# Patient Record
Sex: FEMALE | Race: WHITE | NOT HISPANIC OR LATINO | Employment: OTHER | URBAN - METROPOLITAN AREA
[De-identification: names, ages, dates, MRNs, and addresses within clinical notes are randomized per-mention and may not be internally consistent; named-entity substitution may affect disease eponyms.]

---

## 2017-01-06 DIAGNOSIS — I10 ESSENTIAL HYPERTENSION: ICD-10-CM

## 2017-01-09 RX ORDER — LISINOPRIL 2.5 MG/1
2.5 TABLET ORAL DAILY
Qty: 30 TABLET | Refills: 2 | Status: SHIPPED | OUTPATIENT
Start: 2017-01-09 | End: 2017-05-17 | Stop reason: SDUPTHER

## 2017-05-17 ENCOUNTER — OFFICE VISIT (OUTPATIENT)
Dept: FAMILY MEDICINE | Facility: CLINIC | Age: 72
End: 2017-05-17
Payer: MEDICARE

## 2017-05-17 ENCOUNTER — LAB VISIT (OUTPATIENT)
Dept: LAB | Facility: HOSPITAL | Age: 72
End: 2017-05-17
Attending: FAMILY MEDICINE
Payer: MEDICARE

## 2017-05-17 VITALS
TEMPERATURE: 98 F | HEART RATE: 88 BPM | DIASTOLIC BLOOD PRESSURE: 82 MMHG | SYSTOLIC BLOOD PRESSURE: 134 MMHG | OXYGEN SATURATION: 96 % | HEIGHT: 68 IN

## 2017-05-17 DIAGNOSIS — L03.115 BILATERAL LOWER LEG CELLULITIS: ICD-10-CM

## 2017-05-17 DIAGNOSIS — L03.116 BILATERAL LOWER LEG CELLULITIS: Primary | ICD-10-CM

## 2017-05-17 DIAGNOSIS — L03.115 BILATERAL LOWER LEG CELLULITIS: Primary | ICD-10-CM

## 2017-05-17 DIAGNOSIS — L03.116 BILATERAL LOWER LEG CELLULITIS: ICD-10-CM

## 2017-05-17 DIAGNOSIS — I10 ESSENTIAL HYPERTENSION: ICD-10-CM

## 2017-05-17 LAB
BASOPHILS # BLD AUTO: 0.06 K/UL
BASOPHILS NFR BLD: 0.7 %
CRP SERPL-MCNC: 15.5 MG/L
DIFFERENTIAL METHOD: ABNORMAL
EOSINOPHIL # BLD AUTO: 0.6 K/UL
EOSINOPHIL NFR BLD: 6.4 %
ERYTHROCYTE [DISTWIDTH] IN BLOOD BY AUTOMATED COUNT: 14.1 %
HCT VFR BLD AUTO: 41.2 %
HGB BLD-MCNC: 13.2 G/DL
LYMPHOCYTES # BLD AUTO: 2.2 K/UL
LYMPHOCYTES NFR BLD: 24.9 %
MCH RBC QN AUTO: 26.9 PG
MCHC RBC AUTO-ENTMCNC: 32 %
MCV RBC AUTO: 84 FL
MONOCYTES # BLD AUTO: 0.6 K/UL
MONOCYTES NFR BLD: 6.5 %
NEUTROPHILS # BLD AUTO: 5.3 K/UL
NEUTROPHILS NFR BLD: 61.2 %
PLATELET # BLD AUTO: 296 K/UL
PMV BLD AUTO: 11.1 FL
RBC # BLD AUTO: 4.9 M/UL
WBC # BLD AUTO: 8.73 K/UL

## 2017-05-17 PROCEDURE — 36415 COLL VENOUS BLD VENIPUNCTURE: CPT | Mod: PO

## 2017-05-17 PROCEDURE — 99214 OFFICE O/P EST MOD 30 MIN: CPT | Mod: S$PBB,,, | Performed by: FAMILY MEDICINE

## 2017-05-17 PROCEDURE — 85025 COMPLETE CBC W/AUTO DIFF WBC: CPT

## 2017-05-17 PROCEDURE — 99999 PR PBB SHADOW E&M-EST. PATIENT-LVL III: CPT | Mod: PBBFAC,,, | Performed by: FAMILY MEDICINE

## 2017-05-17 PROCEDURE — 86140 C-REACTIVE PROTEIN: CPT

## 2017-05-17 RX ORDER — LISINOPRIL 2.5 MG/1
2.5 TABLET ORAL DAILY
Qty: 30 TABLET | Refills: 2 | Status: SHIPPED | OUTPATIENT
Start: 2017-05-17 | End: 2017-05-23 | Stop reason: DRUGHIGH

## 2017-05-17 RX ORDER — FLUTICASONE PROPIONATE 50 MCG
2 SPRAY, SUSPENSION (ML) NASAL DAILY
Qty: 16 G | Refills: 0 | Status: SHIPPED | OUTPATIENT
Start: 2017-05-17 | End: 2017-11-06

## 2017-05-17 RX ORDER — SULFAMETHOXAZOLE AND TRIMETHOPRIM 800; 160 MG/1; MG/1
1 TABLET ORAL 2 TIMES DAILY
Qty: 14 TABLET | Refills: 0 | Status: SHIPPED | OUTPATIENT
Start: 2017-05-17 | End: 2017-05-24

## 2017-05-17 NOTE — PROGRESS NOTES
Subjective:       Patient ID: Niru Reyes is a 71 y.o. female.    Chief Complaint: leg infection    HPI Comments: 71 y old female with bilateral leg erythema and edema for 1 w, she also has sores, unsure if they are bug bites. Concerned about cellulitis .No fever , sob , chills, pain      Review of Systems   Constitutional: Negative.    HENT: Negative.    Respiratory: Negative.    Cardiovascular: Positive for leg swelling.       Objective:      Physical Exam   Constitutional: She is oriented to person, place, and time. She appears well-developed and well-nourished. No distress.   HENT:   Head: Normocephalic and atraumatic.   Right Ear: External ear normal.   Left Ear: External ear normal.   Mouth/Throat: No oropharyngeal exudate.   Eyes: Conjunctivae and EOM are normal. Pupils are equal, round, and reactive to light. Right eye exhibits no discharge. Left eye exhibits no discharge. No scleral icterus.   Neck: Normal range of motion. Neck supple. No JVD present. No tracheal deviation present. No thyromegaly present.   Cardiovascular: Normal rate, regular rhythm and normal heart sounds.  Exam reveals no gallop and no friction rub.    No murmur heard.  Pulmonary/Chest: Effort normal and breath sounds normal. No stridor. No respiratory distress. She has no wheezes. She has no rales. She exhibits no tenderness.   Abdominal: Soft. Bowel sounds are normal. She exhibits no distension. There is no tenderness. There is no rebound and no guarding.   Musculoskeletal: Normal range of motion.        Left lower leg: She exhibits tenderness and edema.        Legs:  skin erythema, edema, and warmth on both LE   Lymphadenopathy:     She has no cervical adenopathy.   Neurological: She is alert and oriented to person, place, and time.   Skin: Skin is warm and dry. She is not diaphoretic.   Psychiatric: She has a normal mood and affect. Her behavior is normal. Judgment and thought content normal.       Assessment:      Niru was seen today  for leg infection.    Diagnoses and all orders for this visit:    Bilateral lower leg cellulitis  -     CBC auto differential; Future  -     C-reactive protein; Future    Essential hypertension  -     lisinopril (PRINIVIL,ZESTRIL) 2.5 MG tablet; Take 1 tablet (2.5 mg total) by mouth once daily.    Other orders  -     sulfamethoxazole-trimethoprim 800-160mg (BACTRIM DS) 800-160 mg Tab; Take 1 tablet by mouth 2 (two) times daily.  -     fluticasone (FLONASE) 50 mcg/actuation nasal spray; 2 sprays by Each Nare route once daily.      Plan:   F.u in 2d . WS discussed. Keep legs elevated

## 2017-05-17 NOTE — MR AVS SNAPSHOT
[facility]  [address]  [facility] [address]  Phone: [phone]  Fax: [phone]                  Niru Reyes   2017 10:40 AM   Office Visit    Description:  Female : 1945   Provider:  Jordana Rubin MD   Department:  [facility]           Reason for Visit     leg infection           Diagnoses this Visit        Comments    Bilateral lower leg cellulitis    -  Primary     Essential hypertension                To Do List           Future Appointments        Provider Department Dept Phone    2017 2:20 PM [facility]     2017 9:40 AM Maria Eugenia Grier NP [facility] [phone]      Goals (5 Years of Data)     None       These Medications        Disp Refills Start End    sulfamethoxazole-trimethoprim 800-160mg (BACTRIM DS) 800-160 mg Tab 14 tablet 0 2017    Take 1 tablet by mouth 2 (two) times daily. - Oral    Pharmacy: [facility] [address] [place] [id number] [facility] [address] [place] [address] Ph #: [phone]       lisinopril (PRINIVIL,ZESTRIL) 2.5 MG tablet 30 tablet 2 2017    Take 1 tablet (2.5 mg total) by mouth once daily. - Oral    Pharmacy: [facility] [address] [place] [id number] [place] [address] Ph #: [phone]       fluticasone (FLONASE) 50 mcg/actuation nasal spray 16 g 0 2017     2 sprays by Each Nare route once daily. - Each Nare    Pharmacy: [facility] [address] [place] [id number] [facility] [address] [place] [address] Ph #: [phone]       Notes to Pharmacy: **Patient requests 90 days supply**      [facility]     [facility] Care Line -  Assistance  Unless otherwise directed by your provider, please contact [facility], our nurse care line that is available for  assistance.     Registered nurses in the [facility] provide: appointment scheduling, clinical advisement, health education, and other advisory services.  Call: [phone] (toll free)               Medications           Message regarding Medications     Verify the changes and/or additions to your medication regime listed below are the same as discussed with your clinician today.  If any of these changes or additions are incorrect, please notify your healthcare provider.        START taking these NEW medications        Refills    sulfamethoxazole-trimethoprim 800-160mg (BACTRIM DS) 800-160 mg Tab 0    Sig: Take 1 tablet by mouth 2 (two) times daily.    Class: Normal    Route: Oral      CHANGE how you are taking these medications     Start Taking Instead of    fluticasone (FLONASE) 50 mcg/actuation nasal spray fluticasone (FLONASE) 50 mcg/actuation nasal spray    Dosage:  2 sprays by Each Nare route once daily. Dosage:  USE TWO SPRAYS IN EACH NOSTRIL ONCE DAILY    Reason for Change:  Reorder       STOP taking these medications     ondansetron (ZOFRAN-ODT) 4 MG TbDL Take 2 tablets (8 mg total) by mouth every 6 (six) hours as needed.           Verify that the below list of medications is an accurate representation of the medications you are currently taking.  If none reported, the list may be blank. If incorrect, please contact your healthcare provider. Carry this list with you in case of emergency.           Current Medications     citalopram (CELEXA) 10 MG tablet Take 1 tablet (10 mg total) by mouth once daily.    hydrochlorothiazide (HYDRODIURIL) 25 MG tablet Take 1 tablet (25 mg total) by mouth once daily.    cetirizine (ZYRTEC) 10 MG tablet Take 1 tablet (10 mg total) by mouth every evening.    fluticasone (FLONASE) 50 mcg/actuation nasal spray 2 sprays by Each Nare route once daily.    lisinopril (PRINIVIL,ZESTRIL) 2.5 MG tablet Take 1 tablet (2.5 mg total) by mouth once daily.    sulfamethoxazole-trimethoprim 800-160mg (BACTRIM DS) 800-160 mg Tab Take 1  "tablet by mouth 2 (two) times daily.    tramadol (ULTRAM) 50 mg tablet Take 50 mg by mouth every 6 (six) hours as needed for Pain.           Clinical Reference Information           Your Vitals Were     BP Pulse Temp Height SpO2       134/82 (BP Location: Right arm, Patient Position: Sitting, BP Method: Manual) 88 98.3 °F (36.8 °C) (Oral) 5' 8" (1.727 m) 96%       Blood Pressure          Most Recent Value    BP  134/82      Allergies as of 5/17/2017     No Known Allergies      Immunizations Administered on Date of Encounter - 5/17/2017     None      Orders Placed During Today's Visit     Future Labs/Procedures Expected by Expires    C-reactive protein  5/17/2017 7/16/2018    CBC auto differential  5/17/2017 8/15/2017      [facility] Sign-Up     Activating your [facility] account is as easy as 1-2-3!     1) Visit [email or web address], select Sign Up Now, enter this activation code and your date of birth, then select Next.  [email or web address]  Expires: 7/1/2017 11:32 AM      2) Create a username and password to use when you visit [facility] in the future and select a security question in case you lose your password and select Next.    3) Enter your e-mail address and click Sign Up!    Additional Information  If you have questions, please e-mail [email or web address] or call [phone] to talk to our [facility] staff. Remember, [facility] is NOT to be used for urgent needs. For medical emergencies, dial 911.         Instructions     any of these at the pharmacy .  estefany Butt   For diarrhea prevention          Language Assistance Services     ATTENTION: Language assistance services are available, free of charge. Please call [phone].      ATENCIÓN: Si habla español, tiene a montanez disposición servicios gratuitos de asistencia lingüística. Llame al [phone].     CHÚ Ý: N?u b?n nói Ti?ng Vi?t, có các d?ch v? h? tr? ngôn ng? mi?n phí dành cho b?n. G?i s? [phone].         [facility] " complies with applicable Federal civil rights laws and does not discriminate on the basis of race, color, national origin, age, disability, or sex.

## 2017-05-19 ENCOUNTER — OFFICE VISIT (OUTPATIENT)
Dept: FAMILY MEDICINE | Facility: CLINIC | Age: 72
End: 2017-05-19
Payer: MEDICARE

## 2017-05-19 VITALS
BODY MASS INDEX: 44.41 KG/M2 | SYSTOLIC BLOOD PRESSURE: 160 MMHG | OXYGEN SATURATION: 96 % | DIASTOLIC BLOOD PRESSURE: 81 MMHG | WEIGHT: 293 LBS | HEIGHT: 68 IN | RESPIRATION RATE: 18 BRPM | HEART RATE: 72 BPM

## 2017-05-19 DIAGNOSIS — L03.115 BILATERAL LOWER LEG CELLULITIS: Primary | ICD-10-CM

## 2017-05-19 DIAGNOSIS — L03.116 BILATERAL LOWER LEG CELLULITIS: Primary | ICD-10-CM

## 2017-05-19 DIAGNOSIS — F32.2 SEVERE SINGLE CURRENT EPISODE OF MAJOR DEPRESSIVE DISORDER, WITHOUT PSYCHOTIC FEATURES: ICD-10-CM

## 2017-05-19 DIAGNOSIS — I10 ESSENTIAL HYPERTENSION: ICD-10-CM

## 2017-05-19 PROCEDURE — 99999 PR PBB SHADOW E&M-EST. PATIENT-LVL III: CPT | Mod: PBBFAC,,, | Performed by: NURSE PRACTITIONER

## 2017-05-19 PROCEDURE — 99213 OFFICE O/P EST LOW 20 MIN: CPT | Mod: PBBFAC,PO | Performed by: NURSE PRACTITIONER

## 2017-05-19 PROCEDURE — 99213 OFFICE O/P EST LOW 20 MIN: CPT | Mod: S$PBB,,, | Performed by: NURSE PRACTITIONER

## 2017-05-19 PROCEDURE — 96372 THER/PROPH/DIAG INJ SC/IM: CPT | Mod: PBBFAC,PO

## 2017-05-19 RX ORDER — CEFTRIAXONE 1 G/1
1 INJECTION, POWDER, FOR SOLUTION INTRAMUSCULAR; INTRAVENOUS
Status: COMPLETED | OUTPATIENT
Start: 2017-05-19 | End: 2017-05-19

## 2017-05-19 RX ADMIN — CEFTRIAXONE 1 G: 1 INJECTION, POWDER, FOR SOLUTION INTRAMUSCULAR; INTRAVENOUS at 10:05

## 2017-05-19 NOTE — PROGRESS NOTES
Subjective:       Patient ID: Niru Reyes is a 72 y.o. female.    Chief Complaint: No chief complaint on file.  Pt reports to clinic for follow up evaluation of cellulitis to bilateral lower extremities. She was seen in clinic 2 days ago and started on bactrim.  CBC was negative for elevated WBC, CRP=15.5. Pt denies fevers or chills.  Pt is crying in office.  Reports her sister  2 recently from sepsis related to cellulitis.  Cellulitis resulted in amputation of leg.  Pt is afraid the same will happen to her.  Pt's blood pressure is elevated. Has taken medication. Denies chest pain headache or dizziness. Takes celexa daily to management depression  HPI  Review of Systems   Constitutional: Negative for chills and fever.   Cardiovascular: Negative for chest pain and palpitations.   Skin: Positive for wound.   Neurological: Negative for headaches.   Psychiatric/Behavioral: Positive for dysphoric mood. The patient is nervous/anxious.        Objective:      Physical Exam   Constitutional: She is oriented to person, place, and time. She has a sickly appearance.   HENT:   Head: Normocephalic.   Cardiovascular: Normal rate and normal heart sounds.    Pulmonary/Chest: Effort normal and breath sounds normal.   Neurological: She is alert and oriented to person, place, and time.   Skin: There is erythema.        Posterior lower extremities are erythematous, cool to touch, no drainage, edematous   Psychiatric: Her mood appears anxious. She exhibits a depressed mood.   Vitals reviewed.      Assessment:       1. Bilateral lower leg cellulitis    2. Severe single current episode of major depressive disorder, without psychotic features        Plan:   Bilateral lower leg cellulitis  -     cefTRIAXone injection 1 g; Inject 1 g into the muscle one time.  -continue bactrim. Monitor for fever. Skin marker used to border erythema, pt instructed to seek emergency evaluation if erythema exceeds border over the weekend. Verbalized  understanding  Severe single current episode of major depressive disorder, without psychotic features  -pt is emotionally upset in office today.  Explained to patient in great detail about cellulitis and worsening symptoms.  -will consider increasing dose on celexa during follow up visit.    Essential htn  -pt's blood pressure did decrease in office after allowing time to calm down.  Continue current treatment plan  Will re evaluate bp in 3 day during follow up.  If remains elevated will adjust lisinopril  -diet and exercise  -likely related to emotional upset.       Return in about 3 days (around 5/22/2017).

## 2017-05-19 NOTE — MR AVS SNAPSHOT
[facility]  [address]  [place] [address]  Phone: [phone]  Fax: [phone]                  Niru Reyes   2017 9:40 AM   Office Visit    Description:  Female : 1945   Provider:  Maria Eugenia Grier NP   Department:  [facility]           Diagnoses this Visit        Comments    Bilateral lower leg cellulitis    -  Primary            To Do List           Future Appointments        Provider Department Dept Phone    2017 9:40 AM Mraia Eugenia Grier NP [facility] [phone]      Goals (5 Years of Data)     None      Follow-Up and Disposition     Return in about 3 days (around 2017).      [facility]     [facility] Care [facility] -  Assistance  Unless otherwise directed by your provider, please contact [facility], our nurse care line that is available for  assistance.     Registered nurses in the [facility] provide: appointment scheduling, clinical advisement, health education, and other advisory services.  Call: [phone] (toll free)               Medications           Message regarding Medications     Verify the changes and/or additions to your medication regime listed below are the same as discussed with your clinician today.  If any of these changes or additions are incorrect, please notify your healthcare provider.        These medications were administered today        Dose Freq    cefTRIAXone injection 1 g 1 g Clinic/HOD 1 time    Sig: Inject 1 g into the muscle one time.    Class: Normal    Route: Intramuscular           Verify that the below list of medications is an accurate representation of the medications you are currently taking.  If none reported, the list may be blank. If incorrect, please contact your healthcare provider. Carry this list with you in case of emergency.           Current Medications     citalopram (CELEXA) 10 MG tablet Take 1 tablet (10 mg total) by  "mouth once daily.    hydrochlorothiazide (HYDRODIURIL) 25 MG tablet Take 1 tablet (25 mg total) by mouth once daily.    lisinopril (PRINIVIL,ZESTRIL) 2.5 MG tablet Take 1 tablet (2.5 mg total) by mouth once daily.    sulfamethoxazole-trimethoprim 800-160mg (BACTRIM DS) 800-160 mg Tab Take 1 tablet by mouth 2 (two) times daily.    cetirizine (ZYRTEC) 10 MG tablet Take 1 tablet (10 mg total) by mouth every evening.    fluticasone (FLONASE) 50 mcg/actuation nasal spray 2 sprays by Each Nare route once daily.    tramadol (ULTRAM) 50 mg tablet Take 50 mg by mouth every 6 (six) hours as needed for Pain.           Clinical Reference Information           Your Vitals Were     BP Pulse Resp Height Weight SpO2    160/81 72 18 5' 8" (1.727 m) 163 kg (359 lb 5.6 oz) 96%    BMI                54.64 kg/m2          Blood Pressure          Most Recent Value    BP  (!)  160/81      Allergies as of 5/19/2017     No Known Allergies      Immunizations Administered on Date of Encounter - 5/19/2017     None      Administrations This Visit     cefTRIAXone injection 1 g     Admin Date Action Dose Route Administered By             05/19/2017 Given 1 g Intramuscular Nereyda Garibay LPN                      [facility] Sign-Up     Activating your [facility] account is as easy as 1-2-3!     1) Visit [email or web address], select Sign Up Now, enter this activation code and your date of birth, then select Next.  [email or web address]  Expires: 7/1/2017 11:32 AM      2) Create a username and password to use when you visit [facility] in the future and select a security question in case you lose your password and select Next.    3) Enter your e-mail address and click Sign Up!    Additional Information  If you have questions, please e-mail [email or web address] or call [phone] to talk to our [facility] staff. Remember, [facility] is NOT to be used for urgent needs. For medical emergencies, dial 911.         Language Assistance Services     ATTENTION: Language " assistance services are available, free of charge. Please call [phone].      ATENCIÓN: Si habla joseph, tiene a montanez disposición servicios gratuitos de asistencia lingüística. Llame al [phone].     CHÚ Ý: N?u b?n nói Ti?ng Vi?t, có các d?ch v? h? tr? ngôn ng? mi?n phí dành cho b?n. G?i s? [phone].         [facility] complies with applicable Federal civil rights laws and does not discriminate on the basis of race, color, national origin, age, disability, or sex.

## 2017-05-23 ENCOUNTER — OFFICE VISIT (OUTPATIENT)
Dept: FAMILY MEDICINE | Facility: CLINIC | Age: 72
End: 2017-05-23
Payer: MEDICARE

## 2017-05-23 VITALS
SYSTOLIC BLOOD PRESSURE: 149 MMHG | HEIGHT: 68 IN | RESPIRATION RATE: 18 BRPM | DIASTOLIC BLOOD PRESSURE: 74 MMHG | OXYGEN SATURATION: 95 % | WEIGHT: 293 LBS | HEART RATE: 89 BPM | BODY MASS INDEX: 44.41 KG/M2

## 2017-05-23 DIAGNOSIS — L03.116 CELLULITIS OF BOTH LOWER EXTREMITIES: Primary | ICD-10-CM

## 2017-05-23 DIAGNOSIS — I10 ESSENTIAL HYPERTENSION: ICD-10-CM

## 2017-05-23 DIAGNOSIS — L03.115 CELLULITIS OF BOTH LOWER EXTREMITIES: Primary | ICD-10-CM

## 2017-05-23 PROCEDURE — 99214 OFFICE O/P EST MOD 30 MIN: CPT | Mod: PBBFAC,PO | Performed by: NURSE PRACTITIONER

## 2017-05-23 PROCEDURE — 99999 PR PBB SHADOW E&M-EST. PATIENT-LVL IV: CPT | Mod: PBBFAC,,, | Performed by: NURSE PRACTITIONER

## 2017-05-23 PROCEDURE — 99213 OFFICE O/P EST LOW 20 MIN: CPT | Mod: S$PBB,,, | Performed by: NURSE PRACTITIONER

## 2017-05-23 RX ORDER — LISINOPRIL 5 MG/1
5 TABLET ORAL DAILY
Qty: 90 TABLET | Refills: 0 | Status: SHIPPED | OUTPATIENT
Start: 2017-05-23 | End: 2017-06-08 | Stop reason: DRUGHIGH

## 2017-05-23 NOTE — PROGRESS NOTES
Subjective:       Patient ID: Niru Reyes is a 72 y.o. female.    Chief Complaint: Follow-up  pt reports to clinic for cellulitis follow up.  She is being treated with bactrim.  Denies fever, or chills.  Pt's notes decreased in erythema.  Bp is noted to be elevated. Complaint with medication.  Denies chest pain, headache or dizziness.   HPI  Review of Systems   Constitutional: Negative for fever.   Respiratory: Negative.    Cardiovascular: Negative.  Negative for chest pain.   Gastrointestinal: Negative.    Genitourinary: Negative.    Skin: Positive for color change.       Objective:      Physical Exam   Constitutional: She is oriented to person, place, and time. She appears well-developed.   HENT:   Head: Normocephalic.   Eyes: EOM are normal.   Neck: Neck supple.   Cardiovascular: Normal rate and normal heart sounds.    Pulmonary/Chest: Effort normal and breath sounds normal.   Neurological: She is alert and oriented to person, place, and time.   Skin: Skin is warm and dry. No erythema.        Vitals reviewed.      Assessment:       1. Cellulitis of both lower extremities    2. Essential hypertension        Plan:   Cellulitis of both lower extremities  Complete previous prescribed antibiotics   Essential hypertension  -     lisinopril (PRINIVIL,ZESTRIL) 5 MG tablet; Take 1 tablet (5 mg total) by mouth once daily.  Dispense: 90 tablet; Refill: 0  -lisinopril increased to 5mg/day  -diet and exercise to aid in treatment  -pt will schedule a nurse visit in 2 weeks to re evaluate. If remains elevated will re adjust bp meds.     No Follow-up on file.

## 2017-06-06 ENCOUNTER — TELEPHONE (OUTPATIENT)
Dept: FAMILY MEDICINE | Facility: CLINIC | Age: 72
End: 2017-06-06

## 2017-06-08 ENCOUNTER — OFFICE VISIT (OUTPATIENT)
Dept: FAMILY MEDICINE | Facility: CLINIC | Age: 72
End: 2017-06-08
Payer: MEDICARE

## 2017-06-08 VITALS
SYSTOLIC BLOOD PRESSURE: 168 MMHG | BODY MASS INDEX: 44.41 KG/M2 | OXYGEN SATURATION: 98 % | RESPIRATION RATE: 18 BRPM | HEART RATE: 80 BPM | DIASTOLIC BLOOD PRESSURE: 83 MMHG | TEMPERATURE: 98 F | HEIGHT: 68 IN | WEIGHT: 293 LBS

## 2017-06-08 DIAGNOSIS — F32.1 MODERATE SINGLE CURRENT EPISODE OF MAJOR DEPRESSIVE DISORDER: ICD-10-CM

## 2017-06-08 DIAGNOSIS — I10 ESSENTIAL HYPERTENSION: ICD-10-CM

## 2017-06-08 DIAGNOSIS — I10 ESSENTIAL HYPERTENSION: Primary | ICD-10-CM

## 2017-06-08 PROCEDURE — 99213 OFFICE O/P EST LOW 20 MIN: CPT | Mod: PBBFAC,PO | Performed by: NURSE PRACTITIONER

## 2017-06-08 PROCEDURE — 99213 OFFICE O/P EST LOW 20 MIN: CPT | Mod: S$PBB,,, | Performed by: NURSE PRACTITIONER

## 2017-06-08 PROCEDURE — 99999 PR PBB SHADOW E&M-EST. PATIENT-LVL III: CPT | Mod: PBBFAC,,, | Performed by: NURSE PRACTITIONER

## 2017-06-08 PROCEDURE — 1159F MED LIST DOCD IN RCRD: CPT | Mod: ,,, | Performed by: NURSE PRACTITIONER

## 2017-06-08 RX ORDER — CITALOPRAM 20 MG/1
20 TABLET, FILM COATED ORAL DAILY
Qty: 30 TABLET | Refills: 11 | Status: SHIPPED | OUTPATIENT
Start: 2017-06-08 | End: 2017-06-08 | Stop reason: SDUPTHER

## 2017-06-08 RX ORDER — CITALOPRAM 20 MG/1
20 TABLET, FILM COATED ORAL DAILY
Qty: 90 TABLET | Refills: 3 | Status: SHIPPED | OUTPATIENT
Start: 2017-06-08 | End: 2017-11-13 | Stop reason: SDUPTHER

## 2017-06-08 RX ORDER — LISINOPRIL 10 MG/1
10 TABLET ORAL DAILY
Qty: 30 TABLET | Refills: 0 | Status: SHIPPED | OUTPATIENT
Start: 2017-06-08 | End: 2017-06-08 | Stop reason: SDUPTHER

## 2017-06-08 RX ORDER — LISINOPRIL 10 MG/1
10 TABLET ORAL DAILY
Qty: 90 TABLET | Refills: 3 | Status: SHIPPED | OUTPATIENT
Start: 2017-06-08 | End: 2018-08-06 | Stop reason: SDUPTHER

## 2017-06-08 NOTE — PROGRESS NOTES
"Subjective:       Patient ID: Niru Reyes is a 72 y.o. female.    Chief Complaint: Follow-up  Pt reports to clinic for nurse's visit to re evaluate blood pressure.  Pt denies chest pain headache or dizziness.  Reports she has been emotionally upset.  "every little thing upsets me".  This has been a daily occurrence since sister's death. Tries to monitor diet. No exercise regimen.   Hypertension   This is a chronic problem. The current episode started more than 1 year ago. The problem has been gradually worsening since onset. The problem is uncontrolled. Pertinent negatives include no chest pain, headaches or palpitations. There are no associated agents to hypertension. Risk factors for coronary artery disease include obesity, sedentary lifestyle and family history. Past treatments include diuretics and ACE inhibitors. The current treatment provides mild improvement.     Review of Systems   Constitutional: Negative.    Respiratory: Negative.    Cardiovascular: Negative for chest pain and palpitations.   Musculoskeletal: Positive for arthralgias and myalgias.   Neurological: Negative for headaches.   Psychiatric/Behavioral: Positive for dysphoric mood.       Objective:      Physical Exam   Constitutional: She is oriented to person, place, and time. She appears well-developed.   HENT:   Head: Normocephalic.   Eyes: EOM are normal.   Neck: Neck supple.   Cardiovascular: Normal rate and normal heart sounds.    Abdominal:   obese   Neurological: She is alert and oriented to person, place, and time.   Skin: Skin is warm and dry.   Psychiatric: Her mood appears anxious.   Vitals reviewed.      Assessment:       1. Essential hypertension    2. Moderate single current episode of major depressive disorder        Plan:   Essential hypertension  -     lisinopril 10 MG tablet; Take 1 tablet (10 mg total) by mouth once daily.  Dispense: 30 tablet; Refill: 0  Uncontrolled. Schedule follow up nurse visit in 2 weeks.   -diet and " exercise to aid in management.   Moderate single current episode of major depressive disorder  -     citalopram (CELEXA) 20 MG tablet; Take 1 tablet (20 mg total) by mouth once daily.  Dispense: 30 tablet; Refill: 11      No Follow-up on file.

## 2017-06-22 ENCOUNTER — LAB VISIT (OUTPATIENT)
Dept: LAB | Facility: HOSPITAL | Age: 72
End: 2017-06-22
Attending: NURSE PRACTITIONER
Payer: MEDICARE

## 2017-06-22 ENCOUNTER — OFFICE VISIT (OUTPATIENT)
Dept: FAMILY MEDICINE | Facility: CLINIC | Age: 72
End: 2017-06-22
Payer: MEDICARE

## 2017-06-22 ENCOUNTER — TELEPHONE (OUTPATIENT)
Dept: FAMILY MEDICINE | Facility: CLINIC | Age: 72
End: 2017-06-22

## 2017-06-22 VITALS
BODY MASS INDEX: 44.41 KG/M2 | SYSTOLIC BLOOD PRESSURE: 128 MMHG | HEIGHT: 68 IN | HEART RATE: 80 BPM | TEMPERATURE: 97 F | RESPIRATION RATE: 18 BRPM | OXYGEN SATURATION: 95 % | DIASTOLIC BLOOD PRESSURE: 74 MMHG | WEIGHT: 293 LBS

## 2017-06-22 DIAGNOSIS — L03.115 CELLULITIS OF BOTH LOWER EXTREMITIES: Primary | ICD-10-CM

## 2017-06-22 DIAGNOSIS — M79.661 PAIN OF RIGHT LOWER LEG: ICD-10-CM

## 2017-06-22 DIAGNOSIS — L03.115 CELLULITIS OF BOTH LOWER EXTREMITIES: ICD-10-CM

## 2017-06-22 DIAGNOSIS — L03.116 CELLULITIS OF BOTH LOWER EXTREMITIES: Primary | ICD-10-CM

## 2017-06-22 DIAGNOSIS — L03.116 CELLULITIS OF BOTH LOWER EXTREMITIES: ICD-10-CM

## 2017-06-22 LAB
BASOPHILS # BLD AUTO: 0.07 K/UL
BASOPHILS NFR BLD: 0.8 %
CRP SERPL-MCNC: 19.7 MG/L
DIFFERENTIAL METHOD: ABNORMAL
EOSINOPHIL # BLD AUTO: 0.3 K/UL
EOSINOPHIL NFR BLD: 3.6 %
ERYTHROCYTE [DISTWIDTH] IN BLOOD BY AUTOMATED COUNT: 14.1 %
ERYTHROCYTE [SEDIMENTATION RATE] IN BLOOD BY WESTERGREN METHOD: 22 MM/HR
HCT VFR BLD AUTO: 42.8 %
HGB BLD-MCNC: 13.5 G/DL
LYMPHOCYTES # BLD AUTO: 2 K/UL
LYMPHOCYTES NFR BLD: 22.9 %
MCH RBC QN AUTO: 26 PG
MCHC RBC AUTO-ENTMCNC: 31.5 %
MCV RBC AUTO: 82 FL
MONOCYTES # BLD AUTO: 0.6 K/UL
MONOCYTES NFR BLD: 6.7 %
NEUTROPHILS # BLD AUTO: 5.8 K/UL
NEUTROPHILS NFR BLD: 65.7 %
PLATELET # BLD AUTO: 296 K/UL
PMV BLD AUTO: 11.2 FL
RBC # BLD AUTO: 5.2 M/UL
WBC # BLD AUTO: 8.81 K/UL

## 2017-06-22 PROCEDURE — 85025 COMPLETE CBC W/AUTO DIFF WBC: CPT

## 2017-06-22 PROCEDURE — 99999 PR PBB SHADOW E&M-EST. PATIENT-LVL III: CPT | Mod: PBBFAC,,, | Performed by: NURSE PRACTITIONER

## 2017-06-22 PROCEDURE — 85651 RBC SED RATE NONAUTOMATED: CPT

## 2017-06-22 PROCEDURE — 36415 COLL VENOUS BLD VENIPUNCTURE: CPT | Mod: PO

## 2017-06-22 PROCEDURE — 86140 C-REACTIVE PROTEIN: CPT

## 2017-06-22 PROCEDURE — 1159F MED LIST DOCD IN RCRD: CPT | Mod: ,,, | Performed by: NURSE PRACTITIONER

## 2017-06-22 PROCEDURE — 99214 OFFICE O/P EST MOD 30 MIN: CPT | Mod: S$PBB,,, | Performed by: NURSE PRACTITIONER

## 2017-06-22 RX ORDER — SULFAMETHOXAZOLE AND TRIMETHOPRIM 800; 160 MG/1; MG/1
1 TABLET ORAL 2 TIMES DAILY
Qty: 14 TABLET | Refills: 0 | Status: SHIPPED | OUTPATIENT
Start: 2017-06-22 | End: 2017-07-02

## 2017-06-22 NOTE — TELEPHONE ENCOUNTER
Spoke with pt. NV cancelled. Pt will be seen by Maria Eugenia Grier NP this afternoon for possible cellulitis.

## 2017-06-22 NOTE — TELEPHONE ENCOUNTER
----- Message from Milady Palma sent at 6/22/2017  9:55 AM CDT -----  Contact: [phone]  Patient will not be able to make nurse visit scheduled for 10:00 a.m due to an emergency. Patient would like to schedule another visit for later on this afternoon. Please call back at [phone].    Thanks,  Milady Palma

## 2017-06-22 NOTE — PROGRESS NOTES
Subjective:       Patient ID: Niru Reyes is a 72 y.o. female.    Chief Complaint: Recurrent Skin Infections  Pt reports to clinic for evaluation of discoloration bilateral legs.  Onset of erythema on yesterday.  History of cellulitis.  Negative for fever or chills.  Pt notes having scabs  HPI  Review of Systems   Constitutional: Negative.  Negative for chills and fever.   Respiratory: Negative.    Skin: Positive for color change and wound.       Objective:      Physical Exam   Constitutional: She appears well-developed and well-nourished.   HENT:   Head: Normocephalic.   Eyes: EOM are normal.   Neck: Neck supple.   Cardiovascular: Normal rate and normal heart sounds.    Pulses:       Posterior tibial pulses are 1+ on the right side, and 1+ on the left side.   Pulmonary/Chest: Effort normal and breath sounds normal.   Musculoskeletal: She exhibits edema (BLE).   Skin: There is erythema (bilateral lower extremities, cool to touch, left leg scab).   Vitals reviewed.      Assessment:       1. Cellulitis of both lower extremities    2. Pain of right lower leg         Plan:   Cellulitis of both lower extremities  -     sulfamethoxazole-trimethoprim 800-160mg (BACTRIM DS) 800-160 mg Tab; Take 1 tablet by mouth 2 (two) times daily.  Dispense: 14 tablet; Refill: 0  -     CBC auto differential; Future; Expected date: 06/22/2017  -     C-reactive protein; Future; Expected date: 06/22/2017  -     Sedimentation rate, manual; Future; Expected date: 06/22/2017  -     US Lower Extremity Arteries Bilateral; Future; Expected date: 06/22/2017    Pain of right lower leg   -     US Lower Extremity Arteries Bilateral; Future; Expected date: 06/22/2017  Decreased pedal pulse bilateral lower extremity.     Follow up in 1 week

## 2017-07-05 ENCOUNTER — TELEPHONE (OUTPATIENT)
Dept: RADIOLOGY | Facility: HOSPITAL | Age: 72
End: 2017-07-05

## 2017-07-06 ENCOUNTER — HOSPITAL ENCOUNTER (OUTPATIENT)
Dept: RADIOLOGY | Facility: HOSPITAL | Age: 72
Discharge: HOME | End: 2017-07-06
Attending: NURSE PRACTITIONER
Payer: MEDICARE

## 2017-07-06 DIAGNOSIS — M79.661 PAIN OF RIGHT LOWER LEG: ICD-10-CM

## 2017-07-06 DIAGNOSIS — L03.115 CELLULITIS OF BOTH LOWER EXTREMITIES: ICD-10-CM

## 2017-07-06 DIAGNOSIS — L03.116 CELLULITIS OF BOTH LOWER EXTREMITIES: ICD-10-CM

## 2017-07-06 PROCEDURE — 93925 LOWER EXTREMITY STUDY: CPT | Mod: TC

## 2017-07-06 PROCEDURE — 93925 LOWER EXTREMITY STUDY: CPT | Mod: 26,,, | Performed by: RADIOLOGY

## 2017-08-15 ENCOUNTER — OFFICE VISIT (OUTPATIENT)
Dept: FAMILY MEDICINE | Facility: CLINIC | Age: 72
End: 2017-08-15
Payer: MEDICARE

## 2017-08-15 VITALS
HEART RATE: 80 BPM | SYSTOLIC BLOOD PRESSURE: 124 MMHG | HEIGHT: 68 IN | OXYGEN SATURATION: 98 % | TEMPERATURE: 98 F | DIASTOLIC BLOOD PRESSURE: 77 MMHG | RESPIRATION RATE: 20 BRPM | BODY MASS INDEX: 44.41 KG/M2 | WEIGHT: 293 LBS

## 2017-08-15 DIAGNOSIS — Z12.39 SCREENING FOR MALIGNANT NEOPLASM OF BREAST: ICD-10-CM

## 2017-08-15 DIAGNOSIS — I70.209 ATHEROSCLEROSIS OF ARTERIES OF EXTREMITIES: ICD-10-CM

## 2017-08-15 DIAGNOSIS — Z23 IMMUNIZATION, VIRAL DISEASE: ICD-10-CM

## 2017-08-15 DIAGNOSIS — Z13.820 SCREENING FOR OSTEOPOROSIS: ICD-10-CM

## 2017-08-15 DIAGNOSIS — M85.9 DISORDER OF BONE DENSITY AND STRUCTURE, UNSPECIFIED: ICD-10-CM

## 2017-08-15 DIAGNOSIS — L02.419 ABSCESS OF ANKLE: Primary | ICD-10-CM

## 2017-08-15 PROCEDURE — 99215 OFFICE O/P EST HI 40 MIN: CPT | Mod: PBBFAC,PO | Performed by: NURSE PRACTITIONER

## 2017-08-15 PROCEDURE — 3074F SYST BP LT 130 MM HG: CPT | Mod: ,,, | Performed by: NURSE PRACTITIONER

## 2017-08-15 PROCEDURE — 3008F BODY MASS INDEX DOCD: CPT | Mod: ,,, | Performed by: NURSE PRACTITIONER

## 2017-08-15 PROCEDURE — 1159F MED LIST DOCD IN RCRD: CPT | Mod: ,,, | Performed by: NURSE PRACTITIONER

## 2017-08-15 PROCEDURE — 99214 OFFICE O/P EST MOD 30 MIN: CPT | Mod: S$PBB,,, | Performed by: NURSE PRACTITIONER

## 2017-08-15 PROCEDURE — 3078F DIAST BP <80 MM HG: CPT | Mod: ,,, | Performed by: NURSE PRACTITIONER

## 2017-08-15 PROCEDURE — 99999 PR PBB SHADOW E&M-EST. PATIENT-LVL V: CPT | Mod: PBBFAC,,, | Performed by: NURSE PRACTITIONER

## 2017-08-15 RX ORDER — ASPIRIN 81 MG/1
81 TABLET ORAL DAILY
Status: ON HOLD | COMMUNITY
End: 2023-03-13 | Stop reason: HOSPADM

## 2017-08-15 RX ORDER — BACITRACIN 500 [USP'U]/G
OINTMENT TOPICAL 2 TIMES DAILY
Refills: 0 | COMMUNITY
Start: 2017-08-15 | End: 2017-11-29 | Stop reason: SDUPTHER

## 2017-08-15 RX ORDER — ATORVASTATIN CALCIUM 20 MG/1
20 TABLET, FILM COATED ORAL DAILY
Qty: 90 TABLET | Refills: 3 | Status: SHIPPED | OUTPATIENT
Start: 2017-08-15 | End: 2019-09-19 | Stop reason: SDUPTHER

## 2017-08-15 RX ORDER — HYDROCODONE BITARTRATE AND ACETAMINOPHEN 5; 325 MG/1; MG/1
1 TABLET ORAL EVERY 6 HOURS PRN
Qty: 20 TABLET | Refills: 0 | Status: SHIPPED | OUTPATIENT
Start: 2017-08-15 | End: 2017-08-25

## 2017-08-15 RX ORDER — NAPROXEN SODIUM 220 MG
220 TABLET ORAL 2 TIMES DAILY WITH MEALS
COMMUNITY
End: 2017-11-06

## 2017-08-15 RX ORDER — SULFAMETHOXAZOLE AND TRIMETHOPRIM 800; 160 MG/1; MG/1
1 TABLET ORAL 2 TIMES DAILY
Qty: 14 TABLET | Refills: 0 | Status: SHIPPED | OUTPATIENT
Start: 2017-08-15 | End: 2017-08-25

## 2017-08-15 NOTE — PROGRESS NOTES
Subjective:       Patient ID: Niru Reyes is a 72 y.o. female.    Chief Complaint: Lump (left ankle)  Pt reports to clinic with chief complaint of painful enlarged area on left ankle.  Onset 2 days ago.  Notes erythema.  No drainage.  Pt agrees to mammogram and bone density in the fall.  Would like to hold off on colonoscopy.    Abscess   Chronicity:  NewProgression Since Onset: gradually worsening  Location:  Leg  Characteristics: painful, redness and swelling    Pain Scale:  7/10  Treatments Tried:  Nothing  Relieved by:  Nothing    Review of Systems   Constitutional: Negative.    HENT: Negative.    Respiratory: Negative.    Cardiovascular: Positive for leg swelling. Negative for chest pain and palpitations.   Gastrointestinal: Negative.    Genitourinary: Negative.    Musculoskeletal: Positive for arthralgias, back pain and myalgias.   Skin: Positive for color change and wound.       Objective:      Physical Exam   Constitutional: She is oriented to person, place, and time. She appears well-developed.   obese   HENT:   Head: Normocephalic.   Eyes: EOM are normal.   Neck: Neck supple.   Cardiovascular: Normal rate and normal heart sounds.    Pulmonary/Chest: Effort normal and breath sounds normal.   Musculoskeletal: Normal range of motion.   Neurological: She is alert and oriented to person, place, and time.   Skin: There is erythema.        Psychiatric: She has a normal mood and affect.   Vitals reviewed.      Assessment:       1. Abscess of ankle    2. Atherosclerosis of arteries of extremities    3. Screening for malignant neoplasm of breast    4. Immunization, viral disease    5. Screening for osteoporosis    6. Disorder of bone density and structure, unspecified         Plan:   Abscess of ankle  -     sulfamethoxazole-trimethoprim 800-160mg (BACTRIM DS) 800-160 mg Tab; Take 1 tablet by mouth 2 (two) times daily.  Dispense: 14 tablet; Refill: 0  -     bacitracin 500 unit/gram ointment; Apply topically 2 (two)  times daily.; Refill: 0  -     hydrocodone-acetaminophen 5-325mg (NORCO) 5-325 mg per tablet; Take 1 tablet by mouth every 6 (six) hours as needed for Pain.  Dispense: 20 tablet; Refill: 0  Home care instructions given.  If worsens return to clinic.    Atherosclerosis of arteries of extremities  -     atorvastatin (LIPITOR) 20 MG tablet; Take 1 tablet (20 mg total) by mouth once daily.  Dispense: 90 tablet; Refill: 3  Us of bilateral ext 6/22/17 There is mild atherosclerotic plaque in the lower extremity arteries bilaterally  Screening for malignant neoplasm of breast    Immunization, viral disease  -     zoster vaccine live, PF, (ZOSTAVAX, PF,) 19,400 unit/0.65 mL injection; Inject 19,400 Units into the skin once.  Dispense: 1 vial; Refill: 0    Screening for osteoporosis  -     DXA Bone Density Appendicular Skeleton; Future; Expected date: 08/15/2017    Disorder of bone density and structure, unspecified   -     DXA Bone Density Appendicular Skeleton; Future; Expected date: 08/15/2017      No Follow-up on file.

## 2017-08-16 ENCOUNTER — TELEPHONE (OUTPATIENT)
Dept: FAMILY MEDICINE | Facility: CLINIC | Age: 72
End: 2017-08-16

## 2017-08-16 NOTE — TELEPHONE ENCOUNTER
Called and spoke with patient and informed rx she was given in office she folded up and put in purse. Patient looked in purse and found rx.

## 2017-08-16 NOTE — TELEPHONE ENCOUNTER
----- Message from Ni Burrell sent at 8/16/2017 11:28 AM CDT -----  Contact: Patient  Patient states that her prescription for hydrocodone and bactrim did not go to pharmacy, please send or call patient back at [phone]. Thank you

## 2017-08-18 ENCOUNTER — OFFICE VISIT (OUTPATIENT)
Dept: FAMILY MEDICINE | Facility: CLINIC | Age: 72
End: 2017-08-18
Payer: MEDICARE

## 2017-08-18 VITALS
HEIGHT: 68 IN | DIASTOLIC BLOOD PRESSURE: 78 MMHG | BODY MASS INDEX: 44.41 KG/M2 | HEART RATE: 87 BPM | RESPIRATION RATE: 18 BRPM | WEIGHT: 293 LBS | OXYGEN SATURATION: 97 % | TEMPERATURE: 96 F | SYSTOLIC BLOOD PRESSURE: 135 MMHG

## 2017-08-18 DIAGNOSIS — L02.416 ABSCESS OF LEFT LEG: Primary | ICD-10-CM

## 2017-08-18 PROCEDURE — 99999 PR PBB SHADOW E&M-EST. PATIENT-LVL IV: CPT | Mod: PBBFAC,,, | Performed by: NURSE PRACTITIONER

## 2017-08-18 PROCEDURE — 3075F SYST BP GE 130 - 139MM HG: CPT | Mod: ,,, | Performed by: NURSE PRACTITIONER

## 2017-08-18 PROCEDURE — 1159F MED LIST DOCD IN RCRD: CPT | Mod: ,,, | Performed by: NURSE PRACTITIONER

## 2017-08-18 PROCEDURE — 3078F DIAST BP <80 MM HG: CPT | Mod: ,,, | Performed by: NURSE PRACTITIONER

## 2017-08-18 PROCEDURE — 99214 OFFICE O/P EST MOD 30 MIN: CPT | Mod: PBBFAC,PO | Performed by: NURSE PRACTITIONER

## 2017-08-18 PROCEDURE — 99212 OFFICE O/P EST SF 10 MIN: CPT | Mod: S$PBB,,, | Performed by: NURSE PRACTITIONER

## 2017-08-18 NOTE — PROGRESS NOTES
Subjective:       Patient ID: Niru Reyes is a 72 y.o. female.    Chief Complaint: Follow-up  Pt reports to clinic for abscess to left lower extremity follow up.  Pt placed on bactrim and bacitracin ointment as home care. Pt reports spontaneous rupture on this am, with pus like drainage.  Reports pain has decreased.   HPI  Review of Systems    Objective:      Physical Exam   Constitutional: She is oriented to person, place, and time.   Eyes: EOM are normal.   Neck: Neck supple.   Cardiovascular: Normal rate and normal heart sounds.    Pulmonary/Chest: Effort normal and breath sounds normal.   Neurological: She is alert and oriented to person, place, and time.   Skin: There is erythema.        Vitals reviewed.      Assessment:       1. Abscess of left leg        Plan:   Abscess of left leg  Improved from previous exam.  Continue bactrim and bacitracin, return to clinic if worsens     No Follow-up on file.

## 2017-08-30 ENCOUNTER — OFFICE VISIT (OUTPATIENT)
Dept: FAMILY MEDICINE | Facility: CLINIC | Age: 72
End: 2017-08-30
Payer: MEDICARE

## 2017-08-30 VITALS
OXYGEN SATURATION: 98 % | BODY MASS INDEX: 44.41 KG/M2 | TEMPERATURE: 98 F | HEIGHT: 68 IN | HEART RATE: 90 BPM | SYSTOLIC BLOOD PRESSURE: 142 MMHG | DIASTOLIC BLOOD PRESSURE: 90 MMHG | WEIGHT: 293 LBS

## 2017-08-30 DIAGNOSIS — L98.9 SKIN LESIONS: Primary | ICD-10-CM

## 2017-08-30 PROCEDURE — 99214 OFFICE O/P EST MOD 30 MIN: CPT | Mod: PBBFAC,PO | Performed by: NURSE PRACTITIONER

## 2017-08-30 PROCEDURE — 3080F DIAST BP >= 90 MM HG: CPT | Mod: ,,, | Performed by: NURSE PRACTITIONER

## 2017-08-30 PROCEDURE — 99212 OFFICE O/P EST SF 10 MIN: CPT | Mod: S$PBB,,, | Performed by: NURSE PRACTITIONER

## 2017-08-30 PROCEDURE — 1159F MED LIST DOCD IN RCRD: CPT | Mod: ,,, | Performed by: NURSE PRACTITIONER

## 2017-08-30 PROCEDURE — 99999 PR PBB SHADOW E&M-EST. PATIENT-LVL IV: CPT | Mod: PBBFAC,,, | Performed by: NURSE PRACTITIONER

## 2017-08-30 PROCEDURE — 1126F AMNT PAIN NOTED NONE PRSNT: CPT | Mod: ,,, | Performed by: NURSE PRACTITIONER

## 2017-08-30 PROCEDURE — 3077F SYST BP >= 140 MM HG: CPT | Mod: ,,, | Performed by: NURSE PRACTITIONER

## 2017-08-31 NOTE — PROGRESS NOTES
"Subjective:       Patient ID: Niru Reyes is a 72 y.o. female.    Chief Complaint: Follow-up (left leg abcess)  pt reports to clinic with chief complaint of generalized scab areas on bilateral arms and bilateral lower extremities.  She was recently treated for cellulitis and abscess to lower extremity.  Pt reports open areas, "just pop up and bleed".  Negative for animals in the home or bed bugs.  No rash.  On daily ASA  HPI  Review of Systems   Constitutional: Negative for activity change and appetite change.   Respiratory: Negative.    Cardiovascular: Negative.    Skin: Positive for color change and wound.       Objective:      Physical Exam   Constitutional: She appears well-developed and well-nourished.   Cardiovascular: Normal rate and normal heart sounds.    Pulmonary/Chest: Effort normal and breath sounds normal.   Skin:   Scabbed papule like lesions on bilateral lower extremities, open scabbed areas on bilateral arms   Vitals reviewed.      Assessment:       1. Skin lesions        Plan:   Skin lesions  -uncertain cause of lesions.  Will have patient follow up with Dr. Myers for evaluation.    No Follow-up on file.    "

## 2017-09-18 RX ORDER — HYDROCHLOROTHIAZIDE 25 MG/1
TABLET ORAL
Qty: 90 TABLET | Refills: 0 | Status: SHIPPED | OUTPATIENT
Start: 2017-09-18 | End: 2017-12-21 | Stop reason: SDUPTHER

## 2017-11-03 ENCOUNTER — PATIENT OUTREACH (OUTPATIENT)
Dept: ADMINISTRATIVE | Facility: HOSPITAL | Age: 72
End: 2017-11-03

## 2017-11-03 NOTE — PROGRESS NOTES
I spoke with pt that did book a nurses visit for blood pressure re-check. Pt verbalized understanding of call.

## 2017-11-06 ENCOUNTER — TELEPHONE (OUTPATIENT)
Dept: FAMILY MEDICINE | Facility: CLINIC | Age: 72
End: 2017-11-06

## 2017-11-06 ENCOUNTER — CLINICAL SUPPORT (OUTPATIENT)
Dept: FAMILY MEDICINE | Facility: CLINIC | Age: 72
End: 2017-11-06
Payer: MEDICARE

## 2017-11-06 VITALS — SYSTOLIC BLOOD PRESSURE: 142 MMHG | DIASTOLIC BLOOD PRESSURE: 80 MMHG

## 2017-11-06 DIAGNOSIS — L02.419 ABSCESS OF ANKLE: ICD-10-CM

## 2017-11-06 RX ORDER — MUPIROCIN 20 MG/G
OINTMENT TOPICAL 2 TIMES DAILY
Qty: 22 G | Refills: 0 | Status: SHIPPED | OUTPATIENT
Start: 2017-11-06 | End: 2017-11-29 | Stop reason: SDUPTHER

## 2017-11-06 RX ORDER — BACITRACIN 500 [USP'U]/G
OINTMENT TOPICAL 2 TIMES DAILY
Refills: 0 | Status: CANCELLED | COMMUNITY
Start: 2017-11-06

## 2017-11-06 NOTE — PROGRESS NOTES
Pt came in today to get blood pressure read which was 142/80. Pt is also requesting a refill on bacitracin. Please advise

## 2017-11-08 ENCOUNTER — TELEPHONE (OUTPATIENT)
Dept: FAMILY MEDICINE | Facility: CLINIC | Age: 72
End: 2017-11-08

## 2017-11-13 ENCOUNTER — OFFICE VISIT (OUTPATIENT)
Dept: FAMILY MEDICINE | Facility: CLINIC | Age: 72
End: 2017-11-13
Payer: MEDICARE

## 2017-11-13 VITALS
BODY MASS INDEX: 44.41 KG/M2 | TEMPERATURE: 98 F | DIASTOLIC BLOOD PRESSURE: 78 MMHG | HEART RATE: 97 BPM | SYSTOLIC BLOOD PRESSURE: 132 MMHG | WEIGHT: 293 LBS | OXYGEN SATURATION: 96 % | HEIGHT: 68 IN

## 2017-11-13 DIAGNOSIS — E66.01 MORBID OBESITY WITH BMI OF 50.0-59.9, ADULT: ICD-10-CM

## 2017-11-13 DIAGNOSIS — F42.4 EXCORIATION (SKIN-PICKING) DISORDER: ICD-10-CM

## 2017-11-13 DIAGNOSIS — E78.5 DYSLIPIDEMIA: ICD-10-CM

## 2017-11-13 DIAGNOSIS — R79.9 ABNORMAL FINDING OF BLOOD CHEMISTRY: ICD-10-CM

## 2017-11-13 DIAGNOSIS — I10 HYPERTENSION, UNSPECIFIED TYPE: Primary | ICD-10-CM

## 2017-11-13 DIAGNOSIS — F32.1 MODERATE SINGLE CURRENT EPISODE OF MAJOR DEPRESSIVE DISORDER: ICD-10-CM

## 2017-11-13 PROCEDURE — 99213 OFFICE O/P EST LOW 20 MIN: CPT | Mod: PBBFAC,PO | Performed by: FAMILY MEDICINE

## 2017-11-13 PROCEDURE — 99999 PR PBB SHADOW E&M-EST. PATIENT-LVL III: CPT | Mod: PBBFAC,,, | Performed by: FAMILY MEDICINE

## 2017-11-13 PROCEDURE — 99214 OFFICE O/P EST MOD 30 MIN: CPT | Mod: S$PBB,,, | Performed by: FAMILY MEDICINE

## 2017-11-13 RX ORDER — CITALOPRAM 40 MG/1
40 TABLET, FILM COATED ORAL DAILY
Qty: 90 TABLET | Refills: 3 | Status: SHIPPED | OUTPATIENT
Start: 2017-11-13 | End: 2019-02-18 | Stop reason: SDUPTHER

## 2017-11-13 NOTE — PROGRESS NOTES
Subjective:       Patient ID: Niru Reyes is a 72 y.o. female.    Chief Complaint: Hypertension    72 y old female with MO , HTN , DLP , CRAIG and picking disorder here for f.u . Doing well except for continuing picking  of her arms . She has multiple excoriations which she self medicates with Mupirocin. tries to stay  active . Avoids Soda and candies . Compliant with statin and BP meds.Still mourns her late sister       Review of Systems   Constitutional: Negative.    HENT: Negative.    Respiratory: Negative.    Cardiovascular: Negative.    Gastrointestinal: Negative.    Musculoskeletal: Negative.    Skin: Positive for wound.   Hematological: Negative.    Psychiatric/Behavioral: Negative.        Objective:      Physical Exam   Constitutional: She is oriented to person, place, and time. She appears well-developed and well-nourished. No distress.   HENT:   Head: Normocephalic and atraumatic.   Right Ear: External ear normal.   Left Ear: External ear normal.   Mouth/Throat: No oropharyngeal exudate.   Eyes: Conjunctivae and EOM are normal. Pupils are equal, round, and reactive to light. Right eye exhibits no discharge. Left eye exhibits no discharge. No scleral icterus.   Neck: Normal range of motion. Neck supple. No JVD present. No tracheal deviation present. No thyromegaly present.   Cardiovascular: Normal rate, regular rhythm and normal heart sounds.  Exam reveals no gallop and no friction rub.    No murmur heard.  Pulmonary/Chest: Effort normal and breath sounds normal. No stridor. No respiratory distress. She has no wheezes. She has no rales. She exhibits no tenderness.   Abdominal: Soft. Bowel sounds are normal. She exhibits no distension. There is no tenderness. There is no rebound and no guarding.   Musculoskeletal: Normal range of motion.   Lymphadenopathy:     She has no cervical adenopathy.   Neurological: She is alert and oriented to person, place, and time.   Skin: Skin is warm and dry. Abrasion noted. She  is not diaphoretic.        Psychiatric: She has a normal mood and affect. Her behavior is normal. Judgment and thought content normal.       Assessment:         Niru was seen today for hypertension.    Diagnoses and all orders for this visit:    Hypertension, unspecified type  -     CBC auto differential; Future  -     Comprehensive metabolic panel; Future  -     Hemoglobin A1c; Future  -     Lipid panel; Future    Dyslipidemia  -     CBC auto differential; Future  -     Comprehensive metabolic panel; Future  -     Hemoglobin A1c; Future  -     Lipid panel; Future    Abnormal finding of blood chemistry   -     Hemoglobin A1c; Future    Moderate single current episode of major depressive disorder  -     citalopram (CELEXA) 40 MG tablet; Take 1 tablet (40 mg total) by mouth once daily.    Morbid obesity with BMI of 50.0-59.9, adult    Excoriation (skin-picking) disorder      Plan:     Niru was seen today for hypertension.    Diagnoses and all orders for this visit:    Hypertension, unspecified type  -     CBC auto differential; Future  -     Comprehensive metabolic panel; Future  -     Hemoglobin A1c; Future  -     Lipid panel; Future    Dyslipidemia  -     CBC auto differential; Future  -     Comprehensive metabolic panel; Future  -     Hemoglobin A1c; Future  -     Lipid panel; Future    Abnormal finding of blood chemistry   -     Hemoglobin A1c; Future    Moderate single current episode of major depressive disorder  -     citalopram (CELEXA) 40 MG tablet; Take 1 tablet (40 mg total) by mouth once daily.     controlled. Cont med   The patient is asked to make an attempt to improve diet and exercise patterns to aid in medical management of this problem.  See above '  Increase celexa . Reassess in 2 w   Pending Vladimir , dexa, immunizations t etc

## 2017-11-29 ENCOUNTER — LAB VISIT (OUTPATIENT)
Dept: LAB | Facility: HOSPITAL | Age: 72
End: 2017-11-29
Attending: FAMILY MEDICINE
Payer: MEDICARE

## 2017-11-29 ENCOUNTER — OFFICE VISIT (OUTPATIENT)
Dept: FAMILY MEDICINE | Facility: CLINIC | Age: 72
End: 2017-11-29
Payer: MEDICARE

## 2017-11-29 VITALS
TEMPERATURE: 97 F | HEIGHT: 68 IN | SYSTOLIC BLOOD PRESSURE: 132 MMHG | OXYGEN SATURATION: 94 % | BODY MASS INDEX: 44.41 KG/M2 | HEART RATE: 70 BPM | WEIGHT: 293 LBS | DIASTOLIC BLOOD PRESSURE: 78 MMHG

## 2017-11-29 DIAGNOSIS — I10 HYPERTENSION, UNSPECIFIED TYPE: ICD-10-CM

## 2017-11-29 DIAGNOSIS — Z11.59 ENCOUNTER FOR HEPATITIS C SCREENING TEST FOR LOW RISK PATIENT: ICD-10-CM

## 2017-11-29 DIAGNOSIS — E78.5 DYSLIPIDEMIA: ICD-10-CM

## 2017-11-29 DIAGNOSIS — Z12.11 COLON CANCER SCREENING: ICD-10-CM

## 2017-11-29 DIAGNOSIS — R79.9 ABNORMAL FINDING OF BLOOD CHEMISTRY: ICD-10-CM

## 2017-11-29 DIAGNOSIS — F42.4 PICKING OWN SKIN: ICD-10-CM

## 2017-11-29 DIAGNOSIS — F41.1 GAD (GENERALIZED ANXIETY DISORDER): ICD-10-CM

## 2017-11-29 DIAGNOSIS — Z11.59 ENCOUNTER FOR HEPATITIS C SCREENING TEST FOR LOW RISK PATIENT: Primary | ICD-10-CM

## 2017-11-29 DIAGNOSIS — Z12.39 BREAST CANCER SCREENING: ICD-10-CM

## 2017-11-29 LAB
ALBUMIN SERPL BCP-MCNC: 3.3 G/DL
ALP SERPL-CCNC: 111 U/L
ALT SERPL W/O P-5'-P-CCNC: 7 U/L
ANION GAP SERPL CALC-SCNC: 9 MMOL/L
AST SERPL-CCNC: 14 U/L
BASOPHILS # BLD AUTO: 0.08 K/UL
BASOPHILS NFR BLD: 1.1 %
BILIRUB SERPL-MCNC: 0.9 MG/DL
BUN SERPL-MCNC: 16 MG/DL
CALCIUM SERPL-MCNC: 9.1 MG/DL
CHLORIDE SERPL-SCNC: 102 MMOL/L
CHOLEST SERPL-MCNC: 160 MG/DL
CHOLEST/HDLC SERPL: 4.2 {RATIO}
CO2 SERPL-SCNC: 29 MMOL/L
CREAT SERPL-MCNC: 0.8 MG/DL
DIFFERENTIAL METHOD: ABNORMAL
EOSINOPHIL # BLD AUTO: 0.3 K/UL
EOSINOPHIL NFR BLD: 4 %
ERYTHROCYTE [DISTWIDTH] IN BLOOD BY AUTOMATED COUNT: 14.2 %
EST. GFR  (AFRICAN AMERICAN): >60 ML/MIN/1.73 M^2
EST. GFR  (NON AFRICAN AMERICAN): >60 ML/MIN/1.73 M^2
ESTIMATED AVG GLUCOSE: 103 MG/DL
GLUCOSE SERPL-MCNC: 106 MG/DL
HBA1C MFR BLD HPLC: 5.2 %
HCT VFR BLD AUTO: 40.8 %
HDLC SERPL-MCNC: 38 MG/DL
HDLC SERPL: 23.8 %
HGB BLD-MCNC: 13 G/DL
IMM GRANULOCYTES # BLD AUTO: 0.03 K/UL
IMM GRANULOCYTES NFR BLD AUTO: 0.4 %
LDLC SERPL CALC-MCNC: 100.8 MG/DL
LYMPHOCYTES # BLD AUTO: 1.7 K/UL
LYMPHOCYTES NFR BLD: 23.5 %
MCH RBC QN AUTO: 26.5 PG
MCHC RBC AUTO-ENTMCNC: 31.9 G/DL
MCV RBC AUTO: 83 FL
MONOCYTES # BLD AUTO: 0.5 K/UL
MONOCYTES NFR BLD: 6.5 %
NEUTROPHILS # BLD AUTO: 4.7 K/UL
NEUTROPHILS NFR BLD: 64.5 %
NONHDLC SERPL-MCNC: 122 MG/DL
NRBC BLD-RTO: 0 /100 WBC
PLATELET # BLD AUTO: 289 K/UL
PMV BLD AUTO: 11.2 FL
POTASSIUM SERPL-SCNC: 4 MMOL/L
PROT SERPL-MCNC: 7.6 G/DL
RBC # BLD AUTO: 4.91 M/UL
SODIUM SERPL-SCNC: 140 MMOL/L
TRIGL SERPL-MCNC: 106 MG/DL
WBC # BLD AUTO: 7.28 K/UL

## 2017-11-29 PROCEDURE — 83036 HEMOGLOBIN GLYCOSYLATED A1C: CPT

## 2017-11-29 PROCEDURE — 86803 HEPATITIS C AB TEST: CPT

## 2017-11-29 PROCEDURE — 90732 PPSV23 VACC 2 YRS+ SUBQ/IM: CPT | Mod: PBBFAC,PO

## 2017-11-29 PROCEDURE — 85025 COMPLETE CBC W/AUTO DIFF WBC: CPT

## 2017-11-29 PROCEDURE — 99213 OFFICE O/P EST LOW 20 MIN: CPT | Mod: S$PBB,,, | Performed by: FAMILY MEDICINE

## 2017-11-29 PROCEDURE — G0009 ADMIN PNEUMOCOCCAL VACCINE: HCPCS | Mod: PBBFAC,PO

## 2017-11-29 PROCEDURE — 99215 OFFICE O/P EST HI 40 MIN: CPT | Mod: PBBFAC,PO,25 | Performed by: FAMILY MEDICINE

## 2017-11-29 PROCEDURE — 80053 COMPREHEN METABOLIC PANEL: CPT

## 2017-11-29 PROCEDURE — 99999 PR PBB SHADOW E&M-EST. PATIENT-LVL V: CPT | Mod: PBBFAC,,, | Performed by: FAMILY MEDICINE

## 2017-11-29 PROCEDURE — 80061 LIPID PANEL: CPT

## 2017-11-29 PROCEDURE — G0008 ADMIN INFLUENZA VIRUS VAC: HCPCS | Mod: PBBFAC,PO

## 2017-11-29 PROCEDURE — 36415 COLL VENOUS BLD VENIPUNCTURE: CPT | Mod: PO

## 2017-11-29 RX ORDER — MUPIROCIN 20 MG/G
OINTMENT TOPICAL 2 TIMES DAILY
Qty: 22 G | Refills: 0 | Status: SHIPPED | OUTPATIENT
Start: 2017-11-29 | End: 2018-12-12

## 2017-11-29 NOTE — PROGRESS NOTES
Subjective:       Patient ID: Niru Reyes is a 72 y.o. female.    Chief Complaint: Follow-up (2 week f/u ) and Hypertension    72 y old female with CRAIG and picking disorder  here for f.u after increasing celexa. She feels much better , coping   Better with loss of sister . Picking skin less.       Hypertension       Review of Systems   Constitutional: Negative.    HENT: Negative.    Respiratory: Negative.    Cardiovascular: Negative.    Gastrointestinal: Negative.    Genitourinary: Negative.    Musculoskeletal: Negative.        Objective:      Physical Exam   Constitutional: She is oriented to person, place, and time. No distress.   HENT:   Head: Normocephalic and atraumatic.   Right Ear: External ear normal.   Left Ear: External ear normal.   Mouth/Throat: No oropharyngeal exudate.   Eyes: Conjunctivae and EOM are normal. Pupils are equal, round, and reactive to light. Right eye exhibits no discharge. Left eye exhibits no discharge. No scleral icterus.   Neck: Normal range of motion. Neck supple. No JVD present. No tracheal deviation present. No thyromegaly present.   Cardiovascular: Normal rate, regular rhythm and normal heart sounds.  Exam reveals no gallop and no friction rub.    No murmur heard.  Pulmonary/Chest: Effort normal and breath sounds normal. No stridor. No respiratory distress. She has no wheezes. She has no rales. She exhibits no tenderness.   Abdominal: Soft. Bowel sounds are normal. She exhibits no distension. There is no tenderness. There is no rebound and no guarding.   Musculoskeletal: Normal range of motion.   Lymphadenopathy:     She has no cervical adenopathy.   Neurological: She is alert and oriented to person, place, and time.   Skin: Skin is warm and dry. She is not diaphoretic.   Psychiatric: She has a normal mood and affect. Her behavior is normal. Judgment and thought content normal.       Assessment:     Niru was seen today for follow-up and hypertension.    Diagnoses and all orders  for this visit:    Encounter for hepatitis C screening test for low risk patient  -     Hepatitis C antibody; Future    Colon cancer screening  -     Fecal Immunochemical Test (iFOBT); Future    Breast cancer screening  -     Mammo Digital Screening Bilateral With CAD; Future  -     Mammo Digital Screening Bilateral With CAD    CRAIG (generalized anxiety disorder)    Picking own skin    Hypertension, unspecified type    Other orders  -     mupirocin (BACTROBAN) 2 % ointment; Apply topically 2 (two) times daily.  -     (In Office Administered) Pneumococcal Polysaccharide Vaccine (23 Valent) (SQ/IM)  -     Influenza - High Dose (65+) (PF) (IM)          Plan:     Niru was seen today for follow-up and hypertension.    Diagnoses and all orders for this visit:    Encounter for hepatitis C screening test for low risk patient  -     Hepatitis C antibody; Future    Colon cancer screening  -     Fecal Immunochemical Test (iFOBT); Future    Breast cancer screening  -     Mammo Digital Screening Bilateral With CAD; Future  -     Mammo Digital Screening Bilateral With CAD    Other orders  -     mupirocin (BACTROBAN) 2 % ointment; Apply topically 2 (two) times daily.  -     (In Office Administered) Pneumococcal Polysaccharide Vaccine (23 Valent) (SQ/IM)     Improved. Cont med

## 2017-11-30 LAB — HCV AB SERPL QL IA: NEGATIVE

## 2017-12-21 NOTE — TELEPHONE ENCOUNTER
Last office visit 11/29/2017. Last refill date 12/21/2017. Pt is requesting a refill on hctz 25mg #90 qd.

## 2017-12-22 RX ORDER — HYDROCHLOROTHIAZIDE 25 MG/1
25 TABLET ORAL DAILY
Qty: 90 TABLET | Refills: 0 | Status: SHIPPED | OUTPATIENT
Start: 2017-12-22 | End: 2018-04-02 | Stop reason: SDUPTHER

## 2018-04-02 RX ORDER — HYDROCHLOROTHIAZIDE 25 MG/1
TABLET ORAL
Qty: 90 TABLET | Refills: 0 | Status: SHIPPED | OUTPATIENT
Start: 2018-04-02 | End: 2018-08-03 | Stop reason: SDUPTHER

## 2018-05-17 ENCOUNTER — PATIENT OUTREACH (OUTPATIENT)
Dept: FAMILY MEDICINE | Facility: CLINIC | Age: 73
End: 2018-05-17

## 2018-05-17 NOTE — PROGRESS NOTES
I spoke with pt that did schedule her Mammogram and Dexa Scan. Appt's booked at [facility]. Appt slip mailed. Pt verbalized understanding of needed appt's.

## 2018-08-03 DIAGNOSIS — M89.9 DISORDER OF BONE: ICD-10-CM

## 2018-08-03 DIAGNOSIS — Z12.11 COLON CANCER SCREENING: ICD-10-CM

## 2018-08-03 DIAGNOSIS — Z12.31 ENCOUNTER FOR SCREENING MAMMOGRAM FOR MALIGNANT NEOPLASM OF BREAST: Primary | ICD-10-CM

## 2018-08-03 DIAGNOSIS — M85.80 OSTEOPENIA, UNSPECIFIED LOCATION: ICD-10-CM

## 2018-08-03 RX ORDER — HYDROCHLOROTHIAZIDE 25 MG/1
TABLET ORAL
Qty: 90 TABLET | Refills: 0 | Status: SHIPPED | OUTPATIENT
Start: 2018-08-03 | End: 2018-11-05 | Stop reason: SDUPTHER

## 2018-08-03 NOTE — TELEPHONE ENCOUNTER
Pt is over due for health maintenance.  Needs mammogram, DEXA and fit kit.  Orders placed please call patient and schedule

## 2018-08-06 DIAGNOSIS — I10 ESSENTIAL HYPERTENSION: ICD-10-CM

## 2018-08-06 RX ORDER — LISINOPRIL 10 MG/1
10 TABLET ORAL DAILY
Qty: 90 TABLET | Refills: 3 | Status: SHIPPED | OUTPATIENT
Start: 2018-08-06 | End: 2019-09-19 | Stop reason: SDUPTHER

## 2018-11-05 RX ORDER — HYDROCHLOROTHIAZIDE 25 MG/1
TABLET ORAL
Qty: 12 TABLET | Refills: 0 | Status: SHIPPED | OUTPATIENT
Start: 2018-11-05 | End: 2018-12-12 | Stop reason: SDUPTHER

## 2018-11-05 RX ORDER — HYDROCHLOROTHIAZIDE 25 MG/1
TABLET ORAL
Qty: 90 TABLET | Refills: 0 | OUTPATIENT
Start: 2018-11-05

## 2018-11-05 NOTE — TELEPHONE ENCOUNTER
Spoke with pt, informed her that medication was refilled but needs an appointment. Pt verbalized understanding and states she will call back to schedule.

## 2018-12-12 ENCOUNTER — OFFICE VISIT (OUTPATIENT)
Dept: FAMILY MEDICINE | Facility: CLINIC | Age: 73
End: 2018-12-12
Payer: MEDICARE

## 2018-12-12 VITALS
TEMPERATURE: 99 F | SYSTOLIC BLOOD PRESSURE: 134 MMHG | WEIGHT: 293 LBS | HEIGHT: 68 IN | HEART RATE: 90 BPM | OXYGEN SATURATION: 98 % | BODY MASS INDEX: 44.41 KG/M2 | DIASTOLIC BLOOD PRESSURE: 80 MMHG

## 2018-12-12 DIAGNOSIS — E66.01 MORBID OBESITY WITH BMI OF 50.0-59.9, ADULT: ICD-10-CM

## 2018-12-12 DIAGNOSIS — E78.5 DYSLIPIDEMIA: ICD-10-CM

## 2018-12-12 DIAGNOSIS — R79.9 ABNORMAL FINDING OF BLOOD CHEMISTRY: ICD-10-CM

## 2018-12-12 DIAGNOSIS — Z12.11 COLON CANCER SCREENING: ICD-10-CM

## 2018-12-12 DIAGNOSIS — J01.40 ACUTE PANSINUSITIS, RECURRENCE NOT SPECIFIED: ICD-10-CM

## 2018-12-12 DIAGNOSIS — I10 HYPERTENSION, UNSPECIFIED TYPE: Primary | ICD-10-CM

## 2018-12-12 PROCEDURE — 99213 OFFICE O/P EST LOW 20 MIN: CPT | Mod: PBBFAC,PO | Performed by: FAMILY MEDICINE

## 2018-12-12 PROCEDURE — 99999 PR PBB SHADOW E&M-EST. PATIENT-LVL III: CPT | Mod: PBBFAC,,, | Performed by: FAMILY MEDICINE

## 2018-12-12 PROCEDURE — 99214 OFFICE O/P EST MOD 30 MIN: CPT | Mod: S$PBB,,, | Performed by: FAMILY MEDICINE

## 2018-12-12 RX ORDER — HYDROCHLOROTHIAZIDE 25 MG/1
TABLET ORAL
Qty: 30 TABLET | Refills: 0 | Status: SHIPPED | OUTPATIENT
Start: 2018-12-12 | End: 2019-02-18 | Stop reason: SDUPTHER

## 2018-12-12 RX ORDER — AMOXICILLIN AND CLAVULANATE POTASSIUM 875; 125 MG/1; MG/1
1 TABLET, FILM COATED ORAL 2 TIMES DAILY
Qty: 20 TABLET | Refills: 0 | Status: SHIPPED | OUTPATIENT
Start: 2018-12-12 | End: 2018-12-22

## 2018-12-12 RX ORDER — FLUTICASONE PROPIONATE 50 MCG
SPRAY, SUSPENSION (ML) NASAL
Qty: 48 ML | Refills: 0 | Status: SHIPPED | OUTPATIENT
Start: 2018-12-12 | End: 2019-09-19

## 2018-12-12 RX ORDER — FLUTICASONE PROPIONATE 50 MCG
2 SPRAY, SUSPENSION (ML) NASAL DAILY
Qty: 16 G | Refills: 0 | Status: SHIPPED | OUTPATIENT
Start: 2018-12-12 | End: 2018-12-12 | Stop reason: SDUPTHER

## 2018-12-12 NOTE — PROGRESS NOTES
Subjective:       Patient ID: Niru Reyes is a 73 y.o. female.    Chief Complaint: Annual Exam    73 y old female with MO , dlp , HTN here for f.u , doing well .Compliant with meds. Not exercising . Not on nay diet . Also with nasal  congestion for 1 w . Not taking any meds. No cough . Sob . Cp etc      Review of Systems   Constitutional: Negative.    HENT: Positive for rhinorrhea and sinus pain.    Eyes: Negative.    Respiratory: Negative.    Cardiovascular: Negative.    Gastrointestinal: Negative.    Genitourinary: Negative.    Musculoskeletal: Negative.    Skin: Negative.    Hematological: Negative.        Objective:      Physical Exam   Constitutional: She is oriented to person, place, and time. No distress.   HENT:   Head: Normocephalic and atraumatic.   Right Ear: External ear normal.   Left Ear: External ear normal.   Mouth/Throat: No oropharyngeal exudate.   Eyes: Conjunctivae and EOM are normal. Pupils are equal, round, and reactive to light. Right eye exhibits no discharge. Left eye exhibits no discharge. No scleral icterus.   Neck: Normal range of motion. Neck supple. No JVD present. No tracheal deviation present. No thyromegaly present.   Cardiovascular: Normal rate, regular rhythm and normal heart sounds. Exam reveals no gallop and no friction rub.   No murmur heard.  Pulmonary/Chest: Effort normal and breath sounds normal. No stridor. No respiratory distress. She has no wheezes. She has no rales. She exhibits no tenderness.   Abdominal: Soft. Bowel sounds are normal. She exhibits no distension. There is no tenderness. There is no rebound and no guarding.   Musculoskeletal: Normal range of motion.   Lymphadenopathy:     She has no cervical adenopathy.   Neurological: She is alert and oriented to person, place, and time.   Skin: Skin is warm and dry. She is not diaphoretic.   Psychiatric: She has a normal mood and affect. Her behavior is normal. Judgment and thought content normal.       Assessment:        Niru was seen today for annual exam.    Diagnoses and all orders for this visit:    Hypertension, unspecified type  -     CBC auto differential; Future  -     Comprehensive metabolic panel; Future  -     Hemoglobin A1c; Future  -     Lipid panel; Future    Dyslipidemia  -     CBC auto differential; Future  -     Comprehensive metabolic panel; Future  -     Hemoglobin A1c; Future  -     Lipid panel; Future    Morbid obesity with BMI of 50.0-59.9, adult  -     CBC auto differential; Future  -     Comprehensive metabolic panel; Future  -     Hemoglobin A1c; Future  -     Lipid panel; Future    Colon cancer screening  -     Fecal Immunochemical Test (iFOBT); Future    Abnormal finding of blood chemistry   -     Hemoglobin A1c; Future    Acute pansinusitis, recurrence not specified    Other orders  -     hydroCHLOROthiazide (HYDRODIURIL) 25 MG tablet; TAKE 1 TABLET(25 MG) BY MOUTH EVERY DAY  -     amoxicillin-clavulanate 875-125mg (AUGMENTIN) 875-125 mg per tablet; Take 1 tablet by mouth 2 (two) times daily. for 10 days  -     fluticasone (FLONASE) 50 mcg/actuation nasal spray; 2 sprays (100 mcg total) by Each Nare route once daily.      Plan:     Niru was seen today for annual exam.    Diagnoses and all orders for this visit:    Hypertension, unspecified type  -     CBC auto differential; Future  -     Comprehensive metabolic panel; Future  -     Hemoglobin A1c; Future  -     Lipid panel; Future    Dyslipidemia  -     CBC auto differential; Future  -     Comprehensive metabolic panel; Future  -     Hemoglobin A1c; Future  -     Lipid panel; Future    Morbid obesity with BMI of 50.0-59.9, adult  -     CBC auto differential; Future  -     Comprehensive metabolic panel; Future  -     Hemoglobin A1c; Future  -     Lipid panel; Future    Colon cancer screening  -     Fecal Immunochemical Test (iFOBT); Future    Abnormal finding of blood chemistry   -     Hemoglobin A1c; Future    Other orders  -     hydroCHLOROthiazide  (HYDRODIURIL) 25 MG tablet; TAKE 1 TABLET(25 MG) BY MOUTH EVERY DAY  -     amoxicillin-clavulanate 875-125mg (AUGMENTIN) 875-125 mg per tablet; Take 1 tablet by mouth 2 (two) times daily. for 10 days  -     fluticasone (FLONASE) 50 mcg/actuation nasal spray; 2 sprays (100 mcg total) by Each Nare route once daily.     The patient is asked to make an attempt to improve diet and exercise patterns to aid in medical management of this problem.  Labs   Call or return to clinic prn if these symptoms worsen or fail to improve as anticipated.

## 2018-12-20 ENCOUNTER — PATIENT OUTREACH (OUTPATIENT)
Dept: ADMINISTRATIVE | Facility: HOSPITAL | Age: 73
End: 2018-12-20

## 2018-12-20 NOTE — PROGRESS NOTES
Contacted patient to follow up on overdue fit kit. Patient states she's been sick and is unable to do it at this time.

## 2019-02-18 DIAGNOSIS — F32.1 MODERATE SINGLE CURRENT EPISODE OF MAJOR DEPRESSIVE DISORDER: ICD-10-CM

## 2019-02-18 RX ORDER — CITALOPRAM 40 MG/1
TABLET, FILM COATED ORAL
Qty: 90 TABLET | Refills: 0 | Status: SHIPPED | OUTPATIENT
Start: 2019-02-18 | End: 2019-09-19

## 2019-02-18 RX ORDER — HYDROCHLOROTHIAZIDE 25 MG/1
TABLET ORAL
Qty: 30 TABLET | Refills: 0 | Status: SHIPPED | OUTPATIENT
Start: 2019-02-18 | End: 2019-09-19 | Stop reason: SDUPTHER

## 2019-07-02 ENCOUNTER — PATIENT OUTREACH (OUTPATIENT)
Dept: ADMINISTRATIVE | Facility: HOSPITAL | Age: 74
End: 2019-07-02

## 2019-07-02 NOTE — PROGRESS NOTES
I spoke with pt that stated she got back to late for her appt yesterday and rescheduled. Pt stated she will schedule mammogram/Dexa when she comes in for her appt. Pt verbalized understanding of needed appt's.

## 2019-09-10 ENCOUNTER — PATIENT OUTREACH (OUTPATIENT)
Dept: ADMINISTRATIVE | Facility: HOSPITAL | Age: 74
End: 2019-09-10

## 2019-09-10 NOTE — PROGRESS NOTES
I've attempted without success to contact pt. re scheduling an appt with PCP. No answer, unable to lvm.

## 2019-09-19 ENCOUNTER — LAB VISIT (OUTPATIENT)
Dept: LAB | Facility: HOSPITAL | Age: 74
End: 2019-09-19
Attending: FAMILY MEDICINE
Payer: MEDICARE

## 2019-09-19 ENCOUNTER — OFFICE VISIT (OUTPATIENT)
Dept: FAMILY MEDICINE | Facility: CLINIC | Age: 74
End: 2019-09-19
Attending: FAMILY MEDICINE
Payer: MEDICARE

## 2019-09-19 VITALS
HEART RATE: 84 BPM | DIASTOLIC BLOOD PRESSURE: 94 MMHG | HEIGHT: 68 IN | TEMPERATURE: 97 F | OXYGEN SATURATION: 94 % | BODY MASS INDEX: 54.3 KG/M2 | SYSTOLIC BLOOD PRESSURE: 164 MMHG

## 2019-09-19 DIAGNOSIS — R79.9 ABNORMAL FINDING OF BLOOD CHEMISTRY: ICD-10-CM

## 2019-09-19 DIAGNOSIS — I70.209 ATHEROSCLEROSIS OF ARTERIES OF EXTREMITIES: ICD-10-CM

## 2019-09-19 DIAGNOSIS — L03.90 CELLULITIS, UNSPECIFIED CELLULITIS SITE: ICD-10-CM

## 2019-09-19 DIAGNOSIS — E66.01 MORBID OBESITY WITH BMI OF 50.0-59.9, ADULT: ICD-10-CM

## 2019-09-19 DIAGNOSIS — I10 ESSENTIAL HYPERTENSION: ICD-10-CM

## 2019-09-19 DIAGNOSIS — E78.5 DYSLIPIDEMIA: ICD-10-CM

## 2019-09-19 DIAGNOSIS — Z12.11 COLON CANCER SCREENING: Primary | ICD-10-CM

## 2019-09-19 DIAGNOSIS — F32.1 MODERATE SINGLE CURRENT EPISODE OF MAJOR DEPRESSIVE DISORDER: ICD-10-CM

## 2019-09-19 LAB
ALBUMIN SERPL BCP-MCNC: 3.3 G/DL (ref 3.5–5.2)
ALP SERPL-CCNC: 106 U/L (ref 55–135)
ALT SERPL W/O P-5'-P-CCNC: 8 U/L (ref 10–44)
ANION GAP SERPL CALC-SCNC: 12 MMOL/L (ref 8–16)
AST SERPL-CCNC: 16 U/L (ref 10–40)
BASOPHILS # BLD AUTO: 0.08 K/UL (ref 0–0.2)
BASOPHILS NFR BLD: 1 % (ref 0–1.9)
BILIRUB SERPL-MCNC: 0.9 MG/DL (ref 0.1–1)
BUN SERPL-MCNC: 12 MG/DL (ref 8–23)
CALCIUM SERPL-MCNC: 8.6 MG/DL (ref 8.7–10.5)
CHLORIDE SERPL-SCNC: 106 MMOL/L (ref 95–110)
CHOLEST SERPL-MCNC: 135 MG/DL (ref 120–199)
CHOLEST/HDLC SERPL: 3.6 {RATIO} (ref 2–5)
CO2 SERPL-SCNC: 22 MMOL/L (ref 23–29)
CREAT SERPL-MCNC: 0.8 MG/DL (ref 0.5–1.4)
DIFFERENTIAL METHOD: ABNORMAL
EOSINOPHIL # BLD AUTO: 0.3 K/UL (ref 0–0.5)
EOSINOPHIL NFR BLD: 4.4 % (ref 0–8)
ERYTHROCYTE [DISTWIDTH] IN BLOOD BY AUTOMATED COUNT: 14.6 % (ref 11.5–14.5)
EST. GFR  (AFRICAN AMERICAN): >60 ML/MIN/1.73 M^2
EST. GFR  (NON AFRICAN AMERICAN): >60 ML/MIN/1.73 M^2
ESTIMATED AVG GLUCOSE: 108 MG/DL (ref 68–131)
GLUCOSE SERPL-MCNC: 95 MG/DL (ref 70–110)
HBA1C MFR BLD HPLC: 5.4 % (ref 4–5.6)
HCT VFR BLD AUTO: 39.6 % (ref 37–48.5)
HDLC SERPL-MCNC: 38 MG/DL (ref 40–75)
HDLC SERPL: 28.1 % (ref 20–50)
HGB BLD-MCNC: 11.8 G/DL (ref 12–16)
IMM GRANULOCYTES # BLD AUTO: 0.02 K/UL (ref 0–0.04)
IMM GRANULOCYTES NFR BLD AUTO: 0.3 % (ref 0–0.5)
LDLC SERPL CALC-MCNC: 81 MG/DL (ref 63–159)
LYMPHOCYTES # BLD AUTO: 1.4 K/UL (ref 1–4.8)
LYMPHOCYTES NFR BLD: 17.4 % (ref 18–48)
MCH RBC QN AUTO: 25.7 PG (ref 27–31)
MCHC RBC AUTO-ENTMCNC: 29.8 G/DL (ref 32–36)
MCV RBC AUTO: 86 FL (ref 82–98)
MONOCYTES # BLD AUTO: 0.6 K/UL (ref 0.3–1)
MONOCYTES NFR BLD: 7.6 % (ref 4–15)
NEUTROPHILS # BLD AUTO: 5.4 K/UL (ref 1.8–7.7)
NEUTROPHILS NFR BLD: 69.3 % (ref 38–73)
NONHDLC SERPL-MCNC: 97 MG/DL
NRBC BLD-RTO: 0 /100 WBC
PLATELET # BLD AUTO: 224 K/UL (ref 150–350)
PMV BLD AUTO: 11.7 FL (ref 9.2–12.9)
POTASSIUM SERPL-SCNC: 4.6 MMOL/L (ref 3.5–5.1)
PROT SERPL-MCNC: 7 G/DL (ref 6–8.4)
RBC # BLD AUTO: 4.6 M/UL (ref 4–5.4)
SODIUM SERPL-SCNC: 140 MMOL/L (ref 136–145)
TRIGL SERPL-MCNC: 80 MG/DL (ref 30–150)
WBC # BLD AUTO: 7.77 K/UL (ref 3.9–12.7)

## 2019-09-19 PROCEDURE — 99999 PR PBB SHADOW E&M-EST. PATIENT-LVL III: CPT | Mod: PBBFAC,,, | Performed by: FAMILY MEDICINE

## 2019-09-19 PROCEDURE — 83036 HEMOGLOBIN GLYCOSYLATED A1C: CPT

## 2019-09-19 PROCEDURE — 36415 COLL VENOUS BLD VENIPUNCTURE: CPT | Mod: PO

## 2019-09-19 PROCEDURE — 99214 PR OFFICE/OUTPT VISIT, EST, LEVL IV, 30-39 MIN: ICD-10-PCS | Mod: 25,S$PBB,, | Performed by: FAMILY MEDICINE

## 2019-09-19 PROCEDURE — 90662 IIV NO PRSV INCREASED AG IM: CPT | Mod: PBBFAC,PO

## 2019-09-19 PROCEDURE — 99999 PR PBB SHADOW E&M-EST. PATIENT-LVL III: ICD-10-PCS | Mod: PBBFAC,,, | Performed by: FAMILY MEDICINE

## 2019-09-19 PROCEDURE — 99214 OFFICE O/P EST MOD 30 MIN: CPT | Mod: 25,S$PBB,, | Performed by: FAMILY MEDICINE

## 2019-09-19 PROCEDURE — 80061 LIPID PANEL: CPT

## 2019-09-19 PROCEDURE — 80053 COMPREHEN METABOLIC PANEL: CPT

## 2019-09-19 PROCEDURE — 99213 OFFICE O/P EST LOW 20 MIN: CPT | Mod: PBBFAC,PO | Performed by: FAMILY MEDICINE

## 2019-09-19 PROCEDURE — 85025 COMPLETE CBC W/AUTO DIFF WBC: CPT

## 2019-09-19 RX ORDER — CITALOPRAM 40 MG/1
TABLET, FILM COATED ORAL
Qty: 90 TABLET | Refills: 0 | Status: SHIPPED | OUTPATIENT
Start: 2019-09-19 | End: 2020-09-14 | Stop reason: SDUPTHER

## 2019-09-19 RX ORDER — ATORVASTATIN CALCIUM 20 MG/1
20 TABLET, FILM COATED ORAL DAILY
Qty: 90 TABLET | Refills: 3 | Status: SHIPPED | OUTPATIENT
Start: 2019-09-19 | End: 2023-03-07

## 2019-09-19 RX ORDER — MUPIROCIN 20 MG/G
OINTMENT TOPICAL 2 TIMES DAILY
Qty: 15 G | Refills: 0 | Status: SHIPPED | OUTPATIENT
Start: 2019-09-19 | End: 2020-09-14

## 2019-09-19 RX ORDER — DOXYCYCLINE 100 MG/1
100 CAPSULE ORAL 2 TIMES DAILY
Qty: 20 CAPSULE | Refills: 0 | Status: SHIPPED | OUTPATIENT
Start: 2019-09-19 | End: 2019-09-29

## 2019-09-19 RX ORDER — HYDROCHLOROTHIAZIDE 25 MG/1
TABLET ORAL
Qty: 30 TABLET | Refills: 0 | Status: SHIPPED | OUTPATIENT
Start: 2019-09-19 | End: 2019-11-14 | Stop reason: SDUPTHER

## 2019-09-19 RX ORDER — LISINOPRIL 10 MG/1
10 TABLET ORAL DAILY
Qty: 90 TABLET | Refills: 3 | Status: SHIPPED | OUTPATIENT
Start: 2019-09-19 | End: 2020-11-02 | Stop reason: SDUPTHER

## 2019-09-19 RX ORDER — NAPROXEN SODIUM 220 MG
440 TABLET ORAL DAILY
COMMUNITY
End: 2023-03-07

## 2019-09-19 RX ORDER — DICLOFENAC SODIUM 10 MG/G
2 GEL TOPICAL 3 TIMES DAILY
Qty: 100 G | Refills: 0 | Status: SHIPPED | OUTPATIENT
Start: 2019-09-19 | End: 2020-09-14

## 2019-09-19 NOTE — PROGRESS NOTES
"Subjective:       Patient ID: Niur Reyes is a 74 y.o. female.    Chief Complaint: Rash (to lower legs, x 2 weeks)    74 y old female with MO , dlp , HTN  And depression here for f.u ,  Not doing well . Not Compliant with meds over the past 6 m . "Im falling apart" she states  . Not exercising . Not on any  diet . Also with lesions on both LE for 1 w, also significant erythema and edema . Applying neosporin     Review of Systems   Constitutional: Negative.    HENT: Negative.    Eyes: Negative.    Respiratory: Negative.    Cardiovascular: Positive for leg swelling.   Gastrointestinal: Negative.    Genitourinary: Negative.    Musculoskeletal: Negative.    Skin: Negative.    Hematological: Negative.        Objective:      Physical Exam   Constitutional: She is oriented to person, place, and time. She appears well-developed. No distress.   HENT:   Head: Normocephalic and atraumatic.   Right Ear: External ear normal.   Left Ear: External ear normal.   Mouth/Throat: No oropharyngeal exudate.   Eyes: Pupils are equal, round, and reactive to light. Conjunctivae and EOM are normal. Right eye exhibits no discharge. Left eye exhibits no discharge. No scleral icterus.   Neck: Normal range of motion. Neck supple. No JVD present. No tracheal deviation present. No thyromegaly present.   Cardiovascular: Normal rate, regular rhythm and normal heart sounds. Exam reveals no gallop and no friction rub.   No murmur heard.  Pulmonary/Chest: Effort normal and breath sounds normal. No stridor. No respiratory distress. She has no wheezes. She has no rales. She exhibits no tenderness.   Abdominal: Soft. Bowel sounds are normal. She exhibits no distension. There is no tenderness. There is no rebound and no guarding.   Musculoskeletal: Normal range of motion.   Lymphadenopathy:     She has no cervical adenopathy.   Neurological: She is alert and oriented to person, place, and time.   Skin: Skin is warm and dry. Lesion and rash noted. She is not " diaphoretic. There is erythema.        Psychiatric: She has a normal mood and affect. Her behavior is normal. Judgment and thought content normal.       Assessment:       Niru was seen today for rash.    Diagnoses and all orders for this visit:    Colon cancer screening  -     Fecal Immunochemical Test (iFOBT); Future    Atherosclerosis of arteries of extremities  -     CBC auto differential; Future  -     Comprehensive metabolic panel; Future  -     Hemoglobin A1c; Future  -     Lipid panel; Future  -     atorvastatin (LIPITOR) 20 MG tablet; Take 1 tablet (20 mg total) by mouth once daily.    Moderate single current episode of major depressive disorder  -     citalopram (CELEXA) 40 MG tablet; TAKE 1 TABLET(40 MG) BY MOUTH EVERY DAY    Essential hypertension  -     CBC auto differential; Future  -     Comprehensive metabolic panel; Future  -     Hemoglobin A1c; Future  -     Lipid panel; Future  -     lisinopril 10 MG tablet; Take 1 tablet (10 mg total) by mouth once daily.    Abnormal finding of blood chemistry   -     Hemoglobin A1c; Future    Cellulitis, unspecified cellulitis site    Morbid obesity with BMI of 50.0-59.9, adult    Dyslipidemia    Other orders  -     hydroCHLOROthiazide (HYDRODIURIL) 25 MG tablet; TAKE 1 TABLET(25 MG) BY MOUTH EVERY DAY  -     doxycycline (VIBRAMYCIN) 100 MG Cap; Take 1 capsule (100 mg total) by mouth 2 (two) times daily. for 10 days  -     mupirocin (BACTROBAN) 2 % ointment; Apply topically 2 (two) times daily.  -     diclofenac sodium (VOLTAREN) 1 % Gel; Apply 2 g topically 3 (three) times daily.  -     Influenza - High Dose (65+) (PF) (IM)      Plan:     Niru was seen today for rash.    Diagnoses and all orders for this visit:    Colon cancer screening  -     Fecal Immunochemical Test (iFOBT); Future    Atherosclerosis of arteries of extremities  -     CBC auto differential; Future  -     Comprehensive metabolic panel; Future  -     Hemoglobin A1c; Future  -     Lipid panel;  Future  -     atorvastatin (LIPITOR) 20 MG tablet; Take 1 tablet (20 mg total) by mouth once daily.    Moderate single current episode of major depressive disorder  -     citalopram (CELEXA) 40 MG tablet; TAKE 1 TABLET(40 MG) BY MOUTH EVERY DAY    Essential hypertension  -     CBC auto differential; Future  -     Comprehensive metabolic panel; Future  -     Hemoglobin A1c; Future  -     Lipid panel; Future  -     lisinopril 10 MG tablet; Take 1 tablet (10 mg total) by mouth once daily.    Abnormal finding of blood chemistry   -     Hemoglobin A1c; Future    Other orders  -     hydroCHLOROthiazide (HYDRODIURIL) 25 MG tablet; TAKE 1 TABLET(25 MG) BY MOUTH EVERY DAY  -     doxycycline (VIBRAMYCIN) 100 MG Cap; Take 1 capsule (100 mg total) by mouth 2 (two) times daily. for 10 days  -     mupirocin (BACTROBAN) 2 % ointment; Apply topically 2 (two) times daily.  -     diclofenac sodium (VOLTAREN) 1 % Gel; Apply 2 g topically 3 (three) times daily.     Uncontrolled BP . Resume med . F.u in 1 w   DOXy . WC and WS discussed. F/u im 1 w   Diet and ex   Resume statin

## 2019-09-23 ENCOUNTER — TELEPHONE (OUTPATIENT)
Dept: FAMILY MEDICINE | Facility: CLINIC | Age: 74
End: 2019-09-23

## 2019-09-23 DIAGNOSIS — D64.9 ANEMIA, UNSPECIFIED TYPE: Primary | ICD-10-CM

## 2019-09-23 NOTE — TELEPHONE ENCOUNTER
----- Message from Sheryl Hidalgo MA sent at 9/23/2019 10:37 AM CDT -----  Spoke with pt, scheduled hematology appointment on 10/1/19 at 10 am. Please place referral.

## 2019-09-25 ENCOUNTER — PATIENT OUTREACH (OUTPATIENT)
Dept: ADMINISTRATIVE | Facility: HOSPITAL | Age: 74
End: 2019-09-25

## 2019-09-25 NOTE — PROGRESS NOTES
Health Maintenance reviewed.   Immunizations: Abstracted.  Care Everywhere abstracted: No new results found.  Health Maintenance Due   Topic    Mammogram     DEXA SCAN     Colonoscopy    MAMMO:DUE DEXA:DUE

## 2019-09-26 ENCOUNTER — PATIENT OUTREACH (OUTPATIENT)
Dept: ADMINISTRATIVE | Facility: HOSPITAL | Age: 74
End: 2019-09-26

## 2019-09-26 NOTE — PROGRESS NOTES
Contacted patient to follow up on overdue fit kit. Patient states she hasn't been able to complete it yet since she's constipated

## 2019-09-27 ENCOUNTER — OFFICE VISIT (OUTPATIENT)
Dept: FAMILY MEDICINE | Facility: CLINIC | Age: 74
End: 2019-09-27
Payer: MEDICARE

## 2019-09-27 ENCOUNTER — LAB VISIT (OUTPATIENT)
Dept: LAB | Facility: HOSPITAL | Age: 74
End: 2019-09-27
Attending: NURSE PRACTITIONER
Payer: MEDICARE

## 2019-09-27 VITALS
BODY MASS INDEX: 54.28 KG/M2 | OXYGEN SATURATION: 97 % | DIASTOLIC BLOOD PRESSURE: 57 MMHG | WEIGHT: 293 LBS | SYSTOLIC BLOOD PRESSURE: 137 MMHG | HEART RATE: 67 BPM

## 2019-09-27 DIAGNOSIS — K59.00 CONSTIPATION, UNSPECIFIED CONSTIPATION TYPE: ICD-10-CM

## 2019-09-27 DIAGNOSIS — H00.012 HORDEOLUM EXTERNUM OF RIGHT LOWER EYELID: ICD-10-CM

## 2019-09-27 DIAGNOSIS — E66.01 MORBID OBESITY WITH BMI OF 50.0-59.9, ADULT: ICD-10-CM

## 2019-09-27 DIAGNOSIS — L03.116 LEFT LEG CELLULITIS: ICD-10-CM

## 2019-09-27 DIAGNOSIS — L03.116 LEFT LEG CELLULITIS: Primary | ICD-10-CM

## 2019-09-27 LAB
CRP SERPL-MCNC: 12.7 MG/L (ref 0–8.2)
ERYTHROCYTE [SEDIMENTATION RATE] IN BLOOD BY WESTERGREN METHOD: 24 MM/HR (ref 0–36)

## 2019-09-27 PROCEDURE — 86140 C-REACTIVE PROTEIN: CPT

## 2019-09-27 PROCEDURE — 36415 COLL VENOUS BLD VENIPUNCTURE: CPT | Mod: PO

## 2019-09-27 PROCEDURE — 99213 OFFICE O/P EST LOW 20 MIN: CPT | Mod: PBBFAC,PO | Performed by: NURSE PRACTITIONER

## 2019-09-27 PROCEDURE — 99999 PR PBB SHADOW E&M-EST. PATIENT-LVL III: ICD-10-PCS | Mod: PBBFAC,,, | Performed by: NURSE PRACTITIONER

## 2019-09-27 PROCEDURE — 86038 ANTINUCLEAR ANTIBODIES: CPT

## 2019-09-27 PROCEDURE — 99214 OFFICE O/P EST MOD 30 MIN: CPT | Mod: S$PBB,,, | Performed by: NURSE PRACTITIONER

## 2019-09-27 PROCEDURE — 85652 RBC SED RATE AUTOMATED: CPT

## 2019-09-27 PROCEDURE — 99999 PR PBB SHADOW E&M-EST. PATIENT-LVL III: CPT | Mod: PBBFAC,,, | Performed by: NURSE PRACTITIONER

## 2019-09-27 PROCEDURE — 99214 PR OFFICE/OUTPT VISIT, EST, LEVL IV, 30-39 MIN: ICD-10-PCS | Mod: S$PBB,,, | Performed by: NURSE PRACTITIONER

## 2019-09-27 RX ORDER — ERYTHROMYCIN 5 MG/G
OINTMENT OPHTHALMIC NIGHTLY
Qty: 1 G | Refills: 0 | Status: SHIPPED | OUTPATIENT
Start: 2019-09-27 | End: 2020-09-14

## 2019-09-27 RX ORDER — BISACODYL 10 MG
10 SUPPOSITORY, RECTAL RECTAL DAILY PRN
Qty: 12 SUPPOSITORY | Refills: 3 | Status: SHIPPED | OUTPATIENT
Start: 2019-09-27 | End: 2020-09-14

## 2019-09-27 RX ORDER — POLYETHYLENE GLYCOL 3350 17 G/17G
17 POWDER, FOR SOLUTION ORAL 2 TIMES DAILY
Qty: 510 G | Refills: 0 | Status: SHIPPED | OUTPATIENT
Start: 2019-09-27 | End: 2020-09-14

## 2019-09-27 NOTE — PROGRESS NOTES
Subjective:       Patient ID: Niru Reyes is a 74 y.o. female.    Chief Complaint: Follow-up  pt reports to clinic for follow up evaluation of cellulitis to BLE.  Treated with doxycycline.  Pt reports minimal improvement.  No new eruptions, no worsening erythema.  No fever.  Applying bacitracin to area.  Denies any animals in home, no bed bugs.  Complains of constipation.  No bm in 4 days.  Has not taken anything for relief.  Also complains of pustule on right eye lid.  Denies vision changes.   HPI  Review of Systems   Constitutional: Negative.    HENT: Negative.    Eyes:        Stye   Respiratory: Negative.    Cardiovascular: Negative.    Gastrointestinal: Positive for constipation.   Musculoskeletal: Negative.    Skin: Positive for color change and wound.       Objective:      Physical Exam   Constitutional: She is oriented to person, place, and time. She appears well-developed and well-nourished.   HENT:   Head: Normocephalic.   Eyes: EOM are normal. Right eye exhibits hordeolum.       Neck: Neck supple.   Cardiovascular: Normal rate and normal heart sounds.   Pulmonary/Chest: Effort normal and breath sounds normal.   Abdominal: Soft. Bowel sounds are normal.   obese   Neurological: She is alert and oriented to person, place, and time.   Skin: There is erythema.        Scabbed pustules, purpura    Vitals reviewed.      Assessment:       1. Left leg cellulitis    2. Hordeolum externum of right lower eyelid    3. Constipation, unspecified constipation type    4. Morbid obesity with BMI of 50.0-59.9, adult        Plan:   Left leg cellulitis  -     Sedimentation rate; Future; Expected date: 09/27/2019  -     C-reactive protein; Future; Expected date: 09/27/2019  -     LUX Screen w/Reflex; Future; Expected date: 09/27/2019    Hordeolum externum of right lower eyelid  -     erythromycin (ROMYCIN) ophthalmic ointment; Place into the right eye every evening.  Dispense: 1 g; Refill: 0    Constipation, unspecified  constipation type  -     polyethylene glycol (GLYCOLAX) 17 gram/dose powder; Take 17 g by mouth 2 (two) times daily.  Dispense: 510 g; Refill: 0  -     bisacodyl (DULCOLAX) 10 mg Supp; Place 1 suppository (10 mg total) rectally daily as needed.  Dispense: 12 suppository; Refill: 3    Morbid obesity with BMI of 50.0-59.9, adult      No follow-ups on file.

## 2019-09-30 LAB — ANA SER QL IF: NORMAL

## 2019-10-01 ENCOUNTER — INITIAL CONSULT (OUTPATIENT)
Dept: HEMATOLOGY/ONCOLOGY | Facility: CLINIC | Age: 74
End: 2019-10-01
Payer: MEDICARE

## 2019-10-01 ENCOUNTER — LAB VISIT (OUTPATIENT)
Dept: LAB | Facility: HOSPITAL | Age: 74
End: 2019-10-01
Attending: INTERNAL MEDICINE
Payer: MEDICARE

## 2019-10-01 VITALS
SYSTOLIC BLOOD PRESSURE: 139 MMHG | RESPIRATION RATE: 16 BRPM | HEIGHT: 68 IN | BODY MASS INDEX: 54.28 KG/M2 | HEART RATE: 64 BPM | TEMPERATURE: 98 F | DIASTOLIC BLOOD PRESSURE: 67 MMHG | OXYGEN SATURATION: 94 %

## 2019-10-01 DIAGNOSIS — D64.9 ANEMIA, UNSPECIFIED TYPE: ICD-10-CM

## 2019-10-01 DIAGNOSIS — D64.9 ANEMIA, UNSPECIFIED TYPE: Primary | ICD-10-CM

## 2019-10-01 LAB
ALBUMIN SERPL BCP-MCNC: 3.3 G/DL (ref 3.5–5.2)
ALP SERPL-CCNC: 104 U/L (ref 55–135)
ALT SERPL W/O P-5'-P-CCNC: 8 U/L (ref 10–44)
ANION GAP SERPL CALC-SCNC: 10 MMOL/L (ref 8–16)
AST SERPL-CCNC: 11 U/L (ref 10–40)
BASOPHILS # BLD AUTO: 0.06 K/UL (ref 0–0.2)
BASOPHILS NFR BLD: 0.7 % (ref 0–1.9)
BILIRUB SERPL-MCNC: 0.9 MG/DL (ref 0.1–1)
BUN SERPL-MCNC: 17 MG/DL (ref 8–23)
CALCIUM SERPL-MCNC: 9.1 MG/DL (ref 8.7–10.5)
CHLORIDE SERPL-SCNC: 105 MMOL/L (ref 95–110)
CO2 SERPL-SCNC: 28 MMOL/L (ref 23–29)
CREAT SERPL-MCNC: 0.8 MG/DL (ref 0.5–1.4)
DIFFERENTIAL METHOD: ABNORMAL
EOSINOPHIL # BLD AUTO: 0.3 K/UL (ref 0–0.5)
EOSINOPHIL NFR BLD: 3 % (ref 0–8)
ERYTHROCYTE [DISTWIDTH] IN BLOOD BY AUTOMATED COUNT: 15 % (ref 11.5–14.5)
EST. GFR  (AFRICAN AMERICAN): >60 ML/MIN/1.73 M^2
EST. GFR  (NON AFRICAN AMERICAN): >60 ML/MIN/1.73 M^2
GLUCOSE SERPL-MCNC: 104 MG/DL (ref 70–110)
HCT VFR BLD AUTO: 39.7 % (ref 37–48.5)
HGB BLD-MCNC: 12.6 G/DL (ref 12–16)
LYMPHOCYTES # BLD AUTO: 1.7 K/UL (ref 1–4.8)
LYMPHOCYTES NFR BLD: 19 % (ref 18–48)
MCH RBC QN AUTO: 26.5 PG (ref 27–31)
MCHC RBC AUTO-ENTMCNC: 31.7 G/DL (ref 32–36)
MCV RBC AUTO: 83 FL (ref 82–98)
MONOCYTES # BLD AUTO: 0.6 K/UL (ref 0.3–1)
MONOCYTES NFR BLD: 6.6 % (ref 4–15)
NEUTROPHILS # BLD AUTO: 6.3 K/UL (ref 1.8–7.7)
NEUTROPHILS NFR BLD: 70.8 % (ref 38–73)
PLATELET # BLD AUTO: 209 K/UL (ref 150–350)
PMV BLD AUTO: 10.4 FL (ref 9.2–12.9)
POTASSIUM SERPL-SCNC: 4 MMOL/L (ref 3.5–5.1)
PROT SERPL-MCNC: 6.7 G/DL (ref 6–8.4)
RBC # BLD AUTO: 4.76 M/UL (ref 4–5.4)
SODIUM SERPL-SCNC: 143 MMOL/L (ref 136–145)
WBC # BLD AUTO: 8.93 K/UL (ref 3.9–12.7)

## 2019-10-01 PROCEDURE — 85025 COMPLETE CBC W/AUTO DIFF WBC: CPT

## 2019-10-01 PROCEDURE — 99999 PR PBB SHADOW E&M-EST. PATIENT-LVL IV: ICD-10-PCS | Mod: PBBFAC,,, | Performed by: INTERNAL MEDICINE

## 2019-10-01 PROCEDURE — 36415 COLL VENOUS BLD VENIPUNCTURE: CPT

## 2019-10-01 PROCEDURE — 99205 OFFICE O/P NEW HI 60 MIN: CPT | Mod: S$PBB,,, | Performed by: INTERNAL MEDICINE

## 2019-10-01 PROCEDURE — 99999 PR PBB SHADOW E&M-EST. PATIENT-LVL IV: CPT | Mod: PBBFAC,,, | Performed by: INTERNAL MEDICINE

## 2019-10-01 PROCEDURE — 80053 COMPREHEN METABOLIC PANEL: CPT

## 2019-10-01 PROCEDURE — 82728 ASSAY OF FERRITIN: CPT

## 2019-10-01 PROCEDURE — 99205 PR OFFICE/OUTPT VISIT, NEW, LEVL V, 60-74 MIN: ICD-10-PCS | Mod: S$PBB,,, | Performed by: INTERNAL MEDICINE

## 2019-10-01 PROCEDURE — 99214 OFFICE O/P EST MOD 30 MIN: CPT | Mod: PBBFAC | Performed by: INTERNAL MEDICINE

## 2019-10-01 PROCEDURE — 83540 ASSAY OF IRON: CPT

## 2019-10-01 NOTE — PROGRESS NOTES
Subjective:   Date of Visit: 10/1/19   ?   ?    REFERRING PROVIDER: Jordana Shirley MD  [address] [place] [address]   ?   CHIEF COMPLAINT:  Anemia???????   ?   HPI:  Ms. Reyes was seen at [facility] today.  She is a 74-year-old female with history of hypertension who was referred to us by Dr. Shirley who is her primary care physician due to anemia.  Patient denies any bleeding episodes such as epistaxis, hemoptysis, hematochezia, melena.  She denies any shortness of breath, palpitations, chest pain, weight loss, fever, chills, diarrhea, abdominal pain or change in urine frequency.  Patient has not had any colonoscopy due to fear of complications.  She denies any family history for malignancy.    Review of Systems   Constitutional: Positive for fatigue. Negative for activity change, appetite change, chills, fever and unexpected weight change.   HENT: Negative for hearing loss, mouth sores, nosebleeds, sore throat, tinnitus, trouble swallowing and voice change.    Eyes: Negative for visual disturbance.   Respiratory: Negative for cough, chest tightness and shortness of breath.    Cardiovascular: Negative for chest pain, palpitations and leg swelling.   Gastrointestinal: Negative for abdominal pain, anal bleeding, blood in stool, constipation, diarrhea, nausea and vomiting.   Genitourinary: Negative for dysuria, frequency, hematuria, pelvic pain, vaginal bleeding and vaginal pain.   Musculoskeletal: Negative for arthralgias, back pain, joint swelling and neck pain.   Skin: Negative for color change, pallor, rash and wound.   Allergic/Immunologic: Negative for immunocompromised state.   Neurological: Negative for dizziness, tremors, syncope, speech difficulty, weakness, light-headedness and headaches.   Hematological: Negative for adenopathy. Does not bruise/bleed easily.   Psychiatric/Behavioral: Negative for agitation, confusion, decreased concentration, hallucinations and  sleep disturbance. The patient is not nervous/anxious.        ?   PAST MEDICAL HISTORY:   Past Medical History:   Diagnosis Date    Hypertension     ?     PAST SURGICAL HISTORY:   Past Surgical History:   Procedure Laterality Date     SECTION      HYSTERECTOMY        ?   ALLERGIES:   Allergies as of 10/01/2019    (No Known Allergies)      ?   MEDICATIONS:?   Outpatient Medications Marked as Taking for the 10/1/19 encounter (Initial consult) with Teofilo Mares MD   Medication Sig Dispense Refill    aspirin (ECOTRIN) 81 MG EC tablet Take 81 mg by mouth once daily.      atorvastatin (LIPITOR) 20 MG tablet Take 1 tablet (20 mg total) by mouth once daily. 90 tablet 3    bisacodyl (DULCOLAX) 10 mg Supp Place 1 suppository (10 mg total) rectally daily as needed. 12 suppository 3    citalopram (CELEXA) 40 MG tablet TAKE 1 TABLET(40 MG) BY MOUTH EVERY DAY 90 tablet 0    diclofenac sodium (VOLTAREN) 1 % Gel Apply 2 g topically 3 (three) times daily. 100 g 0    erythromycin (ROMYCIN) ophthalmic ointment Place into the right eye every evening. 1 g 0    hydroCHLOROthiazide (HYDRODIURIL) 25 MG tablet TAKE 1 TABLET(25 MG) BY MOUTH EVERY DAY 30 tablet 0    lisinopril 10 MG tablet Take 1 tablet (10 mg total) by mouth once daily. 90 tablet 3    mupirocin (BACTROBAN) 2 % ointment Apply topically 2 (two) times daily. 15 g 0    naproxen sodium (ANAPROX) 220 MG tablet Take 440 mg by mouth once daily.      polyethylene glycol (GLYCOLAX) 17 gram/dose powder Take 17 g by mouth 2 (two) times daily. 510 g 0      ?   SOCIAL HISTORY:?   Social History     Tobacco Use    Smoking status: Never Smoker   Substance Use Topics    Alcohol use: No        ?   FAMILY HISTORY:   family history includes Birth defects in her daughter; Heart disease in her mother and sister; Muscular dystrophy in her father.   ?     Objective:      Physical Exam   Constitutional: She is oriented to person, place, and time. She appears  well-developed and well-nourished. She is cooperative.  Non-toxic appearance. She does not appear ill. No distress.   Morbid obesity   HENT:   Head: Normocephalic and atraumatic.   Mouth/Throat: No oropharyngeal exudate.   Eyes: Pupils are equal, round, and reactive to light. Conjunctivae are normal. Right eye exhibits no discharge. Left eye exhibits no discharge. No scleral icterus.   Neck: Normal range of motion. Neck supple. No thyromegaly present.   Cardiovascular: Normal rate and regular rhythm.   No murmur heard.  Pulmonary/Chest: Effort normal and breath sounds normal. No respiratory distress. She exhibits no tenderness.   Abdominal: Soft. Bowel sounds are normal. She exhibits no distension and no mass. There is no tenderness. There is no rebound and no guarding.   Musculoskeletal: Normal range of motion. She exhibits no edema or tenderness.   Lymphadenopathy:     She has no cervical adenopathy.        Right cervical: No superficial cervical adenopathy present.       Left cervical: No superficial cervical adenopathy present.        Right axillary: No pectoral adenopathy present.        Left axillary: No pectoral adenopathy present.       Right: No inguinal and no supraclavicular adenopathy present.        Left: No inguinal and no supraclavicular adenopathy present.   Neurological: She is alert and oriented to person, place, and time. No cranial nerve deficit or sensory deficit.   Skin: Skin is warm and dry. Capillary refill takes 2 to 3 seconds. No rash noted. No erythema. No pallor.   Psychiatric: She has a normal mood and affect. Her behavior is normal. Judgment normal.       ?   Vitals:    10/01/19 1028   BP: 139/67   Pulse: 64   Resp: 16   Temp: 98.2 °F (36.8 °C)      ?     ECOG SCORE    1 - Restricted in strenuous activity-ambulatory and able to carry out work of a light nature             ?   Laboratory:  ?   No visits with results within 1 Day(s) from this visit.   Latest known visit with results is:    Lab Visit on 09/27/2019   Component Date Value Ref Range Status    Sed Rate 09/27/2019 24  0 - 36 mm/Hr Final    CRP 09/27/2019 12.7* 0.0 - 8.2 mg/L Final    LUX Screen 09/27/2019 Negative <1:160  Negative <1:160 Final      ?   Tumor markers   ?   ?   Imaging: US Lower Extremity Arteries Bilateral  Narrative: Technique:  Bilateral lower extremity arterial duplex ultrasound examination performed. Multiple gray scale and color doppler images were obtained in addition to waveform analysis.      Comparison: None.    Findings:    There is mild atherosclerotic plaque in the lower extremity arteries bilaterally.  Arterial waveforms are triphasic or biphasic.    The peak systolic velocities on the right are as follows, in centimeters/second:    Common femoral artery: 106  Superficial femoral artery, proximal: 97  Superficial femoral artery, mid portion: 108  Superficial femoral artery, distal: 88  Popliteal artery: 59  Anterior tibial artery: 66  Posterior tibial artery: 71  Peroneal artery: 90  Dorsalis pedis artery: 67    The peak systolic velocities on the left are as follows, in centimeters/second:    Common femoral artery: 124  Superficial femoral artery, proximal: 116  Superficial femoral artery, mid portion: 108  Superficial femoral artery, distal: 92  Popliteal artery: 75  Anterior tibial artery: 1:30  Posterior tibial artery: 110  Peroneal artery: 80  Dorsalis pedis artery: 67  Impression: No hemodynamically significant stenosis demonstrated in the right or left lower extremity arterial system.    Electronically signed by: ROLA VARGHESE MD  Date:     07/06/17  Time:    15:59      ?      Pathology:  Pathology Results  (Last 10 years)    None           ?   Assessment/Plan:       1. Anemia, unspecified type      I had a detailed conversation with Ms. Reyes and her friend who was present at the time of interview some of the common etiologies for anemia.  We discussed iron deficiency anemia as well as anemia of  chronic inflammation.  At this time I will go ahead and order for iron studies including TIBC, serum iron, ferritin.  Based on this report will discuss with the patient requires IV iron supplementation.  Apart from the anemia patient has no other significant cytopenia.  I also discussed importance of endoscopy.  Patient has not had any colonoscopy due to fear of complications from the procedure.  I assured her that it is safe for her to undergo colonoscopy.  We will discuss further in 1 week.  Patient knows to contact the clinic with any questions or concerns.  I will be calling her with the results of the iron studies.  ?   ?   Follow-Up: Follow up in about 1 week (around 10/8/2019).    BARBIE ASH Md., Ph.D  Hematology & Oncology Department  Phone #: [phone]

## 2019-10-01 NOTE — LETTER
October 1, 2019      Jordana Shirley MD  [address] [facility] [address]            [facility]  [address] [place] [id number]  Phone: [phone]  Fax: [phone]          Patient: Niru Reyes   MR Number: [id number]   YOB: 1945   Date of Visit: 10/1/2019       Dear Dr. Jordana Shirley:    Thank you for referring Niru Reyes to me for evaluation. Attached you will find relevant portions of my assessment and plan of care.    If you have questions, please do not hesitate to call me. I look forward to following Niru Reyes along with you.    Sincerely,    Teofilo Mares MD    Enclosure  CC:  No Recipients    If you would like to receive this communication electronically, please contact [email or web address] or [phone] to request more information on [facility] Link access.    For providers and/or their staff who would like to refer a patient to [facility], please contact us through our one-stop-shop provider referral line, [facility], at [phone].    If you feel you have received this communication in error or would no longer like to receive these types of communications, please e-mail [email or web address]

## 2019-10-02 LAB
FERRITIN SERPL-MCNC: 46 NG/ML (ref 20–300)
IRON SERPL-MCNC: 54 UG/DL (ref 30–160)
SATURATED IRON: 15 % (ref 20–50)
TOTAL IRON BINDING CAPACITY: 371 UG/DL (ref 250–450)
TRANSFERRIN SERPL-MCNC: 251 MG/DL (ref 200–375)

## 2019-10-07 ENCOUNTER — TELEPHONE (OUTPATIENT)
Dept: HEMATOLOGY/ONCOLOGY | Facility: CLINIC | Age: 74
End: 2019-10-07

## 2019-10-07 ENCOUNTER — APPOINTMENT (OUTPATIENT)
Dept: LAB | Facility: HOSPITAL | Age: 74
End: 2019-10-07
Attending: FAMILY MEDICINE
Payer: MEDICARE

## 2019-10-07 NOTE — TELEPHONE ENCOUNTER
----- Message from Lisa Pritchard sent at 10/7/2019  2:34 PM CDT -----  Contact: self [phone]  Type:  Sooner Apoointment Request    Caller is requesting a sooner appointment.  Caller declined first available appointment listed below.  Caller will not accept being placed on the waitlist and is requesting a message be sent to doctor.  Name of Caller:Niru Reyes  When is the first available appointment? 10/14/19 @ 2 pm  Symptoms:1 wk fu  Would the patient rather a call back or a response via [facility]? Call back   Best Call Back Number:[phone]  Additional Information: States that she would like to be worked in for an appt the week of 10/14/19 in the morning around 10 am. States that she can only have morning appt because that it when she will have transportation. Also is calling for lab results.

## 2019-10-08 ENCOUNTER — TELEPHONE (OUTPATIENT)
Dept: HEMATOLOGY/ONCOLOGY | Facility: CLINIC | Age: 74
End: 2019-10-08

## 2019-10-08 ENCOUNTER — TELEPHONE (OUTPATIENT)
Dept: FAMILY MEDICINE | Facility: CLINIC | Age: 74
End: 2019-10-08

## 2019-10-08 DIAGNOSIS — K92.1 BLOOD IN STOOL: Primary | ICD-10-CM

## 2019-10-08 NOTE — TELEPHONE ENCOUNTER
----- Message from Teofilo Mares MD sent at 10/8/2019  4:10 PM CDT -----  Please let her know that her labs look good.  The iron studies are normal      ----- Message -----  From: Radha Burch LPN  Sent: 10/8/2019   4:05 PM CDT  To: Teofilo Mares MD    Were you able to review patients labs that were done on 10/1?  Patient inquired about results.. I will call her to update. And to reschedule her appt.till after she has colonoscopy.    Thank you

## 2019-10-08 NOTE — TELEPHONE ENCOUNTER
Spoke to patient about fit kit test results and pt mentioned that she tried to contact Hematology and has not heard from them. Advised her that the nurse tried to contact her yesterday but was unable to leave message.

## 2019-10-08 NOTE — TELEPHONE ENCOUNTER
Called and gave report of labs per md, and patient stated she will call bk to reschedule her f/u appointment after her colonoscopy

## 2019-10-08 NOTE — TELEPHONE ENCOUNTER
Spoke w/ patient, she inquired about lab results. Awaiting response from once he reviews. Informed pt I would call her back once  Reviews.

## 2019-10-08 NOTE — TELEPHONE ENCOUNTER
Called and spoke to patient, patient called in ref to check the stats of her labs, as well a rescheduling her appt.  infomed patient that I would have md to review her labs, and call her back. She also stated that she is having a colonoscopy done, and wants to know if dr wants to see her before or after the colonoscopy.. I informed pt I would call her back or md will call her back.

## 2019-10-09 ENCOUNTER — TELEPHONE (OUTPATIENT)
Dept: ENDOSCOPY | Facility: HOSPITAL | Age: 74
End: 2019-10-09

## 2019-10-09 NOTE — TELEPHONE ENCOUNTER
Endoscopy Scheduling Questionnaire:f2      1. Have you been admitted overnight to the hospital in the past 3 months? no  2. Have you had a cardiac stent placed in the past 12 months? no  3. Have you had a stroke or heart attack in the past 6 months? no  4. Have you had any chest pain in the past 3 months? no      If so, have you been evaluated by your PCP or Cardiologist? no  5. Do you take prescription weight loss medication? no  6. Have you been diagnosed with Diverticulitis within the past 3 months? no  7. Are you on dialysis? no  8. Are you diabetic? no Do you have an insulin pump? no  9. Do you have any other health issues that you feel might limit your ability to safely have the procedure and/or sedation? no  10. Is the patient over 79 yo? no        If so, has the patient been seen by their PCP or GI in the last 6 months? N/A       -I have reviewed the last colonoscopy for recommendations regarding surveillance and bowel prep  is NA  -I have reviewed the patient's medications and allergies. She is not on high risk medication, A clearance request NA  -I have verified the pharmacy information. The prep being used is Miralax. Sent Miralax instructions in mail  .    Date Endoscopy Scheduled: Date: 11-

## 2019-11-05 ENCOUNTER — PES CALL (OUTPATIENT)
Dept: ADMINISTRATIVE | Facility: CLINIC | Age: 74
End: 2019-11-05

## 2019-11-14 RX ORDER — HYDROCHLOROTHIAZIDE 25 MG/1
TABLET ORAL
Qty: 30 TABLET | Refills: 0 | Status: SHIPPED | OUTPATIENT
Start: 2019-11-14 | End: 2020-02-25

## 2019-11-21 ENCOUNTER — TELEPHONE (OUTPATIENT)
Dept: ENDOSCOPY | Facility: HOSPITAL | Age: 74
End: 2019-11-21

## 2019-11-21 NOTE — TELEPHONE ENCOUNTER
----- Message from Prince Jerry sent at 11/21/2019 10:49 AM CST -----  Contact: pt   .Type:  Patient Returning Call    Who Called: pt   Who Left Message for Patient: Dinora  Does the patient know what this is regarding? Procedure   Would the patient rather a call back or a response via [facility]? Callback   Best Call Back Number .[phone]    Additional Information:

## 2019-11-25 ENCOUNTER — ANESTHESIA (OUTPATIENT)
Dept: ENDOSCOPY | Facility: HOSPITAL | Age: 74
End: 2019-11-25
Payer: MEDICARE

## 2019-11-25 ENCOUNTER — HOSPITAL ENCOUNTER (OUTPATIENT)
Facility: HOSPITAL | Age: 74
Discharge: HOME | End: 2019-11-25
Attending: INTERNAL MEDICINE | Admitting: INTERNAL MEDICINE
Payer: MEDICARE

## 2019-11-25 ENCOUNTER — ANESTHESIA EVENT (OUTPATIENT)
Dept: ENDOSCOPY | Facility: HOSPITAL | Age: 74
End: 2019-11-25
Payer: MEDICARE

## 2019-11-25 VITALS
BODY MASS INDEX: 44.41 KG/M2 | HEIGHT: 68 IN | OXYGEN SATURATION: 96 % | SYSTOLIC BLOOD PRESSURE: 162 MMHG | RESPIRATION RATE: 16 BRPM | TEMPERATURE: 98 F | WEIGHT: 293 LBS | HEART RATE: 66 BPM | DIASTOLIC BLOOD PRESSURE: 106 MMHG

## 2019-11-25 DIAGNOSIS — Z12.11 ENCOUNTER FOR SCREENING COLONOSCOPY: Primary | ICD-10-CM

## 2019-11-25 DIAGNOSIS — R19.5 OCCULT BLOOD POSITIVE STOOL: ICD-10-CM

## 2019-11-25 PROCEDURE — 63600175 PHARM REV CODE 636 W HCPCS: Performed by: INTERNAL MEDICINE

## 2019-11-25 PROCEDURE — 37000009 HC ANESTHESIA EA ADD 15 MINS: Performed by: INTERNAL MEDICINE

## 2019-11-25 PROCEDURE — G0121 COLON CA SCRN NOT HI RSK IND: HCPCS | Performed by: INTERNAL MEDICINE

## 2019-11-25 PROCEDURE — 45378 DIAGNOSTIC COLONOSCOPY: CPT | Performed by: INTERNAL MEDICINE

## 2019-11-25 PROCEDURE — 37000008 HC ANESTHESIA 1ST 15 MINUTES: Performed by: INTERNAL MEDICINE

## 2019-11-25 PROCEDURE — G0121 COLON CA SCRN NOT HI RSK IND: ICD-10-PCS | Mod: 53,,, | Performed by: INTERNAL MEDICINE

## 2019-11-25 PROCEDURE — G0121 COLON CA SCRN NOT HI RSK IND: HCPCS | Mod: 53,,, | Performed by: INTERNAL MEDICINE

## 2019-11-25 PROCEDURE — 63600175 PHARM REV CODE 636 W HCPCS: Performed by: ANESTHESIOLOGY

## 2019-11-25 RX ORDER — SODIUM, POTASSIUM,MAG SULFATES 17.5-3.13G
1 SOLUTION, RECONSTITUTED, ORAL ORAL DAILY
Qty: 1 KIT | Refills: 0 | Status: SHIPPED | OUTPATIENT
Start: 2019-11-25 | End: 2019-11-27

## 2019-11-25 RX ORDER — SODIUM CHLORIDE 0.9 % (FLUSH) 0.9 %
10 SYRINGE (ML) INJECTION
Status: DISCONTINUED | OUTPATIENT
Start: 2019-11-25 | End: 2019-11-25 | Stop reason: HOSPADM

## 2019-11-25 RX ORDER — PROPOFOL 10 MG/ML
INJECTION, EMULSION INTRAVENOUS CONTINUOUS PRN
Status: DISCONTINUED | OUTPATIENT
Start: 2019-11-25 | End: 2019-11-25

## 2019-11-25 RX ORDER — LIDOCAINE HCL/PF 100 MG/5ML
SYRINGE (ML) INTRAVENOUS
Status: DISCONTINUED | OUTPATIENT
Start: 2019-11-25 | End: 2019-11-25

## 2019-11-25 RX ORDER — SODIUM CHLORIDE, SODIUM LACTATE, POTASSIUM CHLORIDE, CALCIUM CHLORIDE 600; 310; 30; 20 MG/100ML; MG/100ML; MG/100ML; MG/100ML
INJECTION, SOLUTION INTRAVENOUS CONTINUOUS
Status: DISCONTINUED | OUTPATIENT
Start: 2019-11-25 | End: 2019-11-25 | Stop reason: HOSPADM

## 2019-11-25 RX ADMIN — SODIUM CHLORIDE, SODIUM LACTATE, POTASSIUM CHLORIDE, AND CALCIUM CHLORIDE: 600; 310; 30; 20 INJECTION, SOLUTION INTRAVENOUS at 01:11

## 2019-11-25 RX ADMIN — PROPOFOL 50 MCG/KG/MIN: 10 INJECTION, EMULSION INTRAVENOUS at 01:11

## 2019-11-25 RX ADMIN — LIDOCAINE HYDROCHLORIDE 50 MG: 20 INJECTION, SOLUTION INTRAVENOUS at 01:11

## 2019-11-25 NOTE — TRANSFER OF CARE
"Anesthesia Transfer of Care Note    Patient: Niru Reyes    Procedure(s) Performed: Procedure(s) (LRB):  COLONOSCOPY (N/A)    Patient location: PACU    Anesthesia Type: MAC    Transport from OR: Transported from OR on room air with adequate spontaneous ventilation    Post pain: adequate analgesia    Post assessment: no apparent anesthetic complications    Post vital signs: stable    Level of consciousness: awake    Nausea/Vomiting: no nausea/vomiting    Complications: none    Transfer of care protocol was followed      Last vitals:   Visit Vitals  BP (!) 152/92   Pulse 76   Temp 36.4 °C (97.5 °F)   Resp 18   Ht 5' 8" (1.727 m)   Wt (!) 171.7 kg (378 lb 8.5 oz)   SpO2 (!) 92%   Breastfeeding? No   BMI 57.56 kg/m²     "

## 2019-11-25 NOTE — DISCHARGE SUMMARY
Endoscopy Discharge Summary      Admit Date: 11/25/2019    Discharge Date and Time:  11/25/2019 2:18 PM    Attending Physician: Janeth Leroy MD     Discharge Physician: Janeth Leroy MD     Principal Admitting Diagnoses: Positive FIT (fecal immunochemical test)         Discharge Diagnosis: Positive FIT, poor prep, diverticulosis    Discharged Condition: Good    Indication for Admission: Positive FIT (fecal immunochemical test)     Hospital Course: Patient was admitted for an inpatient procedure and tolerated the procedure well with no complications.    Significant Diagnostic Studies: Incomplete Colonoscopy    Pathology (if any):  Specimen (12h ago, onward)    None          Estimated Blood Loss: 0 ml.    Discussed with: patient.    Disposition: Home.    Follow Up/Patient Instructions:   Current Discharge Medication List      START taking these medications    Details   sodium,potassium,mag sulfates (SUPREP BOWEL PREP KIT) 17.5-3.13-1.6 gram SolR Take 177 mLs by mouth once daily. for 2 days  Qty: 1 kit, Refills: 0         CONTINUE these medications which have NOT CHANGED    Details   aspirin (ECOTRIN) 81 MG EC tablet Take 81 mg by mouth once daily.      atorvastatin (LIPITOR) 20 MG tablet Take 1 tablet (20 mg total) by mouth once daily.  Qty: 90 tablet, Refills: 3    Associated Diagnoses: Atherosclerosis of arteries of extremities      bisacodyl (DULCOLAX) 10 mg Supp Place 1 suppository (10 mg total) rectally daily as needed.  Qty: 12 suppository, Refills: 3    Associated Diagnoses: Constipation, unspecified constipation type      citalopram (CELEXA) 40 MG tablet TAKE 1 TABLET(40 MG) BY MOUTH EVERY DAY  Qty: 90 tablet, Refills: 0    Associated Diagnoses: Moderate single current episode of major depressive disorder      hydroCHLOROthiazide (HYDRODIURIL) 25 MG tablet TAKE 1 TABLET(25 MG) BY MOUTH EVERY DAY  Qty: 30 tablet, Refills: 0      lisinopril 10 MG tablet Take 1 tablet (10 mg total) by mouth once  daily.  Qty: 90 tablet, Refills: 3    Associated Diagnoses: Essential hypertension      naproxen sodium (ANAPROX) 220 MG tablet Take 440 mg by mouth once daily.      diclofenac sodium (VOLTAREN) 1 % Gel Apply 2 g topically 3 (three) times daily.  Qty: 100 g, Refills: 0      erythromycin (ROMYCIN) ophthalmic ointment Place into the right eye every evening.  Qty: 1 g, Refills: 0    Associated Diagnoses: Hordeolum externum of right lower eyelid      mupirocin (BACTROBAN) 2 % ointment Apply topically 2 (two) times daily.  Qty: 15 g, Refills: 0      polyethylene glycol (GLYCOLAX) 17 gram/dose powder Take 17 g by mouth 2 (two) times daily.  Qty: 510 g, Refills: 0    Associated Diagnoses: Constipation, unspecified constipation type             No discharge procedures on file.        Janeth Leroy MD  Gastroenterology and Hepatology

## 2019-11-25 NOTE — [employer] PATIENT INSTRUCTIONS
Discharge Summary/Instructions after an Endoscopic Procedure  Patient Name: Niru Reyes  Patient MRN: [id number]  Patient YOB: 1945 Monday, November 25, 2019 Janeth Leroy MD  RESTRICTIONS:  During your procedure today, you received medications for sedation.  These   medications may affect your judgment, balance and coordination.  Therefore,   for 24 hours, you have the following restrictions:   - DO NOT drive a car, operate machinery, make legal/financial decisions,   sign important papers or drink alcohol.    ACTIVITY:  Today: no heavy lifting, straining or running due to procedural   sedation/anesthesia.  The following day: return to full activity including work.  DIET:  Eat and drink normally unless instructed otherwise.     TREATMENT FOR COMMON SIDE EFFECTS:  - Mild abdominal pain, nausea, belching, bloating or excessive gas:  rest,   eat lightly and use a heating pad.  - Sore Throat: treat with throat lozenges and/or gargle with warm salt   water.  - Because air was used during the procedure, expelling large amounts of air   from your rectum or belching is normal.  - If a bowel prep was taken, you may not have a bowel movement for 1-3 days.    This is normal.  SYMPTOMS TO WATCH FOR AND REPORT TO YOUR PHYSICIAN:  1. Abdominal pain or bloating, other than gas cramps.  2. Chest pain.  3. Back pain.  4. Signs of infection such as: chills or fever occurring within 24 hours   after the procedure.  5. Rectal bleeding, which would show as bright red, maroon, or black stools.   (A tablespoon of blood from the rectum is not serious, especially if   hemorrhoids are present.)  6. Vomiting.  7. Weakness or dizziness.  GO DIRECTLY TO THE NEAREST EMERGENCY ROOM IF YOU HAVE ANY OF THE FOLLOWING:      Difficulty breathing              Chills and/or fever over 101 F   Persistent vomiting and/or vomiting blood   Severe abdominal pain   Severe chest pain   Black, tarry stools   Bleeding- more than one  tablespoon   Any other symptom or condition that you feel may need urgent attention  Your doctor recommends these additional instructions:  If any biopsies were taken, your doctors clinic will contact you in 1 to 2   weeks with any results.  - Discharge patient to home (via wheelchair).   - High fiber diet.   - Continue present medications.   - Repeat colonoscopy at next available appointment (within 3 months) because   the bowel preparation was suboptimal.   - Patient has a contact number available for emergencies.  The signs and   symptoms of potential delayed complications were discussed with the   patient.  Return to normal activities tomorrow.  Written discharge   instructions were provided to the patient.  For questions, problems or results please call your physician Janeth Leroy MD at Work:  [phone]  If you have any questions about the above instructions, call the GI   department at [phone] or call the endoscopy unit at [phone]   from 7am until 3 pm.  [facility], EMERGENCY ROOM PHONE NUMBER:   [phone]  IF A COMPLICATION OR EMERGENCY SITUATION ARISES AND YOU ARE UNABLE TO REACH   YOUR PHYSICIAN - GO DIRECTLY TO THE EMERGENCY ROOM.  I have read or have had read to me these discharge instructions for my   procedure and have received a written copy.  I understand these   instructions and will follow-up with my physician if I have any questions.     __________________________________       _____________________________________  Nurse Signature                                          Patient/Designated   Responsible Party Signature  MD Janeth Nelson MD  11/25/2019 2:17:25 PM  [employer]

## 2019-11-25 NOTE — DISCHARGE INSTRUCTIONS

## 2019-11-25 NOTE — ANESTHESIA RELEASE NOTE
"Anesthesia Release from PACU Note    Patient: Niru Reyes    Procedure(s) Performed: Procedure(s) (LRB):  COLONOSCOPY (N/A)    Anesthesia type: MAC    Post pain: Adequate analgesia    Post assessment: no apparent anesthetic complications, tolerated procedure well and no evidence of recall    Last Vitals:   Visit Vitals  BP (!) 152/92   Pulse 76   Temp 36.4 °C (97.5 °F)   Resp 18   Ht 5' 8" (1.727 m)   Wt (!) 171.7 kg (378 lb 8.5 oz)   SpO2 (!) 92%   Breastfeeding? No   BMI 57.56 kg/m²       Post vital signs: stable    Level of consciousness: awake, alert  and oriented    Nausea/Vomiting: no nausea/no vomiting    Complications: none    Airway Patency: patent    Respiratory: unassisted    Cardiovascular: stable and blood pressure at baseline    Hydration: euvolemic  "

## 2019-11-25 NOTE — ANESTHESIA POSTPROCEDURE EVALUATION
Anesthesia Post Evaluation    Patient: Niru Reyes    Procedure(s) Performed: Procedure(s) (LRB):  COLONOSCOPY (N/A)    Final Anesthesia Type: MAC    Patient location during evaluation: GI PACU  Patient participation: Yes- Able to Participate  Level of consciousness: awake and alert  Post-procedure vital signs: reviewed and stable  Pain management: adequate  Airway patency: patent  CRISTOBAL mitigation strategies: Preoperative initiation of continuous positive airway pressure (CPAP) or non-invasive positive pressure ventilation (NIPPV)  PONV status at discharge: No PONV  Anesthetic complications: no      Cardiovascular status: blood pressure returned to baseline  Respiratory status: unassisted  Hydration status: euvolemic  Follow-up not needed.          Vitals Value Taken Time   /92 11/25/2019  1:37 PM   Temp 36.4 °C (97.5 °F) 11/25/2019  1:37 PM   Pulse 76 11/25/2019  1:37 PM   Resp 18 11/25/2019  1:37 PM   SpO2 92 % 11/25/2019  1:37 PM         No case tracking events are documented in the log.      Pain/Dennis Score: No data recorded

## 2019-11-25 NOTE — H&P
Short Stay Endoscopy History and Physical    PCP - Jordana Shirley MD    Procedure - Colonoscopy  ASA - 2  Mallampati - per anesthesia  History of Anesthesia problems - no  Family history Anesthesia problems -  no     HPI:  This is a 74 y.o. female here for evaluation of :   Active Hospital Problems    Diagnosis  POA    *Positive FIT (fecal immunochemical test) [R19.5]  Yes      Resolved Hospital Problems   No resolved problems to display.         Health Maintenance       Date Due Completion Date    Mammogram 1987 ---    DEXA SCAN 1987 ---    Shingles Vaccine (1 of 2) 1997 ---    Colonoscopy 1997 ---    TETANUS VACCINE 2020 7/3/2010    Aspirin/Antiplatelet Therapy 10/01/2020 10/1/2019    Lipid Panel 2024          Screening - yes  History of polyps - no  Diarrhea - no  Anemia - no  Blood in stools - Patient denies gross blood in stool. +FIT   Abdominal pain - no  Family History of Colon Cancer- no  Other - no    ROS:  CONSTITUTIONAL: Denies weight change,  fatigue, fevers, chills, night sweats.  CARDIOVASCULAR: Denies chest pain, shortness of breath, orthopnea and edema.  RESPIRATORY: Denies cough, hemoptysis, dyspnea, and wheezing.  GI: See HPI.    Medical History:   Past Medical History:   Diagnosis Date    Hypertension        Surgical History:   Past Surgical History:   Procedure Laterality Date     SECTION      HYSTERECTOMY         Family History:   Family History   Problem Relation Age of Onset    Heart disease Mother         valvulopathy    Muscular dystrophy Father     Heart disease Sister         CAD     Birth defects Daughter        Social History:   Social History     Tobacco Use    Smoking status: Never Smoker   Substance Use Topics    Alcohol use: No    Drug use: No       Allergies:   Review of patient's allergies indicates:  No Known Allergies    Medications:   No current facility-administered medications on file prior to  encounter.      Current Outpatient Medications on File Prior to Encounter   Medication Sig Dispense Refill    aspirin (ECOTRIN) 81 MG EC tablet Take 81 mg by mouth once daily.      atorvastatin (LIPITOR) 20 MG tablet Take 1 tablet (20 mg total) by mouth once daily. 90 tablet 3    bisacodyl (DULCOLAX) 10 mg Supp Place 1 suppository (10 mg total) rectally daily as needed. 12 suppository 3    citalopram (CELEXA) 40 MG tablet TAKE 1 TABLET(40 MG) BY MOUTH EVERY DAY 90 tablet 0    lisinopril 10 MG tablet Take 1 tablet (10 mg total) by mouth once daily. 90 tablet 3    naproxen sodium (ANAPROX) 220 MG tablet Take 440 mg by mouth once daily.      diclofenac sodium (VOLTAREN) 1 % Gel Apply 2 g topically 3 (three) times daily. 100 g 0    erythromycin (ROMYCIN) ophthalmic ointment Place into the right eye every evening. 1 g 0    mupirocin (BACTROBAN) 2 % ointment Apply topically 2 (two) times daily. 15 g 0    polyethylene glycol (GLYCOLAX) 17 gram/dose powder Take 17 g by mouth 2 (two) times daily. 510 g 0       Physical Exam:  Vital Signs:   Vitals:    11/25/19 1418   BP: (!) 142/71   Pulse: 69   Resp: 20   Temp: 98.2 °F (36.8 °C)     General Appearance: Well appearing in no acute distress  ENT: OP clear  Chest: CTA B  CV: RRR, no m/r/g  Abd: s/nt/nd/nabs  Ext: no edema    Labs:Reviewed    IMP:  Active Hospital Problems    Diagnosis  POA    *Positive FIT (fecal immunochemical test) [R19.5]  Yes      Resolved Hospital Problems   No resolved problems to display.         Plan:   I have explained the risks and benefits of colonoscopy to the patient including but not limited to bleeding, perforation, infection, and death. The patient wishes to proceed.

## 2019-11-25 NOTE — ANESTHESIA PREPROCEDURE EVALUATION
11/25/2019  Niru Reyes is a 74 y.o., female.    Anesthesia Evaluation    I have reviewed the Patient Summary Reports.    I have reviewed the Nursing Notes.   I have reviewed the Medications.     Review of Systems  Anesthesia Hx:  No problems with previous Anesthesia  Denies Family Hx of Anesthesia complications.   Denies Personal Hx of Anesthesia complications.   Social:  Non-Smoker    Hematology/Oncology:  Hematology Normal   Oncology Normal     EENT/Dental:EENT/Dental Normal   Cardiovascular:   Hypertension, poorly controlled    Pulmonary:  Pulmonary Normal    Renal/:  Renal/ Normal     Hepatic/GI:  Hepatic/GI Normal    Musculoskeletal:   Arthritis     Neurological:  Neurology Normal    Endocrine:  Endocrine Normal    Dermatological:  Skin Normal    Psych:   Psychiatric History depression          Physical Exam  General:  Morbid Obesity    Airway/Jaw/Neck:  Airway Findings: Mouth Opening: Normal Tongue: Large  General Airway Assessment: Adult, Possible difficult mask airway  Mallampati: II  Improves to I with phonation.  TM Distance: Normal, at least 6 cm      Dental:  Dental Findings: Upper Dentures, Lower Dentures   Chest/Lungs:  Chest/Lungs Clear    Heart/Vascular:  Heart Findings: Normal Heart murmur: negative Vascular Findings: Normal    Abdomen:  Abdomen Findings: Normal    Musculoskeletal:  Musculoskeletal Findings: Normal   Skin:  Skin Findings: Normal    Mental Status:  Mental Status Findings:  Alert and Oriented, Cooperative         Anesthesia Plan  Type of Anesthesia, risks & benefits discussed:  Anesthesia Type:  MAC  Patient's Preference:   Intra-op Monitoring Plan: standard ASA monitors  Intra-op Monitoring Plan Comments:   Post Op Pain Control Plan: multimodal analgesia  Post Op Pain Control Plan Comments:   Induction:   IV  Beta Blocker:  Patient is not currently on a Beta-Blocker (No  further documentation required).       Informed Consent: Patient understands risks and agrees with Anesthesia plan.  Questions answered. Anesthesia consent signed with patient.  ASA Score: 3     Day of Surgery Review of History & Physical: I have interviewed and examined the patient. I have reviewed the patient's H&P dated:  There are no significant changes.  H&P update referred to the surgeon.         Ready For Surgery From Anesthesia Perspective.

## 2020-01-08 ENCOUNTER — TELEPHONE (OUTPATIENT)
Dept: FAMILY MEDICINE | Facility: CLINIC | Age: 75
End: 2020-01-08

## 2020-01-08 NOTE — TELEPHONE ENCOUNTER
----- Message from Yuridia Menjivar sent at 1/8/2020  1:50 PM CST -----  Contact: pt   She's calling in regards to medication being called in       ,pls call pt back at [phone] (home)

## 2020-01-08 NOTE — TELEPHONE ENCOUNTER
Spoke with pt, informed her that she will need an appointment. She states she will have to call back to schedule.

## 2020-01-08 NOTE — TELEPHONE ENCOUNTER
Patient states that she has sores on her arms and has been given an antibiotic and Bactroban in the past and requesting refills sent in. Please advise?

## 2020-02-03 ENCOUNTER — PATIENT OUTREACH (OUTPATIENT)
Dept: ADMINISTRATIVE | Facility: HOSPITAL | Age: 75
End: 2020-02-03

## 2020-02-03 NOTE — PROGRESS NOTES
Spoke with pt Re Mammogram. Pt. states she wasn't ready to diaz'd and will call back to [facility].

## 2020-02-04 ENCOUNTER — TELEPHONE (OUTPATIENT)
Dept: ENDOSCOPY | Facility: HOSPITAL | Age: 75
End: 2020-02-04

## 2020-02-04 RX ORDER — SODIUM, POTASSIUM,MAG SULFATES 17.5-3.13G
1 SOLUTION, RECONSTITUTED, ORAL ORAL DAILY
Qty: 1 KIT | Refills: 0 | Status: SHIPPED | OUTPATIENT
Start: 2020-02-04 | End: 2020-02-06

## 2020-02-04 NOTE — TELEPHONE ENCOUNTER
"1. Have you been admitted overnight to the hospital in the past 3 months? no   If yes, schedule an appointment with PCP before scheduling endoscopic procedure.     2. Have you had a stent placed in the last 12 months? no   If yes, for a screening visit, cancel and message the ordering provider.  The patient will need a new order when the time is appropriate.     3. Have you had a stroke or heart attack in the past 6 months? no   If yes, cancel and refer patient to ordering provider for clearance, also message ordering provider to inform.     4. Have you had any chest pain in the past 3 months? no   If yes, Have you been evaluated by your PCP and/or cardiologist and it was determined to not be heart related? not applicable   If No, Pt needs to be seen by PCP or Cardiologist .  Pt can be scheduled once clearance obtained by either of those providers.     5. Do you take prescription weight loss medications?  no   If yes, must stop for 2 weeks prior to procedure.     6. Have you been diagnosed with diverticulitis within the past 3 months? no   If yes, must have been seen by GI within the last 3 months, if not schedule with GI MAR[facility].    If pt has been seen by GI, schedule procedure 8-12 weeks post antibiotic treatment.     7. Are you on Dialysis? no  If yes, schedule procedure for the day AFTER dialysis.  Appt time should be 9am or later, patient arrival time is 2 hours prior.  Nulytely or miralax prep for all patients with kidney disease.     8. Are you diabetic?  no   If yes, schedule morning appt. Advise pt to hold all diabetic meds day of procedure.     9. If pt is older than 80 years of age and HAS NOT been seen by GI or PCP within the last 6 months, needs appt with GI MAR[facility].   If pt has been seen by the GI provider or PCP within the past 6  months AND meets criteria, schedule procedure AND send message to the endoscopist.     10. Is patient on a "high risk" medication (blood thinner/antiplatelet agent)?  no   If yes, " has cardiac clearance been obtained within the last 60 days? N/A   If no, a new clearance needs to be obtained.       I have reviewed the last colonoscopy for recommendations regarding next procedure bowel prep.  yes  I have reviewed medications and allergies.  yes  I have verified the pharmacy information and appropriate prep sent if needed. yes  Prep instructions have been mailed or sent to portal per patient request. yes    If answers yes to any of the following, schedule at [facility] ONLY. If No, OK for either location.     Is BMI over 45?   Any complaints of chest pain, new onset or at rest?  Does pt have an AICD?  Is there a diagnosis of heart failure?  Is patient on dialysis?  Does patient have an insulin pump?  If procedure for esophageal banding?

## 2020-02-04 NOTE — TELEPHONE ENCOUNTER
Per chart note pt will have 2 days of clear liquids/mag citrate added along with Suprep. Advised pt to drink plenty of fluids for a proper clean out. dw   Detail Level: Detailed Plan: If not improved we will biopsy at next visit

## 2020-02-25 RX ORDER — HYDROCHLOROTHIAZIDE 25 MG/1
TABLET ORAL
Qty: 30 TABLET | Refills: 0 | Status: SHIPPED | OUTPATIENT
Start: 2020-02-25 | End: 2020-04-15

## 2020-02-27 ENCOUNTER — PATIENT OUTREACH (OUTPATIENT)
Dept: ADMINISTRATIVE | Facility: HOSPITAL | Age: 75
End: 2020-02-27

## 2020-02-27 NOTE — PROGRESS NOTES
Attempting to contact pt to schedule over due HM & F/U. Unable to reach patient at this time. Left voicemail.   Health Maintenance Updated.   Immunizations: Abstracted.  Care Everywhere abstracted:No new results found.    Health Maintenance Due   Topic    Mammogram     DEXA SCAN        Pt has been declining to schedule Mammogram and Dexa with [facility] Dept stating that she will schedule with pcp at her office visit.  MAMMO:DUE DEXA:DUE

## 2020-03-17 ENCOUNTER — TELEPHONE (OUTPATIENT)
Dept: ENDOSCOPY | Facility: HOSPITAL | Age: 75
End: 2020-03-17

## 2020-03-17 NOTE — TELEPHONE ENCOUNTER
Spoke with patient regarding cancellation of procedure that is scheduled for 3- d/t covid-19.  Patient verbalized understanding of reschedule process.

## 2020-04-15 RX ORDER — HYDROCHLOROTHIAZIDE 25 MG/1
TABLET ORAL
Qty: 30 TABLET | Refills: 0 | Status: SHIPPED | OUTPATIENT
Start: 2020-04-15 | End: 2020-05-16

## 2020-05-13 ENCOUNTER — TELEPHONE (OUTPATIENT)
Dept: ENDOSCOPY | Facility: HOSPITAL | Age: 75
End: 2020-05-13

## 2020-05-16 RX ORDER — HYDROCHLOROTHIAZIDE 25 MG/1
TABLET ORAL
Qty: 30 TABLET | Refills: 0 | Status: SHIPPED | OUTPATIENT
Start: 2020-05-16 | End: 2020-07-23 | Stop reason: SDUPTHER

## 2020-05-23 ENCOUNTER — TELEPHONE (OUTPATIENT)
Dept: GASTROENTEROLOGY | Facility: CLINIC | Age: 75
End: 2020-05-23

## 2020-05-23 NOTE — TELEPHONE ENCOUNTER
Spoke with patient who declined scheduling at this time. Patient states that she relies on her daughter for appointments and she is currently out of town. Patient states that she understands appointment is needed and will call back to schedule after speaking with daughter.     Patient had no concerns at this time.

## 2020-07-23 RX ORDER — HYDROCHLOROTHIAZIDE 25 MG/1
25 TABLET ORAL DAILY
Qty: 30 TABLET | Refills: 0 | Status: SHIPPED | OUTPATIENT
Start: 2020-07-23 | End: 2020-09-14 | Stop reason: SDUPTHER

## 2020-08-05 ENCOUNTER — PATIENT OUTREACH (OUTPATIENT)
Dept: ADMINISTRATIVE | Facility: HOSPITAL | Age: 75
End: 2020-08-05

## 2020-08-05 NOTE — PROGRESS NOTES
MSSP Report-Patients not seen in a year.    Attempted to contact pt to schedule annual exam and consider mammo, dexa scan. No answer or voice mail    Care Everywhere-no records found.  Lab Steven no records found.

## 2020-09-08 RX ORDER — HYDROCHLOROTHIAZIDE 25 MG/1
25 TABLET ORAL DAILY
Qty: 30 TABLET | Refills: 0 | OUTPATIENT
Start: 2020-09-08

## 2020-09-09 ENCOUNTER — TELEPHONE (OUTPATIENT)
Dept: FAMILY MEDICINE | Facility: CLINIC | Age: 75
End: 2020-09-09

## 2020-09-09 NOTE — TELEPHONE ENCOUNTER
----- Message from Jordana Shirley MD sent at 9/9/2020  2:42 PM CDT -----  Regarding: RE: Wheel chair  Contact: pt  Yes, she can see Maria Eugenia   ----- Message -----  From: Mayra Alcantara LPN  Sent: 9/9/2020   2:26 PM CDT  To: Jordana Shirley MD  Subject: FW: Wheel chair                                  Does pt need an appt for this? Please advise. LOV 1/8/2020  ----- Message -----  From: Erendira Peacock  Sent: 9/9/2020   2:04 PM CDT  To: Scottie Silveira Staff  Subject: Wheel chair                                      Stated she calling for ICD-10 code  showing that she needs a wheel chair to be faxed [phone], she can be reached at [phone]

## 2020-09-10 ENCOUNTER — TELEPHONE (OUTPATIENT)
Dept: FAMILY MEDICINE | Facility: CLINIC | Age: 75
End: 2020-09-10

## 2020-09-10 NOTE — TELEPHONE ENCOUNTER
----- Message from Carmen Young sent at 9/10/2020  1:18 PM CDT -----  Type:  Patient Returning Call    Who Called:Mary daughter of patient   Who Left Message for Patient:na  Does the patient know what this is regarding?:wheelchair   Would the patient rather a call back or a response via [facility]? Callback   Best Call Back Number:[phone]  Additional Information:       Type:  RX Refill Request    Who Called: pt   Refill or New Rx:refill  RX Name and Strength:hydroCHLOROthiazide (HYDRODIURIL) 25 MG tablet  How is the patient currently taking it? (ex. 1XDay):1XDaily   Is this a 30 day or 90 day RX 90  Preferred Pharmacy with phone number:  Local or Mail Order:local   Ordering Provider:  Would the patient rather a call back or a response via [facility]? Callback   Best Call Back Number:[phone]   Additional Information:         [id number] [place], [place] - [address] [place] [address] & [facility]  [address]  [facility] [address]  Phone: [phone] Fax: [phone]

## 2020-09-10 NOTE — TELEPHONE ENCOUNTER
Spoke with pt, scheduled her an appointment with Dr. Myers on 9/14/20 at 1:20 pm. Pt verbalized understanding.

## 2020-09-10 NOTE — TELEPHONE ENCOUNTER
----- Message from Nadia Lan sent at 9/10/2020  1:18 PM CDT -----  Regarding: WC Order  Good afternoon!This patient is requesting a WC. If this order can be placed, please fax to [phone].     Family member also asked about blood pressure medication.    Thanks,  [facility]

## 2020-09-10 NOTE — TELEPHONE ENCOUNTER
Spoke with pt, informed her that she will need an appointment with Dr. Myers. She scheduled on Monday the 14th at 1:20 pm.

## 2020-09-10 NOTE — TELEPHONE ENCOUNTER
----- Message from Mayra Alcantara LPN sent at 9/10/2020  4:30 PM CDT -----  Regarding: FW: WC Order    ----- Message -----  From: Nadia Lan  Sent: 9/10/2020   1:18 PM CDT  To: Jordana Shirley MD, #  Subject: WC Order                                         Good afternoon!This patient is requesting a WC. If this order can be placed, please fax to [phone].     Family member also asked about blood pressure medication.    Thanks,  [facility]

## 2020-09-11 NOTE — TELEPHONE ENCOUNTER
I spoke with pt yesterday and she did not mention BP medication. She does have an appointment scheduled for Monday.

## 2020-09-14 ENCOUNTER — TELEPHONE (OUTPATIENT)
Dept: ORTHOPEDICS | Facility: CLINIC | Age: 75
End: 2020-09-14

## 2020-09-14 ENCOUNTER — OFFICE VISIT (OUTPATIENT)
Dept: FAMILY MEDICINE | Facility: CLINIC | Age: 75
End: 2020-09-14
Attending: FAMILY MEDICINE
Payer: MEDICARE

## 2020-09-14 ENCOUNTER — TELEPHONE (OUTPATIENT)
Dept: FAMILY MEDICINE | Facility: CLINIC | Age: 75
End: 2020-09-14

## 2020-09-14 VITALS
HEIGHT: 68 IN | HEART RATE: 95 BPM | DIASTOLIC BLOOD PRESSURE: 78 MMHG | OXYGEN SATURATION: 96 % | SYSTOLIC BLOOD PRESSURE: 196 MMHG | BODY MASS INDEX: 57.56 KG/M2 | TEMPERATURE: 97 F

## 2020-09-14 DIAGNOSIS — F32.1 MODERATE SINGLE CURRENT EPISODE OF MAJOR DEPRESSIVE DISORDER: ICD-10-CM

## 2020-09-14 DIAGNOSIS — Z12.11 COLON CANCER SCREENING: Primary | ICD-10-CM

## 2020-09-14 DIAGNOSIS — Z12.39 BREAST CANCER SCREENING: ICD-10-CM

## 2020-09-14 DIAGNOSIS — M17.30 POST-TRAUMATIC OSTEOARTHRITIS OF KNEE, UNSPECIFIED LATERALITY: ICD-10-CM

## 2020-09-14 DIAGNOSIS — E66.01 MORBID OBESITY WITH BMI OF 60.0-69.9, ADULT: ICD-10-CM

## 2020-09-14 DIAGNOSIS — Z12.31 ENCOUNTER FOR SCREENING MAMMOGRAM FOR MALIGNANT NEOPLASM OF BREAST: ICD-10-CM

## 2020-09-14 DIAGNOSIS — I10 HYPERTENSION, UNSPECIFIED TYPE: ICD-10-CM

## 2020-09-14 PROCEDURE — 99214 OFFICE O/P EST MOD 30 MIN: CPT | Mod: PBBFAC,PO | Performed by: FAMILY MEDICINE

## 2020-09-14 PROCEDURE — 99999 PR PBB SHADOW E&M-EST. PATIENT-LVL IV: CPT | Mod: PBBFAC,,, | Performed by: FAMILY MEDICINE

## 2020-09-14 PROCEDURE — 99999 PR PBB SHADOW E&M-EST. PATIENT-LVL IV: ICD-10-PCS | Mod: PBBFAC,,, | Performed by: FAMILY MEDICINE

## 2020-09-14 PROCEDURE — 99214 PR OFFICE/OUTPT VISIT, EST, LEVL IV, 30-39 MIN: ICD-10-PCS | Mod: S$PBB,,, | Performed by: FAMILY MEDICINE

## 2020-09-14 PROCEDURE — 99214 OFFICE O/P EST MOD 30 MIN: CPT | Mod: S$PBB,,, | Performed by: FAMILY MEDICINE

## 2020-09-14 RX ORDER — CITALOPRAM 40 MG/1
TABLET, FILM COATED ORAL
Qty: 90 TABLET | Refills: 0 | Status: SHIPPED | OUTPATIENT
Start: 2020-09-14 | End: 2020-12-10

## 2020-09-14 RX ORDER — HYDROCHLOROTHIAZIDE 25 MG/1
25 TABLET ORAL DAILY
Qty: 30 TABLET | Refills: 0 | Status: SHIPPED | OUTPATIENT
Start: 2020-09-14 | End: 2020-10-19

## 2020-09-14 NOTE — PROGRESS NOTES
Subjective:       Patient ID: Niru Reyes is a 75 y.o. female.    Chief Complaint: Follow-up    75 y old female with MO , HTN , Knees OA here for f.u . She is having extreme difficulty with ambulation due to bilateral knee pain . No injuries. Acknowledges poor eating habits . She has been using a Cane which helps her with tranfers . It is a taxing effort to go to the grocery store. Agrees to see ortho but insists in having a prescription for a wheelchair  .She has been out of HCTZ for  1 w    Review of Systems   Constitutional: Negative.    HENT: Negative.    Eyes: Negative.    Respiratory: Negative.    Cardiovascular: Negative.    Gastrointestinal: Negative.    Genitourinary: Negative.    Musculoskeletal: Positive for arthralgias.   Skin: Negative.    Hematological: Negative.        Objective:      Physical Exam  Vitals signs and nursing note reviewed.   Constitutional:       General: She is not in acute distress.     Appearance: She is well-developed. She is obese. She is not diaphoretic.   HENT:      Head: Normocephalic and atraumatic.      Right Ear: External ear normal.      Left Ear: External ear normal.      Nose: Nose normal.   Eyes:      General: No scleral icterus.        Left eye: No discharge.      Conjunctiva/sclera: Conjunctivae normal.      Pupils: Pupils are equal, round, and reactive to light.   Neck:      Musculoskeletal: Normal range of motion and neck supple.      Thyroid: No thyromegaly.      Vascular: No JVD.      Trachea: No tracheal deviation.   Cardiovascular:      Rate and Rhythm: Normal rate and regular rhythm.      Heart sounds: Normal heart sounds. No murmur. No friction rub. No gallop.    Pulmonary:      Effort: Pulmonary effort is normal. No respiratory distress.      Breath sounds: Normal breath sounds. No wheezing or rales.   Chest:      Chest wall: No tenderness.   Abdominal:      General: Bowel sounds are normal. There is no distension.      Palpations: Abdomen is soft. There is  no mass.      Tenderness: There is no abdominal tenderness. There is no guarding.   Lymphadenopathy:      Cervical: No cervical adenopathy.         Assessment:       1. Colon cancer screening    2. Moderate single current episode of major depressive disorder    3. Breast cancer screening    4. Post-traumatic osteoarthritis of knee, unspecified laterality    5. Encounter for screening mammogram for malignant neoplasm of breast     6. Hypertension, unspecified type    7. Morbid obesity with BMI of 60.0-69.9, adult        Plan:     Niru was seen today for follow-up.    Diagnoses and all orders for this visit:    Colon cancer screening  -     Case request GI: COLONOSCOPY    Moderate single current episode of major depressive disorder  -     citalopram (CELEXA) 40 MG tablet; TAKE 1 TABLET(40 MG) BY MOUTH EVERY DAY    Breast cancer screening  -     Mammo Digital Screening Bilat; Future    Post-traumatic osteoarthritis of knee, unspecified laterality  -     Ambulatory referral/consult to Orthopedics; Future  -     WHEELCHAIR FOR HOME USE    Encounter for screening mammogram for malignant neoplasm of breast   -     Mammo Digital Screening Bilat; Future    Hypertension, unspecified type    Morbid obesity with BMI of 60.0-69.9, adult    Other orders  -     hydroCHLOROthiazide (HYDRODIURIL) 25 MG tablet; Take 1 tablet (25 mg total) by mouth once daily.     celexa  Ortho , pt, weight loss  Uncontrolled. Resume HCTZ . F.u in 2 w

## 2020-09-15 ENCOUNTER — TELEPHONE (OUTPATIENT)
Dept: FAMILY MEDICINE | Facility: CLINIC | Age: 75
End: 2020-09-15

## 2020-09-15 NOTE — TELEPHONE ENCOUNTER
----- Message from Son Cadena sent at 9/15/2020  9:20 AM CDT -----  Contact: pt daughter - ronal  Is calling to say THANK YOU , they were able to find a wheelchair for the pt and has been ordered and will be delivered on tomorrow and appreciates the Dr's help and can be reached at [phone]//[email or web address]

## 2020-09-15 NOTE — TELEPHONE ENCOUNTER
Spoke with pt's daughter, Mary, she was calling to thank Dr. Myers for her help yesterday. She states that pt is getting wheelchair delivered tomorrow.

## 2020-10-19 RX ORDER — HYDROCHLOROTHIAZIDE 25 MG/1
TABLET ORAL
Qty: 7 TABLET | Refills: 0 | Status: SHIPPED | OUTPATIENT
Start: 2020-10-19 | End: 2020-12-01 | Stop reason: SDUPTHER

## 2020-11-02 DIAGNOSIS — I10 ESSENTIAL HYPERTENSION: ICD-10-CM

## 2020-11-02 RX ORDER — LISINOPRIL 10 MG/1
10 TABLET ORAL DAILY
Qty: 5 TABLET | Refills: 0 | Status: SHIPPED | OUTPATIENT
Start: 2020-11-02 | End: 2020-12-01 | Stop reason: SDUPTHER

## 2020-11-05 ENCOUNTER — CLINICAL SUPPORT (OUTPATIENT)
Dept: FAMILY MEDICINE | Facility: CLINIC | Age: 75
End: 2020-11-05
Payer: MEDICARE

## 2020-11-05 ENCOUNTER — TELEPHONE (OUTPATIENT)
Dept: FAMILY MEDICINE | Facility: CLINIC | Age: 75
End: 2020-11-05

## 2020-11-05 ENCOUNTER — TELEPHONE (OUTPATIENT)
Dept: ADMINISTRATIVE | Facility: HOSPITAL | Age: 75
End: 2020-11-05

## 2020-11-05 VITALS — DIASTOLIC BLOOD PRESSURE: 82 MMHG | SYSTOLIC BLOOD PRESSURE: 132 MMHG

## 2020-11-23 ENCOUNTER — TELEPHONE (OUTPATIENT)
Dept: ADMINISTRATIVE | Facility: HOSPITAL | Age: 75
End: 2020-11-23

## 2020-11-23 NOTE — TELEPHONE ENCOUNTER
Contacted patient to schedule overdue mammogram. Patient states that she is not feeling well at the moment and she declines to schedule at this time.

## 2020-12-01 ENCOUNTER — OFFICE VISIT (OUTPATIENT)
Dept: FAMILY MEDICINE | Facility: CLINIC | Age: 75
End: 2020-12-01
Attending: FAMILY MEDICINE
Payer: MEDICARE

## 2020-12-01 ENCOUNTER — LAB VISIT (OUTPATIENT)
Dept: LAB | Facility: HOSPITAL | Age: 75
End: 2020-12-01
Attending: NURSE PRACTITIONER
Payer: MEDICARE

## 2020-12-01 VITALS
DIASTOLIC BLOOD PRESSURE: 112 MMHG | OXYGEN SATURATION: 96 % | BODY MASS INDEX: 62.92 KG/M2 | SYSTOLIC BLOOD PRESSURE: 174 MMHG | WEIGHT: 293 LBS | TEMPERATURE: 97 F | HEART RATE: 84 BPM

## 2020-12-01 DIAGNOSIS — E66.01 CLASS 3 SEVERE OBESITY IN ADULT, UNSPECIFIED BMI, UNSPECIFIED OBESITY TYPE, UNSPECIFIED WHETHER SERIOUS COMORBIDITY PRESENT: ICD-10-CM

## 2020-12-01 DIAGNOSIS — R09.89 DECREASED DORSALIS PEDIS PULSE: ICD-10-CM

## 2020-12-01 DIAGNOSIS — R79.9 ABNORMAL FINDING OF BLOOD CHEMISTRY, UNSPECIFIED: ICD-10-CM

## 2020-12-01 DIAGNOSIS — I10 ESSENTIAL HYPERTENSION: Primary | ICD-10-CM

## 2020-12-01 DIAGNOSIS — M17.0 PRIMARY OSTEOARTHRITIS OF BOTH KNEES: ICD-10-CM

## 2020-12-01 DIAGNOSIS — I89.0 LYMPHEDEMA OF BOTH LOWER EXTREMITIES: ICD-10-CM

## 2020-12-01 DIAGNOSIS — R60.0 PERIPHERAL EDEMA: ICD-10-CM

## 2020-12-01 DIAGNOSIS — D50.9 IRON DEFICIENCY ANEMIA, UNSPECIFIED IRON DEFICIENCY ANEMIA TYPE: ICD-10-CM

## 2020-12-01 DIAGNOSIS — R19.5 POSITIVE FIT (FECAL IMMUNOCHEMICAL TEST): ICD-10-CM

## 2020-12-01 DIAGNOSIS — M25.50 ARTHRALGIA, UNSPECIFIED JOINT: ICD-10-CM

## 2020-12-01 DIAGNOSIS — F32.1 CURRENT MODERATE EPISODE OF MAJOR DEPRESSIVE DISORDER WITHOUT PRIOR EPISODE: ICD-10-CM

## 2020-12-01 DIAGNOSIS — I70.209 ATHEROSCLEROSIS OF ARTERIES OF EXTREMITIES: ICD-10-CM

## 2020-12-01 LAB
ALBUMIN SERPL BCP-MCNC: 3.3 G/DL (ref 3.5–5.2)
ALP SERPL-CCNC: 99 U/L (ref 55–135)
ALT SERPL W/O P-5'-P-CCNC: 12 U/L (ref 10–44)
ANION GAP SERPL CALC-SCNC: 8 MMOL/L (ref 8–16)
AST SERPL-CCNC: 16 U/L (ref 10–40)
BASOPHILS # BLD AUTO: 0.1 K/UL (ref 0–0.2)
BASOPHILS NFR BLD: 1.2 % (ref 0–1.9)
BILIRUB SERPL-MCNC: 0.6 MG/DL (ref 0.1–1)
BNP SERPL-MCNC: 53 PG/ML (ref 0–99)
BUN SERPL-MCNC: 15 MG/DL (ref 8–23)
CALCIUM SERPL-MCNC: 8.3 MG/DL (ref 8.7–10.5)
CHLORIDE SERPL-SCNC: 107 MMOL/L (ref 95–110)
CHOLEST SERPL-MCNC: 150 MG/DL (ref 120–199)
CHOLEST/HDLC SERPL: 3.7 {RATIO} (ref 2–5)
CO2 SERPL-SCNC: 26 MMOL/L (ref 23–29)
CREAT SERPL-MCNC: 0.8 MG/DL (ref 0.5–1.4)
CRP SERPL-MCNC: 19.9 MG/L (ref 0–8.2)
DIFFERENTIAL METHOD: ABNORMAL
EOSINOPHIL # BLD AUTO: 0.5 K/UL (ref 0–0.5)
EOSINOPHIL NFR BLD: 5.7 % (ref 0–8)
ERYTHROCYTE [DISTWIDTH] IN BLOOD BY AUTOMATED COUNT: 14.4 % (ref 11.5–14.5)
ERYTHROCYTE [SEDIMENTATION RATE] IN BLOOD BY WESTERGREN METHOD: 43 MM/HR (ref 0–36)
EST. GFR  (AFRICAN AMERICAN): >60 ML/MIN/1.73 M^2
EST. GFR  (NON AFRICAN AMERICAN): >60 ML/MIN/1.73 M^2
ESTIMATED AVG GLUCOSE: 108 MG/DL (ref 68–131)
FERRITIN SERPL-MCNC: 68 NG/ML (ref 20–300)
GLUCOSE SERPL-MCNC: 106 MG/DL (ref 70–110)
HBA1C MFR BLD HPLC: 5.4 % (ref 4–5.6)
HCT VFR BLD AUTO: 43.4 % (ref 37–48.5)
HDLC SERPL-MCNC: 41 MG/DL (ref 40–75)
HDLC SERPL: 27.3 % (ref 20–50)
HGB BLD-MCNC: 13.3 G/DL (ref 12–16)
IMM GRANULOCYTES # BLD AUTO: 0.04 K/UL (ref 0–0.04)
IMM GRANULOCYTES NFR BLD AUTO: 0.5 % (ref 0–0.5)
IRON SERPL-MCNC: 41 UG/DL (ref 30–160)
LDLC SERPL CALC-MCNC: 91 MG/DL (ref 63–159)
LYMPHOCYTES # BLD AUTO: 1.7 K/UL (ref 1–4.8)
LYMPHOCYTES NFR BLD: 20.4 % (ref 18–48)
MCH RBC QN AUTO: 26.8 PG (ref 27–31)
MCHC RBC AUTO-ENTMCNC: 30.6 G/DL (ref 32–36)
MCV RBC AUTO: 87 FL (ref 82–98)
MONOCYTES # BLD AUTO: 0.6 K/UL (ref 0.3–1)
MONOCYTES NFR BLD: 6.9 % (ref 4–15)
NEUTROPHILS # BLD AUTO: 5.4 K/UL (ref 1.8–7.7)
NEUTROPHILS NFR BLD: 65.3 % (ref 38–73)
NONHDLC SERPL-MCNC: 109 MG/DL
NRBC BLD-RTO: 0 /100 WBC
PLATELET # BLD AUTO: 265 K/UL (ref 150–350)
PMV BLD AUTO: 10.8 FL (ref 9.2–12.9)
POTASSIUM SERPL-SCNC: 4.2 MMOL/L (ref 3.5–5.1)
PROT SERPL-MCNC: 7.2 G/DL (ref 6–8.4)
RBC # BLD AUTO: 4.97 M/UL (ref 4–5.4)
SATURATED IRON: 11 % (ref 20–50)
SODIUM SERPL-SCNC: 141 MMOL/L (ref 136–145)
TOTAL IRON BINDING CAPACITY: 364 UG/DL (ref 250–450)
TRANSFERRIN SERPL-MCNC: 246 MG/DL (ref 200–375)
TRIGL SERPL-MCNC: 90 MG/DL (ref 30–150)
URATE SERPL-MCNC: 6.5 MG/DL (ref 2.4–5.7)
WBC # BLD AUTO: 8.22 K/UL (ref 3.9–12.7)

## 2020-12-01 PROCEDURE — 36415 COLL VENOUS BLD VENIPUNCTURE: CPT | Mod: PO

## 2020-12-01 PROCEDURE — 80061 LIPID PANEL: CPT

## 2020-12-01 PROCEDURE — 86038 ANTINUCLEAR ANTIBODIES: CPT

## 2020-12-01 PROCEDURE — 99999 PR PBB SHADOW E&M-EST. PATIENT-LVL III: CPT | Mod: PBBFAC,,, | Performed by: NURSE PRACTITIONER

## 2020-12-01 PROCEDURE — 85652 RBC SED RATE AUTOMATED: CPT

## 2020-12-01 PROCEDURE — 83036 HEMOGLOBIN GLYCOSYLATED A1C: CPT

## 2020-12-01 PROCEDURE — 83540 ASSAY OF IRON: CPT

## 2020-12-01 PROCEDURE — 84550 ASSAY OF BLOOD/URIC ACID: CPT

## 2020-12-01 PROCEDURE — 85025 COMPLETE CBC W/AUTO DIFF WBC: CPT

## 2020-12-01 PROCEDURE — 86431 RHEUMATOID FACTOR QUANT: CPT

## 2020-12-01 PROCEDURE — 86140 C-REACTIVE PROTEIN: CPT

## 2020-12-01 PROCEDURE — 99214 OFFICE O/P EST MOD 30 MIN: CPT | Mod: S$PBB,,, | Performed by: NURSE PRACTITIONER

## 2020-12-01 PROCEDURE — 80053 COMPREHEN METABOLIC PANEL: CPT

## 2020-12-01 PROCEDURE — 99213 OFFICE O/P EST LOW 20 MIN: CPT | Mod: PBBFAC,PO | Performed by: NURSE PRACTITIONER

## 2020-12-01 PROCEDURE — 82728 ASSAY OF FERRITIN: CPT

## 2020-12-01 PROCEDURE — 83880 ASSAY OF NATRIURETIC PEPTIDE: CPT

## 2020-12-01 PROCEDURE — 99999 PR PBB SHADOW E&M-EST. PATIENT-LVL III: ICD-10-PCS | Mod: PBBFAC,,, | Performed by: NURSE PRACTITIONER

## 2020-12-01 PROCEDURE — 99214 PR OFFICE/OUTPT VISIT, EST, LEVL IV, 30-39 MIN: ICD-10-PCS | Mod: S$PBB,,, | Performed by: NURSE PRACTITIONER

## 2020-12-01 RX ORDER — HYDROCHLOROTHIAZIDE 25 MG/1
25 TABLET ORAL DAILY
Qty: 90 TABLET | Refills: 0 | Status: SHIPPED | OUTPATIENT
Start: 2020-12-01 | End: 2021-04-16 | Stop reason: SDUPTHER

## 2020-12-01 RX ORDER — LISINOPRIL 10 MG/1
10 TABLET ORAL DAILY
Qty: 90 TABLET | Refills: 0 | Status: SHIPPED | OUTPATIENT
Start: 2020-12-01 | End: 2021-04-16 | Stop reason: SDUPTHER

## 2020-12-02 ENCOUNTER — TELEPHONE (OUTPATIENT)
Dept: FAMILY MEDICINE | Facility: CLINIC | Age: 75
End: 2020-12-02

## 2020-12-02 LAB
ANA SER QL IF: NORMAL
RHEUMATOID FACT SERPL-ACNC: <10 IU/ML (ref 0–15)

## 2020-12-02 NOTE — TELEPHONE ENCOUNTER
Spoke to pt and told pt that it wasn't us calling her that she had a us scheduled for today and that it could have been them calling to reschedule her appt.

## 2020-12-02 NOTE — TELEPHONE ENCOUNTER
----- Message from Nahed Wynn sent at 12/2/2020  1:02 PM CST -----  .Type:  Patient Returning Call    Who Called:self  Who Left Message for Patient:call  Does the patient know what this is regarding?:  Would the patient rather a call back or a response via [facility]? call  Best Call Back Number:.[phone] (home)   Additional Information:

## 2020-12-04 ENCOUNTER — TELEPHONE (OUTPATIENT)
Dept: FAMILY MEDICINE | Facility: CLINIC | Age: 75
End: 2020-12-04

## 2020-12-04 RX ORDER — COLCHICINE 0.6 MG/1
TABLET ORAL
Qty: 3 TABLET | Refills: 0 | Status: SHIPPED | OUTPATIENT
Start: 2020-12-04 | End: 2023-03-07

## 2020-12-04 RX ORDER — METHYLPREDNISOLONE 4 MG/1
TABLET ORAL
Qty: 1 PACKAGE | Refills: 0 | Status: SHIPPED | OUTPATIENT
Start: 2020-12-04 | End: 2023-03-07

## 2020-12-04 NOTE — TELEPHONE ENCOUNTER
----- Message from Cristina Gore sent at 12/4/2020  3:06 PM CST -----  Contact: [phone]  Type:  Test Results    Who Called:Patient  Name of Test (Lab/Mammo/Etc): LABS  Date of Test: 11-01-20  Ordering Provider: Maria Eugenia Grier NP  Where the test was performed: 11-01-20  Would the patient rather a call back or a response via [facility]? Call Back  Best Call Back Number: [phone]  Additional Information:              Myriam/ELLE

## 2020-12-05 ENCOUNTER — NURSE TRIAGE (OUTPATIENT)
Dept: ADMINISTRATIVE | Facility: CLINIC | Age: 75
End: 2020-12-05

## 2020-12-05 NOTE — TELEPHONE ENCOUNTER
Reason for Disposition   [1] Caller requesting NON-URGENT health information AND [2] PCP's office is the best resource    Protocols used: INFORMATION ONLY CALL-A-AH    [employer] called for more information for prior authorization. No additional information was provided. Explained that this will need to come from ordering provider's office, and that triage RN unable to assist with request.  She states understanding.  Message to Jordana Shirley MD, pcp.  Please contact caller directly with any additional care advice.

## 2020-12-07 ENCOUNTER — TELEPHONE (OUTPATIENT)
Dept: FAMILY MEDICINE | Facility: CLINIC | Age: 75
End: 2020-12-07

## 2020-12-16 NOTE — PROGRESS NOTES
Subjective:       Patient ID: Niru Reyes is a 75 y.o. female.    Chief Complaint: Medication Refill  pt reports to clinic for chronic care visit.  PMH: HTN lymphedema, obesity, depression.  Positive FIT kit. Pt has cancelled colonoscopy multiple times.  Does not monitor diet. No exercise regimen.  Reports mobility is difficult and uses a wheelchair.  Additionally pt continues to complaint of multiple joint pain worse in knees.  Notes swelling. Does not watch sodium intake.    Hypertension  This is a chronic problem. The current episode started more than 1 year ago. The problem is uncontrolled. Associated symptoms include peripheral edema. Pertinent negatives include no blurred vision, chest pain or palpitations. Risk factors for coronary artery disease include obesity, dyslipidemia, sedentary lifestyle and post-menopausal state. Past treatments include calcium channel blockers and ACE inhibitors.     Review of Systems   Constitutional: Negative.    HENT: Negative.    Eyes: Negative for blurred vision.   Respiratory: Negative.    Cardiovascular: Negative for chest pain and palpitations.   Gastrointestinal: Negative.    Genitourinary: Negative.    Musculoskeletal: Positive for arthralgias and myalgias.   Skin: Positive for color change.       Objective:      Physical Exam  Vitals signs reviewed.   Constitutional:       Appearance: She is obese.   HENT:      Head: Normocephalic.      Right Ear: Tympanic membrane normal.      Left Ear: Tympanic membrane normal.      Nose: Nose normal.      Mouth/Throat:      Mouth: Mucous membranes are dry.   Eyes:      Extraocular Movements: Extraocular movements intact.   Neck:      Musculoskeletal: Normal range of motion.   Cardiovascular:      Rate and Rhythm: Normal rate.      Heart sounds: Normal heart sounds.   Pulmonary:      Effort: Pulmonary effort is normal.      Breath sounds: Examination of the right-lower field reveals decreased breath sounds. Examination of the  left-lower field reveals decreased breath sounds. Decreased breath sounds present.   Abdominal:      Tenderness: There is no abdominal tenderness.   Musculoskeletal:         General: Swelling present.      Comments: lymphedema   Neurological:      General: No focal deficit present.      Mental Status: She is oriented to person, place, and time.   Psychiatric:         Behavior: Behavior normal.         Assessment:       1. Essential hypertension    2. Positive FIT (fecal immunochemical test)    3. Current moderate episode of major depressive disorder without prior episode    4. Primary osteoarthritis of both knees    5. Class 3 severe obesity in adult, unspecified BMI, unspecified obesity type, unspecified whether serious comorbidity present    6. Iron deficiency anemia, unspecified iron deficiency anemia type    7. Abnormal finding of blood chemistry, unspecified     8. Peripheral edema    9. Arthralgia, unspecified joint    10. Atherosclerosis of arteries of extremities    11. Lymphedema of both lower extremities    12. Decreased dorsalis pedis pulse        Plan:   Essential hypertension  -     lisinopriL 10 MG tablet; Take 1 tablet (10 mg total) by mouth once daily.  Dispense: 90 tablet; Refill: 0  -uncontrolled. Nurse visit schedule to repeat BP  Positive FIT (fecal immunochemical test)  -     Case Request Endoscopy: COLONOSCOPY  -discussed the importance of rescheduling appt  Current moderate episode of major depressive disorder without prior episode  Stable continue treatment plan   Primary osteoarthritis of both knees    Class 3 severe obesity in adult, unspecified BMI, unspecified obesity type, unspecified whether serious comorbidity present  -     CBC Auto Differential; Future; Expected date: 12/01/2020  -     Comprehensive Metabolic Panel; Future; Expected date: 12/01/2020  -     Lipid Panel; Future; Expected date: 12/01/2020  -     Hemoglobin A1C; Future; Expected date: 12/01/2020  -worsening diet and  exercise   Iron deficiency anemia, unspecified iron deficiency anemia type  -     Iron and TIBC; Future; Expected date: 12/01/2020  -     Ferritin; Future; Expected date: 12/01/2020  Stable continue treatment plan   Abnormal finding of blood chemistry, unspecified   -     Hemoglobin A1C; Future; Expected date: 12/01/2020    Peripheral edema  -     BNP; Future; Expected date: 12/01/2020  -     URINALYSIS    Arthralgia, unspecified joint  -     Rheumatoid Factor; Future; Expected date: 12/01/2020  -     Sedimentation rate; Future; Expected date: 12/01/2020  -     C-reactive protein; Future; Expected date: 12/01/2020  -     LUX Screen w/Reflex; Future; Expected date: 12/01/2020  -     Uric Acid; Future; Expected date: 12/01/2020    Atherosclerosis of arteries of extremities  Continue statin plan   Lymphedema of both lower extremities  Refuses referral to lymphedema clinic  Decreased dorsalis pedis pulse  -     US Lower Extremity Arteries Bilateral; Future; Expected date: 12/01/2020    Other orders  -     hydroCHLOROthiazide (HYDRODIURIL) 25 MG tablet; Take 1 tablet (25 mg total) by mouth once daily.  Dispense: 90 tablet; Refill: 0      Follow up pending lab results

## 2021-02-17 ENCOUNTER — PATIENT OUTREACH (OUTPATIENT)
Dept: ADMINISTRATIVE | Facility: HOSPITAL | Age: 76
End: 2021-02-17

## 2021-04-12 ENCOUNTER — TELEPHONE (OUTPATIENT)
Dept: ENDOSCOPY | Facility: HOSPITAL | Age: 76
End: 2021-04-12

## 2021-04-16 DIAGNOSIS — I10 ESSENTIAL HYPERTENSION: ICD-10-CM

## 2021-04-16 RX ORDER — HYDROCHLOROTHIAZIDE 25 MG/1
25 TABLET ORAL DAILY
Qty: 90 TABLET | Refills: 0 | Status: SHIPPED | OUTPATIENT
Start: 2021-04-16 | End: 2021-08-09

## 2021-04-16 RX ORDER — LISINOPRIL 10 MG/1
10 TABLET ORAL DAILY
Qty: 90 TABLET | Refills: 0 | Status: SHIPPED | OUTPATIENT
Start: 2021-04-16 | End: 2023-03-07

## 2021-04-21 ENCOUNTER — PES CALL (OUTPATIENT)
Dept: ADMINISTRATIVE | Facility: CLINIC | Age: 76
End: 2021-04-21

## 2021-07-01 ENCOUNTER — PES CALL (OUTPATIENT)
Dept: ADMINISTRATIVE | Facility: CLINIC | Age: 76
End: 2021-07-01

## 2021-07-20 ENCOUNTER — PATIENT OUTREACH (OUTPATIENT)
Dept: ADMINISTRATIVE | Facility: HOSPITAL | Age: 76
End: 2021-07-20

## 2022-02-03 DIAGNOSIS — I10 HTN (HYPERTENSION): ICD-10-CM

## 2022-03-09 ENCOUNTER — PATIENT OUTREACH (OUTPATIENT)
Dept: ADMINISTRATIVE | Facility: HOSPITAL | Age: 77
End: 2022-03-09
Payer: MEDICARE

## 2022-03-09 NOTE — PROGRESS NOTES
Working Mammogram report; chart searched; No mammogram records  in pt chart.  I Called pt to schedule appointment for overdue mammogram exam. Will call when she is ready.

## 2022-05-12 ENCOUNTER — PATIENT OUTREACH (OUTPATIENT)
Dept: ADMINISTRATIVE | Facility: HOSPITAL | Age: 77
End: 2022-05-12
Payer: MEDICARE

## 2022-05-12 NOTE — PROGRESS NOTES
Not Seen in 12 month Report: Patient notified of overdue annual exam with PCP, patient has scheduled appointment on 5/23/22 for check up.

## 2022-08-24 DIAGNOSIS — Z78.0 MENOPAUSE: ICD-10-CM

## 2022-11-08 ENCOUNTER — PATIENT OUTREACH (OUTPATIENT)
Dept: ADMINISTRATIVE | Facility: HOSPITAL | Age: 77
End: 2022-11-08
Payer: MEDICARE

## 2022-11-08 NOTE — PROGRESS NOTES
HTN REPORT: Tried calling patient to obtain a at home blood pressure reading. No answer. Left voicemail asking for a return call

## 2023-03-07 ENCOUNTER — HOSPITAL ENCOUNTER (INPATIENT)
Facility: HOSPITAL | Age: 78
LOS: 7 days | Discharge: NURSING, REHAB OR LONG-TERM CARE | DRG: 556 | End: 2023-03-15
Attending: EMERGENCY MEDICINE | Admitting: INTERNAL MEDICINE
Payer: MEDICARE

## 2023-03-07 DIAGNOSIS — R52 PAIN: ICD-10-CM

## 2023-03-07 DIAGNOSIS — M79.81: Primary | ICD-10-CM

## 2023-03-07 DIAGNOSIS — K66.1 INTRAPERITONEAL BLEEDING: ICD-10-CM

## 2023-03-07 DIAGNOSIS — R60.0 BILATERAL LOWER EXTREMITY EDEMA: ICD-10-CM

## 2023-03-07 PROBLEM — D62 ACUTE BLOOD LOSS ANEMIA: Status: ACTIVE | Noted: 2023-03-07

## 2023-03-07 LAB
ABO + RH BLD: NORMAL
ALBUMIN SERPL BCP-MCNC: 3.4 G/DL (ref 3.5–5.2)
ALP SERPL-CCNC: 93 U/L (ref 55–135)
ALT SERPL W/O P-5'-P-CCNC: 9 U/L (ref 10–44)
ANION GAP SERPL CALC-SCNC: 12 MMOL/L (ref 8–16)
AST SERPL-CCNC: 13 U/L (ref 10–40)
BASOPHILS # BLD AUTO: 0.06 K/UL (ref 0–0.2)
BASOPHILS NFR BLD: 0.6 % (ref 0–1.9)
BILIRUB SERPL-MCNC: 0.8 MG/DL (ref 0.1–1)
BLD GP AB SCN CELLS X3 SERPL QL: NORMAL
BUN SERPL-MCNC: 15 MG/DL (ref 8–23)
CALCIUM SERPL-MCNC: 9 MG/DL (ref 8.7–10.5)
CHLORIDE SERPL-SCNC: 104 MMOL/L (ref 95–110)
CO2 SERPL-SCNC: 26 MMOL/L (ref 23–29)
CREAT SERPL-MCNC: 0.8 MG/DL (ref 0.5–1.4)
DIFFERENTIAL METHOD: ABNORMAL
EOSINOPHIL # BLD AUTO: 0 K/UL (ref 0–0.5)
EOSINOPHIL NFR BLD: 0.3 % (ref 0–8)
ERYTHROCYTE [DISTWIDTH] IN BLOOD BY AUTOMATED COUNT: 15.5 % (ref 11.5–14.5)
EST. GFR  (NO RACE VARIABLE): >60 ML/MIN/1.73 M^2
GLUCOSE SERPL-MCNC: 147 MG/DL (ref 70–110)
HCT VFR BLD AUTO: 30.9 % (ref 37–48.5)
HCT VFR BLD AUTO: 35.6 % (ref 37–48.5)
HGB BLD-MCNC: 11.1 G/DL (ref 12–16)
HGB BLD-MCNC: 9.8 G/DL (ref 12–16)
IMM GRANULOCYTES # BLD AUTO: 0.04 K/UL (ref 0–0.04)
IMM GRANULOCYTES NFR BLD AUTO: 0.4 % (ref 0–0.5)
LYMPHOCYTES # BLD AUTO: 0.7 K/UL (ref 1–4.8)
LYMPHOCYTES NFR BLD: 6.6 % (ref 18–48)
MCH RBC QN AUTO: 26.4 PG (ref 27–31)
MCHC RBC AUTO-ENTMCNC: 31.2 G/DL (ref 32–36)
MCV RBC AUTO: 85 FL (ref 82–98)
MONOCYTES # BLD AUTO: 0.4 K/UL (ref 0.3–1)
MONOCYTES NFR BLD: 3.7 % (ref 4–15)
NEUTROPHILS # BLD AUTO: 9.4 K/UL (ref 1.8–7.7)
NEUTROPHILS NFR BLD: 88.4 % (ref 38–73)
NRBC BLD-RTO: 0 /100 WBC
PLATELET # BLD AUTO: 226 K/UL (ref 150–450)
PMV BLD AUTO: 10.5 FL (ref 9.2–12.9)
POTASSIUM SERPL-SCNC: 3.8 MMOL/L (ref 3.5–5.1)
PROT SERPL-MCNC: 6.9 G/DL (ref 6–8.4)
RBC # BLD AUTO: 4.21 M/UL (ref 4–5.4)
SODIUM SERPL-SCNC: 142 MMOL/L (ref 136–145)
WBC # BLD AUTO: 10.59 K/UL (ref 3.9–12.7)

## 2023-03-07 PROCEDURE — 99285 EMERGENCY DEPT VISIT HI MDM: CPT | Mod: ,,, | Performed by: PHYSICIAN ASSISTANT

## 2023-03-07 PROCEDURE — 80053 COMPREHEN METABOLIC PANEL: CPT | Performed by: EMERGENCY MEDICINE

## 2023-03-07 PROCEDURE — G0378 HOSPITAL OBSERVATION PER HR: HCPCS

## 2023-03-07 PROCEDURE — 86920 COMPATIBILITY TEST SPIN: CPT | Performed by: NURSE PRACTITIONER

## 2023-03-07 PROCEDURE — 25000003 PHARM REV CODE 250: Performed by: STUDENT IN AN ORGANIZED HEALTH CARE EDUCATION/TRAINING PROGRAM

## 2023-03-07 PROCEDURE — 99285 PR EMERGENCY DEPT VISIT,LEVEL V: ICD-10-PCS | Mod: ,,, | Performed by: PHYSICIAN ASSISTANT

## 2023-03-07 PROCEDURE — 36415 COLL VENOUS BLD VENIPUNCTURE: CPT | Performed by: NURSE PRACTITIONER

## 2023-03-07 PROCEDURE — 96374 THER/PROPH/DIAG INJ IV PUSH: CPT

## 2023-03-07 PROCEDURE — 25000003 PHARM REV CODE 250: Performed by: NURSE PRACTITIONER

## 2023-03-07 PROCEDURE — 25000003 PHARM REV CODE 250: Performed by: EMERGENCY MEDICINE

## 2023-03-07 PROCEDURE — 63600175 PHARM REV CODE 636 W HCPCS: Performed by: EMERGENCY MEDICINE

## 2023-03-07 PROCEDURE — 99285 EMERGENCY DEPT VISIT HI MDM: CPT | Mod: 25

## 2023-03-07 PROCEDURE — 86900 BLOOD TYPING SEROLOGIC ABO: CPT | Performed by: EMERGENCY MEDICINE

## 2023-03-07 PROCEDURE — 85018 HEMOGLOBIN: CPT | Performed by: NURSE PRACTITIONER

## 2023-03-07 PROCEDURE — 85025 COMPLETE CBC W/AUTO DIFF WBC: CPT | Performed by: EMERGENCY MEDICINE

## 2023-03-07 PROCEDURE — 85014 HEMATOCRIT: CPT | Performed by: NURSE PRACTITIONER

## 2023-03-07 RX ORDER — HYDROMORPHONE HYDROCHLORIDE 2 MG/ML
1 INJECTION, SOLUTION INTRAMUSCULAR; INTRAVENOUS; SUBCUTANEOUS
Status: COMPLETED | OUTPATIENT
Start: 2023-03-07 | End: 2023-03-07

## 2023-03-07 RX ORDER — OXYCODONE AND ACETAMINOPHEN 10; 325 MG/1; MG/1
1 TABLET ORAL
Status: COMPLETED | OUTPATIENT
Start: 2023-03-07 | End: 2023-03-07

## 2023-03-07 RX ORDER — ONDANSETRON HYDROCHLORIDE 4 MG/5ML
4 SOLUTION ORAL ONCE
Status: COMPLETED | OUTPATIENT
Start: 2023-03-07 | End: 2023-03-07

## 2023-03-07 RX ORDER — HYDRALAZINE HYDROCHLORIDE 20 MG/ML
10 INJECTION INTRAMUSCULAR; INTRAVENOUS EVERY 4 HOURS PRN
Status: DISCONTINUED | OUTPATIENT
Start: 2023-03-07 | End: 2023-03-15 | Stop reason: HOSPADM

## 2023-03-07 RX ORDER — LISINOPRIL 20 MG/1
20 TABLET ORAL DAILY
Status: DISCONTINUED | OUTPATIENT
Start: 2023-03-07 | End: 2023-03-11

## 2023-03-07 RX ADMIN — OXYCODONE AND ACETAMINOPHEN 1 TABLET: 10; 325 TABLET ORAL at 09:03

## 2023-03-07 RX ADMIN — HYDROMORPHONE HYDROCHLORIDE 1 MG: 2 INJECTION, SOLUTION INTRAMUSCULAR; INTRAVENOUS; SUBCUTANEOUS at 11:03

## 2023-03-07 RX ADMIN — ONDANSETRON 4 MG: 4 SOLUTION ORAL at 08:03

## 2023-03-07 RX ADMIN — LISINOPRIL 20 MG: 20 TABLET ORAL at 08:03

## 2023-03-07 NOTE — ASSESSMENT & PLAN NOTE
Noted on CT hip.   Monitor H/H. 11.3>9.8 g/dL. If downward trend continues may need CTA to rule out active bleed.  No surgical intervention at this time.  Pain manageable with current regimen  Orthopedic surgery input appreciated

## 2023-03-07 NOTE — SUBJECTIVE & OBJECTIVE
Past Medical History:   Diagnosis Date    Hypertension        Past Surgical History:   Procedure Laterality Date     SECTION      COLONOSCOPY N/A 2019    Procedure: COLONOSCOPY;  Surgeon: Janeth Leroy MD;  Location: [facility];  Service: Endoscopy;  Laterality: N/A;    HYSTERECTOMY         Review of patient's allergies indicates:  No Known Allergies    No current facility-administered medications on file prior to encounter.     Current Outpatient Medications on File Prior to Encounter   Medication Sig    aspirin (ECOTRIN) 81 MG EC tablet Take 81 mg by mouth once daily.    hydroCHLOROthiazide (HYDRODIURIL) 25 MG tablet TAKE 1 TABLET(25 MG) BY MOUTH EVERY DAY    [DISCONTINUED] atorvastatin (LIPITOR) 20 MG tablet Take 1 tablet (20 mg total) by mouth once daily.    [DISCONTINUED] citalopram (CELEXA) 40 MG tablet TAKE 1 TABLET(40 MG) BY MOUTH EVERY DAY    [DISCONTINUED] colchicine (COLCRYS) 0.6 mg tablet Take 2 tablets by mouth, wait 1 hour and take 1 tablet    [DISCONTINUED] lisinopriL 10 MG tablet Take 1 tablet (10 mg total) by mouth once daily.    [DISCONTINUED] methylPREDNISolone (MEDROL DOSEPACK) 4 mg tablet use as directed    [DISCONTINUED] naproxen sodium (ANAPROX) 220 MG tablet Take 440 mg by mouth once daily.     Family History       Problem Relation (Age of Onset)    Birth defects Daughter    Heart disease Mother, Sister    Muscular dystrophy Father          Tobacco Use    Smoking status: Never    Smokeless tobacco: Not on file   Substance and Sexual Activity    Alcohol use: No    Drug use: No    Sexual activity: Never     Review of Systems   Constitutional:  Negative for activity change, diaphoresis, fatigue and unexpected weight change.   HENT:  Negative for congestion, ear pain and sore throat.    Eyes: Negative.    Respiratory:  Negative for shortness of breath and wheezing.    Cardiovascular:  Negative for chest pain and palpitations.   Gastrointestinal:  Negative for abdominal pain,  constipation, diarrhea and vomiting.   Endocrine: Negative.    Genitourinary:  Negative for flank pain, hematuria and urgency.   Musculoskeletal:  Positive for arthralgias, gait problem and joint swelling. Negative for neck pain.   Skin:  Positive for wound. Negative for pallor.   Neurological:  Negative for seizures, syncope and light-headedness.   Hematological: Negative.    Psychiatric/Behavioral: Negative.       Objective:     Vital Signs (Most Recent):  Temp: 97.7 °F (36.5 °C) (03/07/23 0809)  Pulse: 73 (03/07/23 1500)  Resp: 20 (03/07/23 1500)  BP: (!) 150/68 (03/07/23 1500)  SpO2: 95 % (03/07/23 1500)   Vital Signs (24h Range):  Temp:  [97.7 °F (36.5 °C)-98.2 °F (36.8 °C)] 97.7 °F (36.5 °C)  Pulse:  [73-80] 73  Resp:  [18-20] 20  SpO2:  [93 %-97 %] 95 %  BP: (150-211)/() 150/68     Weight: (!) 183.6 kg (404 lb 12.8 oz)  Body mass index is 61.55 kg/m².    Physical Exam  Constitutional:       Appearance: She is well-developed. She is obese.   HENT:      Head: Normocephalic and atraumatic.   Cardiovascular:      Rate and Rhythm: Normal rate and regular rhythm.      Heart sounds: Normal heart sounds. No murmur heard.  Pulmonary:      Effort: Pulmonary effort is normal. No respiratory distress.      Breath sounds: Normal breath sounds.   Abdominal:      General: Bowel sounds are normal. There is no distension.      Palpations: Abdomen is soft.      Tenderness: There is no abdominal tenderness.   Musculoskeletal:         General: Normal range of motion.      Cervical back: Normal range of motion and neck supple.      Right lower leg: Edema present.      Left lower leg: Edema present.      Comments: Left hip tenderness with deformity     Skin:     General: Skin is warm.      Comments: Skin changes consistent with lymphedema.    Neurological:      Mental Status: She is alert and oriented to person, place, and time.        Significant Labs: All pertinent labs within the past 24 hours have been reviewed.  CBC:    Recent Labs   Lab 03/07/23  0859 03/07/23  1447   WBC 10.59  --    HGB 11.1* 9.8*   HCT 35.6* 30.9*     --      CMP:   Recent Labs   Lab 03/07/23  0859      K 3.8      CO2 26   *   BUN 15   CREATININE 0.8   CALCIUM 9.0   PROT 6.9   ALBUMIN 3.4*   BILITOT 0.8   ALKPHOS 93   AST 13   ALT 9*   ANIONGAP 12       Significant Imaging:   Imaging Results              CT Pelvis Without Contrast (Final result)  Result time 03/07/23 10:19:33      Final result by Catrachito Steele MD (03/07/23 10:19:33)                   Impression:      Large area of hemorrhage in the left pelvis overlying the left iliacus muscle as well as diffuse enlargement of the left iliacus and iliopsoas muscles extending all the way down towards the femur.  Certainly primary muscular trauma with avulsion is a consideration though there is no fracture visualized.  The left pelvic hematoma is only partially visualized.    All CT scans at this facility are performed  using dose modulation techniques as appropriate to performed exam including the following:  automated exposure control; adjustment of mA and/or kV according to the patients size (this includes techniques or standardized protocols for targeted exams where dose is matched to indication/reason for exam: i.e. extremities or head);  iterative reconstruction technique.      Electronically signed by: Catrachito Steele MD  Date:    03/07/2023  Time:    10:19               Narrative:    EXAMINATION:  CT PELVIS WITHOUT CONTRAST    CLINICAL HISTORY:  Pelvic fracture;scan thru hips;    TECHNIQUE:  Axial CT through the pelvis with multiplanar reformations.    COMPARISON:  Earlier plain radiographs    FINDINGS:  There is diffuse enlargement of the left iliacus and left iliopsoas muscle extending all the way down to the femur.  Heterogeneous areas of hyperdensity/hemorrhage within the muscle more notably in the iliacus portion.  There is a large area of hematoma overlying the left  iliacus muscle with a maximum axial diameter of 5.9 x 6.3 cm.  Additional surrounding smaller degrees of strandy hemorrhage.  The most superior extent of the hemorrhage is not included within the scan.    There is no fracture evident.    No advanced arthritic change.  Mild arthritic changes left hip.    Atherosclerosis.  Colonic diverticulosis.                                       X-Ray Hip 2 or 3 views Left (with Pelvis when performed) (Final result)  Result time 03/07/23 08:07:05      Final result by Timothy Gonzáles MD (03/07/23 08:07:05)                   Impression:      Limited exam.  No definite fracture.  Consider CT if there remains concern for fracture.      Electronically signed by: Timothy Gonzáles  Date:    03/07/2023  Time:    08:07               Narrative:    EXAMINATION:  XR HIP WITH PELVIS WHEN PERFORMED, 2 OR 3 VIEWS LEFT    CLINICAL HISTORY:  Pain, unspecified    TECHNIQUE:  AP view of the pelvis and frog leg lateral view of the left hip were performed.    COMPARISON:  Pelvic radiograph December 4, 2014.    FINDINGS:  Poor osseous detail of uncertain etiology, possibly overlying clothing artifact.  Osteopenia.  No dislocation.  No definite fracture.

## 2023-03-07 NOTE — ED PROVIDER NOTES
"SCRIBE #1 NOTE: I, Zhensang Nevarez, am scribing for, and in the presence of, Mp Vickers Jr., MD. I have scribed the entire note.      History      Chief Complaint   Patient presents with    Hip Pain     Felt a pop in her left hip while moving in bed.        Review of patient's allergies indicates:  No Known Allergies     HPI   HPI    3/7/2023, 7:20 AM   History obtained from the patient      History of Present Illness: Niru Reyes is a 77 y.o. female patient with a PMHx of HTN who presents to the Emergency Department for L hip pain, onset this morning at 1 AM. Pt states that she was laying in bed and felt a "pop" in her L hip. Pt walks with a walker at baseline, but states that she has been unable to walk since her pain started. Symptoms are constant and moderate in severity. Pain is worse with movement. Associated sxs include nausea. Patient denies any trauma/injury, fever, chills, vomiting, SOB, CP, weakness, numbness, dizziness, headache, and all other sxs at this time. Pt was given 4 mg Zofran and 200 mcg Fentanyl en route to the ED. No further complaints or concerns at this time.     Arrival mode: AASI    PCP: Jordana Shirley MD       Past Medical History:  Past Medical History:   Diagnosis Date    Hypertension        Past Surgical History:  Past Surgical History:   Procedure Laterality Date     SECTION      COLONOSCOPY N/A 2019    Procedure: COLONOSCOPY;  Surgeon: Jaenth Leroy MD;  Location: [facility];  Service: Endoscopy;  Laterality: N/A;    HYSTERECTOMY           Family History:  Family History   Problem Relation Age of Onset    Heart disease Mother         valvulopathy    Muscular dystrophy Father     Heart disease Sister         CAD     Birth defects Daughter        Social History:  Social History     Tobacco Use    Smoking status: Never    Smokeless tobacco: Not on file   Substance and Sexual Activity    Alcohol use: No    Drug use: No    Sexual activity: Never       ROS "   Review of Systems   Constitutional:  Negative for chills and fever.   HENT:  Negative for sore throat.    Respiratory:  Negative for shortness of breath.    Cardiovascular:  Negative for chest pain.   Gastrointestinal:  Negative for diarrhea, nausea and vomiting.   Genitourinary:  Negative for dysuria.   Musculoskeletal:  Positive for arthralgias (L hip). Negative for back pain.   Skin:  Negative for rash.   Neurological:  Negative for dizziness, weakness, light-headedness, numbness and headaches.   Hematological:  Does not bruise/bleed easily.   All other systems reviewed and are negative.    Physical Exam      Initial Vitals [03/07/23 0728]   BP Pulse Resp Temp SpO2   (!) 210/100 77 18 98.2 °F (36.8 °C) 96 %      MAP       --          Physical Exam  Nursing Notes and Vital Signs Reviewed.  Constitutional: Patient is in no acute distress. Well-developed and well-nourished.  Head: Atraumatic. Normocephalic.  Eyes: PERRL. EOM intact. Conjunctivae are not pale. No scleral icterus.  ENT: Mucous membranes are moist. Oropharynx is clear and symmetric.    Neck: Supple. Full ROM.   Cardiovascular: Regular rate. Regular rhythm. No murmurs, rubs, or gallops. Distal pulses are 2+ and symmetric.  Pulmonary/Chest: No respiratory distress. Clear to auscultation bilaterally. No wheezing or rales.  Abdominal: Soft and non-distended.  There is no tenderness.  No rebound, guarding, or rigidity.   Musculoskeletal: Moves all extremities. No obvious deformities. No edema. Tenderness over the L hip.  Skin: Warm and dry.  Neurological:  Alert, awake, and appropriate.  Normal speech.  No acute focal neurological deficits are appreciated.  Psychiatric: Normal affect. Good eye contact. Appropriate in content.    ED Course    Procedures  ED Vital Signs:  Vitals:    03/07/23 0728 03/07/23 0809 03/07/23 0835 03/07/23 0900   BP: (!) 210/100  (!) 211/90 (!) 182/75   Pulse: 77  76 80   Resp: 18  20 20   Temp: 98.2 °F (36.8 °C) 97.7 °F (36.5 °C)  "    TempSrc: Oral Oral     SpO2: 96%  97% (!) 94%   Weight:       Height:        03/07/23 0937 03/07/23 1107 03/07/23 1115 03/07/23 1118   BP:   (!) 191/90    Pulse:   76    Resp: 20  19 20   Temp:       TempSrc:       SpO2:   (!) 93%    Weight:  (!) 183.6 kg (404 lb 12.8 oz)     Height:  5' 8" (1.727 m)         Abnormal Lab Results:  Labs Reviewed   CBC W/ AUTO DIFFERENTIAL - Abnormal; Notable for the following components:       Result Value    Hemoglobin 11.1 (*)     Hematocrit 35.6 (*)     MCH 26.4 (*)     MCHC 31.2 (*)     RDW 15.5 (*)     Gran # (ANC) 9.4 (*)     Lymph # 0.7 (*)     Gran % 88.4 (*)     Lymph % 6.6 (*)     Mono % 3.7 (*)     All other components within normal limits   COMPREHENSIVE METABOLIC PANEL - Abnormal; Notable for the following components:    Glucose 147 (*)     Albumin 3.4 (*)     ALT 9 (*)     All other components within normal limits   HEMOGLOBIN   HEMATOCRIT   TYPE & SCREEN        All Lab Results:  Results for orders placed or performed during the hospital encounter of 03/07/23   CBC auto differential   Result Value Ref Range    WBC 10.59 3.90 - 12.70 K/uL    RBC 4.21 4.00 - 5.40 M/uL    Hemoglobin 11.1 (L) 12.0 - 16.0 g/dL    Hematocrit 35.6 (L) 37.0 - 48.5 %    MCV 85 82 - 98 fL    MCH 26.4 (L) 27.0 - 31.0 pg    MCHC 31.2 (L) 32.0 - 36.0 g/dL    RDW 15.5 (H) 11.5 - 14.5 %    Platelets 226 150 - 450 K/uL    MPV 10.5 9.2 - 12.9 fL    Immature Granulocytes 0.4 0.0 - 0.5 %    Gran # (ANC) 9.4 (H) 1.8 - 7.7 K/uL    Immature Grans (Abs) 0.04 0.00 - 0.04 K/uL    Lymph # 0.7 (L) 1.0 - 4.8 K/uL    Mono # 0.4 0.3 - 1.0 K/uL    Eos # 0.0 0.0 - 0.5 K/uL    Baso # 0.06 0.00 - 0.20 K/uL    nRBC 0 0 /100 WBC    Gran % 88.4 (H) 38.0 - 73.0 %    Lymph % 6.6 (L) 18.0 - 48.0 %    Mono % 3.7 (L) 4.0 - 15.0 %    Eosinophil % 0.3 0.0 - 8.0 %    Basophil % 0.6 0.0 - 1.9 %    Differential Method Automated    Comprehensive metabolic panel   Result Value Ref Range    Sodium 142 136 - 145 mmol/L    Potassium " 3.8 3.5 - 5.1 mmol/L    Chloride 104 95 - 110 mmol/L    CO2 26 23 - 29 mmol/L    Glucose 147 (H) 70 - 110 mg/dL    BUN 15 8 - 23 mg/dL    Creatinine 0.8 0.5 - 1.4 mg/dL    Calcium 9.0 8.7 - 10.5 mg/dL    Total Protein 6.9 6.0 - 8.4 g/dL    Albumin 3.4 (L) 3.5 - 5.2 g/dL    Total Bilirubin 0.8 0.1 - 1.0 mg/dL    Alkaline Phosphatase 93 55 - 135 U/L    AST 13 10 - 40 U/L    ALT 9 (L) 10 - 44 U/L    Anion Gap 12 8 - 16 mmol/L    eGFR >60 >60 mL/min/1.73 m^2   Type & Screen   Result Value Ref Range    Group & Rh O NEG     Indirect Carlitos NEG      Imaging Results:  Imaging Results              CT Pelvis Without Contrast (Final result)  Result time 03/07/23 10:19:33      Final result by Catrachito Steele MD (03/07/23 10:19:33)                   Impression:      Large area of hemorrhage in the left pelvis overlying the left iliacus muscle as well as diffuse enlargement of the left iliacus and iliopsoas muscles extending all the way down towards the femur.  Certainly primary muscular trauma with avulsion is a consideration though there is no fracture visualized.  The left pelvic hematoma is only partially visualized.    All CT scans at this facility are performed  using dose modulation techniques as appropriate to performed exam including the following:  automated exposure control; adjustment of mA and/or kV according to the patients size (this includes techniques or standardized protocols for targeted exams where dose is matched to indication/reason for exam: i.e. extremities or head);  iterative reconstruction technique.      Electronically signed by: Catrachito Steele MD  Date:    03/07/2023  Time:    10:19               Narrative:    EXAMINATION:  CT PELVIS WITHOUT CONTRAST    CLINICAL HISTORY:  Pelvic fracture;scan thru hips;    TECHNIQUE:  Axial CT through the pelvis with multiplanar reformations.    COMPARISON:  Earlier plain radiographs    FINDINGS:  There is diffuse enlargement of the left iliacus and left iliopsoas  muscle extending all the way down to the femur.  Heterogeneous areas of hyperdensity/hemorrhage within the muscle more notably in the iliacus portion.  There is a large area of hematoma overlying the left iliacus muscle with a maximum axial diameter of 5.9 x 6.3 cm.  Additional surrounding smaller degrees of strandy hemorrhage.  The most superior extent of the hemorrhage is not included within the scan.    There is no fracture evident.    No advanced arthritic change.  Mild arthritic changes left hip.    Atherosclerosis.  Colonic diverticulosis.                                       X-Ray Hip 2 or 3 views Left (with Pelvis when performed) (Final result)  Result time 03/07/23 08:07:05      Final result by Timothy Gonzáles MD (03/07/23 08:07:05)                   Impression:      Limited exam.  No definite fracture.  Consider CT if there remains concern for fracture.      Electronically signed by: Timothy Gonzáles  Date:    03/07/2023  Time:    08:07               Narrative:    EXAMINATION:  XR HIP WITH PELVIS WHEN PERFORMED, 2 OR 3 VIEWS LEFT    CLINICAL HISTORY:  Pain, unspecified    TECHNIQUE:  AP view of the pelvis and frog leg lateral view of the left hip were performed.    COMPARISON:  Pelvic radiograph December 4, 2014.    FINDINGS:  Poor osseous detail of uncertain etiology, possibly overlying clothing artifact.  Osteopenia.  No dislocation.  No definite fracture.                                              The Emergency Provider reviewed the vital signs and test results, which are outlined above.    ED Discussion     11:05 AM: Discussed case with Dr. Gore ([facility]). Dr. Gore agrees with current care and management of pt and accepts admission.   Admitting Service: Hospital Medicine  Admitting Physician: Dr. Gore  Admit to: Obs    11:06 AM: Re-evaluated pt. I have discussed test results, shared treatment plan, and the need for admission with patient and family at bedside. Pt and family express  understanding at this time and agree with all information. All questions answered. Pt and family have no further questions or concerns at this time. Pt is ready for admit.         ED Medication(s):  Medications   oxyCODONE-acetaminophen  mg per tablet 1 tablet (1 tablet Oral Given 3/7/23 0952)   HYDROmorphone (PF) injection 1 mg (1 mg Intravenous Given 3/7/23 1118)         New Prescriptions    No medications on file       Medical Decision Making    Medical Decision Making:   Clinical Tests:   Lab Tests: Ordered and Reviewed  Radiological Study: Ordered and Reviewed         Scribe Attestation:   Scribe #1: I performed the above scribed service and the documentation accurately describes the services I performed. I attest to the accuracy of the note.    Attending:   Physician Attestation Statement for Scribe #1: I, Mp Vickers Jr., MD, personally performed the services described in this documentation, as scribed by Zhen Nevarez, in my presence, and it is both accurate and complete.          Clinical Impression       ICD-10-CM ICD-9-CM   1. Intraperitoneal bleeding  K66.1 568.81   2. Pain  R52 780.96   3. Nontraumatic hematoma of muscle  M79.81 729.92       Disposition:   Disposition: Placed in Observation  Condition: Fair       Mp Vickers Jr., MD  03/07/23 5571

## 2023-03-07 NOTE — PHARMACY MED REC
"Admission Medication History     The home medication history was taken by Genaro Green.    You may go to "Admission" then "Reconcile Home Medications" tabs to review and/or act upon these items.     The home medication list has been updated by the Pharmacy department.   Please read ALL comments highlighted in yellow.   Please address this information as you see fit.    Feel free to contact us if you have any questions or require assistance.      The medications listed below were removed from the home medication list. Please reorder if appropriate:  Patient reports no longer taking the following medication(s):  LIPITOR 20MG  CELEXA 40MG  LISINOPRIL 10MG    Medications listed below were obtained from: Patient/family and Analytic software- [employer]  (Not in a hospital admission)      Genaro Green  [id number]    Current Outpatient Medications on File Prior to Encounter   Medication Sig Dispense Refill Last Dose    aspirin (ECOTRIN) 81 MG EC tablet Take 81 mg by mouth once daily.   3/7/2023    hydroCHLOROthiazide (HYDRODIURIL) 25 MG tablet TAKE 1 TABLET(25 MG) BY MOUTH EVERY DAY 14 tablet 0 3/7/2023                           .        "

## 2023-03-07 NOTE — PROGRESS NOTES
"[facility] - Emergency Dept.  Hospital Medicine  Progress Note    Patient Name: Niru Reyes  MRN: [id number]  Patient Class: OP- Observation   Admission Date: 3/7/2023  Length of Stay: 0 days  Attending Physician: Nilson Gore MD  Primary Care Provider: Jordana Shirley MD    Subjective:     Principal Problem:<principal problem not specified>        HPI:  Niru Reyes is a 77 year old female with history of obesity, lymphedema, and hypertension who presents to ED of acute left hip pain that began this morning while lying in bed. Patient reports he felt something "pop" when she rolled over. She denies injury or trauma prior to lying in bed. Prior to this event, ambulated with walker.    In ED, X ray of hip unremarkable. CT Pelvis shows large are of hemorrhage in the left pelvis overlying the left iliacus muscle extending to femur. Orthopedic surgery has been consulted. Patient will be placed in observation under the care of Hospital Medicine      Overview/Hospital Course:  No notes on file    Past Medical History:   Diagnosis Date    Hypertension        Past Surgical History:   Procedure Laterality Date     SECTION      COLONOSCOPY N/A 2019    Procedure: COLONOSCOPY;  Surgeon: Janeth Leroy MD;  Location: [facility];  Service: Endoscopy;  Laterality: N/A;    HYSTERECTOMY         Review of patient's allergies indicates:  No Known Allergies    No current facility-administered medications on file prior to encounter.     Current Outpatient Medications on File Prior to Encounter   Medication Sig    aspirin (ECOTRIN) 81 MG EC tablet Take 81 mg by mouth once daily.    hydroCHLOROthiazide (HYDRODIURIL) 25 MG tablet TAKE 1 TABLET(25 MG) BY MOUTH EVERY DAY    [DISCONTINUED] atorvastatin (LIPITOR) 20 MG tablet Take 1 tablet (20 mg total) by mouth once daily.    [DISCONTINUED] citalopram (CELEXA) 40 MG tablet TAKE 1 TABLET(40 MG) BY MOUTH EVERY DAY    [DISCONTINUED] colchicine (COLCRYS) 0.6 mg " tablet Take 2 tablets by mouth, wait 1 hour and take 1 tablet    [DISCONTINUED] lisinopriL 10 MG tablet Take 1 tablet (10 mg total) by mouth once daily.    [DISCONTINUED] methylPREDNISolone (MEDROL DOSEPACK) 4 mg tablet use as directed    [DISCONTINUED] naproxen sodium (ANAPROX) 220 MG tablet Take 440 mg by mouth once daily.     Family History       Problem Relation (Age of Onset)    Birth defects Daughter    Heart disease Mother, Sister    Muscular dystrophy Father          Tobacco Use    Smoking status: Never    Smokeless tobacco: Not on file   Substance and Sexual Activity    Alcohol use: No    Drug use: No    Sexual activity: Never     Review of Systems   Constitutional:  Negative for activity change, diaphoresis, fatigue and unexpected weight change.   HENT:  Negative for congestion, ear pain and sore throat.    Eyes: Negative.    Respiratory:  Negative for shortness of breath and wheezing.    Cardiovascular:  Negative for chest pain and palpitations.   Gastrointestinal:  Negative for abdominal pain, constipation, diarrhea and vomiting.   Endocrine: Negative.    Genitourinary:  Negative for flank pain, hematuria and urgency.   Musculoskeletal:  Positive for arthralgias, gait problem and joint swelling. Negative for neck pain.   Skin:  Positive for wound. Negative for pallor.   Neurological:  Negative for seizures, syncope and light-headedness.   Hematological: Negative.    Psychiatric/Behavioral: Negative.       Objective:     Vital Signs (Most Recent):  Temp: 97.7 °F (36.5 °C) (03/07/23 0809)  Pulse: 73 (03/07/23 1500)  Resp: 20 (03/07/23 1500)  BP: (!) 150/68 (03/07/23 1500)  SpO2: 95 % (03/07/23 1500)   Vital Signs (24h Range):  Temp:  [97.7 °F (36.5 °C)-98.2 °F (36.8 °C)] 97.7 °F (36.5 °C)  Pulse:  [73-80] 73  Resp:  [18-20] 20  SpO2:  [93 %-97 %] 95 %  BP: (150-211)/() 150/68     Weight: (!) 183.6 kg (404 lb 12.8 oz)  Body mass index is 61.55 kg/m².    Physical Exam  Constitutional:        Appearance: She is well-developed. She is obese.   HENT:      Head: Normocephalic and atraumatic.   Cardiovascular:      Rate and Rhythm: Normal rate and regular rhythm.      Heart sounds: Normal heart sounds. No murmur heard.  Pulmonary:      Effort: Pulmonary effort is normal. No respiratory distress.      Breath sounds: Normal breath sounds.   Abdominal:      General: Bowel sounds are normal. There is no distension.      Palpations: Abdomen is soft.      Tenderness: There is no abdominal tenderness.   Musculoskeletal:         General: Normal range of motion.      Cervical back: Normal range of motion and neck supple.      Right lower leg: Edema present.      Left lower leg: Edema present.      Comments: Left hip tenderness with deformity     Skin:     General: Skin is warm.      Comments: Skin changes consistent with lymphedema.    Neurological:      Mental Status: She is alert and oriented to person, place, and time.        Significant Labs: All pertinent labs within the past 24 hours have been reviewed.  CBC:   Recent Labs   Lab 03/07/23  0859 03/07/23  1447   WBC 10.59  --    HGB 11.1* 9.8*   HCT 35.6* 30.9*     --      CMP:   Recent Labs   Lab 03/07/23  0859      K 3.8      CO2 26   *   BUN 15   CREATININE 0.8   CALCIUM 9.0   PROT 6.9   ALBUMIN 3.4*   BILITOT 0.8   ALKPHOS 93   AST 13   ALT 9*   ANIONGAP 12       Significant Imaging:   Imaging Results              CT Pelvis Without Contrast (Final result)  Result time 03/07/23 10:19:33      Final result by Catrachito Steele MD (03/07/23 10:19:33)                   Impression:      Large area of hemorrhage in the left pelvis overlying the left iliacus muscle as well as diffuse enlargement of the left iliacus and iliopsoas muscles extending all the way down towards the femur.  Certainly primary muscular trauma with avulsion is a consideration though there is no fracture visualized.  The left pelvic hematoma is only partially  visualized.    All CT scans at this facility are performed  using dose modulation techniques as appropriate to performed exam including the following:  automated exposure control; adjustment of mA and/or kV according to the patients size (this includes techniques or standardized protocols for targeted exams where dose is matched to indication/reason for exam: i.e. extremities or head);  iterative reconstruction technique.      Electronically signed by: Catrachito Steele MD  Date:    03/07/2023  Time:    10:19               Narrative:    EXAMINATION:  CT PELVIS WITHOUT CONTRAST    CLINICAL HISTORY:  Pelvic fracture;scan thru hips;    TECHNIQUE:  Axial CT through the pelvis with multiplanar reformations.    COMPARISON:  Earlier plain radiographs    FINDINGS:  There is diffuse enlargement of the left iliacus and left iliopsoas muscle extending all the way down to the femur.  Heterogeneous areas of hyperdensity/hemorrhage within the muscle more notably in the iliacus portion.  There is a large area of hematoma overlying the left iliacus muscle with a maximum axial diameter of 5.9 x 6.3 cm.  Additional surrounding smaller degrees of strandy hemorrhage.  The most superior extent of the hemorrhage is not included within the scan.    There is no fracture evident.    No advanced arthritic change.  Mild arthritic changes left hip.    Atherosclerosis.  Colonic diverticulosis.                                       X-Ray Hip 2 or 3 views Left (with Pelvis when performed) (Final result)  Result time 03/07/23 08:07:05      Final result by Timothy Gonzáles MD (03/07/23 08:07:05)                   Impression:      Limited exam.  No definite fracture.  Consider CT if there remains concern for fracture.      Electronically signed by: Timothy Gonzáles  Date:    03/07/2023  Time:    08:07               Narrative:    EXAMINATION:  XR HIP WITH PELVIS WHEN PERFORMED, 2 OR 3 VIEWS LEFT    CLINICAL HISTORY:  Pain, unspecified    TECHNIQUE:  AP view  of the pelvis and frog leg lateral view of the left hip were performed.    COMPARISON:  Pelvic radiograph December 4, 2014.    FINDINGS:  Poor osseous detail of uncertain etiology, possibly overlying clothing artifact.  Osteopenia.  No dislocation.  No definite fracture.                                          Assessment/Plan:      Acute blood loss anemia  See #1.   Hemoglobin 110.>9.8. closely monitor  Orthostatics in AM      Nontraumatic hematoma of muscle  Noted on CT hip.   Monitor H/H. 11.3>9.8 g/dL. If downward trend continues may need CTA to rule out active bleed.  No surgical intervention at this time.  Pain manageable with current regimen  Orthopedic surgery input appreciated    HTN (hypertension)  Was prescribed HCTZ in the past, but without refills.   Hold diuretic for now.  Hydralazine prn        VTE Risk Mitigation (From admission, onward)    None          Discharge Planning   JERONIMO:      Code Status: Prior   Is the patient medically ready for discharge?:     Reason for patient still in hospital (select all that apply): Treatment             Ila Engel NP  Department of Hospital Medicine   [facility] - Emergency Dept.

## 2023-03-07 NOTE — CONSULTS
[facility] Surg  Orthopedics  Consult Note    Patient Name: Niru Reyes  MRN: [id number]  Admission Date: 3/7/2023  Hospital Length of Stay: 0 days  Attending Provider: Nilson Gore MD  Primary Care Provider: Jordana Shirley MD    Patient information was obtained from patient, relative(s), past medical records, and ER records.     Consults  Subjective:     Principal Problem:<principal problem not specified>    Chief Complaint:   Chief Complaint   Patient presents with    Hip Pain     Felt a pop in her left hip while moving in bed.         HPI: Niru Reyes is a 77-year-old female with past medical history significant for hypertension, major depression, osteoarthritis of the knees, anemia, and atherosclerosis of arteries of extremities who is in the emergency department for evaluation and treatment left hip pain.  Patient reports that she was lying in bed when she rolled over and felt immediate pain in the posterior left hip.  She is unsure of whether there was a pop or not.  She denies numbness or tingling in the extremity.  She has no past history of similar pain.    Past Medical History:   Diagnosis Date    Hypertension        Past Surgical History:   Procedure Laterality Date     SECTION      COLONOSCOPY N/A 2019    Procedure: COLONOSCOPY;  Surgeon: Janeth Leryo MD;  Location: [facility];  Service: Endoscopy;  Laterality: N/A;    HYSTERECTOMY         Review of patient's allergies indicates:  No Known Allergies    No current facility-administered medications for this encounter.     Current Outpatient Medications   Medication Sig    aspirin (ECOTRIN) 81 MG EC tablet Take 81 mg by mouth once daily.    hydroCHLOROthiazide (HYDRODIURIL) 25 MG tablet TAKE 1 TABLET(25 MG) BY MOUTH EVERY DAY     Family History       Problem Relation (Age of Onset)    Birth defects Daughter    Heart disease Mother, Sister    Muscular dystrophy Father          Tobacco Use    Smoking status: Never     "Smokeless tobacco: Not on file   Substance and Sexual Activity    Alcohol use: No    Drug use: No    Sexual activity: Never     Review of Systems   Constitutional: Negative for chills, decreased appetite, fever and weight loss.   HENT:  Negative for congestion, hoarse voice and sore throat.    Eyes:  Negative for blurred vision, double vision, vision loss in left eye and vision loss in right eye.   Cardiovascular:  Negative for chest pain, palpitations and syncope.   Respiratory:  Negative for cough, shortness of breath and wheezing.    Endocrine: Negative for cold intolerance and heat intolerance.   Hematologic/Lymphatic: Negative for bleeding problem. Does not bruise/bleed easily.   Skin:  Negative for dry skin, flushing, itching and suspicious lesions.   Musculoskeletal:  Positive for joint pain and joint swelling. Negative for falls.   Gastrointestinal:  Negative for abdominal pain, diarrhea, nausea and vomiting.   Genitourinary:  Negative for dysuria, frequency and urgency.   Neurological:  Negative for dizziness, headaches, numbness and paresthesias.   Psychiatric/Behavioral:  Negative for altered mental status and memory loss.    Objective:     Vital Signs (Most Recent):  Temp: 97.7 °F (36.5 °C) (03/07/23 0809)  Pulse: 76 (03/07/23 1115)  Resp: 20 (03/07/23 1118)  BP: (!) 191/90 (03/07/23 1115)  SpO2: (!) 93 % (03/07/23 1115)   Vital Signs (24h Range):  Temp:  [97.7 °F (36.5 °C)-98.2 °F (36.8 °C)] 97.7 °F (36.5 °C)  Pulse:  [76-80] 76  Resp:  [18-20] 20  SpO2:  [93 %-97 %] 93 %  BP: (182-211)/() 191/90     Weight: (!) 183.6 kg (404 lb 12.8 oz)  Height: 5' 8" (172.7 cm)  Body mass index is 61.55 kg/m².    No intake or output data in the 24 hours ending 03/07/23 1354    Ortho/SPM Exam  Left hip:   Skin is intact   Mild edema  Moderate TTP laterally and posteriorly   Pain increases with internal/external rotation  ROM of the hip not tested secondary to pain  Calf and compartments are soft and compressible "   Motor exam normal   Sensation and pulses intact     GEN: Well developed, well nourished female. AAOX3. No acute distress.   Head: Normocephalic, atraumatic.   Eyes: VITA  Neck: Trachea is midline, no adenopathy  Resp: Breathing unlabored.  Neuro: Motor function normal, Cranial nerves intact  Psych: Mood and affect appropriate.      Significant Labs:   Recent Lab Results         03/07/23  1118   03/07/23  0859        Albumin   3.4       Alkaline Phosphatase   93       ALT   9       Anion Gap   12       AST   13       Baso #   0.06       Basophil %   0.6       BILIRUBIN TOTAL   0.8  Comment: For infants and newborns, interpretation of results should be based  on gestational age, weight and in agreement with clinical  observations.    Premature Infant recommended reference ranges:  Up to 24 hours.............<8.0 mg/dL  Up to 48 hours............<12.0 mg/dL  3-5 days..................<15.0 mg/dL  6-29 days.................<15.0 mg/dL         BUN   15       Calcium   9.0       Chloride   104       CO2   26       Creatinine   0.8       Differential Method   Automated       eGFR   >60       Eos #   0.0       Eosinophil %   0.3       Glucose   147       Gran # (ANC)   9.4       Gran %   88.4       Group & Rh O NEG         Hematocrit   35.6       Hemoglobin   11.1       Immature Grans (Abs)   0.04  Comment: Mild elevation in immature granulocytes is non specific and   can be seen in a variety of conditions including stress response,   acute inflammation, trauma and pregnancy. Correlation with other   laboratory and clinical findings is essential.         Immature Granulocytes   0.4       INDIRECT MARY NEG         Lymph #   0.7       Lymph %   6.6       MCH   26.4       MCHC   31.2       MCV   85       Mono #   0.4       Mono %   3.7       MPV   10.5       nRBC   0       Platelets   226       Potassium   3.8       PROTEIN TOTAL   6.9       RBC   4.21       RDW   15.5       Sodium   142       WBC   10.59             All  pertinent labs within the past 24 hours have been reviewed.    Significant Imaging: CT: I have reviewed all pertinent results/findings and my personal findings are:  CT of the pelvis without contrast was obtained which shows large area of hemorrhage in the left pelvis overlying the left iliacus muscle with diffuse enlargement of the left iliacus and iliopsoas muscles extending all the way down towards the femur.  No acute fracture dislocation noted.    Assessment/Plan:     Assessment:   77-year-old female with past medical history significant for hypertension, major depression, osteoarthritis of the knees, anemia, and atherosclerosis of arteries of extremities who is in the emergency department for evaluation and treatment left hip hematoma    Plan:   Reviewed the results of the imaging with the patient and her daughter.  Recommended hospital admission for monitoring of labs, especially H&H.  We discussed pain control.  We would also like the patient to have PT and OT for gait training and ADLs.  We will continue to follow the patient.  There is no surgical intervention indicated at this time, but if blood counts stable we may consider CT angiogram to evaluate for active bleed    Jorge Cm PA-C  Orthopedics  [facility]

## 2023-03-07 NOTE — HPI
"Niru Reyes is a 77 year old female with history of obesity, lymphedema, and hypertension who presents to ED of acute left hip pain that began this morning while lying in bed. Patient reports he felt something "pop" when she rolled over. She denies injury or trauma prior to lying in bed. Prior to this event, ambulated with walker.    In ED, X ray of hip unremarkable. CT Pelvis shows large are of hemorrhage in the left pelvis overlying the left iliacus muscle extending to femur. Orthopedic surgery has been consulted. Patient will be placed in observation under the care of Hospital Medicine  "

## 2023-03-08 LAB
ALBUMIN SERPL BCP-MCNC: 2.7 G/DL (ref 3.5–5.2)
ALP SERPL-CCNC: 74 U/L (ref 55–135)
ALT SERPL W/O P-5'-P-CCNC: 6 U/L (ref 10–44)
ANION GAP SERPL CALC-SCNC: 6 MMOL/L (ref 8–16)
AST SERPL-CCNC: 21 U/L (ref 10–40)
BASOPHILS # BLD AUTO: 0.06 K/UL (ref 0–0.2)
BASOPHILS NFR BLD: 0.6 % (ref 0–1.9)
BILIRUB SERPL-MCNC: 0.8 MG/DL (ref 0.1–1)
BUN SERPL-MCNC: 19 MG/DL (ref 8–23)
CALCIUM SERPL-MCNC: 8 MG/DL (ref 8.7–10.5)
CHLORIDE SERPL-SCNC: 105 MMOL/L (ref 95–110)
CO2 SERPL-SCNC: 28 MMOL/L (ref 23–29)
CREAT SERPL-MCNC: 0.8 MG/DL (ref 0.5–1.4)
DIFFERENTIAL METHOD: ABNORMAL
EOSINOPHIL # BLD AUTO: 0.3 K/UL (ref 0–0.5)
EOSINOPHIL NFR BLD: 2.9 % (ref 0–8)
ERYTHROCYTE [DISTWIDTH] IN BLOOD BY AUTOMATED COUNT: 15.9 % (ref 11.5–14.5)
EST. GFR  (NO RACE VARIABLE): >60 ML/MIN/1.73 M^2
GLUCOSE SERPL-MCNC: 95 MG/DL (ref 70–110)
HCT VFR BLD AUTO: 26 % (ref 37–48.5)
HCT VFR BLD AUTO: 27.5 % (ref 37–48.5)
HGB BLD-MCNC: 8 G/DL (ref 12–16)
HGB BLD-MCNC: 8.3 G/DL (ref 12–16)
IMM GRANULOCYTES # BLD AUTO: 0.04 K/UL (ref 0–0.04)
IMM GRANULOCYTES NFR BLD AUTO: 0.4 % (ref 0–0.5)
LYMPHOCYTES # BLD AUTO: 1.7 K/UL (ref 1–4.8)
LYMPHOCYTES NFR BLD: 16.9 % (ref 18–48)
MCH RBC QN AUTO: 26.4 PG (ref 27–31)
MCHC RBC AUTO-ENTMCNC: 30.8 G/DL (ref 32–36)
MCV RBC AUTO: 86 FL (ref 82–98)
MONOCYTES # BLD AUTO: 0.9 K/UL (ref 0.3–1)
MONOCYTES NFR BLD: 8.7 % (ref 4–15)
NEUTROPHILS # BLD AUTO: 7.1 K/UL (ref 1.8–7.7)
NEUTROPHILS NFR BLD: 70.5 % (ref 38–73)
NRBC BLD-RTO: 0 /100 WBC
PLATELET # BLD AUTO: 204 K/UL (ref 150–450)
PMV BLD AUTO: 11.2 FL (ref 9.2–12.9)
POTASSIUM SERPL-SCNC: 4.9 MMOL/L (ref 3.5–5.1)
PROT SERPL-MCNC: 5.7 G/DL (ref 6–8.4)
RBC # BLD AUTO: 3.03 M/UL (ref 4–5.4)
SODIUM SERPL-SCNC: 139 MMOL/L (ref 136–145)
WBC # BLD AUTO: 10.11 K/UL (ref 3.9–12.7)

## 2023-03-08 PROCEDURE — 99233 SBSQ HOSP IP/OBS HIGH 50: CPT | Mod: ,,, | Performed by: PHYSICIAN ASSISTANT

## 2023-03-08 PROCEDURE — 11000001 HC ACUTE MED/SURG PRIVATE ROOM

## 2023-03-08 PROCEDURE — 25500020 PHARM REV CODE 255: Performed by: STUDENT IN AN ORGANIZED HEALTH CARE EDUCATION/TRAINING PROGRAM

## 2023-03-08 PROCEDURE — 85018 HEMOGLOBIN: CPT | Performed by: STUDENT IN AN ORGANIZED HEALTH CARE EDUCATION/TRAINING PROGRAM

## 2023-03-08 PROCEDURE — 63600175 PHARM REV CODE 636 W HCPCS: Performed by: NURSE PRACTITIONER

## 2023-03-08 PROCEDURE — 99900035 HC TECH TIME PER 15 MIN (STAT)

## 2023-03-08 PROCEDURE — 36415 COLL VENOUS BLD VENIPUNCTURE: CPT | Performed by: STUDENT IN AN ORGANIZED HEALTH CARE EDUCATION/TRAINING PROGRAM

## 2023-03-08 PROCEDURE — 25000003 PHARM REV CODE 250: Performed by: NURSE PRACTITIONER

## 2023-03-08 PROCEDURE — 27000221 HC OXYGEN, UP TO 24 HOURS

## 2023-03-08 PROCEDURE — 80053 COMPREHEN METABOLIC PANEL: CPT | Performed by: STUDENT IN AN ORGANIZED HEALTH CARE EDUCATION/TRAINING PROGRAM

## 2023-03-08 PROCEDURE — 25000003 PHARM REV CODE 250: Performed by: STUDENT IN AN ORGANIZED HEALTH CARE EDUCATION/TRAINING PROGRAM

## 2023-03-08 PROCEDURE — 99233 PR SUBSEQUENT HOSPITAL CARE,LEVL III: ICD-10-PCS | Mod: ,,, | Performed by: PHYSICIAN ASSISTANT

## 2023-03-08 PROCEDURE — 85014 HEMATOCRIT: CPT | Performed by: STUDENT IN AN ORGANIZED HEALTH CARE EDUCATION/TRAINING PROGRAM

## 2023-03-08 PROCEDURE — 85025 COMPLETE CBC W/AUTO DIFF WBC: CPT | Performed by: STUDENT IN AN ORGANIZED HEALTH CARE EDUCATION/TRAINING PROGRAM

## 2023-03-08 RX ORDER — HYDROCODONE BITARTRATE AND ACETAMINOPHEN 5; 325 MG/1; MG/1
1 TABLET ORAL EVERY 6 HOURS PRN
Status: DISCONTINUED | OUTPATIENT
Start: 2023-03-08 | End: 2023-03-10

## 2023-03-08 RX ORDER — DEXTROSE MONOHYDRATE 50 MG/ML
INJECTION, SOLUTION INTRAVENOUS
Status: DISCONTINUED | OUTPATIENT
Start: 2023-03-08 | End: 2023-03-15 | Stop reason: HOSPADM

## 2023-03-08 RX ADMIN — CEFTRIAXONE 2 G: 2 INJECTION, POWDER, FOR SOLUTION INTRAMUSCULAR; INTRAVENOUS at 03:03

## 2023-03-08 RX ADMIN — LISINOPRIL 20 MG: 20 TABLET ORAL at 09:03

## 2023-03-08 RX ADMIN — DEXTROSE: 5 SOLUTION INTRAVENOUS at 03:03

## 2023-03-08 RX ADMIN — HYDROCODONE BITARTRATE AND ACETAMINOPHEN 1 TABLET: 5; 325 TABLET ORAL at 03:03

## 2023-03-08 RX ADMIN — HYDRALAZINE HYDROCHLORIDE 10 MG: 20 INJECTION, SOLUTION INTRAMUSCULAR; INTRAVENOUS at 01:03

## 2023-03-08 RX ADMIN — IOHEXOL 100 ML: 350 INJECTION, SOLUTION INTRAVENOUS at 10:03

## 2023-03-08 RX ADMIN — HYDROCODONE BITARTRATE AND ACETAMINOPHEN 1 TABLET: 5; 325 TABLET ORAL at 09:03

## 2023-03-08 NOTE — ASSESSMENT & PLAN NOTE
Noted on CT hip.   Monitor H/H. 11.3>9.8 g/dL. If downward trend continues may need CTA to rule out active bleed.  CBC (3/8) Hgb decreased to 8.1  CTA ordered: negative for detrimental change  Repeat CBC in AM  No surgical intervention at this time.  Pain manageable with current regimen  Orthopedic surgery input appreciated

## 2023-03-08 NOTE — PLAN OF CARE
[facility]  Initial Discharge Assessment       Primary Care Provider: Jordana Shirley MD    Admission Diagnosis: Pain [R52]  Nontraumatic hematoma of muscle [M79.81]  Intraperitoneal bleeding [K66.1]    Admission Date: 3/7/2023  Expected Discharge Date:     Discharge Barriers Identified: None    Payor: MEDICARE / Plan: MEDICARE PART A & B / Product Type: Government /     Extended Emergency Contact Information  Primary Emergency Contact: Mary Medeiros   [facility]  Work Phone: [phone]  Mobile Phone: [phone]  Relation: Daughter    Discharge Plan A: Home Health         [id number] [place]  [address] AT [facility] [address] [place] [address]  [address]  [facility] [address]  Phone: [phone] Fax: [phone]    [id number] - [place] - [address] [facility] [address]  [facility] [place] [address]  Phone: [phone] Fax: [phone]    [facility] - [place], [place] - [address]  [address]  [facility] [address]  Phone: [phone] Fax: [phone]      Initial Assessment (most recent)       Adult Discharge Assessment - 03/08/23 1202          Discharge Assessment    Assessment Type Discharge Planning Assessment     Confirmed/corrected address, phone number and insurance Yes     Confirmed Demographics Correct on Facesheet     Source of Information health record;family     Communicated JERONIMO with patient/caregiver Date not available/Unable to determine     Reason For Admission hematoma, HTN     People in Home alone     Facility Arrived From: home     Do you expect to return to your current living situation? Yes     Do you have help at home or someone to help you manage your care at home? Yes     Who are your caregiver(s) and their phone number(s)? independent, daughter assist     Prior to hospitilization cognitive status: Alert/Oriented      Current cognitive status: Alert/Oriented     Walking or Climbing Stairs ambulation difficulty, requires equipment     Mobility Management walker, ramp to enter home     Dressing/Bathing bathing difficulty, assistance 1 person     Home Layout Able to live on 1st floor     Equipment Currently Used at Home walker, rolling;other (see comments)   ramp to enter home    Readmission within 30 days? No     Patient currently being followed by outpatient case management? No     Do you currently have service(s) that help you manage your care at home? No     Do you take prescription medications? Yes     Do you have prescription coverage? Yes     Do you have any problems affording any of your prescribed medications? No     Is the patient taking medications as prescribed? yes     Who is going to help you get home at discharge? daughterMary     How do you get to doctors appointments? family or friend will provide     Are you on dialysis? No     Discharge Plan A Home Health     DME Needed Upon Discharge  none     Discharge Plan discussed with: Adult children     Discharge Barriers Identified None                   Anticipated DC Dispo: home with [facility], pending progress  Prior Level of Function: lives alone, daughter comes daily to assist with bathing, groceries and other needs  PCP: Dr Shirley  Comments:  CM met with daughter at bedside to discuss d/c planning and introduce role. Patient down for MRI. Per daughter patient lives alone and has ramp to enter home. Patient has recently had several falls at home and needs assist with bathing. Family request home health upon d/c. CM also provided senior resource book and private pay caregiver resources.   CM following.

## 2023-03-08 NOTE — PLAN OF CARE
Discussed poc with pt, pt verbalized understanding  Purposeful rounding every 2 hours  VS wnl  Cardiac monitoring in use, pt is NSR, tele monitor # [id number]   Fall precautions in place, remains injury free  Patient on 2L of oxygen  Accurate I&Os  Bed locked at lowest position  Call light within reach  Chart check complete  Will cont with POC

## 2023-03-08 NOTE — SUBJECTIVE & OBJECTIVE
Interval History: Possible early cellulitis, initiated IV Rocephin. Repeat CBC in AM, significant decrease this AM, CTA stable.     Review of Systems   Constitutional:  Negative for activity change, diaphoresis, fatigue and unexpected weight change.   HENT:  Negative for congestion, ear pain and sore throat.    Eyes: Negative.    Respiratory:  Negative for shortness of breath and wheezing.    Cardiovascular:  Negative for chest pain and palpitations.   Gastrointestinal:  Negative for abdominal pain, constipation, diarrhea and vomiting.   Endocrine: Negative.    Genitourinary:  Negative for flank pain, hematuria and urgency.   Musculoskeletal:  Positive for arthralgias, gait problem and joint swelling. Negative for neck pain.   Skin:  Positive for color change and wound. Negative for pallor.   Neurological:  Negative for seizures, syncope and light-headedness.   Hematological: Negative.    Psychiatric/Behavioral: Negative.     Objective:     Vital Signs (Most Recent):  Temp: 98.2 °F (36.8 °C) (03/08/23 1542)  Pulse: 82 (03/08/23 1542)  Resp: 16 (03/08/23 1542)  BP: (!) 120/56 (03/08/23 1542)  SpO2: 95 % (03/08/23 1542)   Vital Signs (24h Range):  Temp:  [97.4 °F (36.3 °C)-99 °F (37.2 °C)] 98.2 °F (36.8 °C)  Pulse:  [63-89] 82  Resp:  [12-20] 16  SpO2:  [93 %-100 %] 95 %  BP: (112-180)/(55-76) 120/56     Weight: (!) 187 kg (412 lb 4.2 oz)  Body mass index is 62.68 kg/m².    Intake/Output Summary (Last 24 hours) at 3/8/2023 6596  Last data filed at 3/8/2023 0635  Gross per 24 hour   Intake 300 ml   Output 300 ml   Net 0 ml      Physical Exam  Constitutional:       Appearance: She is well-developed. She is obese.   HENT:      Head: Normocephalic and atraumatic.   Cardiovascular:      Rate and Rhythm: Normal rate and regular rhythm.      Heart sounds: Normal heart sounds. No murmur heard.  Pulmonary:      Effort: Pulmonary effort is normal. No respiratory distress.      Breath sounds: Normal breath sounds.   Abdominal:       General: Bowel sounds are normal. There is no distension.      Palpations: Abdomen is soft.      Tenderness: There is no abdominal tenderness.   Musculoskeletal:         General: Normal range of motion.      Cervical back: Normal range of motion and neck supple.      Right lower leg: Edema present.      Left lower leg: Edema present.      Comments: Left hip tenderness with deformity     Skin:     General: Skin is warm.      Findings: Erythema (patchy erythema noted to BLE, see pictures from wound care) present.      Comments: Skin changes consistent with lymphedema.    Neurological:      Mental Status: She is alert and oriented to person, place, and time.       Significant Labs: All pertinent labs within the past 24 hours have been reviewed.  CBC:   Recent Labs   Lab 03/07/23  0859 03/07/23  1447 03/08/23  0759 03/08/23  1329   WBC 10.59  --  10.11  --    HGB 11.1* 9.8* 8.0* 8.3*   HCT 35.6* 30.9* 26.0* 27.5*     --  204  --      CMP:   Recent Labs   Lab 03/07/23  0859 03/08/23  0759    139   K 3.8 4.9    105   CO2 26 28   * 95   BUN 15 19   CREATININE 0.8 0.8   CALCIUM 9.0 8.0*   PROT 6.9 5.7*   ALBUMIN 3.4* 2.7*   BILITOT 0.8 0.8   ALKPHOS 93 74   AST 13 21   ALT 9* 6*   ANIONGAP 12 6*       Significant Imaging: I have reviewed all pertinent imaging results/findings within the past 24 hours.

## 2023-03-08 NOTE — HOSPITAL COURSE
77 year old female, under the care of hospital medicine for left psoas muscle hematoma. Decrease in Hgb this AM, 9.8-->8.1, recommended to order CTA of pelvis, no detrimental changes. Erythema noted to bilateral lower extremities, concern for early cellulitis, initiated rocephin. Vitals stable. Hgb: decreased this AM to 7.6 from 8.3, ordered 1U PRBC. Patient continues to not be able to get up with PT due to pain, extensive discussion with patient on living situation and needs after discharge. Patient in agreement that she can not take care of herself at this time. Recommend SNF, PT/OT to eval.   3/10: SNF placement needed. Will cont on cephalexin for BL LE cellulitis. Obtain echo and LE US for edema.   3/11: pulm HTN noted on echo. Lasix 20mg IV given once. Cont cephalexin for cellulitis. US negative for DVT. Will need outpatient f/u with PCP and pulmonology for sleep study.   3/12: antihypertensives adjusted. Otherwise medically stable for dc to SNF.  3/13-3/14: pending DC to SNF  3/15: pt DC to SNF in stable condition. Outpatient f/u with ortho. Continue to hold asa

## 2023-03-08 NOTE — PROGRESS NOTES
"[facility] S[facility]  Orthopedics  Progress Note    Patient Name: Niru Reyes  MRN: [id number]  Admission Date: 3/7/2023  Hospital Length of Stay: 0 days  Attending Provider: Faith Johnson MD  Primary Care Provider: Jordana Shirley MD    Subjective:     Principal Problem:Nontraumatic hematoma of muscle    Principal Orthopedic Problem:  Left hip hematoma    Interval History: Niru Reyes is a 77-year-old female with past medical history significant for hypertension, major depression, osteoarthritis of the knees, anemia, and atherosclerosis of the arteries of extremities who is admitted for a left hip hematoma.  Patient is resting comfortably in bed at this time.  She reports that her pain his satisfactorily controlled.    Review of patient's allergies indicates:  No Known Allergies    Current Facility-Administered Medications   Medication    hydrALAZINE injection 10 mg    HYDROcodone-acetaminophen 5-325 mg per tablet 1 tablet    lisinopriL tablet 20 mg     Objective:     Vital Signs (Most Recent):  Temp: 98.9 °F (37.2 °C) (03/08/23 0727)  Pulse: 66 (03/08/23 0727)  Resp: 14 (03/08/23 0727)  BP: 135/61 (03/08/23 0727)  SpO2: 98 % (03/08/23 0727) Vital Signs (24h Range):  Temp:  [97.4 °F (36.3 °C)-98.9 °F (37.2 °C)] 98.9 °F (37.2 °C)  Pulse:  [63-77] 66  Resp:  [12-20] 14  SpO2:  [93 %-100 %] 98 %  BP: (112-210)/(55-90) 135/61     Weight: (!) 187 kg (412 lb 4.2 oz)  Height: 5' 8" (172.7 cm)  Body mass index is 62.68 kg/m².      Intake/Output Summary (Last 24 hours) at 3/8/2023 0901  Last data filed at 3/8/2023 0635  Gross per 24 hour   Intake 300 ml   Output 300 ml   Net 0 ml       Ortho/SPM Exam  Left hip:  Skin is intact   Moderate edema  Moderate TTP laterally and posteriorly   Pain increases with internal/external rotation  ROM of the hip is not tested secondary to pain   Calf and compartments are soft and compressible  Motor exam normal   Sensation and pulses intact     GEN: Well developed, well nourished " female. AAOX3. No acute distress.   Head: Normocephalic, atraumatic.   Eyes: VITA  Neck: Trachea is midline, no adenopathy  Resp: Breathing unlabored.  Neuro: Motor function normal, Cranial nerves intact  Psych: Mood and affect appropriate.      Significant Labs:   Recent Lab Results         03/08/23  0759   03/07/23  1447   03/07/23  1118        Albumin 2.7           Alkaline Phosphatase 74           ALT 6           Anion Gap 6           AST 21           Baso # 0.06           Basophil % 0.6           BILIRUBIN TOTAL 0.8  Comment: For infants and newborns, interpretation of results should be based  on gestational age, weight and in agreement with clinical  observations.    Premature Infant recommended reference ranges:  Up to 24 hours.............<8.0 mg/dL  Up to 48 hours............<12.0 mg/dL  3-5 days..................<15.0 mg/dL  6-29 days.................<15.0 mg/dL             BUN 19           Calcium 8.0           Chloride 105           CO2 28           Creatinine 0.8           Differential Method Automated           eGFR >60           Eos # 0.3           Eosinophil % 2.9           Glucose 95           Gran # (ANC) 7.1           Gran % 70.5           Group & Rh     O NEG       Hematocrit 26.0   30.9         Hemoglobin 8.0   9.8         Immature Grans (Abs) 0.04  Comment: Mild elevation in immature granulocytes is non specific and   can be seen in a variety of conditions including stress response,   acute inflammation, trauma and pregnancy. Correlation with other   laboratory and clinical findings is essential.             Immature Granulocytes 0.4           INDIRECT MARY     NEG       Lymph # 1.7           Lymph % 16.9           MCH 26.4           MCHC 30.8           MCV 86           Mono # 0.9           Mono % 8.7           MPV 11.2           nRBC 0           Platelets 204           Potassium 4.9           PROTEIN TOTAL 5.7           RBC 3.03           RDW 15.9           Sodium 139           WBC 10.11                  All pertinent labs within the past 24 hours have been reviewed.    Significant Imaging: I have reviewed and interpreted all pertinent imaging results/findings.    Assessment/Plan:     Active Diagnoses:    Diagnosis Date Noted POA    PRINCIPAL PROBLEM:  Nontraumatic hematoma of muscle [M79.81] 03/07/2023 Unknown    Acute blood loss anemia [D62] 03/07/2023 Yes    HTN (hypertension) [I10] 12/04/2014 Yes      Problems Resolved During this Admission:     Assessment:  77-year-old female with past medical history significant for hypertension, major depression, osteoarthritis of the knees, anemia, and atherosclerosis of arteries of the extremities who is admitted for left hip hematoma, concern for active bleed     Plan:  H&H continues to trend down steadily, so we will get a CTA today to evaluate for an active bleed   Patient may need transfusion if H&H continues to drop, hospital medicine is managing this  Recommend PT/OT for mobility  No orthopedic surgical intervention is indicated at this time, but patient may need a procedure if there is an active bleed   We will continue to follow this patient    Jorge Cm PA-C  Orthopedics  [facility]

## 2023-03-08 NOTE — PROGRESS NOTES
"[facility] Medicine  Progress Note    Patient Name: Niru Reyes  MRN: [id number]  Patient Class: IP- Inpatient   Admission Date: 3/7/2023  Length of Stay: 0 days  Attending Physician: Faith Johnson MD  Primary Care Provider: Jordana Shirley MD        Subjective:     Principal Problem:Nontraumatic hematoma of muscle        HPI:  Niru Reyes is a 77 year old female with history of obesity, lymphedema, and hypertension who presents to ED of acute left hip pain that began this morning while lying in bed. Patient reports he felt something "pop" when she rolled over. She denies injury or trauma prior to lying in bed. Prior to this event, ambulated with walker.    In ED, X ray of hip unremarkable. CT Pelvis shows large are of hemorrhage in the left pelvis overlying the left iliacus muscle extending to femur. Orthopedic surgery has been consulted. Patient will be placed in observation under the care of Hospital Medicine      Overview/Hospital Course:  77 year old female, under the care of hospital medicine for left psoas muscle hematoma. Decrease in Hgb this AM, 9.8-->8.1, recommended to order CTA of pelvis, no detrimental changes. Repeat H/H 4 hours after initial draw: increased to 8.3. Will repeat labs in AM. Erythema noted to bilateral lower extremities, concern for early cellulitis, initiated rocephin. Vitals stable.       Interval History: Possible early cellulitis, initiated IV Rocephin. Repeat CBC in AM, significant decrease this AM, CTA stable.     Review of Systems   Constitutional:  Negative for activity change, diaphoresis, fatigue and unexpected weight change.   HENT:  Negative for congestion, ear pain and sore throat.    Eyes: Negative.    Respiratory:  Negative for shortness of breath and wheezing.    Cardiovascular:  Negative for chest pain and palpitations.   Gastrointestinal:  Negative for abdominal pain, constipation, diarrhea and vomiting.   Endocrine: Negative.  "   Genitourinary:  Negative for flank pain, hematuria and urgency.   Musculoskeletal:  Positive for arthralgias, gait problem and joint swelling. Negative for neck pain.   Skin:  Positive for color change and wound. Negative for pallor.   Neurological:  Negative for seizures, syncope and light-headedness.   Hematological: Negative.    Psychiatric/Behavioral: Negative.     Objective:     Vital Signs (Most Recent):  Temp: 98.2 °F (36.8 °C) (03/08/23 1542)  Pulse: 82 (03/08/23 1542)  Resp: 16 (03/08/23 1542)  BP: (!) 120/56 (03/08/23 1542)  SpO2: 95 % (03/08/23 1542)   Vital Signs (24h Range):  Temp:  [97.4 °F (36.3 °C)-99 °F (37.2 °C)] 98.2 °F (36.8 °C)  Pulse:  [63-89] 82  Resp:  [12-20] 16  SpO2:  [93 %-100 %] 95 %  BP: (112-180)/(55-76) 120/56     Weight: (!) 187 kg (412 lb 4.2 oz)  Body mass index is 62.68 kg/m².    Intake/Output Summary (Last 24 hours) at 3/8/2023 1746  Last data filed at 3/8/2023 0635  Gross per 24 hour   Intake 300 ml   Output 300 ml   Net 0 ml      Physical Exam  Constitutional:       Appearance: She is well-developed. She is obese.   HENT:      Head: Normocephalic and atraumatic.   Cardiovascular:      Rate and Rhythm: Normal rate and regular rhythm.      Heart sounds: Normal heart sounds. No murmur heard.  Pulmonary:      Effort: Pulmonary effort is normal. No respiratory distress.      Breath sounds: Normal breath sounds.   Abdominal:      General: Bowel sounds are normal. There is no distension.      Palpations: Abdomen is soft.      Tenderness: There is no abdominal tenderness.   Musculoskeletal:         General: Normal range of motion.      Cervical back: Normal range of motion and neck supple.      Right lower leg: Edema present.      Left lower leg: Edema present.      Comments: Left hip tenderness with deformity     Skin:     General: Skin is warm.      Findings: Erythema (patchy erythema noted to BLE, see pictures from wound care) present.      Comments: Skin changes consistent with  lymphedema.    Neurological:      Mental Status: She is alert and oriented to person, place, and time.       Significant Labs: All pertinent labs within the past 24 hours have been reviewed.  CBC:   Recent Labs   Lab 03/07/23  0859 03/07/23  1447 03/08/23  0759 03/08/23  1329   WBC 10.59  --  10.11  --    HGB 11.1* 9.8* 8.0* 8.3*   HCT 35.6* 30.9* 26.0* 27.5*     --  204  --      CMP:   Recent Labs   Lab 03/07/23  0859 03/08/23  0759    139   K 3.8 4.9    105   CO2 26 28   * 95   BUN 15 19   CREATININE 0.8 0.8   CALCIUM 9.0 8.0*   PROT 6.9 5.7*   ALBUMIN 3.4* 2.7*   BILITOT 0.8 0.8   ALKPHOS 93 74   AST 13 21   ALT 9* 6*   ANIONGAP 12 6*       Significant Imaging: I have reviewed all pertinent imaging results/findings within the past 24 hours.      Assessment/Plan:      * Nontraumatic hematoma of muscle  Noted on CT hip.   Monitor H/H. 11.3>9.8 g/dL. If downward trend continues may need CTA to rule out active bleed.  CBC (3/8) Hgb decreased to 8.1  CTA ordered: negative for detrimental change  Repeat CBC in AM  No surgical intervention at this time.  Pain manageable with current regimen  Orthopedic surgery input appreciated    Acute blood loss anemia  Acute on Chronic, Secondary to psoas muscle hematoma  Hemoglobin 110.>9.8. closely monitor  Orthostatics in AM- unable to complete due to pain  Repeat CBC: 8.1  --Daily CBC  --Transfuse with 1 unit PRBC for Hgb <7.0 or <8.0 if symptomatic       HTN (hypertension)  Was prescribed HCTZ in the past, but without refills.   Hold diuretic for now.  Hydralazine prn        VTE Risk Mitigation (From admission, onward)    None          Discharge Planning   JERONIMO:      Code Status: Prior   Is the patient medically ready for discharge?:     Reason for patient still in hospital (select all that apply): Patient trending condition  Discharge Plan A: Home Health                  Antoinette Ahumada NP  Department of Hospital Medicine   [facility] Med [facility]

## 2023-03-08 NOTE — ASSESSMENT & PLAN NOTE
Acute on Chronic, Secondary to psoas muscle hematoma  Hemoglobin 110.>9.8. closely monitor  Orthostatics in AM- unable to complete due to pain  Repeat CBC: 8.1  --Daily CBC  --Transfuse with 1 unit PRBC for Hgb <7.0 or <8.0 if symptomatic

## 2023-03-08 NOTE — PLAN OF CARE
Discussed poc with pt, pt verbalized understanding  Purposeful rounding every 2hours  VS wnl  Cardiac monitoring in use, pt is NSR, tele monitor # [id number]  Patient went for CT scan of pelvis  Fall precautions in place, remains injury free  Pain under control with PRN meds  Accurate I&Os  Abx given as prescribed  Bed locked at lowest position  Call light within reach    Chart check complete  Will cont with POC

## 2023-03-08 NOTE — PLAN OF CARE
Call button within reach. PRN blood pressure medication ordered by on-call provider.   Problem: Adult Inpatient Plan of Care  Goal: Plan of Care Review  Outcome: Ongoing, Progressing  Goal: Patient-Specific Goal (Individualized)  Outcome: Ongoing, Progressing  Goal: Absence of Hospital-Acquired Illness or Injury  Outcome: Ongoing, Progressing  Goal: Optimal Comfort and Wellbeing  Outcome: Ongoing, Progressing  Goal: Readiness for Transition of Care  Outcome: Ongoing, Progressing     Problem: Bariatric Environmental Safety  Goal: Safety Maintained with Care  Outcome: Ongoing, Progressing     Problem: Skin Injury Risk Increased  Goal: Skin Health and Integrity  Outcome: Ongoing, Progressing     Problem: Skin or Soft Tissue Infection  Goal: Absence of Infection Signs and Symptoms  Outcome: Ongoing, Progressing

## 2023-03-08 NOTE — H&P
"[facility] Medicine  History & Physical    Patient Name: Niru Reyes  MRN: [id number]  Patient Class: OP- Observation  Admission Date: 3/7/2023  Attending Physician: Faith Johnson MD   Primary Care Provider: Jordana Shirley MD      Patient information was obtained from patient and ER records.     Subjective:     Principal Problem:Nontraumatic hematoma of muscle    Chief Complaint:   Chief Complaint   Patient presents with    Hip Pain     Felt a pop in her left hip while moving in bed.         HPI: Niru Reyes is a 77 year old female with history of obesity, lymphedema, and hypertension who presents to ED of acute left hip pain that began this morning while lying in bed. Patient reports he felt something "pop" when she rolled over. She denies injury or trauma prior to lying in bed. Prior to this event, ambulated with walker.    In ED, X ray of hip unremarkable. CT Pelvis shows large are of hemorrhage in the left pelvis overlying the left iliacus muscle extending to femur. Orthopedic surgery has been consulted. Patient will be placed in observation under the care of Hospital Medicine      Past Medical History:   Diagnosis Date    Hypertension        Past Surgical History:   Procedure Laterality Date     SECTION      COLONOSCOPY N/A 2019    Procedure: COLONOSCOPY;  Surgeon: Janeth Leroy MD;  [facility];  Service: Endoscopy;  Laterality: N/A;    HYSTERECTOMY         Review of patient's allergies indicates:  No Known Allergies    No current facility-administered medications on file prior to encounter.     Current Outpatient Medications on File Prior to Encounter   Medication Sig    aspirin (ECOTRIN) 81 MG EC tablet Take 81 mg by mouth once daily.    hydroCHLOROthiazide (HYDRODIURIL) 25 MG tablet TAKE 1 TABLET(25 MG) BY MOUTH EVERY DAY    [DISCONTINUED] atorvastatin (LIPITOR) 20 MG tablet Take 1 tablet (20 mg total) by mouth once daily.    [DISCONTINUED] " citalopram (CELEXA) 40 MG tablet TAKE 1 TABLET(40 MG) BY MOUTH EVERY DAY    [DISCONTINUED] colchicine (COLCRYS) 0.6 mg tablet Take 2 tablets by mouth, wait 1 hour and take 1 tablet    [DISCONTINUED] lisinopriL 10 MG tablet Take 1 tablet (10 mg total) by mouth once daily.    [DISCONTINUED] methylPREDNISolone (MEDROL DOSEPACK) 4 mg tablet use as directed    [DISCONTINUED] naproxen sodium (ANAPROX) 220 MG tablet Take 440 mg by mouth once daily.     Family History       Problem Relation (Age of Onset)    Birth defects Daughter    Heart disease Mother, Sister    Muscular dystrophy Father          Tobacco Use    Smoking status: Never    Smokeless tobacco: Not on file   Substance and Sexual Activity    Alcohol use: No    Drug use: No    Sexual activity: Never     Review of Systems   Constitutional:  Negative for activity change, diaphoresis, fatigue and unexpected weight change.   HENT:  Negative for congestion, ear pain and sore throat.    Eyes: Negative.    Respiratory:  Negative for shortness of breath and wheezing.    Cardiovascular:  Negative for chest pain and palpitations.   Gastrointestinal:  Negative for abdominal pain, constipation, diarrhea and vomiting.   Endocrine: Negative.    Genitourinary:  Negative for flank pain, hematuria and urgency.   Musculoskeletal:  Positive for arthralgias, gait problem and joint swelling. Negative for neck pain.   Skin:  Positive for wound. Negative for pallor.   Neurological:  Negative for seizures, syncope and light-headedness.   Hematological: Negative.    Psychiatric/Behavioral: Negative.       Objective:     Vital Signs (Most Recent):  Temp: 97.7 °F (36.5 °C) (03/07/23 0809)  Pulse: 73 (03/07/23 1500)  Resp: 20 (03/07/23 1500)  BP: (!) 150/68 (03/07/23 1500)  SpO2: 95 % (03/07/23 1500)   Vital Signs (24h Range):  Temp:  [97.7 °F (36.5 °C)-98.2 °F (36.8 °C)] 97.7 °F (36.5 °C)  Pulse:  [73-80] 73  Resp:  [18-20] 20  SpO2:  [93 %-97 %] 95 %  BP: (150-211)/()  150/68     Weight: (!) 183.6 kg (404 lb 12.8 oz)  Body mass index is 61.55 kg/m².    Physical Exam  Constitutional:       Appearance: She is well-developed. She is obese.   HENT:      Head: Normocephalic and atraumatic.   Cardiovascular:      Rate and Rhythm: Normal rate and regular rhythm.      Heart sounds: Normal heart sounds. No murmur heard.  Pulmonary:      Effort: Pulmonary effort is normal. No respiratory distress.      Breath sounds: Normal breath sounds.   Abdominal:      General: Bowel sounds are normal. There is no distension.      Palpations: Abdomen is soft.      Tenderness: There is no abdominal tenderness.   Musculoskeletal:         General: Normal range of motion.      Cervical back: Normal range of motion and neck supple.      Right lower leg: Edema present.      Left lower leg: Edema present.      Comments: Left hip tenderness with deformity     Skin:     General: Skin is warm.      Comments: Skin changes consistent with lymphedema.    Neurological:      Mental Status: She is alert and oriented to person, place, and time.        Significant Labs: All pertinent labs within the past 24 hours have been reviewed.  CBC:   Recent Labs   Lab 03/07/23  0859 03/07/23  1447   WBC 10.59  --    HGB 11.1* 9.8*   HCT 35.6* 30.9*     --      CMP:   Recent Labs   Lab 03/07/23  0859      K 3.8      CO2 26   *   BUN 15   CREATININE 0.8   CALCIUM 9.0   PROT 6.9   ALBUMIN 3.4*   BILITOT 0.8   ALKPHOS 93   AST 13   ALT 9*   ANIONGAP 12       Significant Imaging:   Imaging Results              CT Pelvis Without Contrast (Final result)  Result time 03/07/23 10:19:33      Final result by Catrachito Steele MD (03/07/23 10:19:33)                   Impression:      Large area of hemorrhage in the left pelvis overlying the left iliacus muscle as well as diffuse enlargement of the left iliacus and iliopsoas muscles extending all the way down towards the femur.  Certainly primary muscular trauma with  avulsion is a consideration though there is no fracture visualized.  The left pelvic hematoma is only partially visualized.    All CT scans at this facility are performed  using dose modulation techniques as appropriate to performed exam including the following:  automated exposure control; adjustment of mA and/or kV according to the patients size (this includes techniques or standardized protocols for targeted exams where dose is matched to indication/reason for exam: i.e. extremities or head);  iterative reconstruction technique.      Electronically signed by: Catrachito Steele MD  Date:    03/07/2023  Time:    10:19               Narrative:    EXAMINATION:  CT PELVIS WITHOUT CONTRAST    CLINICAL HISTORY:  Pelvic fracture;scan thru hips;    TECHNIQUE:  Axial CT through the pelvis with multiplanar reformations.    COMPARISON:  Earlier plain radiographs    FINDINGS:  There is diffuse enlargement of the left iliacus and left iliopsoas muscle extending all the way down to the femur.  Heterogeneous areas of hyperdensity/hemorrhage within the muscle more notably in the iliacus portion.  There is a large area of hematoma overlying the left iliacus muscle with a maximum axial diameter of 5.9 x 6.3 cm.  Additional surrounding smaller degrees of strandy hemorrhage.  The most superior extent of the hemorrhage is not included within the scan.    There is no fracture evident.    No advanced arthritic change.  Mild arthritic changes left hip.    Atherosclerosis.  Colonic diverticulosis.                                       X-Ray Hip 2 or 3 views Left (with Pelvis when performed) (Final result)  Result time 03/07/23 08:07:05      Final result by Timothy Gonzáles MD (03/07/23 08:07:05)                   Impression:      Limited exam.  No definite fracture.  Consider CT if there remains concern for fracture.      Electronically signed by: Timothy Gonzáles  Date:    03/07/2023  Time:    08:07               Narrative:    EXAMINATION:  XR  HIP WITH PELVIS WHEN PERFORMED, 2 OR 3 VIEWS LEFT    CLINICAL HISTORY:  Pain, unspecified    TECHNIQUE:  AP view of the pelvis and frog leg lateral view of the left hip were performed.    COMPARISON:  Pelvic radiograph December 4, 2014.    FINDINGS:  Poor osseous detail of uncertain etiology, possibly overlying clothing artifact.  Osteopenia.  No dislocation.  No definite fracture.                                        Assessment/Plan:     * Nontraumatic hematoma of muscle  Noted on CT hip.   Monitor H/H. 11.3>9.8 g/dL. If downward trend continues may need CTA to rule out active bleed.  No surgical intervention at this time.  Pain manageable with current regimen  Orthopedic surgery input appreciated    Acute blood loss anemia  See #1.   Hemoglobin 110.>9.8. closely monitor  Orthostatics in AM      HTN (hypertension)  Was prescribed HCTZ in the past, but without refills.   Hold diuretic for now.  Hydralazine prn        VTE Risk Mitigation (From admission, onward)    None             Ila Engel NP  Department of Hospital Medicine   [facility]

## 2023-03-09 PROBLEM — L03.90 CELLULITIS: Status: ACTIVE | Noted: 2023-03-09

## 2023-03-09 LAB
ALBUMIN SERPL BCP-MCNC: 2.6 G/DL (ref 3.5–5.2)
ALP SERPL-CCNC: 73 U/L (ref 55–135)
ALT SERPL W/O P-5'-P-CCNC: 7 U/L (ref 10–44)
ANION GAP SERPL CALC-SCNC: 7 MMOL/L (ref 8–16)
AST SERPL-CCNC: 14 U/L (ref 10–40)
BASOPHILS # BLD AUTO: 0.09 K/UL (ref 0–0.2)
BASOPHILS NFR BLD: 0.9 % (ref 0–1.9)
BILIRUB SERPL-MCNC: 0.5 MG/DL (ref 0.1–1)
BLD PROD TYP BPU: NORMAL
BLOOD UNIT EXPIRATION DATE: NORMAL
BLOOD UNIT TYPE CODE: 9500
BLOOD UNIT TYPE: NORMAL
BUN SERPL-MCNC: 18 MG/DL (ref 8–23)
CALCIUM SERPL-MCNC: 7.7 MG/DL (ref 8.7–10.5)
CHLORIDE SERPL-SCNC: 106 MMOL/L (ref 95–110)
CO2 SERPL-SCNC: 27 MMOL/L (ref 23–29)
CODING SYSTEM: NORMAL
CREAT SERPL-MCNC: 0.8 MG/DL (ref 0.5–1.4)
CROSSMATCH INTERPRETATION: NORMAL
DIFFERENTIAL METHOD: ABNORMAL
DISPENSE STATUS: NORMAL
EOSINOPHIL # BLD AUTO: 0.4 K/UL (ref 0–0.5)
EOSINOPHIL NFR BLD: 3.9 % (ref 0–8)
ERYTHROCYTE [DISTWIDTH] IN BLOOD BY AUTOMATED COUNT: 15.9 % (ref 11.5–14.5)
EST. GFR  (NO RACE VARIABLE): >60 ML/MIN/1.73 M^2
GLUCOSE SERPL-MCNC: 103 MG/DL (ref 70–110)
HCT VFR BLD AUTO: 24.6 % (ref 37–48.5)
HCT VFR BLD AUTO: 27.1 % (ref 37–48.5)
HGB BLD-MCNC: 7.6 G/DL (ref 12–16)
HGB BLD-MCNC: 8.4 G/DL (ref 12–16)
IMM GRANULOCYTES # BLD AUTO: 0.04 K/UL (ref 0–0.04)
IMM GRANULOCYTES NFR BLD AUTO: 0.4 % (ref 0–0.5)
LYMPHOCYTES # BLD AUTO: 1.7 K/UL (ref 1–4.8)
LYMPHOCYTES NFR BLD: 16.6 % (ref 18–48)
MCH RBC QN AUTO: 27 PG (ref 27–31)
MCHC RBC AUTO-ENTMCNC: 30.9 G/DL (ref 32–36)
MCV RBC AUTO: 87 FL (ref 82–98)
MONOCYTES # BLD AUTO: 0.8 K/UL (ref 0.3–1)
MONOCYTES NFR BLD: 8 % (ref 4–15)
NEUTROPHILS # BLD AUTO: 7 K/UL (ref 1.8–7.7)
NEUTROPHILS NFR BLD: 70.2 % (ref 38–73)
NRBC BLD-RTO: 0 /100 WBC
NUM UNITS TRANS PACKED RBC: NORMAL
PLATELET # BLD AUTO: 214 K/UL (ref 150–450)
PMV BLD AUTO: 10.5 FL (ref 9.2–12.9)
POTASSIUM SERPL-SCNC: 4.4 MMOL/L (ref 3.5–5.1)
PROT SERPL-MCNC: 5.4 G/DL (ref 6–8.4)
RBC # BLD AUTO: 2.82 M/UL (ref 4–5.4)
SODIUM SERPL-SCNC: 140 MMOL/L (ref 136–145)
WBC # BLD AUTO: 9.94 K/UL (ref 3.9–12.7)

## 2023-03-09 PROCEDURE — 85018 HEMOGLOBIN: CPT | Performed by: NURSE PRACTITIONER

## 2023-03-09 PROCEDURE — 25000003 PHARM REV CODE 250: Performed by: STUDENT IN AN ORGANIZED HEALTH CARE EDUCATION/TRAINING PROGRAM

## 2023-03-09 PROCEDURE — 36415 COLL VENOUS BLD VENIPUNCTURE: CPT | Performed by: NURSE PRACTITIONER

## 2023-03-09 PROCEDURE — 94761 N-INVAS EAR/PLS OXIMETRY MLT: CPT

## 2023-03-09 PROCEDURE — 11000001 HC ACUTE MED/SURG PRIVATE ROOM

## 2023-03-09 PROCEDURE — 80053 COMPREHEN METABOLIC PANEL: CPT | Performed by: NURSE PRACTITIONER

## 2023-03-09 PROCEDURE — 85014 HEMATOCRIT: CPT | Performed by: NURSE PRACTITIONER

## 2023-03-09 PROCEDURE — 99232 SBSQ HOSP IP/OBS MODERATE 35: CPT | Mod: ,,, | Performed by: PHYSICIAN ASSISTANT

## 2023-03-09 PROCEDURE — P9016 RBC LEUKOCYTES REDUCED: HCPCS | Performed by: NURSE PRACTITIONER

## 2023-03-09 PROCEDURE — 85025 COMPLETE CBC W/AUTO DIFF WBC: CPT | Performed by: NURSE PRACTITIONER

## 2023-03-09 PROCEDURE — 25000003 PHARM REV CODE 250: Performed by: NURSE PRACTITIONER

## 2023-03-09 PROCEDURE — 99232 PR SUBSEQUENT HOSPITAL CARE,LEVL II: ICD-10-PCS | Mod: ,,, | Performed by: PHYSICIAN ASSISTANT

## 2023-03-09 PROCEDURE — 36430 TRANSFUSION BLD/BLD COMPNT: CPT

## 2023-03-09 PROCEDURE — 63600175 PHARM REV CODE 636 W HCPCS: Performed by: NURSE PRACTITIONER

## 2023-03-09 RX ORDER — MAG HYDROX/ALUMINUM HYD/SIMETH 200-200-20
30 SUSPENSION, ORAL (FINAL DOSE FORM) ORAL EVERY 4 HOURS PRN
Status: DISCONTINUED | OUTPATIENT
Start: 2023-03-09 | End: 2023-03-15 | Stop reason: HOSPADM

## 2023-03-09 RX ORDER — HYDROCODONE BITARTRATE AND ACETAMINOPHEN 500; 5 MG/1; MG/1
TABLET ORAL
Status: DISCONTINUED | OUTPATIENT
Start: 2023-03-09 | End: 2023-03-15 | Stop reason: HOSPADM

## 2023-03-09 RX ADMIN — HYDROCODONE BITARTRATE AND ACETAMINOPHEN 1 TABLET: 5; 325 TABLET ORAL at 12:03

## 2023-03-09 RX ADMIN — LISINOPRIL 20 MG: 20 TABLET ORAL at 09:03

## 2023-03-09 RX ADMIN — HYDROCODONE BITARTRATE AND ACETAMINOPHEN 1 TABLET: 5; 325 TABLET ORAL at 11:03

## 2023-03-09 RX ADMIN — HYDROCODONE BITARTRATE AND ACETAMINOPHEN 1 TABLET: 5; 325 TABLET ORAL at 06:03

## 2023-03-09 RX ADMIN — ALUMINUM HYDROXIDE, MAGNESIUM HYDROXIDE, AND DIMETHICONE 30 ML: 200; 20; 200 SUSPENSION ORAL at 03:03

## 2023-03-09 RX ADMIN — CEFTRIAXONE 2 G: 2 INJECTION, POWDER, FOR SOLUTION INTRAMUSCULAR; INTRAVENOUS at 03:03

## 2023-03-09 NOTE — SUBJECTIVE & OBJECTIVE
Interval History: Cellulitis improving, PT/OT to eval, will need SNF placement. Lives alone.     Review of Systems   Constitutional:  Negative for activity change, diaphoresis, fatigue and unexpected weight change.   HENT:  Negative for congestion, ear pain and sore throat.    Eyes: Negative.    Respiratory:  Negative for shortness of breath and wheezing.    Cardiovascular:  Negative for chest pain and palpitations.   Gastrointestinal:  Negative for abdominal pain, constipation, diarrhea and vomiting.   Endocrine: Negative.    Genitourinary:  Negative for flank pain, hematuria and urgency.   Musculoskeletal:  Positive for arthralgias, gait problem and joint swelling. Negative for neck pain.   Skin:  Positive for color change and wound. Negative for pallor.   Neurological:  Negative for seizures, syncope and light-headedness.   Hematological: Negative.    Psychiatric/Behavioral: Negative.     Objective:     Vital Signs (Most Recent):  Temp: 98.1 °F (36.7 °C) (03/09/23 1501)  Pulse: 86 (03/09/23 1501)  Resp: 18 (03/09/23 1501)  BP: 137/63 (03/09/23 1501)  SpO2: (!) 90 % (03/09/23 1501)   Vital Signs (24h Range):  Temp:  [98 °F (36.7 °C)-98.7 °F (37.1 °C)] 98.1 °F (36.7 °C)  Pulse:  [71-89] 86  Resp:  [18-20] 18  SpO2:  [90 %-97 %] 90 %  BP: (102-144)/(45-64) 137/63     Weight: (!) 187 kg (412 lb 4.2 oz)  Body mass index is 62.68 kg/m².    Intake/Output Summary (Last 24 hours) at 3/9/2023 1543  Last data filed at 3/9/2023 1530  Gross per 24 hour   Intake 602.14 ml   Output 650 ml   Net -47.86 ml      Physical Exam  Constitutional:       Appearance: She is well-developed. She is obese.   HENT:      Head: Normocephalic and atraumatic.   Cardiovascular:      Rate and Rhythm: Normal rate and regular rhythm.      Heart sounds: Normal heart sounds. No murmur heard.  Pulmonary:      Effort: Pulmonary effort is normal. No respiratory distress.      Breath sounds: Normal breath sounds.   Abdominal:      General: Bowel sounds  are normal. There is no distension.      Palpations: Abdomen is soft.      Tenderness: There is no abdominal tenderness.   Musculoskeletal:         General: Normal range of motion.      Cervical back: Normal range of motion and neck supple.      Right lower leg: Edema present.      Left lower leg: Edema present.      Comments: Left hip tenderness with deformity     Skin:     General: Skin is warm.      Findings: Erythema (patchy erythema noted to BLE, see pictures from wound care) present.      Comments: Skin changes consistent with lymphedema.    Neurological:      Mental Status: She is alert and oriented to person, place, and time.       Significant Labs: All pertinent labs within the past 24 hours have been reviewed.  CBC:   Recent Labs   Lab 03/08/23  0759 03/08/23  1329 03/09/23  0525   WBC 10.11  --  9.94   HGB 8.0* 8.3* 7.6*   HCT 26.0* 27.5* 24.6*     --  214     CMP:   Recent Labs   Lab 03/08/23  0759 03/09/23  0525    140   K 4.9 4.4    106   CO2 28 27   GLU 95 103   BUN 19 18   CREATININE 0.8 0.8   CALCIUM 8.0* 7.7*   PROT 5.7* 5.4*   ALBUMIN 2.7* 2.6*   BILITOT 0.8 0.5   ALKPHOS 74 73   AST 21 14   ALT 6* 7*   ANIONGAP 6* 7*       Significant Imaging: I have reviewed all pertinent imaging results/findings within the past 24 hours.

## 2023-03-09 NOTE — PROGRESS NOTES
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  Patient is resting comfortably in bed.  Patient reports that her pain continues to improve.  She is not been able to get up and ambulate as of yet.    Review of patient's allergies indicates:  No Known Allergies    Current Facility-Administered Medications   Medication    0.9%  NaCl infusion (for blood administration)    cefTRIAXone (ROCEPHIN) 2 g in dextrose 5 % in water (D5W) 5 % 50 mL IVPB (MB+)    dextrose 5 % (D5W) infusion    hydrALAZINE injection 10 mg    HYDROcodone-acetaminophen 5-325 mg per tablet 1 tablet    lisinopriL tablet 20 mg     Objective:     Vital Signs (Most Recent):  Temp: 98.5 °F (36.9 °C) (03/09/23 0747)  Pulse: 78 (03/09/23 0747)  Resp: 18 (03/09/23 0747)  BP: (!) 140/61 (03/09/23 0747)  SpO2: (!) 92 % (03/09/23 0747)   Vital Signs (24h Range):  Temp:  [98.2 °F (36.8 °C)-99 °F (37.2 °C)] 98.5 °F (36.9 °C)  Pulse:  [75-89] 78  Resp:  [16-20] 18  SpO2:  [92 %-97 %] 92 %  BP: (105-170)/(45-74) 140/61     Weight: (!) 187 kg (412 lb 4.2 oz)  Height: 5' 8" (172.7 cm)  Body mass index is 62.68 kg/m².      Intake/Output Summary (Last 24 hours) at 3/9/2023 0995  Last data filed at 3/9/2023 0309  Gross per 24 hour   Intake 482.14 ml   Output 650 ml   Net -167.86 ml       Ortho/SPM Exam  Left hip:  Skin is intact   Moderate edema  Moderate TTP laterally and posteriorly   Pain increases " with internal/external rotation  ROM of the hip is not tested secondary to pain   Calf and compartments are soft and compressible  Motor exam normal   Sensation and pulses intact     GEN: Well developed, well nourished female. AAOX3. No acute distress.   Head: Normocephalic, atraumatic.   Eyes: VITA  Neck: Trachea is midline, no adenopathy  Resp: Breathing unlabored.  Neuro: Motor function normal, Cranial nerves intact  Psych: Mood and affect appropriate.    Significant Labs:   Recent Lab Results         03/09/23  0525   03/08/23  1329        Albumin 2.6         Alkaline Phosphatase 73         ALT 7         Anion Gap 7         AST 14         Baso # 0.09         Basophil % 0.9         BILIRUBIN TOTAL 0.5  Comment: For infants and newborns, interpretation of results should be based  on gestational age, weight and in agreement with clinical  observations.    Premature Infant recommended reference ranges:  Up to 24 hours.............<8.0 mg/dL  Up to 48 hours............<12.0 mg/dL  3-5 days..................<15.0 mg/dL  6-29 days.................<15.0 mg/dL           BUN 18         Calcium 7.7         Chloride 106         CO2 27         Creatinine 0.8         Differential Method Automated         eGFR >60         Eos # 0.4         Eosinophil % 3.9         Glucose 103         Gran # (ANC) 7.0         Gran % 70.2         Hematocrit 24.6   27.5       Hemoglobin 7.6   8.3       Immature Grans (Abs) 0.04  Comment: Mild elevation in immature granulocytes is non specific and   can be seen in a variety of conditions including stress response,   acute inflammation, trauma and pregnancy. Correlation with other   laboratory and clinical findings is essential.           Immature Granulocytes 0.4         Lymph # 1.7         Lymph % 16.6         MCH 27.0         MCHC 30.9         MCV 87         Mono # 0.8         Mono % 8.0         MPV 10.5         nRBC 0         Platelets 214         Potassium 4.4         PROTEIN TOTAL 5.4          RBC 2.82         RDW 15.9         Sodium 140         WBC 9.94               All pertinent labs within the past 24 hours have been reviewed.    Significant Imaging: I have reviewed and interpreted all pertinent imaging results/findings.    Assessment/Plan:     Active Diagnoses:    Diagnosis Date Noted POA    PRINCIPAL PROBLEM:  Nontraumatic hematoma of muscle [M79.81] 03/07/2023 Yes    Acute blood loss anemia [D62] 03/07/2023 Yes    HTN (hypertension) [I10] 12/04/2014 Yes      Problems Resolved During this Admission:     Assessment:  77-year-old female with past medical history significant for hypertension, major depression, osteoarthritis of the knees, anemia, and atherosclerosis of arteries of the extremities who is admitted for left hip hematoma, concern for active bleed     Plan:  CTA performed yesterday showed no evidence of active bleed   H&H continues to trend downward, but may be leveling off, we will continue to monitor this   Appreciate management per primary team   No orthopedic intervention is indicated at this time: Boot we will continue to follow this patient and we will see her as an outpatient once she has been discharged    Jorge Cm PA-C  Orthopedics  [facility]

## 2023-03-09 NOTE — ASSESSMENT & PLAN NOTE
Noted on CT hip.   Monitor H/H. 11.3>9.8 g/dL. If downward trend continues may need CTA to rule out active bleed.  --Monitor CBC, decreased to 7.6 this AM, transfuse with 1U  CTA ordered: negative for detrimental change  Repeat CBC in AM  No surgical intervention at this time.  Pain manageable with current regimen  Orthopedic surgery input appreciated

## 2023-03-09 NOTE — PROGRESS NOTES
"[facility] Medicine  Progress Note    Patient Name: Niru Reyes  MRN: [id number]  Patient Class: IP- Inpatient   Admission Date: 3/7/2023  Length of Stay: 1 days  Attending Physician: Faith Johnson MD  Primary Care Provider: Jordana Shirley MD        Subjective:     Principal Problem:Nontraumatic hematoma of muscle        HPI:  Niru Reyes is a 77 year old female with history of obesity, lymphedema, and hypertension who presents to ED of acute left hip pain that began this morning while lying in bed. Patient reports he felt something "pop" when she rolled over. She denies injury or trauma prior to lying in bed. Prior to this event, ambulated with walker.    In ED, X ray of hip unremarkable. CT Pelvis shows large are of hemorrhage in the left pelvis overlying the left iliacus muscle extending to femur. Orthopedic surgery has been consulted. Patient will be placed in observation under the care of Hospital Medicine      Overview/Hospital Course:  77 year old female, under the care of hospital medicine for left psoas muscle hematoma. Decrease in Hgb this AM, 9.8-->8.1, recommended to order CTA of pelvis, no detrimental changes. Erythema noted to bilateral lower extremities, concern for early cellulitis, initiated rocephin. Vitals stable. Hgb: decreased this AM to 7.6 from 8.3, ordered 1U PRBC. Patient continues to not be able to get up with PT due to pain, extensive discussion with patient on living situation and needs after discharge. Patient in agreement that she can not take care of herself at this time. Recommend SNF, PT/OT to eval.       Interval History: Cellulitis improving, PT/OT to eval, will need SNF placement. Lives alone.     Review of Systems   Constitutional:  Negative for activity change, diaphoresis, fatigue and unexpected weight change.   HENT:  Negative for congestion, ear pain and sore throat.    Eyes: Negative.    Respiratory:  Negative for shortness of breath and " wheezing.    Cardiovascular:  Negative for chest pain and palpitations.   Gastrointestinal:  Negative for abdominal pain, constipation, diarrhea and vomiting.   Endocrine: Negative.    Genitourinary:  Negative for flank pain, hematuria and urgency.   Musculoskeletal:  Positive for arthralgias, gait problem and joint swelling. Negative for neck pain.   Skin:  Positive for color change and wound. Negative for pallor.   Neurological:  Negative for seizures, syncope and light-headedness.   Hematological: Negative.    Psychiatric/Behavioral: Negative.     Objective:     Vital Signs (Most Recent):  Temp: 98.1 °F (36.7 °C) (03/09/23 1501)  Pulse: 86 (03/09/23 1501)  Resp: 18 (03/09/23 1501)  BP: 137/63 (03/09/23 1501)  SpO2: (!) 90 % (03/09/23 1501)   Vital Signs (24h Range):  Temp:  [98 °F (36.7 °C)-98.7 °F (37.1 °C)] 98.1 °F (36.7 °C)  Pulse:  [71-89] 86  Resp:  [18-20] 18  SpO2:  [90 %-97 %] 90 %  BP: (102-144)/(45-64) 137/63     Weight: (!) 187 kg (412 lb 4.2 oz)  Body mass index is 62.68 kg/m².    Intake/Output Summary (Last 24 hours) at 3/9/2023 1543  Last data filed at 3/9/2023 1530  Gross per 24 hour   Intake 602.14 ml   Output 650 ml   Net -47.86 ml      Physical Exam  Constitutional:       Appearance: She is well-developed. She is obese.   HENT:      Head: Normocephalic and atraumatic.   Cardiovascular:      Rate and Rhythm: Normal rate and regular rhythm.      Heart sounds: Normal heart sounds. No murmur heard.  Pulmonary:      Effort: Pulmonary effort is normal. No respiratory distress.      Breath sounds: Normal breath sounds.   Abdominal:      General: Bowel sounds are normal. There is no distension.      Palpations: Abdomen is soft.      Tenderness: There is no abdominal tenderness.   Musculoskeletal:         General: Normal range of motion.      Cervical back: Normal range of motion and neck supple.      Right lower leg: Edema present.      Left lower leg: Edema present.      Comments: Left hip tenderness  with deformity     Skin:     General: Skin is warm.      Findings: Erythema (patchy erythema noted to BLE, see pictures from wound care) present.      Comments: Skin changes consistent with lymphedema.    Neurological:      Mental Status: She is alert and oriented to person, place, and time.       Significant Labs: All pertinent labs within the past 24 hours have been reviewed.  CBC:   Recent Labs   Lab 03/08/23  0759 03/08/23  1329 03/09/23  0525   WBC 10.11  --  9.94   HGB 8.0* 8.3* 7.6*   HCT 26.0* 27.5* 24.6*     --  214     CMP:   Recent Labs   Lab 03/08/23  0759 03/09/23  0525    140   K 4.9 4.4    106   CO2 28 27   GLU 95 103   BUN 19 18   CREATININE 0.8 0.8   CALCIUM 8.0* 7.7*   PROT 5.7* 5.4*   ALBUMIN 2.7* 2.6*   BILITOT 0.8 0.5   ALKPHOS 74 73   AST 21 14   ALT 6* 7*   ANIONGAP 6* 7*       Significant Imaging: I have reviewed all pertinent imaging results/findings within the past 24 hours.      Assessment/Plan:      * Nontraumatic hematoma of muscle  Noted on CT hip.   Monitor H/H. 11.3>9.8 g/dL. If downward trend continues may need CTA to rule out active bleed.  --Monitor CBC, decreased to 7.6 this AM, transfuse with 1U  CTA ordered: negative for detrimental change  Repeat CBC in AM  No surgical intervention at this time.  Pain manageable with current regimen  Orthopedic surgery input appreciated    Cellulitis  Secondary to possible lymphedema vs venous stasis    --Rocephin IV      Acute blood loss anemia  Acute on Chronic, Secondary to psoas muscle hematoma  Hemoglobin 110.>9.8. closely monitor  Orthostatics in AM- unable to complete due to pain    --Daily CBC  --Transfuse with 1 unit PRBC for Hgb <7.0 or <8.0 if symptomatic   --Hgb: 7.6, transfuse with 1U PRBC      HTN (hypertension)  Was prescribed HCTZ in the past, but without refills.   Hold diuretic for now.  Hydralazine prn        VTE Risk Mitigation (From admission, onward)    None          Discharge Planning   JERONIMO: 3/9/2023      Code Status: Prior   Is the patient medically ready for discharge?:     Reason for patient still in hospital (select all that apply): Patient trending condition  Discharge Plan A: Home Health                  Antoinette Ahumada NP  Department of Hospital Medicine   '[facility]

## 2023-03-09 NOTE — PLAN OF CARE
Pt remains free from falls/injuries this shift. Safety precautions maintained. All orders active and reviewed. Unable to obtain orthostatic vital signs at this time due to pt being unable to get out of bed. Pt refuses q2 repositioning. Pain managed per prn orders. Chart check completed.   Problem: Adult Inpatient Plan of Care  Goal: Plan of Care Review  Outcome: Ongoing, Progressing  Goal: Patient-Specific Goal (Individualized)  Outcome: Ongoing, Progressing  Goal: Absence of Hospital-Acquired Illness or Injury  Outcome: Ongoing, Progressing  Goal: Optimal Comfort and Wellbeing  Outcome: Ongoing, Progressing  Goal: Readiness for Transition of Care  Outcome: Ongoing, Progressing     Problem: Bariatric Environmental Safety  Goal: Safety Maintained with Care  Outcome: Ongoing, Progressing     Problem: Skin Injury Risk Increased  Goal: Skin Health and Integrity  Outcome: Ongoing, Progressing     Problem: Skin or Soft Tissue Infection  Goal: Absence of Infection Signs and Symptoms  Outcome: Ongoing, Progressing     Problem: Pain Acute  Goal: Acceptable Pain Control and Functional Ability  Outcome: Ongoing, Progressing

## 2023-03-09 NOTE — PLAN OF CARE
Discussed poc with pt, pt verbalized understanding    Purposeful rounding every 2hours    VS wnl  Cardiac monitoring in use, pt is NSR, tele monitor # [id number]  Fall precautions in place, remains injury free  Pain and nausea under control with PRN meds  Received 1 unit of PRBCs without any problems    IVFs  Accurate I&Os; Purewick in use  Abx given as prescribed  Bed locked at lowest position  Call light within reach    Chart check complete  Will cont with POC

## 2023-03-09 NOTE — CHAPLAIN
Initial visit with patient.  Visited with patient to assess for spiritual and emotional needs.  Pt was doing good but did ask for prayer.  I prayed for her before leaving and spiritual care remains available as needed.    Chaplain Catrachito Poole M.Div., [facility]

## 2023-03-09 NOTE — ASSESSMENT & PLAN NOTE
Acute on Chronic, Secondary to psoas muscle hematoma  Hemoglobin 110.>9.8. closely monitor  Orthostatics in AM- unable to complete due to pain    --Daily CBC  --Transfuse with 1 unit PRBC for Hgb <7.0 or <8.0 if symptomatic   --Hgb: 7.6, transfuse with 1U PRBC

## 2023-03-10 PROBLEM — R60.0 BILATERAL LOWER EXTREMITY EDEMA: Status: ACTIVE | Noted: 2023-03-10

## 2023-03-10 LAB
ALBUMIN SERPL BCP-MCNC: 2.7 G/DL (ref 3.5–5.2)
ALP SERPL-CCNC: 75 U/L (ref 55–135)
ALT SERPL W/O P-5'-P-CCNC: 8 U/L (ref 10–44)
ANION GAP SERPL CALC-SCNC: 9 MMOL/L (ref 8–16)
AORTIC ROOT ANNULUS: 3.12 CM
ASCENDING AORTA: 3.56 CM
AST SERPL-CCNC: 18 U/L (ref 10–40)
AV INDEX (PROSTH): 0.59
AV MEAN GRADIENT: 9 MMHG
AV PEAK GRADIENT: 16 MMHG
AV VELOCITY RATIO: 0.56
BASOPHILS # BLD AUTO: 0.08 K/UL (ref 0–0.2)
BASOPHILS NFR BLD: 0.9 % (ref 0–1.9)
BILIRUB SERPL-MCNC: 0.6 MG/DL (ref 0.1–1)
BSA FOR ECHO PROCEDURE: 2.96 M2
BUN SERPL-MCNC: 16 MG/DL (ref 8–23)
CALCIUM SERPL-MCNC: 8 MG/DL (ref 8.7–10.5)
CHLORIDE SERPL-SCNC: 105 MMOL/L (ref 95–110)
CO2 SERPL-SCNC: 26 MMOL/L (ref 23–29)
CREAT SERPL-MCNC: 0.7 MG/DL (ref 0.5–1.4)
CV ECHO LV RWT: 0.65 CM
DIFFERENTIAL METHOD: ABNORMAL
DOP CALC AO PEAK VEL: 2.03 M/S
DOP CALC AO VTI: 43.2 CM
DOP CALC LVOT PEAK VEL: 1.14 M/S
DOP CALC RVOT PEAK VEL: 1.16 M/S
DOP CALC RVOT VTI: 32.1 CM
DOP CALCLVOT PEAK VEL VTI: 25.6 CM
E WAVE DECELERATION TIME: 255.94 MSEC
E/A RATIO: 1.03
ECHO LV POSTERIOR WALL: 1.38 CM (ref 0.6–1.1)
EJECTION FRACTION: 60 %
EOSINOPHIL # BLD AUTO: 0.4 K/UL (ref 0–0.5)
EOSINOPHIL NFR BLD: 4.6 % (ref 0–8)
ERYTHROCYTE [DISTWIDTH] IN BLOOD BY AUTOMATED COUNT: 16.8 % (ref 11.5–14.5)
EST. GFR  (NO RACE VARIABLE): >60 ML/MIN/1.73 M^2
FRACTIONAL SHORTENING: 32 % (ref 28–44)
GLUCOSE SERPL-MCNC: 108 MG/DL (ref 70–110)
HCT VFR BLD AUTO: 26.3 % (ref 37–48.5)
HGB BLD-MCNC: 8.3 G/DL (ref 12–16)
IMM GRANULOCYTES # BLD AUTO: 0.06 K/UL (ref 0–0.04)
IMM GRANULOCYTES NFR BLD AUTO: 0.6 % (ref 0–0.5)
INTERVENTRICULAR SEPTUM: 1.53 CM (ref 0.6–1.1)
IVC DIAMETER: 2.7 CM
IVRT: 59.94 MSEC
LA MAJOR: 6.16 CM
LA MINOR: 5.69 CM
LA WIDTH: 3.5 CM
LEFT ATRIUM SIZE: 3.57 CM
LEFT ATRIUM VOLUME INDEX: 22.8 ML/M2
LEFT ATRIUM VOLUME: 62.83 CM3
LEFT INTERNAL DIMENSION IN SYSTOLE: 2.91 CM (ref 2.1–4)
LEFT VENTRICLE DIASTOLIC VOLUME INDEX: 29.21 ML/M2
LEFT VENTRICLE DIASTOLIC VOLUME: 80.62 ML
LEFT VENTRICLE MASS INDEX: 88 G/M2
LEFT VENTRICLE SYSTOLIC VOLUME INDEX: 11.7 ML/M2
LEFT VENTRICLE SYSTOLIC VOLUME: 32.4 ML
LEFT VENTRICULAR INTERNAL DIMENSION IN DIASTOLE: 4.25 CM (ref 3.5–6)
LEFT VENTRICULAR MASS: 242.13 G
LV LATERAL E/E' RATIO: 10.5 M/S
LVOT MG: 2.95 MMHG
LVOT MV: 0.81 CM/S
LYMPHOCYTES # BLD AUTO: 1.7 K/UL (ref 1–4.8)
LYMPHOCYTES NFR BLD: 18.7 % (ref 18–48)
MCH RBC QN AUTO: 26.5 PG (ref 27–31)
MCHC RBC AUTO-ENTMCNC: 31.6 G/DL (ref 32–36)
MCV RBC AUTO: 84 FL (ref 82–98)
MONOCYTES # BLD AUTO: 0.8 K/UL (ref 0.3–1)
MONOCYTES NFR BLD: 8.2 % (ref 4–15)
MV PEAK A VEL: 1.22 M/S
MV PEAK E VEL: 1.26 M/S
MV STENOSIS PRESSURE HALF TIME: 74.22 MS
MV VALVE AREA P 1/2 METHOD: 2.96 CM2
NEUTROPHILS # BLD AUTO: 6.2 K/UL (ref 1.8–7.7)
NEUTROPHILS NFR BLD: 67 % (ref 38–73)
NRBC BLD-RTO: 0 /100 WBC
PISA TR MAX VEL: 3 M/S
PLATELET # BLD AUTO: 195 K/UL (ref 150–450)
PMV BLD AUTO: 10.7 FL (ref 9.2–12.9)
POTASSIUM SERPL-SCNC: 4.4 MMOL/L (ref 3.5–5.1)
PROT SERPL-MCNC: 5.7 G/DL (ref 6–8.4)
PV MEAN GRADIENT: 4.59 MMHG
RA MAJOR: 4.78 CM
RA PRESSURE: 15 MMHG
RBC # BLD AUTO: 3.13 M/UL (ref 4–5.4)
SODIUM SERPL-SCNC: 140 MMOL/L (ref 136–145)
STJ: 3.45 CM
TDI LATERAL: 0.12 M/S
TR MAX PG: 36 MMHG
TRICUSPID ANNULAR PLANE SYSTOLIC EXCURSION: 2.7 CM
TV REST PULMONARY ARTERY PRESSURE: 51 MMHG
WBC # BLD AUTO: 9.29 K/UL (ref 3.9–12.7)

## 2023-03-10 PROCEDURE — 94761 N-INVAS EAR/PLS OXIMETRY MLT: CPT

## 2023-03-10 PROCEDURE — 97530 THERAPEUTIC ACTIVITIES: CPT

## 2023-03-10 PROCEDURE — 25000003 PHARM REV CODE 250: Performed by: FAMILY MEDICINE

## 2023-03-10 PROCEDURE — 85025 COMPLETE CBC W/AUTO DIFF WBC: CPT | Performed by: NURSE PRACTITIONER

## 2023-03-10 PROCEDURE — 80053 COMPREHEN METABOLIC PANEL: CPT | Performed by: NURSE PRACTITIONER

## 2023-03-10 PROCEDURE — 25000003 PHARM REV CODE 250: Performed by: NURSE PRACTITIONER

## 2023-03-10 PROCEDURE — 99232 PR SUBSEQUENT HOSPITAL CARE,LEVL II: ICD-10-PCS | Mod: ,,, | Performed by: PHYSICIAN ASSISTANT

## 2023-03-10 PROCEDURE — 36415 COLL VENOUS BLD VENIPUNCTURE: CPT | Performed by: NURSE PRACTITIONER

## 2023-03-10 PROCEDURE — 99232 SBSQ HOSP IP/OBS MODERATE 35: CPT | Mod: ,,, | Performed by: PHYSICIAN ASSISTANT

## 2023-03-10 PROCEDURE — 97162 PT EVAL MOD COMPLEX 30 MIN: CPT

## 2023-03-10 PROCEDURE — 63600175 PHARM REV CODE 636 W HCPCS: Performed by: NURSE PRACTITIONER

## 2023-03-10 PROCEDURE — 25000003 PHARM REV CODE 250: Performed by: STUDENT IN AN ORGANIZED HEALTH CARE EDUCATION/TRAINING PROGRAM

## 2023-03-10 PROCEDURE — 97166 OT EVAL MOD COMPLEX 45 MIN: CPT

## 2023-03-10 PROCEDURE — 11000001 HC ACUTE MED/SURG PRIVATE ROOM

## 2023-03-10 RX ORDER — HYDROCODONE BITARTRATE AND ACETAMINOPHEN 7.5; 325 MG/1; MG/1
1 TABLET ORAL EVERY 6 HOURS PRN
Status: DISCONTINUED | OUTPATIENT
Start: 2023-03-10 | End: 2023-03-15 | Stop reason: HOSPADM

## 2023-03-10 RX ORDER — CEPHALEXIN 500 MG/1
500 CAPSULE ORAL
Status: DISCONTINUED | OUTPATIENT
Start: 2023-03-10 | End: 2023-03-15 | Stop reason: HOSPADM

## 2023-03-10 RX ADMIN — HYDROCODONE BITARTRATE AND ACETAMINOPHEN 1 TABLET: 7.5; 325 TABLET ORAL at 03:03

## 2023-03-10 RX ADMIN — CEPHALEXIN 500 MG: 500 CAPSULE ORAL at 12:03

## 2023-03-10 RX ADMIN — CEPHALEXIN 500 MG: 500 CAPSULE ORAL at 08:03

## 2023-03-10 RX ADMIN — HYDROCODONE BITARTRATE AND ACETAMINOPHEN 1 TABLET: 7.5; 325 TABLET ORAL at 09:03

## 2023-03-10 RX ADMIN — HYDRALAZINE HYDROCHLORIDE 10 MG: 20 INJECTION, SOLUTION INTRAMUSCULAR; INTRAVENOUS at 04:03

## 2023-03-10 RX ADMIN — HYDROCODONE BITARTRATE AND ACETAMINOPHEN 1 TABLET: 5; 325 TABLET ORAL at 01:03

## 2023-03-10 RX ADMIN — LISINOPRIL 20 MG: 20 TABLET ORAL at 09:03

## 2023-03-10 RX ADMIN — HYDROCODONE BITARTRATE AND ACETAMINOPHEN 1 TABLET: 5; 325 TABLET ORAL at 09:03

## 2023-03-10 NOTE — PLAN OF CARE
OT eval completed. Rolling to R side with total A x 2. Unable to complete sup>sit with multiple attempts with total A x 2. Returned to supine with total A x 2. Supine scoot with max A x 2. Recommending SNF at d/c.

## 2023-03-10 NOTE — NURSING
Pt is resting in bed. No signs of distress noted. No adverse overnight events. Pt remains free of falls. Q12 chart check complete.

## 2023-03-10 NOTE — PLAN OF CARE
Problem: Adult Inpatient Plan of Care  Goal: Plan of Care Review  Outcome: Ongoing, Progressing  Goal: Patient-Specific Goal (Individualized)  Outcome: Ongoing, Progressing  Goal: Absence of Hospital-Acquired Illness or Injury  Outcome: Ongoing, Progressing  Goal: Optimal Comfort and Wellbeing  Outcome: Ongoing, Progressing  Goal: Readiness for Transition of Care  Outcome: Ongoing, Progressing     Problem: Bariatric Environmental Safety  Goal: Safety Maintained with Care  Outcome: Ongoing, Progressing     Problem: Skin Injury Risk Increased  Goal: Skin Health and Integrity  Outcome: Ongoing, Progressing     Problem: Skin or Soft Tissue Infection  Goal: Absence of Infection Signs and Symptoms  Outcome: Ongoing, Progressing     Problem: Pain Acute  Goal: Acceptable Pain Control and Functional Ability  Outcome: Ongoing, Progressing

## 2023-03-10 NOTE — PLAN OF CARE
PT EVAL complete. Required Total A for sup<>sit, MAX A of 2 for supine scoot. Pt limited by sever pain with movement. Recommending SNF upon d/c.

## 2023-03-10 NOTE — PROGRESS NOTES
Pharmacist Renal Dose Adjustment Note    Niru Reyes is a 77 y.o. female being treated with the medication cephalexin.     Patient Data:    Vital Signs (Most Recent):  Temp: 98.1 °F (36.7 °C) (03/10/23 1154)  Pulse: 72 (03/10/23 1154)  Resp: 17 (03/10/23 1154)  BP: 130/61 (03/10/23 1154)  SpO2: 95 % (03/10/23 1154) Vital Signs (72h Range):  Temp:  [97.4 °F (36.3 °C)-99 °F (37.2 °C)]   Pulse:  [63-90]   Resp:  [12-20]   BP: (102-180)/(45-79)   SpO2:  [90 %-100 %]      Recent Labs   Lab 03/08/23  0759 03/09/23  0525 03/10/23  0645   CREATININE 0.8 0.8 0.7     Serum creatinine: 0.7 mg/dL 03/10/23 0645  Estimated creatinine clearance: 122.4 mL/min    Cephalexin 500 mg oral every 6 hours will be changed to cephalexin 500 mg oral every 8 hours per renal dose protocol for mild-moderate infection in CrCl > 50 ml/min.     Thank you,  Pharmacist's Name: Scout Calix

## 2023-03-10 NOTE — PROGRESS NOTES
"[facility]  Progress Note    Patient Name: Niru Reyes  MRN: [id number]  Patient Class: IP- Inpatient   Admission Date: 3/7/2023  Length of Stay: 2 days  Attending Physician: Damian Little MD  Primary Care Provider: Jordana Shirley MD        Subjective:     Principal Problem:Nontraumatic hematoma of muscle        HPI:  Niru Reyes is a 77 year old female with history of obesity, lymphedema, and hypertension who presents to ED of acute left hip pain that began this morning while lying in bed. Patient reports he felt something "pop" when she rolled over. She denies injury or trauma prior to lying in bed. Prior to this event, ambulated with walker.    In ED, X ray of hip unremarkable. CT Pelvis shows large are of hemorrhage in the left pelvis overlying the left iliacus muscle extending to femur. Orthopedic surgery has been consulted. Patient will be placed in observation under the care of Hospital Medicine      Overview/Hospital Course:  77 year old female, under the care of hospital medicine for left psoas muscle hematoma. Decrease in Hgb this AM, 9.8-->8.1, recommended to order CTA of pelvis, no detrimental changes. Erythema noted to bilateral lower extremities, concern for early cellulitis, initiated rocephin. Vitals stable. Hgb: decreased this AM to 7.6 from 8.3, ordered 1U PRBC. Patient continues to not be able to get up with PT due to pain, extensive discussion with patient on living situation and needs after discharge. Patient in agreement that she can not take care of herself at this time. Recommend SNF, PT/OT to eval.   3/10: SNF placement needed. Will cont on cephalexin for BL LE cellulitis. Obtain echo and LE US for edema.         Interval History: No acute issues overnight. Pain controlled. Awaiting snf.     Review of Systems   Constitutional:  Negative for activity change, diaphoresis, fatigue and unexpected weight change.   HENT:  Negative for congestion, ear pain and sore " throat.    Eyes: Negative.    Respiratory:  Negative for shortness of breath and wheezing.    Cardiovascular:  Negative for chest pain and palpitations.   Gastrointestinal:  Negative for abdominal pain, constipation, diarrhea and vomiting.   Endocrine: Negative.    Genitourinary:  Negative for flank pain, hematuria and urgency.   Musculoskeletal:  Positive for arthralgias, gait problem and joint swelling. Negative for neck pain.   Skin:  Positive for color change and wound. Negative for pallor.   Neurological:  Negative for seizures, syncope and light-headedness.   Hematological: Negative.    Psychiatric/Behavioral: Negative.     Objective:     Vital Signs (Most Recent):  Temp: 98.1 °F (36.7 °C) (03/10/23 1154)  Pulse: 72 (03/10/23 1154)  Resp: 17 (03/10/23 1154)  BP: 130/61 (03/10/23 1154)  SpO2: 95 % (03/10/23 1154) Vital Signs (24h Range):  Temp:  [97.7 °F (36.5 °C)-98.6 °F (37 °C)] 98.1 °F (36.7 °C)  Pulse:  [72-90] 72  Resp:  [17-19] 17  SpO2:  [90 %-97 %] 95 %  BP: (119-171)/(53-72) 130/61     Weight: (!) 183.3 kg (404 lb)  Body mass index is 61.43 kg/m².    Intake/Output Summary (Last 24 hours) at 3/10/2023 1202  Last data filed at 3/10/2023 0905  Gross per 24 hour   Intake 710 ml   Output 900 ml   Net -190 ml      Physical Exam  Constitutional:       Appearance: She is well-developed. She is obese.   HENT:      Head: Normocephalic and atraumatic.   Cardiovascular:      Rate and Rhythm: Normal rate and regular rhythm.      Heart sounds: Normal heart sounds. No murmur heard.  Pulmonary:      Effort: Pulmonary effort is normal. No respiratory distress.      Breath sounds: Normal breath sounds.   Abdominal:      General: Bowel sounds are normal. There is no distension.      Palpations: Abdomen is soft.      Tenderness: There is no abdominal tenderness.   Musculoskeletal:         General: Normal range of motion.      Cervical back: Normal range of motion and neck supple.      Right lower leg: Edema present.       Left lower leg: Edema present.      Comments: Left hip tenderness with deformity     Skin:     General: Skin is warm.      Findings: Erythema (patchy erythema noted to BLE, see pictures from wound care) present.      Comments: Skin changes consistent with lymphedema.    Neurological:      Mental Status: She is alert and oriented to person, place, and time.       Significant Labs: All pertinent labs within the past 24 hours have been reviewed.    Significant Imaging: I have reviewed all pertinent imaging results/findings within the past 24 hours.          Assessment/Plan:      * Nontraumatic hematoma of muscle  Noted on CT hip.   Monitor H/H. 11.3>9.8 g/dL. If downward trend continues may need CTA to rule out active bleed.  --Monitor CBC, decreased to 7.6 this AM, transfuse with 1U  CTA ordered: negative for detrimental change  Repeat CBC in AM  No surgical intervention at this time.  Pain manageable with current regimen  Orthopedic surgery input appreciated    3/10:  H/h stable  Pt/ot recommending snf  Placement pending     Bilateral lower extremity edema  BLE edema  Possibly due to lymphadema  Will obtain echo and lower ext u/s      Cellulitis  Will continue on cephalexin       Acute blood loss anemia  Stable  Cont to monitor       HTN (hypertension)  Was prescribed HCTZ in the past, but without refills.   Hold diuretic for now.  Hydralazine prn        VTE Risk Mitigation (From admission, onward)    None          Discharge Planning   JERONIMO: 3/9/2023     Code Status: Prior   Is the patient medically ready for discharge?:     Reason for patient still in hospital (select all that apply): Pending disposition  Discharge Plan A: Home Health                  Damian Little MD  Department of Hospital Medicine   [facility]

## 2023-03-10 NOTE — PROGRESS NOTES
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  Patient is resting comfortably in bed.  Patient reports that her pain continues to improve.    Review of patient's allergies indicates:  No Known Allergies    Current Facility-Administered Medications   Medication    0.9%  NaCl infusion (for blood administration)    aluminum-magnesium hydroxide-simethicone 200-200-20 mg/5 mL suspension 30 mL    cefTRIAXone (ROCEPHIN) 2 g in dextrose 5 % in water (D5W) 5 % 50 mL IVPB (MB+)    dextrose 5 % (D5W) infusion    hydrALAZINE injection 10 mg    HYDROcodone-acetaminophen 5-325 mg per tablet 1 tablet    lisinopriL tablet 20 mg     Objective:     Vital Signs (Most Recent):  Temp: 97.9 °F (36.6 °C) (03/10/23 0718)  Pulse: 78 (03/10/23 0718)  Resp: 18 (03/10/23 0801)  BP: (!) 143/61 (03/10/23 0718)  SpO2: (!) 94 % (03/10/23 0801)   Vital Signs (24h Range):  Temp:  [97.7 °F (36.5 °C)-98.6 °F (37 °C)] 97.9 °F (36.6 °C)  Pulse:  [71-89] 78  Resp:  [17-19] 18  SpO2:  [90 %-97 %] 94 %  BP: (102-171)/(53-72) 143/61     Weight: 16.3 kg (35 lb 15 oz)  Height: 5' 8" (172.7 cm)  Body mass index is 5.46 kg/m².      Intake/Output Summary (Last 24 hours) at 3/10/2023 0835  Last data filed at 3/9/2023 1614  Gross per 24 hour   Intake 590 ml   Output 1100 ml   Net -510 ml       Ortho/SPM Exam  Left hip:  Skin is intact   Moderate edema  Moderate TTP laterally and posteriorly "   Pain increases with internal/external rotation  ROM of the hip is not tested secondary to pain   Calf and compartments are soft and compressible  Motor exam normal   Sensation and pulses intact     GEN: Well developed, well nourished female. AAOX3. No acute distress.   Head: Normocephalic, atraumatic.   Eyes: VITA  Neck: Trachea is midline, no adenopathy  Resp: Breathing unlabored.  Neuro: Motor function normal, Cranial nerves intact  Psych: Mood and affect appropriate.    Significant Labs:   Recent Lab Results         03/10/23  0645   03/09/23  1602        Albumin 2.7         Alkaline Phosphatase 75         ALT 8         Anion Gap 9         AST 18         Baso # 0.08         Basophil % 0.9         BILIRUBIN TOTAL 0.6  Comment: For infants and newborns, interpretation of results should be based  on gestational age, weight and in agreement with clinical  observations.    Premature Infant recommended reference ranges:  Up to 24 hours.............<8.0 mg/dL  Up to 48 hours............<12.0 mg/dL  3-5 days..................<15.0 mg/dL  6-29 days.................<15.0 mg/dL           BUN 16         Calcium 8.0         Chloride 105         CO2 26         Creatinine 0.7         Differential Method Automated         eGFR >60         Eos # 0.4         Eosinophil % 4.6         Glucose 108         Gran # (ANC) 6.2         Gran % 67.0         Hematocrit 26.3   27.1       Hemoglobin 8.3   8.4       Immature Grans (Abs) 0.06  Comment: Mild elevation in immature granulocytes is non specific and   can be seen in a variety of conditions including stress response,   acute inflammation, trauma and pregnancy. Correlation with other   laboratory and clinical findings is essential.           Immature Granulocytes 0.6         Lymph # 1.7         Lymph % 18.7         MCH 26.5         MCHC 31.6         MCV 84         Mono # 0.8         Mono % 8.2         MPV 10.7         nRBC 0         Platelets 195         Potassium 4.4         PROTEIN  TOTAL 5.7         RBC 3.13         RDW 16.8         Sodium 140         WBC 9.29               All pertinent labs within the past 24 hours have been reviewed.    Significant Imaging: I have reviewed and interpreted all pertinent imaging results/findings.    Assessment/Plan:     Active Diagnoses:    Diagnosis Date Noted POA    PRINCIPAL PROBLEM:  Nontraumatic hematoma of muscle [M79.81] 03/07/2023 Yes    Cellulitis [L03.90] 03/09/2023 Yes    Acute blood loss anemia [D62] 03/07/2023 Yes    HTN (hypertension) [I10] 12/04/2014 Yes      Problems Resolved During this Admission:     Assessment:  77-year-old female with past medical history significant for hypertension, major depression, osteoarthritis of the knees, anemia, and atherosclerosis of arteries of the extremities who is admitted for left hip hematoma    Plan:  CTA performed 2 days ago she evidence of active bleed  H&H has stabilized  Appreciate management per primary team   No orthopedic intervention is indicated at this time   Patient will need SNF/rehab placement  We will continue to follow the patient and we will see her as an outpatient once discharged    Jorge Cm PA-C  Orthopedics  [facility]

## 2023-03-10 NOTE — SUBJECTIVE & OBJECTIVE
Interval History: No acute issues overnight. Pain controlled. Awaiting snf.     Review of Systems   Constitutional:  Negative for activity change, diaphoresis, fatigue and unexpected weight change.   HENT:  Negative for congestion, ear pain and sore throat.    Eyes: Negative.    Respiratory:  Negative for shortness of breath and wheezing.    Cardiovascular:  Negative for chest pain and palpitations.   Gastrointestinal:  Negative for abdominal pain, constipation, diarrhea and vomiting.   Endocrine: Negative.    Genitourinary:  Negative for flank pain, hematuria and urgency.   Musculoskeletal:  Positive for arthralgias, gait problem and joint swelling. Negative for neck pain.   Skin:  Positive for color change and wound. Negative for pallor.   Neurological:  Negative for seizures, syncope and light-headedness.   Hematological: Negative.    Psychiatric/Behavioral: Negative.     Objective:     Vital Signs (Most Recent):  Temp: 98.1 °F (36.7 °C) (03/10/23 1154)  Pulse: 72 (03/10/23 1154)  Resp: 17 (03/10/23 1154)  BP: 130/61 (03/10/23 1154)  SpO2: 95 % (03/10/23 1154) Vital Signs (24h Range):  Temp:  [97.7 °F (36.5 °C)-98.6 °F (37 °C)] 98.1 °F (36.7 °C)  Pulse:  [72-90] 72  Resp:  [17-19] 17  SpO2:  [90 %-97 %] 95 %  BP: (119-171)/(53-72) 130/61     Weight: (!) 183.3 kg (404 lb)  Body mass index is 61.43 kg/m².    Intake/Output Summary (Last 24 hours) at 3/10/2023 1202  Last data filed at 3/10/2023 0905  Gross per 24 hour   Intake 710 ml   Output 900 ml   Net -190 ml      Physical Exam  Constitutional:       Appearance: She is well-developed. She is obese.   HENT:      Head: Normocephalic and atraumatic.   Cardiovascular:      Rate and Rhythm: Normal rate and regular rhythm.      Heart sounds: Normal heart sounds. No murmur heard.  Pulmonary:      Effort: Pulmonary effort is normal. No respiratory distress.      Breath sounds: Normal breath sounds.   Abdominal:      General: Bowel sounds are normal. There is no  distension.      Palpations: Abdomen is soft.      Tenderness: There is no abdominal tenderness.   Musculoskeletal:         General: Normal range of motion.      Cervical back: Normal range of motion and neck supple.      Right lower leg: Edema present.      Left lower leg: Edema present.      Comments: Left hip tenderness with deformity     Skin:     General: Skin is warm.      Findings: Erythema (patchy erythema noted to BLE, see pictures from wound care) present.      Comments: Skin changes consistent with lymphedema.    Neurological:      Mental Status: She is alert and oriented to person, place, and time.       Significant Labs: All pertinent labs within the past 24 hours have been reviewed.    Significant Imaging: I have reviewed all pertinent imaging results/findings within the past 24 hours.

## 2023-03-10 NOTE — PLAN OF CARE
Pt remains free from falls/injuries this shift. Safety precautions maintained. No s/s of acute distress noted. VSS. Pain controlled with pain medication and heat pads. Tele monitoring per orders. Low air loss pump on bed. Pt educated on turning q 2hr and changing position. Continuing with plan of care. Chart check complete and all orders reviewed.

## 2023-03-10 NOTE — PT/OT/SLP EVAL
Physical Therapy Evaluation    Patient Name:  Niru Reyes   MRN:  [id number]    Recommendations:     Discharge Recommendations: nursing facility, skilled   Discharge Equipment Recommendations:  (TBD at next level of care)   Barriers to discharge: Decreased caregiver support    Assessment:     Niru Reyes is a 77 y.o. female admitted with a medical diagnosis of Nontraumatic hematoma of muscle.  She presents with the following impairments/functional limitations: weakness, impaired endurance, impaired functional mobility, gait instability, impaired balance, pain, decreased safety awareness, decreased lower extremity function, decreased coordination, edema, impaired cardiopulmonary response to activity.    Rehab Prognosis: Fair; patient would benefit from acute skilled PT services to address these deficits and reach maximum level of function.    Recent Surgery: * No surgery found *      Plan:     During this hospitalization, patient to be seen 3 x/week to address the identified rehab impairments via gait training, therapeutic activities, therapeutic exercises and progress toward the following goals:    Plan of Care Expires:  03/24/23    Subjective     Chief Complaint: C/o severe pain with movement, does not know how much she will be able to do today.  Patient/Family Comments/goals:  Pain/Comfort:  Pain Rating 1: 10/10  Location - Side 1: Left  Location - Orientation 1: generalized  Location 1: hip  Pain Addressed 1: Reposition, Distraction, Cessation of Activity    Patients cultural, spiritual, Adventism conflicts given the current situation: no    Living Environment:  Pt lives alone in a mobile home, 1 story, ramp to enter. Pt's daughter works and  lives in a different town, pt has no other support to assist upon d/c.  Prior to admission, patients level of function was MOD I with bathing, dressing, household ambulation using RW, required assistance with household taks (washing clothes, cleaning), driving, retired.   Equipment used at home: shower chair, walker, rolling.  DME owned (not currently used):  Raised toilet frame .  Upon discharge, patient will have assistance from ?.    Objective:     Communicated with nurse Lisa CERNA prior to session.  Patient found supine with peripheral IV, PureWick, telemetry  upon PT entry to room.    General Precautions: Standard, fall  Orthopedic Precautions:N/A   Braces: N/A  Respiratory Status: Room air    Exams:  Cognitive Exam:  Patient is oriented to Person, Place, Time, and Situation  Sensation:    -       Intact  Skin Integrity/Edema:      -       Skin integrity: altered B LE  RLE ROM: WFL  RLE Strength: Grossly 2+/5  LLE ROM: PROM WFL  LLE Strength: NT due to severe pain with movement    Functional Mobility:  Bed Mobility:     Rolling Right: total assistance and of 2 persons  Scooting: Supine scoot MAX A of 2  Supine to Sit: total assistance, of 2 persons, multiple attempts, unable to come into full sitting position, limited by pain  Sit to Supine: total assistance and of 2 persons  Transfers: unable to progress due to increased pain  Balance: posterior instability, pt could only tolerate sitting position < 1sec before letting her self fall back into the bed due to increased pain    AM-PAC 6 CLICK MOBILITY  Total Score:8     Treatment & Education:  Pt educated on role of PT in acute care and POC. Educated on importance of OOB activities and HEP (hip flex, LAQ, ham curls, ankle pumps) in order to maintain/regain strength. Educated on increased risk of falling due to weakness, instructed to utilize call bell for assistance for all bed mobility. Pt agreeable to all requests.    Patient left supine with all lines intact and call button in reach.    GOALS:   Multidisciplinary Problems       Physical Therapy Goals          Problem: Physical Therapy    Goal Priority Disciplines Outcome Goal Variances Interventions   Physical Therapy Goal     PT, PT/OT      Description: Goals to be met by  3/24/23.  Pt will complete R/L rolling MIN A.  Pt will complete supine to sit MIN A.  Pt will ambulate 50ft MIN A using RW.                       History:     Past Medical History:   Diagnosis Date    Hypertension        Past Surgical History:   Procedure Laterality Date     SECTION      COLONOSCOPY N/A 2019    Procedure: COLONOSCOPY;  Surgeon: Janeth Leroy MD;  Location: [facility];  Service: Endoscopy;  Laterality: N/A;    HYSTERECTOMY         Time Tracking:     PT Received On: 03/10/23  PT Start Time: 830     PT Stop Time: 0855  PT Total Time (min): 25 min     Billable Minutes: Evaluation 10min and Therapeutic Activity 15min      03/10/2023

## 2023-03-10 NOTE — ASSESSMENT & PLAN NOTE
Noted on CT hip.   Monitor H/H. 11.3>9.8 g/dL. If downward trend continues may need CTA to rule out active bleed.  --Monitor CBC, decreased to 7.6 this AM, transfuse with 1U  CTA ordered: negative for detrimental change  Repeat CBC in AM  No surgical intervention at this time.  Pain manageable with current regimen  Orthopedic surgery input appreciated    3/10:  H/h stable  Pt/ot recommending snf  Placement pending

## 2023-03-10 NOTE — PT/OT/SLP EVAL
"Occupational Therapy Evaluation and Treatment    Name: Niru Reyes  MRN: [id number]  Admitting Diagnosis: Nontraumatic hematoma of muscle  Recent Surgery: * No surgery found *      Recommendations:     Discharge Recommendations: nursing facility, skilled  Level of Assistance Recommended: 24 hours significant assistance  Discharge Equipment Recommendations:  (TBD at next level of care)   Barriers to discharge: Decreased caregiver support    Assessment:     Niru Reyes is a 77 y.o. female with a medical diagnosis of Nontraumatic hematoma of muscle. She presents with performance deficits affecting function including weakness, impaired endurance, impaired self care skills, impaired functional mobility, impaired balance, decreased safety awareness, pain, decreased lower extremity function, decreased upper extremity function, decreased coordination, edema, impaired skin.     Rehab Prognosis: Fair; patient would benefit from acute OT services to address these deficits and reach maximum level of function.    Plan:     Patient to be seen 2 x/week to address the above listed problems via self-care/home management, therapeutic activities, therapeutic exercises  Plan of Care Expires: 03/24/23  Plan of Care Reviewed with: patient    Subjective     Chief Complaint: Patient reported "When I move it's a 10 it hurts so bad" and "I had fallen and I panicked and now I'm scared to walk"  Patient Comments/Goals: None reported  Pain/Comfort:  Pain Rating 1: 10/10  Location - Side 1: Left  Location - Orientation 1: generalized  Location 1: hip  Pain Addressed 1: Distraction, Reposition, Cessation of Activity, Activity Pacing  Pain Rating Post-Intervention 1: 10/10  Patient c/o L hip pain with all movement.  Pain Rating 2: 5/10  Location - Side 2: Left  Location - Orientation 2: generalized  Location 2: shoulder  Pain Addressed 2: Reposition, Cessation of Activity, Activity Pacing  Pain Rating Post-Intervention 2: 5/10  Patient reported " that it is a throbbing pain in her shoulder.    Social History:  Living Environment: Patient lives alone in a mobile home with a ramp to enter.  Prior Level of Function: Prior to admission, patient was independent with ADLs and modified independent with  and community functional mobility via rolling walker.   Roles and Routines: Patient was driving and not working prior to admission.  Equipment Used at Home: rolling walker, bedside commode, shower chair  DME owned (not currently used): none  Assistance Upon Discharge: family. Patient reported that her daughter works and helps her clean the house and do laundry. Patient will have intermittent A at d/c.     Objective:     Communicated with nurse, Lisa CORBIN, prior to session. Patient found supine with peripheral IV, PureWick, telemetry upon OT entry to room.    General Precautions: Standard, fall   Orthopedic Precautions:N/A   Braces: N/A  Respiratory Status: Room air    Occupational Performance    Gait belt applied - N/A    Bed Mobility:  Patient required max encouragement to participate in treatment session.  Rolling/Turning to Right x 2 trials with total assistance of 2 persons with increased time and v/c for hand placement.   Supine>Sit attempted x 5 trials with total assistance of 2 persons with increased time and v/c for hand placement. Patient unable to sustain seated EOB activity due to increased pain. When patient attempted to sit up, she would lay back down and cry out in pain. Patient required max v/c throughout for technique.   Scooting to HOB in supine: maximal assistance of 2 persons. Patient utilized bed rails to scoot herself up partway and then therapist used draw sheet to complete supine scooting.    Functional Mobility/Transfers:  Functional mobility and transfers not completed due to patient's increased pain with movement. Will progress to OOB activities once deemed safe.    Activities of Daily Living:  Feeding:  set up assistance Patient completed  eating breakfast with set up assistance for item retrieval.  Lower Body Dressing: total assistance Patient required total assistance for donning socks.    Cognitive/Visual Perceptual:  Cognitive/Psychosocial Skills:    -       Oriented to: Person, Place, Time, Situation  -       Follows Commands/attention:Follows one-step commands  -       Communication: clear/fluent  -       Memory: No Deficits noted  -       Safety awareness/insight to disability: impaired   -       Mood/Affect/Coping skills/emotional control: Tearful, patient was anxious throughout and has a fear of falling.    Physical Exam:  Balance:    Dominant Hand: Right  Upper Extremity Range of Motion:    -       Right Upper Extremity: WFL  -       Left Upper Extremity: WFL  Upper Extremity Strength:    -       Right Upper Extremity: Deficits: grossly 3+/5  -       Left Upper Extremity: Deficits: grossly 3+/5   Strength:    -       Right Upper Extremity: Deficits: fair  -       Left Upper Extremity: Deficits: fair    AMPAC 6 Click ADL:  AMPAC Total Score: 9    Treatment & Education:  Patient tolerated OT evaluation poorly due to increased pain.   Therapist provided facilitation and instruction of proper body mechanics, energy conservation, and fall prevention strategies during tasks listed above.  Patient educated on role of OT, POC and goals for therapy  Patient educated on importance of OOB activities with staff member assistance and sitting OOB majority of the day.   Patient educated on B UE AROM therex while supine to increase functional strength for ADL completion. Patient verbalized understanding.    Patient left with bed in chair position with all lines intact and call button in reach.    GOALS:   Multidisciplinary Problems       Occupational Therapy Goals          Problem: Occupational Therapy    Goal Priority Disciplines Outcome Interventions   Occupational Therapy Goal     OT, PT/OT     Description: Goals to be met by: 3/24/23     Patient  will increase functional independence with ADLs by performing:    Grooming while supine with Minimal Assistance.  Supine to sit with Moderate Assistance.  Increased functional strength in B UE by 1/2 MM grade.                         History:     Past Medical History:   Diagnosis Date    Hypertension          Past Surgical History:   Procedure Laterality Date     SECTION      COLONOSCOPY N/A 2019    Procedure: COLONOSCOPY;  Surgeon: Janeth Leroy MD;  Location: [facility];  Service: Endoscopy;  Laterality: N/A;    HYSTERECTOMY         Time Tracking:     OT Date of Treatment: 03/10/23  OT Start Time: 820  OT Stop Time: 858  OT Total Time (min): 38 min    Billable Minutes: Evaluation 10 and Therapeutic Activity 28    LEMUEL Cary      3/10/2023

## 2023-03-11 PROBLEM — I27.20 PULMONARY HYPERTENSION: Status: ACTIVE | Noted: 2023-03-11

## 2023-03-11 LAB
ANION GAP SERPL CALC-SCNC: 10 MMOL/L (ref 8–16)
BASOPHILS # BLD AUTO: 0.1 K/UL (ref 0–0.2)
BASOPHILS NFR BLD: 1 % (ref 0–1.9)
BUN SERPL-MCNC: 12 MG/DL (ref 8–23)
CALCIUM SERPL-MCNC: 8.6 MG/DL (ref 8.7–10.5)
CHLORIDE SERPL-SCNC: 102 MMOL/L (ref 95–110)
CO2 SERPL-SCNC: 27 MMOL/L (ref 23–29)
CREAT SERPL-MCNC: 0.7 MG/DL (ref 0.5–1.4)
DIFFERENTIAL METHOD: ABNORMAL
EOSINOPHIL # BLD AUTO: 0.4 K/UL (ref 0–0.5)
EOSINOPHIL NFR BLD: 3.9 % (ref 0–8)
ERYTHROCYTE [DISTWIDTH] IN BLOOD BY AUTOMATED COUNT: 16.5 % (ref 11.5–14.5)
EST. GFR  (NO RACE VARIABLE): >60 ML/MIN/1.73 M^2
GLUCOSE SERPL-MCNC: 120 MG/DL (ref 70–110)
HCT VFR BLD AUTO: 30.2 % (ref 37–48.5)
HGB BLD-MCNC: 9.4 G/DL (ref 12–16)
IMM GRANULOCYTES # BLD AUTO: 0.1 K/UL (ref 0–0.04)
IMM GRANULOCYTES NFR BLD AUTO: 1 % (ref 0–0.5)
LYMPHOCYTES # BLD AUTO: 1.2 K/UL (ref 1–4.8)
LYMPHOCYTES NFR BLD: 11.7 % (ref 18–48)
MCH RBC QN AUTO: 26.6 PG (ref 27–31)
MCHC RBC AUTO-ENTMCNC: 31.1 G/DL (ref 32–36)
MCV RBC AUTO: 85 FL (ref 82–98)
MONOCYTES # BLD AUTO: 0.8 K/UL (ref 0.3–1)
MONOCYTES NFR BLD: 7.3 % (ref 4–15)
NEUTROPHILS # BLD AUTO: 7.8 K/UL (ref 1.8–7.7)
NEUTROPHILS NFR BLD: 75.1 % (ref 38–73)
NRBC BLD-RTO: 0 /100 WBC
PLATELET # BLD AUTO: 235 K/UL (ref 150–450)
PMV BLD AUTO: 10.6 FL (ref 9.2–12.9)
POTASSIUM SERPL-SCNC: 4.2 MMOL/L (ref 3.5–5.1)
RBC # BLD AUTO: 3.54 M/UL (ref 4–5.4)
SODIUM SERPL-SCNC: 139 MMOL/L (ref 136–145)
WBC # BLD AUTO: 10.33 K/UL (ref 3.9–12.7)

## 2023-03-11 PROCEDURE — 36415 COLL VENOUS BLD VENIPUNCTURE: CPT | Performed by: FAMILY MEDICINE

## 2023-03-11 PROCEDURE — 63600175 PHARM REV CODE 636 W HCPCS: Performed by: NURSE PRACTITIONER

## 2023-03-11 PROCEDURE — 63600175 PHARM REV CODE 636 W HCPCS: Performed by: FAMILY MEDICINE

## 2023-03-11 PROCEDURE — 25000003 PHARM REV CODE 250: Performed by: NURSE PRACTITIONER

## 2023-03-11 PROCEDURE — 97530 THERAPEUTIC ACTIVITIES: CPT | Mod: CQ

## 2023-03-11 PROCEDURE — 85025 COMPLETE CBC W/AUTO DIFF WBC: CPT | Performed by: FAMILY MEDICINE

## 2023-03-11 PROCEDURE — 11000001 HC ACUTE MED/SURG PRIVATE ROOM

## 2023-03-11 PROCEDURE — 25000003 PHARM REV CODE 250: Performed by: FAMILY MEDICINE

## 2023-03-11 PROCEDURE — 80048 BASIC METABOLIC PNL TOTAL CA: CPT | Performed by: FAMILY MEDICINE

## 2023-03-11 RX ORDER — ENOXAPARIN SODIUM 100 MG/ML
60 INJECTION SUBCUTANEOUS 2 TIMES DAILY
Status: DISCONTINUED | OUTPATIENT
Start: 2023-03-11 | End: 2023-03-11

## 2023-03-11 RX ORDER — FUROSEMIDE 10 MG/ML
20 INJECTION INTRAMUSCULAR; INTRAVENOUS ONCE
Status: COMPLETED | OUTPATIENT
Start: 2023-03-11 | End: 2023-03-11

## 2023-03-11 RX ORDER — HYDROCHLOROTHIAZIDE 25 MG/1
25 TABLET ORAL DAILY
Status: DISCONTINUED | OUTPATIENT
Start: 2023-03-11 | End: 2023-03-15 | Stop reason: HOSPADM

## 2023-03-11 RX ORDER — ENOXAPARIN SODIUM 100 MG/ML
40 INJECTION SUBCUTANEOUS EVERY 12 HOURS
Status: DISCONTINUED | OUTPATIENT
Start: 2023-03-11 | End: 2023-03-11

## 2023-03-11 RX ADMIN — HYDROCODONE BITARTRATE AND ACETAMINOPHEN 1 TABLET: 7.5; 325 TABLET ORAL at 11:03

## 2023-03-11 RX ADMIN — HYDRALAZINE HYDROCHLORIDE 10 MG: 20 INJECTION, SOLUTION INTRAMUSCULAR; INTRAVENOUS at 06:03

## 2023-03-11 RX ADMIN — HYDROCHLOROTHIAZIDE 25 MG: 25 TABLET ORAL at 11:03

## 2023-03-11 RX ADMIN — CEPHALEXIN 500 MG: 500 CAPSULE ORAL at 11:03

## 2023-03-11 RX ADMIN — LISINOPRIL 20 MG: 20 TABLET ORAL at 09:03

## 2023-03-11 RX ADMIN — CEPHALEXIN 500 MG: 500 CAPSULE ORAL at 03:03

## 2023-03-11 RX ADMIN — HYDROCODONE BITARTRATE AND ACETAMINOPHEN 1 TABLET: 7.5; 325 TABLET ORAL at 05:03

## 2023-03-11 RX ADMIN — HYDROCODONE BITARTRATE AND ACETAMINOPHEN 1 TABLET: 7.5; 325 TABLET ORAL at 06:03

## 2023-03-11 RX ADMIN — FUROSEMIDE 20 MG: 10 INJECTION, SOLUTION INTRAMUSCULAR; INTRAVENOUS at 09:03

## 2023-03-11 RX ADMIN — CEPHALEXIN 500 MG: 500 CAPSULE ORAL at 09:03

## 2023-03-11 NOTE — PROGRESS NOTES
Pharmacist Renal Dose Adjustment Note    Niru Reyes is a 77 y.o. female being treated with the medication enoxaparin.     Patient Data:    Vital Signs (Most Recent):  Temp: 98.2 °F (36.8 °C) (03/11/23 0720)  Pulse: 75 (03/11/23 0720)  Resp: 18 (03/11/23 0720)  BP: (!) 193/79 (03/11/23 0720)  SpO2: (!) 94 % (03/11/23 0720)   Vital Signs (72h Range):  Temp:  [97.7 °F (36.5 °C)-99 °F (37.2 °C)]   Pulse:  [68-90]   Resp:  [16-20]   BP: (102-193)/(45-81)   SpO2:  [90 %-97 %]      Recent Labs   Lab 03/08/23  0759 03/09/23  0525 03/10/23  0645   CREATININE 0.8 0.8 0.7     Serum creatinine: 0.7 mg/dL 03/10/23 0645  Estimated creatinine clearance: 126 mL/min  Patient overweight, BMI = 64.19    Enoxaparin 40 mg subq every 12 hours will be changed to enoxaparin 60 mg subq every 12 hours per renal dose protocol for BMI > 50.     Thank you,  Pharmacist's Name: Scout Calix

## 2023-03-11 NOTE — PT/OT/SLP PROGRESS
Physical Therapy  Treatment    Niru Reyes   MRN: [id number]   Admitting Diagnosis: Nontraumatic hematoma of muscle    PT Received On: 03/11/23  PT Start Time: 0830     PT Stop Time: 0855    PT Total Time (min): 25 min       Billable Minutes:  Therapeutic Activity 25    Treatment Type: Treatment  PT/PTA: PTA     PTA Visit Number: 1       General Precautions: Standard, fall  Orthopedic Precautions: N/A  Braces: N/A  Respiratory Status: Room air    Spiritual, Cultural Beliefs, Protestant Practices, Values that Affect Care: no    Subjective:  Communicated with patient's nurse, Anna, and completed Epic chart review prior to session.  Patient agreed only to sitting EOB at this time due to increased pain.     Pain/Comfort  Pain Rating 1: 10/10  Location - Side 1: Left  Location - Orientation 1: generalized  Location 1: hip  Pain Addressed 1: Pre-medicate for activity, Other (see comments) (ACTIVITY PACING)  Pain Rating Post-Intervention 1: 10/10    Objective:   Patient found with: peripheral IV, PureWick, telemetry, bed alarm    Supine > sit EOB: Mod-max A of 2 (increased time to complete)    Forward scoot towards EOB: SBA    Seated EOB x 15 min total focusing on increased tolerance to upright position, core stability, trunk control and CV endurance.   Maintained self supported sitting balance with Mod-Min A   Unable to maintain unsupported sitting balance at this time    Educated patient on importance of increased tolerance to upright position and direct impact on CV endurance and strength.     Patient encouraged to utilize elevated HOB/ supported sitting EOB to simulate chair position until able to safely complete chair T/F.     Patient given a minimum goal of majority of the day to be spent in upright, especially with all meals.     Encouraged patient to perform AROM TE to BLE throughout the day within all available planes of motion. Reviewed ROM exercises with patient.     Re enforced importance of utilizing call  light to meet needs in room and not attempt to get up without staff assistance. Patient verbalized understanding and agreed to comply.     AM-PAC 6 CLICK MOBILITY  How much help from another person does this patient currently need?   1 = Unable, Total/Dependent Assistance  2 = A lot, Maximum/Moderate Assistance  3 = A little, Minimum/Contact Guard/Supervision  4 = None, Modified [place]/Independent    Turning over in bed (including adjusting bedclothes, sheets and blankets)?: 2  Sitting down on and standing up from a chair with arms (e.g., wheelchair, bedside commode, etc.): 1 (REFUSED DUE TO PAIN)  Moving from lying on back to sitting on the side of the bed?: 2  Moving to and from a bed to a chair (including a wheelchair)?: 1 (REFUSED DUE TO PAIN)  Need to walk in hospital room?: 1 (REFUSED DUE TO PAIN)  Climbing 3-5 steps with a railing?: 1  Basic Mobility Total Score: 8    AM-PAC Raw Score CMS G-Code Modifier Level of Impairment Assistance   6 % Total / Unable   7 - 9 CM 80 - 100% Maximal Assist   10 - 14 CL 60 - 80% Moderate Assist   15 - 19 CK 40 - 60% Moderate Assist   20 - 22 CJ 20 - 40% Minimal Assist   23 CI 1-20% SBA / CGA   24 CH 0% Independent/ Mod I     Patient left sitting edge of bed (supported with cushion & pillows) with all lines intact and call button in reach.    Assessment:  Niru Reyes is a 77 y.o. female with a medical diagnosis of Nontraumatic hematoma of muscle and presents with overall decline in functional mobility. Patient would continue to benefit from skilled PT to address functional limitations listed below in order to return to PLOF/decrease caregiver burden. Patient remains limited by pain and some self limiting behaviors. Requires intermittent redirection towards task. Appeared to have a reduction in pain level while sitting EOB with bed elevated slightly.     Rehab identified problem list/impairments: weakness, impaired endurance, impaired self care skills, impaired  functional mobility, impaired balance, decreased coordination, decreased lower extremity function, decreased safety awareness, pain, decreased ROM, edema, impaired cardiopulmonary response to activity    Rehab potential is fair.    Activity tolerance: Fair    Discharge recommendations: nursing facility, skilled      Barriers to discharge:      Equipment recommendations: other (see comments) (TBD BY NEXT LEVEL OF CARE)     GOALS:   Multidisciplinary Problems       Physical Therapy Goals          Problem: Physical Therapy    Goal Priority Disciplines Outcome Goal Variances Interventions   Physical Therapy Goal     PT, PT/OT      Description: Goals to be met by 3/24/23.  Pt will complete R/L rolling MIN A.  Pt will complete supine to sit MIN A.  Pt will ambulate 50ft MIN A using RW.                       PLAN:    Patient to be seen 3 x/week to address the above listed problems via gait training, therapeutic activities, therapeutic exercises  Plan of Care expires: 03/24/23  Plan of Care reviewed with: patient         03/11/2023

## 2023-03-11 NOTE — PROGRESS NOTES
"[facility] Medicine  Progress Note    Patient Name: Niru Reyes  MRN: [id number]  Patient Class: IP- Inpatient   Admission Date: 3/7/2023  Length of Stay: 3 days  Attending Physician: Damian Little MD  Primary Care Provider: Jordana Shirley MD        Subjective:     Principal Problem:Nontraumatic hematoma of muscle        HPI:  Niru Reyes is a 77 year old female with history of obesity, lymphedema, and hypertension who presents to ED of acute left hip pain that began this morning while lying in bed. Patient reports he felt something "pop" when she rolled over. She denies injury or trauma prior to lying in bed. Prior to this event, ambulated with walker.    In ED, X ray of hip unremarkable. CT Pelvis shows large are of hemorrhage in the left pelvis overlying the left iliacus muscle extending to femur. Orthopedic surgery has been consulted. Patient will be placed in observation under the care of Hospital Medicine      Overview/Hospital Course:  77 year old female, under the care of hospital medicine for left psoas muscle hematoma. Decrease in Hgb this AM, 9.8-->8.1, recommended to order CTA of pelvis, no detrimental changes. Erythema noted to bilateral lower extremities, concern for early cellulitis, initiated rocephin. Vitals stable. Hgb: decreased this AM to 7.6 from 8.3, ordered 1U PRBC. Patient continues to not be able to get up with PT due to pain, extensive discussion with patient on living situation and needs after discharge. Patient in agreement that she can not take care of herself at this time. Recommend SNF, PT/OT to eval.   3/10: SNF placement needed. Will cont on cephalexin for BL LE cellulitis. Obtain echo and LE US for edema.   3/11: pulm HTN noted on echo. Lasix 20mg IV given once. Cont cephalexin for cellulitis. US negative for DVT. Will need outpatient f/u with PCP and pulmonology for sleep study.       Interval History: No acute issues overnight. Awaiting placement.     Review of " Systems   Constitutional:  Negative for activity change, diaphoresis, fatigue and unexpected weight change.   HENT:  Negative for congestion, ear pain and sore throat.    Eyes: Negative.    Respiratory:  Negative for shortness of breath and wheezing.    Cardiovascular:  Negative for chest pain and palpitations.   Gastrointestinal:  Negative for abdominal pain, constipation, diarrhea and vomiting.   Endocrine: Negative.    Genitourinary:  Negative for flank pain, hematuria and urgency.   Musculoskeletal:  Positive for arthralgias, gait problem and joint swelling. Negative for neck pain.   Skin:  Positive for color change and wound. Negative for pallor.   Neurological:  Negative for seizures, syncope and light-headedness.   Hematological: Negative.    Psychiatric/Behavioral: Negative.     Objective:     Vital Signs (Most Recent):  Temp: 98.2 °F (36.8 °C) (03/11/23 0720)  Pulse: 75 (03/11/23 0720)  Resp: 18 (03/11/23 0720)  BP: (!) 193/79 (03/11/23 0720)  SpO2: (!) 94 % (03/11/23 0720)   Vital Signs (24h Range):  Temp:  [97.8 °F (36.6 °C)-98.4 °F (36.9 °C)] 98.2 °F (36.8 °C)  Pulse:  [68-84] 75  Resp:  [16-18] 18  SpO2:  [92 %-95 %] 94 %  BP: (130-193)/(61-81) 193/79     Weight: (!) 191.5 kg (422 lb 2.9 oz)  Body mass index is 64.19 kg/m².    Intake/Output Summary (Last 24 hours) at 3/11/2023 1128  Last data filed at 3/11/2023 0900  Gross per 24 hour   Intake 740 ml   Output 2400 ml   Net -1660 ml      Physical Exam  Constitutional:       Appearance: She is well-developed. She is obese.   HENT:      Head: Normocephalic and atraumatic.   Cardiovascular:      Rate and Rhythm: Normal rate and regular rhythm.      Heart sounds: Normal heart sounds. No murmur heard.  Pulmonary:      Effort: Pulmonary effort is normal. No respiratory distress.      Breath sounds: Normal breath sounds.   Abdominal:      General: Bowel sounds are normal. There is no distension.      Palpations: Abdomen is soft.      Tenderness: There is no  abdominal tenderness.   Musculoskeletal:         General: Normal range of motion.      Cervical back: Normal range of motion and neck supple.      Right lower leg: Edema present.      Left lower leg: Edema present.      Comments: Left hip tenderness with deformity     Skin:     General: Skin is warm.      Findings: Erythema (patchy erythema noted to BLE, see pictures from wound care) present.      Comments: Skin changes consistent with lymphedema.    Neurological:      Mental Status: She is alert and oriented to person, place, and time.       Significant Labs: All pertinent labs within the past 24 hours have been reviewed.    Significant Imaging: I have reviewed all pertinent imaging results/findings within the past 24 hours.        Assessment/Plan:      * Nontraumatic hematoma of muscle  Noted on CT hip.   Monitor H/H. 11.3>9.8 g/dL. If downward trend continues may need CTA to rule out active bleed.  --Monitor CBC, decreased to 7.6 this AM, transfuse with 1U  CTA ordered: negative for detrimental change  Repeat CBC in AM  No surgical intervention at this time.  Pain manageable with current regimen  Orthopedic surgery input appreciated    3/11:  H/h stable  Pt/ot recommending snf  Placement pending   Will consult [facility] medicine on case    Pulmonary hypertension  Will need outpatient f/u with pulmonology  May need sleep study       Bilateral lower extremity edema  BLE edema  LE US negative for DVT  Ef good on echo  Lasix 20mg given once      Cellulitis  Will continue on cephalexin       Acute blood loss anemia  Stable  Cont to monitor       HTN (hypertension)  Cont hctz  Hydralazine PRN  Consider CCB should bp remain uncontrolled         VTE Risk Mitigation (From admission, onward)         Ordered     Place sequential compression device  Until discontinued         03/11/23 1132                Discharge Planning   JERONIMO: 3/9/2023     Code Status: Prior   Is the patient medically ready for discharge?: Yes    Reason for  patient still in hospital (select all that apply): Patient trending condition  Discharge Plan A: Skilled Nursing Facility   Discharge Delays: None known at this time              Damian Little MD  Department of Hospital Medicine   '[facility]

## 2023-03-11 NOTE — SUBJECTIVE & OBJECTIVE
Interval History: No acute issues overnight. Awaiting placement.     Review of Systems   Constitutional:  Negative for activity change, diaphoresis, fatigue and unexpected weight change.   HENT:  Negative for congestion, ear pain and sore throat.    Eyes: Negative.    Respiratory:  Negative for shortness of breath and wheezing.    Cardiovascular:  Negative for chest pain and palpitations.   Gastrointestinal:  Negative for abdominal pain, constipation, diarrhea and vomiting.   Endocrine: Negative.    Genitourinary:  Negative for flank pain, hematuria and urgency.   Musculoskeletal:  Positive for arthralgias, gait problem and joint swelling. Negative for neck pain.   Skin:  Positive for color change and wound. Negative for pallor.   Neurological:  Negative for seizures, syncope and light-headedness.   Hematological: Negative.    Psychiatric/Behavioral: Negative.     Objective:     Vital Signs (Most Recent):  Temp: 98.2 °F (36.8 °C) (03/11/23 0720)  Pulse: 75 (03/11/23 0720)  Resp: 18 (03/11/23 0720)  BP: (!) 193/79 (03/11/23 0720)  SpO2: (!) 94 % (03/11/23 0720)   Vital Signs (24h Range):  Temp:  [97.8 °F (36.6 °C)-98.4 °F (36.9 °C)] 98.2 °F (36.8 °C)  Pulse:  [68-84] 75  Resp:  [16-18] 18  SpO2:  [92 %-95 %] 94 %  BP: (130-193)/(61-81) 193/79     Weight: (!) 191.5 kg (422 lb 2.9 oz)  Body mass index is 64.19 kg/m².    Intake/Output Summary (Last 24 hours) at 3/11/2023 1128  Last data filed at 3/11/2023 0900  Gross per 24 hour   Intake 740 ml   Output 2400 ml   Net -1660 ml      Physical Exam  Constitutional:       Appearance: She is well-developed. She is obese.   HENT:      Head: Normocephalic and atraumatic.   Cardiovascular:      Rate and Rhythm: Normal rate and regular rhythm.      Heart sounds: Normal heart sounds. No murmur heard.  Pulmonary:      Effort: Pulmonary effort is normal. No respiratory distress.      Breath sounds: Normal breath sounds.   Abdominal:      General: Bowel sounds are normal. There is no  distension.      Palpations: Abdomen is soft.      Tenderness: There is no abdominal tenderness.   Musculoskeletal:         General: Normal range of motion.      Cervical back: Normal range of motion and neck supple.      Right lower leg: Edema present.      Left lower leg: Edema present.      Comments: Left hip tenderness with deformity     Skin:     General: Skin is warm.      Findings: Erythema (patchy erythema noted to BLE, see pictures from wound care) present.      Comments: Skin changes consistent with lymphedema.    Neurological:      Mental Status: She is alert and oriented to person, place, and time.       Significant Labs: All pertinent labs within the past 24 hours have been reviewed.    Significant Imaging: I have reviewed all pertinent imaging results/findings within the past 24 hours.

## 2023-03-11 NOTE — ASSESSMENT & PLAN NOTE
Noted on CT hip.   Monitor H/H. 11.3>9.8 g/dL. If downward trend continues may need CTA to rule out active bleed.  --Monitor CBC, decreased to 7.6 this AM, transfuse with 1U  CTA ordered: negative for detrimental change  Repeat CBC in AM  No surgical intervention at this time.  Pain manageable with current regimen  Orthopedic surgery input appreciated    3/11:  H/h stable  Pt/ot recommending snf  Placement pending   Will consult virtual medicine on case

## 2023-03-11 NOTE — NURSING
Pt is resting in bed. No signs of distress noted. No adverse overnight events. Pt remains free from falls Q12 chart check complete.

## 2023-03-11 NOTE — PLAN OF CARE
Pt resting comfortably in bed , all orders acknowledged and carried out . Will continue to monitor.

## 2023-03-12 PROCEDURE — 25000003 PHARM REV CODE 250: Performed by: FAMILY MEDICINE

## 2023-03-12 PROCEDURE — 25000003 PHARM REV CODE 250: Performed by: HOSPITALIST

## 2023-03-12 PROCEDURE — 11000001 HC ACUTE MED/SURG PRIVATE ROOM

## 2023-03-12 PROCEDURE — 63600175 PHARM REV CODE 636 W HCPCS: Performed by: NURSE PRACTITIONER

## 2023-03-12 PROCEDURE — 63600175 PHARM REV CODE 636 W HCPCS: Performed by: HOSPITALIST

## 2023-03-12 RX ORDER — ONDANSETRON 2 MG/ML
4 INJECTION INTRAMUSCULAR; INTRAVENOUS EVERY 6 HOURS PRN
Status: DISCONTINUED | OUTPATIENT
Start: 2023-03-12 | End: 2023-03-15 | Stop reason: HOSPADM

## 2023-03-12 RX ORDER — AMLODIPINE BESYLATE 5 MG/1
5 TABLET ORAL DAILY
Status: DISCONTINUED | OUTPATIENT
Start: 2023-03-12 | End: 2023-03-15 | Stop reason: HOSPADM

## 2023-03-12 RX ORDER — POLYETHYLENE GLYCOL 3350 17 G/17G
17 POWDER, FOR SOLUTION ORAL DAILY
Status: DISCONTINUED | OUTPATIENT
Start: 2023-03-12 | End: 2023-03-15 | Stop reason: HOSPADM

## 2023-03-12 RX ORDER — AMOXICILLIN 250 MG
1 CAPSULE ORAL 2 TIMES DAILY
Status: DISCONTINUED | OUTPATIENT
Start: 2023-03-12 | End: 2023-03-15 | Stop reason: HOSPADM

## 2023-03-12 RX ORDER — HYDROXYZINE PAMOATE 25 MG/1
25 CAPSULE ORAL EVERY 8 HOURS PRN
Status: DISCONTINUED | OUTPATIENT
Start: 2023-03-12 | End: 2023-03-15 | Stop reason: HOSPADM

## 2023-03-12 RX ADMIN — HYDROCODONE BITARTRATE AND ACETAMINOPHEN 1 TABLET: 7.5; 325 TABLET ORAL at 11:03

## 2023-03-12 RX ADMIN — POLYETHYLENE GLYCOL 3350 17 G: 17 POWDER, FOR SOLUTION ORAL at 02:03

## 2023-03-12 RX ADMIN — ONDANSETRON 4 MG: 2 INJECTION INTRAMUSCULAR; INTRAVENOUS at 02:03

## 2023-03-12 RX ADMIN — SENNOSIDES AND DOCUSATE SODIUM 1 TABLET: 50; 8.6 TABLET ORAL at 09:03

## 2023-03-12 RX ADMIN — AMLODIPINE BESYLATE 5 MG: 5 TABLET ORAL at 02:03

## 2023-03-12 RX ADMIN — HYDROCODONE BITARTRATE AND ACETAMINOPHEN 1 TABLET: 7.5; 325 TABLET ORAL at 06:03

## 2023-03-12 RX ADMIN — HYDRALAZINE HYDROCHLORIDE 10 MG: 20 INJECTION, SOLUTION INTRAMUSCULAR; INTRAVENOUS at 03:03

## 2023-03-12 RX ADMIN — CEPHALEXIN 500 MG: 500 CAPSULE ORAL at 05:03

## 2023-03-12 RX ADMIN — HYDROCODONE BITARTRATE AND ACETAMINOPHEN 1 TABLET: 7.5; 325 TABLET ORAL at 01:03

## 2023-03-12 RX ADMIN — CEPHALEXIN 500 MG: 500 CAPSULE ORAL at 02:03

## 2023-03-12 RX ADMIN — CEPHALEXIN 500 MG: 500 CAPSULE ORAL at 09:03

## 2023-03-12 RX ADMIN — HYDROCHLOROTHIAZIDE 25 MG: 25 TABLET ORAL at 09:03

## 2023-03-12 NOTE — CONSULTS
[facility] Medicine  Telemedicine Consult Note       Niru Reyes has been accepted for transfer to [facility] and will be followed through telemedicine services beginning 03/12/23 at 7 AM.      Carine Pavon MD  [facility] Medicine Staff

## 2023-03-12 NOTE — PLAN OF CARE
AAOx4. Pt pain is being managed. Pt encouraged to turn Q2. Pt remained free from fall and injury. No sign of any distress noted. Safety precautions alert. Pt asked to call for assistance if needed. IV access clean dry and intact saline locked. Chart checked reviewed. VSS. Plan of care continued.

## 2023-03-13 PROBLEM — Z75.8 DISCHARGE PLANNING ISSUES: Status: ACTIVE | Noted: 2023-03-13

## 2023-03-13 PROBLEM — K59.01 SLOW TRANSIT CONSTIPATION: Status: ACTIVE | Noted: 2023-03-13

## 2023-03-13 LAB
ANION GAP SERPL CALC-SCNC: 10 MMOL/L (ref 8–16)
BASOPHILS # BLD AUTO: 0.1 K/UL (ref 0–0.2)
BASOPHILS NFR BLD: 1 % (ref 0–1.9)
BUN SERPL-MCNC: 15 MG/DL (ref 8–23)
CALCIUM SERPL-MCNC: 8.6 MG/DL (ref 8.7–10.5)
CHLORIDE SERPL-SCNC: 102 MMOL/L (ref 95–110)
CO2 SERPL-SCNC: 25 MMOL/L (ref 23–29)
CREAT SERPL-MCNC: 0.7 MG/DL (ref 0.5–1.4)
DIFFERENTIAL METHOD: ABNORMAL
EOSINOPHIL # BLD AUTO: 0.4 K/UL (ref 0–0.5)
EOSINOPHIL NFR BLD: 4.5 % (ref 0–8)
ERYTHROCYTE [DISTWIDTH] IN BLOOD BY AUTOMATED COUNT: 16.6 % (ref 11.5–14.5)
EST. GFR  (NO RACE VARIABLE): >60 ML/MIN/1.73 M^2
GLUCOSE SERPL-MCNC: 112 MG/DL (ref 70–110)
HCT VFR BLD AUTO: 32.1 % (ref 37–48.5)
HGB BLD-MCNC: 9.8 G/DL (ref 12–16)
IMM GRANULOCYTES # BLD AUTO: 0.06 K/UL (ref 0–0.04)
IMM GRANULOCYTES NFR BLD AUTO: 0.6 % (ref 0–0.5)
LYMPHOCYTES # BLD AUTO: 1.2 K/UL (ref 1–4.8)
LYMPHOCYTES NFR BLD: 12.2 % (ref 18–48)
MAGNESIUM SERPL-MCNC: 1.9 MG/DL (ref 1.6–2.6)
MCH RBC QN AUTO: 26.6 PG (ref 27–31)
MCHC RBC AUTO-ENTMCNC: 30.5 G/DL (ref 32–36)
MCV RBC AUTO: 87 FL (ref 82–98)
MONOCYTES # BLD AUTO: 0.9 K/UL (ref 0.3–1)
MONOCYTES NFR BLD: 9.4 % (ref 4–15)
NEUTROPHILS # BLD AUTO: 6.9 K/UL (ref 1.8–7.7)
NEUTROPHILS NFR BLD: 72.3 % (ref 38–73)
NRBC BLD-RTO: 0 /100 WBC
PHOSPHATE SERPL-MCNC: 3.5 MG/DL (ref 2.7–4.5)
PLATELET # BLD AUTO: 162 K/UL (ref 150–450)
PMV BLD AUTO: 12.5 FL (ref 9.2–12.9)
POTASSIUM SERPL-SCNC: 4.8 MMOL/L (ref 3.5–5.1)
RBC # BLD AUTO: 3.69 M/UL (ref 4–5.4)
SODIUM SERPL-SCNC: 137 MMOL/L (ref 136–145)
WBC # BLD AUTO: 9.57 K/UL (ref 3.9–12.7)

## 2023-03-13 PROCEDURE — 97530 THERAPEUTIC ACTIVITIES: CPT | Mod: CQ

## 2023-03-13 PROCEDURE — 83735 ASSAY OF MAGNESIUM: CPT | Performed by: HOSPITALIST

## 2023-03-13 PROCEDURE — 36415 COLL VENOUS BLD VENIPUNCTURE: CPT | Performed by: HOSPITALIST

## 2023-03-13 PROCEDURE — 84100 ASSAY OF PHOSPHORUS: CPT | Performed by: HOSPITALIST

## 2023-03-13 PROCEDURE — 80048 BASIC METABOLIC PNL TOTAL CA: CPT | Performed by: HOSPITALIST

## 2023-03-13 PROCEDURE — 85025 COMPLETE CBC W/AUTO DIFF WBC: CPT | Performed by: HOSPITALIST

## 2023-03-13 PROCEDURE — 97530 THERAPEUTIC ACTIVITIES: CPT

## 2023-03-13 PROCEDURE — 99232 SBSQ HOSP IP/OBS MODERATE 35: CPT | Mod: ,,, | Performed by: PHYSICIAN ASSISTANT

## 2023-03-13 PROCEDURE — 11000001 HC ACUTE MED/SURG PRIVATE ROOM

## 2023-03-13 PROCEDURE — 25000003 PHARM REV CODE 250: Performed by: FAMILY MEDICINE

## 2023-03-13 PROCEDURE — 97535 SELF CARE MNGMENT TRAINING: CPT

## 2023-03-13 PROCEDURE — 99232 PR SUBSEQUENT HOSPITAL CARE,LEVL II: ICD-10-PCS | Mod: ,,, | Performed by: PHYSICIAN ASSISTANT

## 2023-03-13 PROCEDURE — 25000003 PHARM REV CODE 250: Performed by: HOSPITALIST

## 2023-03-13 PROCEDURE — 94761 N-INVAS EAR/PLS OXIMETRY MLT: CPT

## 2023-03-13 RX ORDER — HYDROCODONE BITARTRATE AND ACETAMINOPHEN 7.5; 325 MG/1; MG/1
1 TABLET ORAL EVERY 6 HOURS PRN
Qty: 10 TABLET | Refills: 0
Start: 2023-03-13 | End: 2023-03-15 | Stop reason: SDUPTHER

## 2023-03-13 RX ORDER — POLYETHYLENE GLYCOL 3350 17 G/17G
17 POWDER, FOR SOLUTION ORAL DAILY
Refills: 0
Start: 2023-03-13 | End: 2023-08-03

## 2023-03-13 RX ORDER — BISACODYL 10 MG
10 SUPPOSITORY, RECTAL RECTAL DAILY PRN
Status: DISCONTINUED | OUTPATIENT
Start: 2023-03-13 | End: 2023-03-15 | Stop reason: HOSPADM

## 2023-03-13 RX ORDER — AMLODIPINE BESYLATE 5 MG/1
5 TABLET ORAL DAILY
Qty: 30 TABLET | Refills: 11 | Status: SHIPPED | OUTPATIENT
Start: 2023-03-13 | End: 2024-03-05 | Stop reason: SDUPTHER

## 2023-03-13 RX ORDER — LACTULOSE 10 G/15ML
20 SOLUTION ORAL 3 TIMES DAILY
Status: DISCONTINUED | OUTPATIENT
Start: 2023-03-13 | End: 2023-03-15 | Stop reason: HOSPADM

## 2023-03-13 RX ORDER — AMOXICILLIN 250 MG
1 CAPSULE ORAL 2 TIMES DAILY
Start: 2023-03-13 | End: 2023-08-03

## 2023-03-13 RX ORDER — ADHESIVE BANDAGE
30 BANDAGE TOPICAL DAILY
Status: DISCONTINUED | OUTPATIENT
Start: 2023-03-13 | End: 2023-03-15 | Stop reason: HOSPADM

## 2023-03-13 RX ORDER — CEPHALEXIN 500 MG/1
500 CAPSULE ORAL EVERY 8 HOURS
Qty: 15 CAPSULE | Refills: 0
Start: 2023-03-13 | End: 2023-03-18

## 2023-03-13 RX ADMIN — LACTULOSE 20 G: 20 SOLUTION ORAL at 01:03

## 2023-03-13 RX ADMIN — HYDROCODONE BITARTRATE AND ACETAMINOPHEN 1 TABLET: 7.5; 325 TABLET ORAL at 12:03

## 2023-03-13 RX ADMIN — CEPHALEXIN 500 MG: 500 CAPSULE ORAL at 09:03

## 2023-03-13 RX ADMIN — HYDROCHLOROTHIAZIDE 25 MG: 25 TABLET ORAL at 09:03

## 2023-03-13 RX ADMIN — HYDROCODONE BITARTRATE AND ACETAMINOPHEN 1 TABLET: 7.5; 325 TABLET ORAL at 09:03

## 2023-03-13 RX ADMIN — HYDROCODONE BITARTRATE AND ACETAMINOPHEN 1 TABLET: 7.5; 325 TABLET ORAL at 05:03

## 2023-03-13 RX ADMIN — POLYETHYLENE GLYCOL 3350 17 G: 17 POWDER, FOR SOLUTION ORAL at 09:03

## 2023-03-13 RX ADMIN — SENNOSIDES AND DOCUSATE SODIUM 1 TABLET: 50; 8.6 TABLET ORAL at 09:03

## 2023-03-13 RX ADMIN — CEPHALEXIN 500 MG: 500 CAPSULE ORAL at 05:03

## 2023-03-13 RX ADMIN — CEPHALEXIN 500 MG: 500 CAPSULE ORAL at 01:03

## 2023-03-13 RX ADMIN — AMLODIPINE BESYLATE 5 MG: 5 TABLET ORAL at 09:03

## 2023-03-13 RX ADMIN — MAGNESIUM HYDROXIDE 2400 MG: 2400 SUSPENSION ORAL at 01:03

## 2023-03-13 NOTE — PLAN OF CARE
Additional referrals sent to [facility], [facility] and [facility]

## 2023-03-13 NOTE — PLAN OF CARE
SNF denials from [facility], and [place].  [place] referrals sent.

## 2023-03-13 NOTE — PROGRESS NOTES
"      [facility]  Telemedicine Progress Note    Patient Name: Niru Reyes  MRN: [id number]  Patient Class: IP- Inpatient   Admission Date: 3/7/2023  Length of Stay: 5 days  Attending Physician: Carine Pavon MD  Primary Care Provider: Jordana Shirley MD          Subjective:     Principal Problem:Nontraumatic hematoma of muscle        HPI:  Niru Reyes is a 77 year old female with history of obesity, lymphedema, and hypertension who presents to ED of acute left hip pain that began this morning while lying in bed. Patient reports he felt something "pop" when she rolled over. She denies injury or trauma prior to lying in bed. Prior to this event, ambulated with walker.    In ED, X ray of hip unremarkable. CT Pelvis shows large are of hemorrhage in the left pelvis overlying the left iliacus muscle extending to femur. Orthopedic surgery has been consulted. Patient will be placed in observation under the care of Hospital Medicine      Overview/Hospital Course:  77 year old female, under the care of hospital medicine for left psoas muscle hematoma. Decrease in Hgb this AM, 9.8-->8.1, recommended to order CTA of pelvis, no detrimental changes. Erythema noted to bilateral lower extremities, concern for early cellulitis, initiated rocephin. Vitals stable. Hgb: decreased this AM to 7.6 from 8.3, ordered 1U PRBC. Patient continues to not be able to get up with PT due to pain, extensive discussion with patient on living situation and needs after discharge. Patient in agreement that she can not take care of herself at this time. Recommend SNF, PT/OT to eval.   3/10: SNF placement needed. Will cont on cephalexin for BL LE cellulitis. Obtain echo and LE US for edema.   3/11: pulm HTN noted on echo. Lasix 20mg IV given once. Cont cephalexin for cellulitis. US negative for DVT. Will need outpatient f/u with PCP and pulmonology for sleep study.   3/12: antihypertensives adjusted. Otherwise " medically stable for dc to SNF.      Interval History: Pt noted to be afebrile and hemodynamically stable. Slightly hypertensive. Reports some anxiety. Stable respiratory status. No acute events overnight. Doing well overall. Has been able to get adequate rest. Eating and drinking well. Medically stbale, pending SNF placement.       Review of Systems   Constitutional:  Positive for activity change and fatigue. Negative for fever.   Respiratory:  Negative for shortness of breath.    Cardiovascular:  Negative for chest pain.   Gastrointestinal:  Negative for abdominal pain.   Musculoskeletal:  Positive for arthralgias.   Neurological:  Negative for dizziness and headaches.   All other systems reviewed and are negative.  Objective:     Vital Signs (Most Recent):  Temp: 98.5 °F (36.9 °C) (03/12/23 1911)  Pulse: 93 (03/12/23 1911)  Resp: 19 (03/12/23 1911)  BP: (!) 148/65 (03/12/23 1911)  SpO2: (!) 94 % (03/12/23 1911)   Vital Signs (24h Range):  Temp:  [98.1 °F (36.7 °C)-99.8 °F (37.7 °C)] 98.5 °F (36.9 °C)  Pulse:  [18-93] 93  Resp:  [16-20] 19  SpO2:  [92 %-95 %] 94 %  BP: (144-166)/(62-86) 148/65     Weight: (!) 191.5 kg (422 lb 2.9 oz)  Body mass index is 64.19 kg/m².    Intake/Output Summary (Last 24 hours) at 3/13/2023 0009  Last data filed at 3/12/2023 0800  Gross per 24 hour   Intake 120 ml   Output 1500 ml   Net -1380 ml      Physical Exam  Vitals and nursing note reviewed.   Constitutional:       Appearance: Normal appearance.   HENT:      Head: Normocephalic and atraumatic.   Eyes:      Extraocular Movements: Extraocular movements intact.      Pupils: Pupils are equal, round, and reactive to light.   Cardiovascular:      Rate and Rhythm: Normal rate and regular rhythm.   Pulmonary:      Effort: Pulmonary effort is normal. No respiratory distress.   Abdominal:      General: There is no distension.   Musculoskeletal:         General: Normal range of motion.      Cervical back: Normal range of motion.    Neurological:      General: No focal deficit present.      Mental Status: She is alert and oriented to person, place, and time.   Psychiatric:         Mood and Affect: Mood normal.         Behavior: Behavior normal.       Significant Labs: All pertinent labs within the past 24 hours have been reviewed.  CBC:   Recent Labs   Lab 03/11/23  1107   WBC 10.33   HGB 9.4*   HCT 30.2*        CMP:   Recent Labs   Lab 03/11/23  1107      K 4.2      CO2 27   *   BUN 12   CREATININE 0.7   CALCIUM 8.6*   ANIONGAP 10       Significant Imaging: I have reviewed all pertinent imaging results/findings within the past 24 hours.      Assessment/Plan:      * Nontraumatic hematoma of muscle  Noted on CT hip.   Monitor H/H. 11.3>9.8 g/dL. If downward trend continues may need CTA to rule out active bleed.  --Monitor CBC, decreased to 7.6 this AM, transfuse with 1U  CTA ordered: negative for detrimental change  Repeat CBC in AM  No surgical intervention at this time.  Pain manageable with current regimen  Orthopedic surgery input appreciated    3/11:  H/h stable  Pt/ot recommending snf  Placement pending   Will consult [facility] [employer] on case    Discharge planning issues  -Patient noted to be medically stable for discharge at this time  -Has been able to work with OT/PT who recommend placement for further rehabilitation  -Patient pending placement, continue in-hospital care as stated above in the meantime  -More than 20 minutes of my time has been spent discussing and planning just her safe disposition with patient/family/CM/SW/Nursing staff (not including other time spent in clinical discussion and management)  -CM/SW following, appreciate input   -Will place DC orders once placement has been secured      Pulmonary hypertension  Will need outpatient f/u with pulmonology  May need sleep study       Bilateral lower extremity edema  BLE edema  LE US negative for DVT  Ef good on echo  Lasix 20mg given  once      Cellulitis  Will continue on cephalexin       Acute blood loss anemia  Stable  Cont to monitor       HTN (hypertension)  Cont hctz  Started amlodipine   Hydralazine PRN          VTE Risk Mitigation (From admission, onward)         Ordered     Place sequential compression device  Until discontinued         03/11/23 1132                      I have completed this tele-visit with the assistance of a telepresenter.    The attending portion of this evaluation, treatment, and documentation was performed per Carine Pavon MD via Telemedicine AudioVisual using the secure [employer] software platform with 2 way audio/video. The provider was located off-site and the patient is located in the hospital. The aforementioned video software was utilized to document the relevant history and physical exam    Carine Pavon MD  Department of Hospital Medicine   [facility]

## 2023-03-13 NOTE — ASSESSMENT & PLAN NOTE
-Patient noted to be medically stable for discharge at this time  -Has been able to work with OT/PT who recommend placement for further rehabilitation  -Patient pending placement, continue in-hospital care as stated above in the meantime  -More than 20 minutes of my time has been spent discussing and planning just her safe disposition with patient/family/CM/SW/Nursing staff (not including other time spent in clinical discussion and management)  -CM/SW following, appreciate input   -Will place DC orders once placement has been secured

## 2023-03-13 NOTE — PLAN OF CARE
AAOx4. Pain is being managed. Cardiac monitoring. Pt remained free from fall and injury. No sign of any distress noted. Safety precautions alert. Pt asked to call for assistance if needed. IV access clean dry and intact saline locked. Chart checked reviewed. VSS. Plan of care continued.

## 2023-03-13 NOTE — PLAN OF CARE
Patient tolerated OT treatment fair due to pain and weakness. Sup>sit x 2 trials with max A x 2. Sit>stand x 3 attempts with max A x 2 and rolling walker. Continue recommending SNF at d/c.

## 2023-03-13 NOTE — SUBJECTIVE & OBJECTIVE
Interval History: Pt noted to be afebrile and hemodynamically stable. Slightly hypertensive. Reports some anxiety. Stable respiratory status. No acute events overnight. Doing well overall. Has been able to get adequate rest. Eating and drinking well. Medically stbale, pending SNF placement.       Review of Systems   Constitutional:  Positive for activity change and fatigue. Negative for fever.   Respiratory:  Negative for shortness of breath.    Cardiovascular:  Negative for chest pain.   Gastrointestinal:  Negative for abdominal pain.   Musculoskeletal:  Positive for arthralgias.   Neurological:  Negative for dizziness and headaches.   All other systems reviewed and are negative.  Objective:     Vital Signs (Most Recent):  Temp: 98.5 °F (36.9 °C) (03/12/23 1911)  Pulse: 93 (03/12/23 1911)  Resp: 19 (03/12/23 1911)  BP: (!) 148/65 (03/12/23 1911)  SpO2: (!) 94 % (03/12/23 1911)   Vital Signs (24h Range):  Temp:  [98.1 °F (36.7 °C)-99.8 °F (37.7 °C)] 98.5 °F (36.9 °C)  Pulse:  [18-93] 93  Resp:  [16-20] 19  SpO2:  [92 %-95 %] 94 %  BP: (144-166)/(62-86) 148/65     Weight: (!) 191.5 kg (422 lb 2.9 oz)  Body mass index is 64.19 kg/m².    Intake/Output Summary (Last 24 hours) at 3/13/2023 0009  Last data filed at 3/12/2023 0800  Gross per 24 hour   Intake 120 ml   Output 1500 ml   Net -1380 ml      Physical Exam  Vitals and nursing note reviewed.   Constitutional:       Appearance: Normal appearance.   HENT:      Head: Normocephalic and atraumatic.   Eyes:      Extraocular Movements: Extraocular movements intact.      Pupils: Pupils are equal, round, and reactive to light.   Cardiovascular:      Rate and Rhythm: Normal rate and regular rhythm.   Pulmonary:      Effort: Pulmonary effort is normal. No respiratory distress.   Abdominal:      General: There is no distension.   Musculoskeletal:         General: Normal range of motion.      Cervical back: Normal range of motion.   Neurological:      General: No focal deficit  present.      Mental Status: She is alert and oriented to person, place, and time.   Psychiatric:         Mood and Affect: Mood normal.         Behavior: Behavior normal.       Significant Labs: All pertinent labs within the past 24 hours have been reviewed.  CBC:   Recent Labs   Lab 03/11/23  1107   WBC 10.33   HGB 9.4*   HCT 30.2*        CMP:   Recent Labs   Lab 03/11/23  1107      K 4.2      CO2 27   *   BUN 12   CREATININE 0.7   CALCIUM 8.6*   ANIONGAP 10       Significant Imaging: I have reviewed all pertinent imaging results/findings within the past 24 hours.

## 2023-03-13 NOTE — PLAN OF CARE
[facility]     Department of Hospital Medicine     [address] [place] [address]     [phone] after hours  [phone] fax       NURSING HOME ORDERS    03/13/2023    Admit to Nursing Home:  Skilled Bed                                                  Diagnoses:  Active Hospital Problems    Diagnosis  POA    *Nontraumatic hematoma of muscle [M79.81]  Yes    Discharge planning issues [Z02.9]  Not Applicable    Pulmonary hypertension [I27.20]  Yes    Bilateral lower extremity edema [R60.0]  Yes    Cellulitis [L03.90]  Yes    Acute blood loss anemia [D62]  Yes    HTN (hypertension) [I10]  Yes      Resolved Hospital Problems   No resolved problems to display.       Allergies:Review of patient's allergies indicates:  No Known Allergies    Vitals:  Every shift (Skilled Nursing patients)    Diet: cardiac   Supplement:  1 can every three times a day with meals                         Type:  House          Acitivities: Per PT     LABS:  Per facility protocol    Nursing Precautions:      - Fall precautions per nursing home protocol   - Decubitus precautions:        -  for positioning   - Pressure reducing foam mattress   - Turn patient every two hours. Use wedge pillows to anchor patient   - Aspiration precautions        CONSULTS:      Physical Therapy to evaluate and treat     Occupational Therapy to evaluate and treat     Nutrition to evaluate and recommend diet     Speech Therapy  to evaluate and treat     Psychiatry to evaluate and follow patients for delirium    MISCELLANEOUS CARE:     Routine Skin for Bedridden Patients:  Apply moisture barrier cream to all    skin folds and wet areas in perineal area daily and after baths and                           all bowel movements.                      Medications: Discontinue all previous medication orders, if any. See new list below.    Current Discharge Medication List        START taking these medications     Details   amLODIPine (NORVASC) 5 MG tablet Take 1 tablet (5 mg total) by mouth once daily.  Qty: 30 tablet, Refills: 11    Comments: .      cephALEXin (KEFLEX) 500 MG capsule Take 1 capsule (500 mg total) by mouth every 8 (eight) hours. for 5 days  Qty: 15 capsule, Refills: 0      HYDROcodone-acetaminophen (NORCO) 7.5-325 mg per tablet Take 1 tablet by mouth every 6 (six) hours as needed for Pain.  Qty: 10 tablet, Refills: 0    Comments: Quantity prescribed more than 7 day supply? No      polyethylene glycol (GLYCOLAX) 17 gram PwPk Take 17 g by mouth once daily.  Refills: 0      senna-docusate 8.6-50 mg (PERICOLACE) 8.6-50 mg per tablet Take 1 tablet by mouth 2 (two) times daily.           CONTINUE these medications which have NOT CHANGED    Details   hydroCHLOROthiazide (HYDRODIURIL) 25 MG tablet TAKE 1 TABLET(25 MG) BY MOUTH EVERY DAY  Qty: 14 tablet, Refills: 0           STOP taking these medications       aspirin (ECOTRIN) 81 MG EC tablet Comments:   Reason for Stopping:                       _________________________________  Carine Pavon MD  03/13/2023

## 2023-03-13 NOTE — PLAN OF CARE
Discussed poc with pt, pt verbalized understanding     Purposeful rounding every 2hours     Fall precautions in place, remains injury free  Pt denies c/o nausea  Pain still being managed with PRN meds    Abx given as ordered  Bed locked at lowest position  Call light within reach     Chart check complete  Reviewed active orders  Will continue with POC

## 2023-03-13 NOTE — PROGRESS NOTES
"      [facility]  Telemedicine Progress Note    Patient Name: Niru Reyes  MRN: [id number]  Patient Class: IP- Inpatient   Admission Date: 3/7/2023  Length of Stay: 5 days  Attending Physician: Carine Pavon MD  Primary Care Provider: Jordana Shirley MD          Subjective:     Principal Problem:Nontraumatic hematoma of muscle        HPI:  Niru Reyes is a 77 year old female with history of obesity, lymphedema, and hypertension who presents to ED of acute left hip pain that began this morning while lying in bed. Patient reports he felt something "pop" when she rolled over. She denies injury or trauma prior to lying in bed. Prior to this event, ambulated with walker.    In ED, X ray of hip unremarkable. CT Pelvis shows large are of hemorrhage in the left pelvis overlying the left iliacus muscle extending to femur. Orthopedic surgery has been consulted. Patient will be placed in observation under the care of Hospital Medicine      Overview/Hospital Course:  77 year old female, under the care of hospital medicine for left psoas muscle hematoma. Decrease in Hgb this AM, 9.8-->8.1, recommended to order CTA of pelvis, no detrimental changes. Erythema noted to bilateral lower extremities, concern for early cellulitis, initiated rocephin. Vitals stable. Hgb: decreased this AM to 7.6 from 8.3, ordered 1U PRBC. Patient continues to not be able to get up with PT due to pain, extensive discussion with patient on living situation and needs after discharge. Patient in agreement that she can not take care of herself at this time. Recommend SNF, PT/OT to eval.   3/10: SNF placement needed. Will cont on cephalexin for BL LE cellulitis. Obtain echo and LE US for edema.   3/11: pulm HTN noted on echo. Lasix 20mg IV given once. Cont cephalexin for cellulitis. US negative for DVT. Will need outpatient f/u with PCP and pulmonology for sleep study.   3/12: antihypertensives adjusted. Otherwise " medically stable for dc to SNF.  3/13: pending DC to SNF      Interval History: Pt noted to be afebrile and hemodynamically stable. BP improved. Stable respiratory status. No acute events overnight. Doing well overall. Medically stbale, pending SNF placement.       Review of Systems   Constitutional:  Positive for activity change and fatigue. Negative for fever.   Respiratory:  Negative for shortness of breath.    Cardiovascular:  Negative for chest pain.   Gastrointestinal:  Positive for constipation. Negative for abdominal pain.   Musculoskeletal:  Positive for arthralgias.   Neurological:  Negative for dizziness and headaches.   All other systems reviewed and are negative.  Objective:     Vital Signs (Most Recent):  Temp: 98.2 °F (36.8 °C) (03/13/23 1608)  Pulse: 85 (03/13/23 1608)  Resp: 18 (03/13/23 1608)  BP: (!) 140/65 (03/13/23 1608)  SpO2: 97 % (03/13/23 1608)   Vital Signs (24h Range):  Temp:  [98.1 °F (36.7 °C)-99.6 °F (37.6 °C)] 98.2 °F (36.8 °C)  Pulse:  [79-93] 85  Resp:  [16-20] 18  SpO2:  [90 %-97 %] 97 %  BP: (137-164)/(63-77) 140/65     Weight: (!) 191.5 kg (422 lb 2.9 oz)  Body mass index is 64.19 kg/m².    Intake/Output Summary (Last 24 hours) at 3/13/2023 1650  Last data filed at 3/13/2023 1013  Gross per 24 hour   Intake 120 ml   Output 1100 ml   Net -980 ml        Physical Exam  Vitals and nursing note reviewed.   Constitutional:       Appearance: Normal appearance.   HENT:      Head: Normocephalic and atraumatic.   Eyes:      Extraocular Movements: Extraocular movements intact.      Pupils: Pupils are equal, round, and reactive to light.   Cardiovascular:      Rate and Rhythm: Normal rate and regular rhythm.   Pulmonary:      Effort: Pulmonary effort is normal. No respiratory distress.   Abdominal:      General: There is no distension.   Musculoskeletal:         General: Normal range of motion.      Cervical back: Normal range of motion.   Neurological:      General: No focal deficit present.       Mental Status: She is alert and oriented to person, place, and time.   Psychiatric:         Mood and Affect: Mood normal.         Behavior: Behavior normal.       Significant Labs: All pertinent labs within the past 24 hours have been reviewed.  CBC:   Recent Labs   Lab 03/13/23  0610   WBC 9.57   HGB 9.8*   HCT 32.1*          CMP:   Recent Labs   Lab 03/13/23  0610      K 4.8      CO2 25   *   BUN 15   CREATININE 0.7   CALCIUM 8.6*   ANIONGAP 10         Significant Imaging: I have reviewed all pertinent imaging results/findings within the past 24 hours.      Assessment/Plan:      * Nontraumatic hematoma of muscle  Noted on CT hip.   Monitor H/H. 11.3>9.8 g/dL. If downward trend continues may need CTA to rule out active bleed.  --Monitor CBC, decreased to 7.6 this AM, transfuse with 1U  CTA ordered: negative for detrimental change  No surgical intervention at this time.  Pain manageable with current regimen  Orthopedic surgery input appreciated  -H/h stable  -hold asa  Pt/ot recommending snf  Placement pending   Will consult [facility] medicine on case    Slow transit constipation  -bowel regimen ordered      Discharge planning issues  -Patient noted to be medically stable for discharge at this time  -Has been able to work with OT/PT who recommend placement for further rehabilitation  -Patient pending placement, continue in-hospital care as stated above in the meantime  -More than 20 minutes of my time has been spent discussing and planning just her safe disposition with patient/family/CM/SW/Nursing staff (not including other time spent in clinical discussion and management)  -CM/SW following, appreciate input   -Will place DC orders once placement has been secured      Pulmonary hypertension  Will need outpatient f/u with pulmonology  May need sleep study       Bilateral lower extremity edema  BLE edema  LE US negative for DVT  Ef good on echo  Lasix 20mg given once      Cellulitis  Will  continue on cephalexin       Acute blood loss anemia  Stable  Cont to monitor       HTN (hypertension)  Cont hctz  Started amlodipine   Hydralazine PRN          VTE Risk Mitigation (From admission, onward)         Ordered     Place sequential compression device  Until discontinued         03/11/23 1132                      I have completed this tele-visit with the assistance of a telepresenter.    The attending portion of this evaluation, treatment, and documentation was performed per Carine Pavon MD via Telemedicine AudioVisual using the secure [employer] software platform with 2 way audio/video. The provider was located off-site and the patient is located in the hospital. The aforementioned video software was utilized to document the relevant history and physical exam    Carine Pavon MD  Department of Hospital Medicine   [facility] - Med Surg

## 2023-03-13 NOTE — PT/OT/SLP PROGRESS
Occupational Therapy   Treatment    Name: Niru Reyes  MRN: [id number]  Admitting Diagnosis:  Nontraumatic hematoma of muscle       Recommendations:     Discharge Recommendations: nursing facility, skilled  Discharge Equipment Recommendations:   (TBD at next level of care)  Barriers to discharge:  Decreased caregiver support    Assessment:     Niru Reyes is a 77 y.o. female with a medical diagnosis of Nontraumatic hematoma of muscle.  She presents with the following performance deficits affecting function are weakness, impaired endurance, impaired self care skills, impaired functional mobility, impaired balance, edema, impaired skin, decreased safety awareness, pain, decreased lower extremity function, decreased upper extremity function, decreased coordination, impaired cardiopulmonary response to activity.     Rehab Prognosis:  Fair; patient would benefit from acute skilled OT services to address these deficits and reach maximum level of function.       Plan:     Patient to be seen 2 x/week to address the above listed problems via self-care/home management, therapeutic activities, therapeutic exercises  Plan of Care Expires: 03/24/23  Plan of Care Reviewed with: patient    Subjective     Pain/Comfort:  Pain Rating 1: 8/10 (via FACES scale) Patient tearful throughout session due to pain.   Location - Side 1: Left  Location - Orientation 1: generalized  Location 1: hip  Pain Addressed 1: Pre-medicate for activity, Reposition, Distraction, Cessation of Activity (activity pacing)    Objective:     Communicated with: NurseGriselda, prior to session.  Patient found supine with peripheral IV, PureWick, bed alarm, telemetry upon OT entry to room.    General Precautions: Standard, fall    Orthopedic Precautions:N/A  Braces: N/A  Respiratory Status: Room air     Occupational Performance:     Bed Mobility:    Patient completed Rolling/Turning to Left with  moderate assistance and 2 persons for therapist to complete toileting  hygiene and place new pad underneath patient.  Patient completed Rolling/Turning to Right with moderate assistance and 2 persons for therapist to complete toileting hygiene and place new pad underneath patient.  Patient completed Supine to Sit x 2 trials with maximal assistance and 2 persons with increased time and max v/c for hand placement.   Patient completed Anterior Scoot with stand by assistance with increased time and v/c for hand placement.   Patient completed Posterior Scoot with maximal assistance with increased time and v/c for technique.  Patient completed Supine Scoot with bed in trendelenburg with minimum assistance with increased time and v/c for hand placement.    Functional Mobility/Transfers:  Patient completed Sit <> Stand Transfer x 3 attempts with maximal assistance and of 2 persons  with  rolling walker and bed elevated and 3rd person present to block knees. Patient achieved 1/4 clearance from bed.  Functional Mobility: Functional mobility not completed because not appropriate at this time due to patient's increased pain and weakness. Will progress to functional mobility once deemed safe and patient can tolerate it.     Activities of Daily Living:  Grooming: set up assistance Patient washed face, combed hair, and completed oral care with dentures while seated EOB with set up assistance for item retrieval and container management.  Toileting: total assistance Patient completed toileting with total assistance for toileting hygiene and new PureWick placement.    [facility] 6 Click ADL: 14    Treatment & Education:  Patient tolerated treatment fair due to increased pain and weakness.  Patient encouraged to remain seated EOB for at least 2 hours to increase activity tolerance for ADL completion.  Patient encouraged to complete B UE AROM therex to increase functional strength for ADL completion.    Patient left sitting edge of bed with pillows with all lines intact and call button in reach    GOALS:    Multidisciplinary Problems       Occupational Therapy Goals          Problem: Occupational Therapy    Goal Priority Disciplines Outcome Interventions   Occupational Therapy Goal     OT, PT/OT Ongoing, Progressing    Description: Goals to be met by: 3/24/23     Patient will increase functional independence with ADLs by performing:    Grooming while supine with Minimal Assistance.  Supine to sit with Moderate Assistance.  Increased functional strength in B UE by 1/2 MM grade.                         Time Tracking:     OT Date of Treatment: 03/13/23  OT Start Time: 1035  OT Stop Time: 1115  OT Total Time (min): 40 min    Billable Minutes:Self Care/Home Management 10  Therapeutic Activity 30        LEMUEL Cary         3/13/2023

## 2023-03-13 NOTE — SUBJECTIVE & OBJECTIVE
Interval History: Pt noted to be afebrile and hemodynamically stable. BP improved. Stable respiratory status. No acute events overnight. Doing well overall. Medically stbale, pending SNF placement.       Review of Systems   Constitutional:  Positive for activity change and fatigue. Negative for fever.   Respiratory:  Negative for shortness of breath.    Cardiovascular:  Negative for chest pain.   Gastrointestinal:  Positive for constipation. Negative for abdominal pain.   Musculoskeletal:  Positive for arthralgias.   Neurological:  Negative for dizziness and headaches.   All other systems reviewed and are negative.  Objective:     Vital Signs (Most Recent):  Temp: 98.2 °F (36.8 °C) (03/13/23 1608)  Pulse: 85 (03/13/23 1608)  Resp: 18 (03/13/23 1608)  BP: (!) 140/65 (03/13/23 1608)  SpO2: 97 % (03/13/23 1608)   Vital Signs (24h Range):  Temp:  [98.1 °F (36.7 °C)-99.6 °F (37.6 °C)] 98.2 °F (36.8 °C)  Pulse:  [79-93] 85  Resp:  [16-20] 18  SpO2:  [90 %-97 %] 97 %  BP: (137-164)/(63-77) 140/65     Weight: (!) 191.5 kg (422 lb 2.9 oz)  Body mass index is 64.19 kg/m².    Intake/Output Summary (Last 24 hours) at 3/13/2023 1650  Last data filed at 3/13/2023 1013  Gross per 24 hour   Intake 120 ml   Output 1100 ml   Net -980 ml        Physical Exam  Vitals and nursing note reviewed.   Constitutional:       Appearance: Normal appearance.   HENT:      Head: Normocephalic and atraumatic.   Eyes:      Extraocular Movements: Extraocular movements intact.      Pupils: Pupils are equal, round, and reactive to light.   Cardiovascular:      Rate and Rhythm: Normal rate and regular rhythm.   Pulmonary:      Effort: Pulmonary effort is normal. No respiratory distress.   Abdominal:      General: There is no distension.   Musculoskeletal:         General: Normal range of motion.      Cervical back: Normal range of motion.   Neurological:      General: No focal deficit present.      Mental Status: She is alert and oriented to person,  place, and time.   Psychiatric:         Mood and Affect: Mood normal.         Behavior: Behavior normal.       Significant Labs: All pertinent labs within the past 24 hours have been reviewed.  CBC:   Recent Labs   Lab 03/13/23  0610   WBC 9.57   HGB 9.8*   HCT 32.1*          CMP:   Recent Labs   Lab 03/13/23  0610      K 4.8      CO2 25   *   BUN 15   CREATININE 0.7   CALCIUM 8.6*   ANIONGAP 10         Significant Imaging: I have reviewed all pertinent imaging results/findings within the past 24 hours.

## 2023-03-13 NOTE — ASSESSMENT & PLAN NOTE
Noted on CT hip.   Monitor H/H. 11.3>9.8 g/dL. If downward trend continues may need CTA to rule out active bleed.  --Monitor CBC, decreased to 7.6 this AM, transfuse with 1U  CTA ordered: negative for detrimental change  No surgical intervention at this time.  Pain manageable with current regimen  Orthopedic surgery input appreciated  -H/h stable  -hold asa  Pt/ot recommending snf  Placement pending   Will consult [facility] medicine on case

## 2023-03-13 NOTE — PT/OT/SLP PROGRESS
Physical Therapy  Treatment    Niru Reyes   MRN: [id number]   Admitting Diagnosis: Nontraumatic hematoma of muscle    PT Received On: 03/13/23  PT Start Time: 1035     PT Stop Time: 1115    PT Total Time (min): 40 min       Billable Minutes:  Therapeutic Activity 40    Treatment Type: Treatment  PT/PTA: PTA     Number of PTA visits since last PT visit: 2       General Precautions: Standard, fall  Orthopedic Precautions: N/A  Braces: N/A  Respiratory Status: Room air    Spiritual, Cultural Beliefs, Yazidism Practices, Values that Affect Care: no    Subjective:  Communicated with patient's nurse, Griselda, and completed Epic chart review prior to session.  Patient agreed to PT session with encouragement from therapist.     Pain/Comfort  Pain Rating 1: 8/10  Location - Side 1: Left  Location - Orientation 1: generalized  Location 1: hip  Pain Addressed 1: Other (see comments) (ACTIVITY PACING)  Pain Rating Post-Intervention 1: 8/10    Objective:   Patient found with: telemetry, peripheral IV, PureWick, bed alarm    Supine > sit EOB x2 trials Max A of 2    Forward scoot towards EOB x2 trials: SBA    X3 attempts STS from EOB > RW but unable despite Max A of 2 and 3rd person to block knees and prevent fwd slide  Achieved 1/4 clearance from bed.     Posterior scoot towards center of bed: Max A of 2    Sit > supine x2 trials: max A of 2    Supine scoot towards HOB: Min A (bed in trend)    Roll R/L x3 reps each to change soiled padding under patient: Mod A of 2    Educated patient on importance of increased tolerance to upright position and direct impact on CV endurance and strength. Patient encouraged to utilize elevated HOB/ supported sitting EOB to simulate chair position until able to safely complete chair T/F. Patient given a minimum goal of majority of the day to be spent in upright, especially with all meals. Encouraged patient to perform AROM TE to BLE throughout the day within all available planes of motion. Re  enforced importance of utilizing call light to meet needs in room and not attempt to get up without staff assistance. Patient verbalized understanding and agreed to comply.     AM-PAC 6 CLICK MOBILITY  How much help from another person does this patient currently need?   1 = Unable, Total/Dependent Assistance  2 = A lot, Maximum/Moderate Assistance  3 = A little, Minimum/Contact Guard/Supervision  4 = None, Modified [facility]/Independent    Turning over in bed (including adjusting bedclothes, sheets and blankets)?: 2  Sitting down on and standing up from a chair with arms (e.g., wheelchair, bedside commode, etc.): 1  Moving from lying on back to sitting on the side of the bed?: 2  Moving to and from a bed to a chair (including a wheelchair)?: 1  Need to walk in hospital room?: 1  Climbing 3-5 steps with a railing?: 1  Basic Mobility Total Score: 8    AM-PAC Raw Score CMS G-Code Modifier Level of Impairment Assistance   6 % Total / Unable   7 - 9 CM 80 - 100% Maximal Assist   10 - 14 CL 60 - 80% Moderate Assist   15 - 19 CK 40 - 60% Moderate Assist   20 - 22 CJ 20 - 40% Minimal Assist   23 CI 1-20% SBA / CGA   24 CH 0% Independent/ Mod I     Patient left sitting edge of bed (supported with cushion and pillows) with call button in reach.    Assessment:  Niru Reyes is a 77 y.o. female with a medical diagnosis of Nontraumatic hematoma of muscle and presents with overall decline in functional mobility. Patient would continue to benefit from skilled PT to address functional limitations listed below in order to return to PLOF/decrease caregiver burden.    Rehab identified problem list/impairments: weakness, impaired endurance, impaired sensation, impaired self care skills, impaired functional mobility, impaired balance, decreased coordination, decreased lower extremity function, decreased upper extremity function, decreased safety awareness, pain, decreased ROM, impaired coordination, edema, impaired  cardiopulmonary response to activity    Rehab potential is fair.    Activity tolerance: Fair    Discharge recommendations: nursing facility, skilled      Barriers to discharge:      Equipment recommendations: other (see comments) (TBD BY NEXT LEVEL OF CARE)     GOALS:   Multidisciplinary Problems       Physical Therapy Goals          Problem: Physical Therapy    Goal Priority Disciplines Outcome Goal Variances Interventions   Physical Therapy Goal     PT, PT/OT      Description: Goals to be met by 3/24/23.  Pt will complete R/L rolling MIN A.  Pt will complete supine to sit MIN A.  Pt will ambulate 50ft MIN A using RW.                       PLAN:    Patient to be seen 3 x/week to address the above listed problems via gait training, therapeutic activities, therapeutic exercises  Plan of Care expires: 03/24/23  Plan of Care reviewed with: patient         03/13/2023

## 2023-03-14 PROCEDURE — 25000003 PHARM REV CODE 250: Performed by: HOSPITALIST

## 2023-03-14 PROCEDURE — 27000221 HC OXYGEN, UP TO 24 HOURS

## 2023-03-14 PROCEDURE — 99900035 HC TECH TIME PER 15 MIN (STAT)

## 2023-03-14 PROCEDURE — 11000001 HC ACUTE MED/SURG PRIVATE ROOM

## 2023-03-14 PROCEDURE — 25000003 PHARM REV CODE 250: Performed by: FAMILY MEDICINE

## 2023-03-14 PROCEDURE — 99232 PR SUBSEQUENT HOSPITAL CARE,LEVL II: ICD-10-PCS | Mod: ,,, | Performed by: PHYSICIAN ASSISTANT

## 2023-03-14 PROCEDURE — 94761 N-INVAS EAR/PLS OXIMETRY MLT: CPT

## 2023-03-14 PROCEDURE — 99232 SBSQ HOSP IP/OBS MODERATE 35: CPT | Mod: ,,, | Performed by: PHYSICIAN ASSISTANT

## 2023-03-14 RX ADMIN — HYDROCODONE BITARTRATE AND ACETAMINOPHEN 1 TABLET: 7.5; 325 TABLET ORAL at 06:03

## 2023-03-14 RX ADMIN — CEPHALEXIN 500 MG: 500 CAPSULE ORAL at 10:03

## 2023-03-14 RX ADMIN — CEPHALEXIN 500 MG: 500 CAPSULE ORAL at 06:03

## 2023-03-14 RX ADMIN — LACTULOSE 20 G: 20 SOLUTION ORAL at 10:03

## 2023-03-14 RX ADMIN — CEPHALEXIN 500 MG: 500 CAPSULE ORAL at 02:03

## 2023-03-14 RX ADMIN — HYDROCODONE BITARTRATE AND ACETAMINOPHEN 1 TABLET: 7.5; 325 TABLET ORAL at 10:03

## 2023-03-14 RX ADMIN — POLYETHYLENE GLYCOL 3350 17 G: 17 POWDER, FOR SOLUTION ORAL at 10:03

## 2023-03-14 RX ADMIN — AMLODIPINE BESYLATE 5 MG: 5 TABLET ORAL at 10:03

## 2023-03-14 RX ADMIN — SENNOSIDES AND DOCUSATE SODIUM 1 TABLET: 50; 8.6 TABLET ORAL at 10:03

## 2023-03-14 RX ADMIN — HYDROXYZINE PAMOATE 25 MG: 25 CAPSULE ORAL at 10:03

## 2023-03-14 RX ADMIN — HYDROCODONE BITARTRATE AND ACETAMINOPHEN 1 TABLET: 7.5; 325 TABLET ORAL at 12:03

## 2023-03-14 RX ADMIN — LACTULOSE 20 G: 20 SOLUTION ORAL at 02:03

## 2023-03-14 RX ADMIN — HYDROCHLOROTHIAZIDE 25 MG: 25 TABLET ORAL at 10:03

## 2023-03-14 RX ADMIN — MAGNESIUM HYDROXIDE 2400 MG: 2400 SUSPENSION ORAL at 10:03

## 2023-03-14 NOTE — PLAN OF CARE
Voicemail left with daughter regarding the following accepting SNF facilities: [facility], [facility], [facility], [facility]. [facility] has denied per mayelin.  CM to f/u in am.

## 2023-03-14 NOTE — PROGRESS NOTES
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  Patient is resting comfortably in bed.  No new complaints at this time.  Patient reports that her hip is continuing to feel better.    Review of patient's allergies indicates:  No Known Allergies    Current Facility-Administered Medications   Medication    0.9%  NaCl infusion (for blood administration)    aluminum-magnesium hydroxide-simethicone 200-200-20 mg/5 mL suspension 30 mL    amLODIPine tablet 5 mg    bisacodyL suppository 10 mg    cephALEXin capsule 500 mg    dextrose 5 % (D5W) infusion    hydrALAZINE injection 10 mg    hydroCHLOROthiazide tablet 25 mg    HYDROcodone-acetaminophen 7.5-325 mg per tablet 1 tablet    hydrOXYzine pamoate capsule 25 mg    lactulose 20 gram/30 mL solution Soln 20 g    magnesium hydroxide 400 mg/5 ml suspension 2,400 mg    ondansetron injection 4 mg    polyethylene glycol packet 17 g    senna-docusate 8.6-50 mg per tablet 1 tablet     Objective:     Vital Signs (Most Recent):  Temp: 97.4 °F (36.3 °C) (03/14/23 0722)  Pulse: 74 (03/14/23 0722)  Resp: 17 (03/14/23 0722)  BP: 125/60 (03/14/23 0722)  SpO2: 96 % (03/14/23 0722) Vital Signs (24h Range):  Temp:  [97.4 °F (36.3 °C)-99.6 °F (37.6 °C)] 97.4 °F (36.3 °C)  Pulse:  [74-90] 74  Resp:  [17-20] 17  SpO2:  [90 %-97 %] 96 %  BP: (117-140)/(53-77) 125/60     Weight: (!) 179.8 kg (396 lb 6.2 oz)  Height: 5' 8" (172.7 " cm)  Body mass index is 60.27 kg/m².      Intake/Output Summary (Last 24 hours) at 3/14/2023 0900  Last data filed at 3/14/2023 0359  Gross per 24 hour   Intake 270 ml   Output 1150 ml   Net -880 ml       Ortho/SPM Exam  Left hip:  Skin is intact  Mild edema   Minimal TTP laterally and posteriorly   No pain with logroll  ROM of the hip is not tested secondary to pain  Calf and compartments are soft and compressible  Motor exam normal   Sensation and pulses intact     GEN: Well developed, well nourished female. AAOX3. No acute distress.   Head: Normocephalic, atraumatic.   Eyes: VITA  Neck: Trachea is midline, no adenopathy  Resp: Breathing unlabored.  Neuro: Motor function normal, Cranial nerves intact  Psych: Mood and affect appropriate.      Significant Labs:   Recent Lab Results       None          All pertinent labs within the past 24 hours have been reviewed.    Significant Imaging: I have reviewed and interpreted all pertinent imaging results/findings.    Assessment/Plan:     Active Diagnoses:    Diagnosis Date Noted POA    PRINCIPAL PROBLEM:  Nontraumatic hematoma of muscle [M79.81] 03/07/2023 Yes    Discharge planning issues [Z02.9] 03/13/2023 Not Applicable    Slow transit constipation [K59.01] 03/13/2023 Unknown    Pulmonary hypertension [I27.20] 03/11/2023 Yes    Bilateral lower extremity edema [R60.0] 03/10/2023 Yes    Cellulitis [L03.90] 03/09/2023 Yes    Acute blood loss anemia [D62] 03/07/2023 Yes    HTN (hypertension) [I10] 12/04/2014 Yes      Problems Resolved During this Admission:     Assessment:  77-year-old female with past medical history significant for hypertension, major depression, osteoarthritis of the knees, anemia, and 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

## 2023-03-14 NOTE — PROGRESS NOTES
"      [facility]  Telemedicine Progress Note    Patient Name: Niru Reyes  MRN: [id number]  Patient Class: IP- Inpatient   Admission Date: 3/7/2023  Length of Stay: 6 days  Attending Physician: Carine Pavon MD  Primary Care Provider: Jordana Shirley MD          Subjective:     Principal Problem:Nontraumatic hematoma of muscle        HPI:  iNru Reyes is a 77 year old female with history of obesity, lymphedema, and hypertension who presents to ED of acute left hip pain that began this morning while lying in bed. Patient reports he felt something "pop" when she rolled over. She denies injury or trauma prior to lying in bed. Prior to this event, ambulated with walker.    In ED, X ray of hip unremarkable. CT Pelvis shows large are of hemorrhage in the left pelvis overlying the left iliacus muscle extending to femur. Orthopedic surgery has been consulted. Patient will be placed in observation under the care of Hospital Medicine      Overview/Hospital Course:  77 year old female, under the care of hospital medicine for left psoas muscle hematoma. Decrease in Hgb this AM, 9.8-->8.1, recommended to order CTA of pelvis, no detrimental changes. Erythema noted to bilateral lower extremities, concern for early cellulitis, initiated rocephin. Vitals stable. Hgb: decreased this AM to 7.6 from 8.3, ordered 1U PRBC. Patient continues to not be able to get up with PT due to pain, extensive discussion with patient on living situation and needs after discharge. Patient in agreement that she can not take care of herself at this time. Recommend SNF, PT/OT to eval.   3/10: SNF placement needed. Will cont on cephalexin for BL LE cellulitis. Obtain echo and LE US for edema.   3/11: pulm HTN noted on echo. Lasix 20mg IV given once. Cont cephalexin for cellulitis. US negative for DVT. Will need outpatient f/u with PCP and pulmonology for sleep study.   3/12: antihypertensives adjusted. Otherwise " medically stable for dc to SNF.  3/13: pending DC to SNF      Interval History: Pt noted to be afebrile and hemodynamically stable. No acute events overnight. Medically stbale, pending SNF placement.       Review of Systems   Constitutional:  Positive for activity change and fatigue. Negative for fever.   Respiratory:  Negative for shortness of breath.    Cardiovascular:  Negative for chest pain.   Gastrointestinal:  Positive for constipation. Negative for abdominal pain.   Musculoskeletal:  Positive for arthralgias.   Neurological:  Negative for dizziness and headaches.   All other systems reviewed and are negative.  Objective:     Vital Signs (Most Recent):  Temp: 98.1 °F (36.7 °C) (03/14/23 1141)  Pulse: 69 (03/14/23 1141)  Resp: 18 (03/14/23 1239)  BP: (!) 142/65 (03/14/23 1141)  SpO2: 96 % (03/14/23 1141)   Vital Signs (24h Range):  Temp:  [97.4 °F (36.3 °C)-98.6 °F (37 °C)] 98.1 °F (36.7 °C)  Pulse:  [69-90] 69  Resp:  [17-20] 18  SpO2:  [91 %-97 %] 96 %  BP: (117-142)/(53-65) 142/65     Weight: (!) 179.8 kg (396 lb 6.2 oz)  Body mass index is 60.27 kg/m².    Intake/Output Summary (Last 24 hours) at 3/14/2023 1451  Last data filed at 3/14/2023 1413  Gross per 24 hour   Intake 150 ml   Output 1800 ml   Net -1650 ml        Physical Exam  Vitals and nursing note reviewed.   Constitutional:       Appearance: Normal appearance.   HENT:      Head: Normocephalic and atraumatic.   Eyes:      Extraocular Movements: Extraocular movements intact.      Pupils: Pupils are equal, round, and reactive to light.   Cardiovascular:      Rate and Rhythm: Normal rate and regular rhythm.   Pulmonary:      Effort: Pulmonary effort is normal. No respiratory distress.   Abdominal:      General: There is no distension.   Musculoskeletal:         General: Normal range of motion.      Cervical back: Normal range of motion.   Neurological:      General: No focal deficit present.      Mental Status: She is alert and oriented to person,  place, and time.   Psychiatric:         Mood and Affect: Mood normal.         Behavior: Behavior normal.       Significant Labs: All pertinent labs within the past 24 hours have been reviewed.  CBC:   Recent Labs   Lab 03/13/23  0610   WBC 9.57   HGB 9.8*   HCT 32.1*          CMP:   Recent Labs   Lab 03/13/23  0610      K 4.8      CO2 25   *   BUN 15   CREATININE 0.7   CALCIUM 8.6*   ANIONGAP 10         Significant Imaging: I have reviewed all pertinent imaging results/findings within the past 24 hours.      Assessment/Plan:      * Nontraumatic hematoma of muscle  Noted on CT hip.   Monitor H/H. 11.3>9.8 g/dL. If downward trend continues may need CTA to rule out active bleed.  --Monitor CBC, decreased to 7.6 this AM, transfuse with 1U  CTA ordered: negative for detrimental change  No surgical intervention at this time.  Pain manageable with current regimen  Orthopedic surgery input appreciated  -H/h stable  -hold asa  Pt/ot recommending snf  Placement pending   Will consult virtual medicine on case    Slow transit constipation  -bowel regimen ordered      Discharge planning issues  -Patient noted to be medically stable for discharge at this time  -Has been able to work with OT/PT who recommend placement for further rehabilitation  -Patient pending placement, continue in-hospital care as stated above in the meantime  -More than 20 minutes of my time has been spent discussing and planning just her safe disposition with patient/family/CM/SW/Nursing staff (not including other time spent in clinical discussion and management)  -CM/SW following, appreciate input   -Will place DC orders once placement has been secured      Pulmonary hypertension  Will need outpatient f/u with pulmonology  May need sleep study       Bilateral lower extremity edema  BLE edema  LE US negative for DVT  Ef good on echo  Lasix 20mg given once      Cellulitis  Will continue on cephalexin       Acute blood loss  anemia  Stable  Cont to monitor       HTN (hypertension)  Cont hctz  Started amlodipine   Hydralazine PRN          VTE Risk Mitigation (From admission, onward)         Ordered     Place sequential compression device  Until discontinued         03/11/23 1132                      I have completed this tele-visit with the assistance of a telepresenter.    The attending portion of this evaluation, treatment, and documentation was performed per Carine Pavon MD via Telemedicine AudioVisual using the secure [employer] software platform with 2 way audio/video. The provider was located off-site and the patient is located in the hospital. The aforementioned video software was utilized to document the relevant history and physical exam    Carine Pavon MD  Department of Hospital Medicine   [facility]

## 2023-03-14 NOTE — PLAN OF CARE
Pt remains free from falls/injuries this shift. Safety precautions maintained. Pain managed per orders. Pt had a large bowel movement during shift. Pt refuses q2 repositioning, education provided. Chart check completed.   Problem: Adult Inpatient Plan of Care  Goal: Plan of Care Review  Outcome: Ongoing, Progressing  Goal: Patient-Specific Goal (Individualized)  Outcome: Ongoing, Progressing  Goal: Absence of Hospital-Acquired Illness or Injury  Outcome: Ongoing, Progressing  Goal: Optimal Comfort and Wellbeing  Outcome: Ongoing, Progressing  Goal: Readiness for Transition of Care  Outcome: Ongoing, Progressing     Problem: Bariatric Environmental Safety  Goal: Safety Maintained with Care  Outcome: Ongoing, Progressing     Problem: Skin Injury Risk Increased  Goal: Skin Health and Integrity  Outcome: Ongoing, Progressing     Problem: Skin or Soft Tissue Infection  Goal: Absence of Infection Signs and Symptoms  Outcome: Ongoing, Progressing     Problem: Pain Acute  Goal: Acceptable Pain Control and Functional Ability  Outcome: Ongoing, Progressing     Problem: Impaired Wound Healing  Goal: Optimal Wound Healing  Outcome: Ongoing, Progressing

## 2023-03-14 NOTE — PLAN OF CARE
CM met with patient at bedside and daughter (Mary) over phone to discuss d/c planning. CM discussed bariatric barrier to SNF placement and discussed only possibly accepting facility at this time is [facility], pending bed availability. Daughter states she has spoken to [facility] in [place] and is hopeful for acceptance. CM discussed mass referrals have been sent, CM to f/u with daughter if alternate facility accepts. Daughter reviewing [facility] at this time.    CM [facility] to reach out to facility regarding acceptance.

## 2023-03-14 NOTE — SUBJECTIVE & OBJECTIVE
Interval History: Pt noted to be afebrile and hemodynamically stable. No acute events overnight. Medically stbale, pending SNF placement.       Review of Systems   Constitutional:  Positive for activity change and fatigue. Negative for fever.   Respiratory:  Negative for shortness of breath.    Cardiovascular:  Negative for chest pain.   Gastrointestinal:  Positive for constipation. Negative for abdominal pain.   Musculoskeletal:  Positive for arthralgias.   Neurological:  Negative for dizziness and headaches.   All other systems reviewed and are negative.  Objective:     Vital Signs (Most Recent):  Temp: 98.1 °F (36.7 °C) (03/14/23 1141)  Pulse: 69 (03/14/23 1141)  Resp: 18 (03/14/23 1239)  BP: (!) 142/65 (03/14/23 1141)  SpO2: 96 % (03/14/23 1141)   Vital Signs (24h Range):  Temp:  [97.4 °F (36.3 °C)-98.6 °F (37 °C)] 98.1 °F (36.7 °C)  Pulse:  [69-90] 69  Resp:  [17-20] 18  SpO2:  [91 %-97 %] 96 %  BP: (117-142)/(53-65) 142/65     Weight: (!) 179.8 kg (396 lb 6.2 oz)  Body mass index is 60.27 kg/m².    Intake/Output Summary (Last 24 hours) at 3/14/2023 1451  Last data filed at 3/14/2023 1413  Gross per 24 hour   Intake 150 ml   Output 1800 ml   Net -1650 ml        Physical Exam  Vitals and nursing note reviewed.   Constitutional:       Appearance: Normal appearance.   HENT:      Head: Normocephalic and atraumatic.   Eyes:      Extraocular Movements: Extraocular movements intact.      Pupils: Pupils are equal, round, and reactive to light.   Cardiovascular:      Rate and Rhythm: Normal rate and regular rhythm.   Pulmonary:      Effort: Pulmonary effort is normal. No respiratory distress.   Abdominal:      General: There is no distension.   Musculoskeletal:         General: Normal range of motion.      Cervical back: Normal range of motion.   Neurological:      General: No focal deficit present.      Mental Status: She is alert and oriented to person, place, and time.   Psychiatric:         Mood and Affect: Mood  normal.         Behavior: Behavior normal.       Significant Labs: All pertinent labs within the past 24 hours have been reviewed.  CBC:   Recent Labs   Lab 03/13/23  0610   WBC 9.57   HGB 9.8*   HCT 32.1*          CMP:   Recent Labs   Lab 03/13/23  0610      K 4.8      CO2 25   *   BUN 15   CREATININE 0.7   CALCIUM 8.6*   ANIONGAP 10         Significant Imaging: I have reviewed all pertinent imaging results/findings within the past 24 hours.

## 2023-03-14 NOTE — PLAN OF CARE
Discussed poc with pt, pt verbalized understanding     Purposeful rounding every 2hours  Pt refusing Q2hr repositioning  Pt had a large BM today     Fall precautions in place, remains injury free  Pt denies c/o nausea  Pain still being managed with PRN meds     Abx given as ordered  Bed locked at lowest position  Call light within reach     Chart check complete  Reviewed active orders  Will continue with POC

## 2023-03-15 VITALS
WEIGHT: 293 LBS | DIASTOLIC BLOOD PRESSURE: 72 MMHG | HEART RATE: 80 BPM | TEMPERATURE: 20 F | BODY MASS INDEX: 44.41 KG/M2 | OXYGEN SATURATION: 96 % | SYSTOLIC BLOOD PRESSURE: 163 MMHG | RESPIRATION RATE: 20 BRPM | HEIGHT: 68 IN

## 2023-03-15 LAB — SARS-COV-2 RDRP RESP QL NAA+PROBE: NEGATIVE

## 2023-03-15 PROCEDURE — 97110 THERAPEUTIC EXERCISES: CPT

## 2023-03-15 PROCEDURE — 25000003 PHARM REV CODE 250: Performed by: FAMILY MEDICINE

## 2023-03-15 PROCEDURE — 94761 N-INVAS EAR/PLS OXIMETRY MLT: CPT

## 2023-03-15 PROCEDURE — 25000003 PHARM REV CODE 250: Performed by: HOSPITALIST

## 2023-03-15 PROCEDURE — 99232 PR SUBSEQUENT HOSPITAL CARE,LEVL II: ICD-10-PCS | Mod: ,,, | Performed by: PHYSICIAN ASSISTANT

## 2023-03-15 PROCEDURE — 97535 SELF CARE MNGMENT TRAINING: CPT

## 2023-03-15 PROCEDURE — U0002 COVID-19 LAB TEST NON-CDC: HCPCS | Performed by: HOSPITALIST

## 2023-03-15 PROCEDURE — 27000221 HC OXYGEN, UP TO 24 HOURS

## 2023-03-15 PROCEDURE — 97530 THERAPEUTIC ACTIVITIES: CPT

## 2023-03-15 PROCEDURE — 99232 SBSQ HOSP IP/OBS MODERATE 35: CPT | Mod: ,,, | Performed by: PHYSICIAN ASSISTANT

## 2023-03-15 RX ORDER — HYDROCODONE BITARTRATE AND ACETAMINOPHEN 7.5; 325 MG/1; MG/1
1 TABLET ORAL EVERY 6 HOURS PRN
Qty: 10 TABLET | Refills: 0 | Status: SHIPPED | OUTPATIENT
Start: 2023-03-15 | End: 2023-08-03

## 2023-03-15 RX ORDER — HYDROCODONE BITARTRATE AND ACETAMINOPHEN 7.5; 325 MG/1; MG/1
1 TABLET ORAL EVERY 6 HOURS PRN
Qty: 10 TABLET | Refills: 0 | Status: SHIPPED | OUTPATIENT
Start: 2023-03-15 | End: 2023-03-15 | Stop reason: SDUPTHER

## 2023-03-15 RX ADMIN — AMLODIPINE BESYLATE 5 MG: 5 TABLET ORAL at 09:03

## 2023-03-15 RX ADMIN — CEPHALEXIN 500 MG: 500 CAPSULE ORAL at 03:03

## 2023-03-15 RX ADMIN — HYDROCODONE BITARTRATE AND ACETAMINOPHEN 1 TABLET: 7.5; 325 TABLET ORAL at 03:03

## 2023-03-15 RX ADMIN — MAGNESIUM HYDROXIDE 2400 MG: 2400 SUSPENSION ORAL at 09:03

## 2023-03-15 RX ADMIN — HYDROCHLOROTHIAZIDE 25 MG: 25 TABLET ORAL at 09:03

## 2023-03-15 RX ADMIN — CEPHALEXIN 500 MG: 500 CAPSULE ORAL at 05:03

## 2023-03-15 RX ADMIN — HYDROCODONE BITARTRATE AND ACETAMINOPHEN 1 TABLET: 7.5; 325 TABLET ORAL at 05:03

## 2023-03-15 RX ADMIN — SENNOSIDES AND DOCUSATE SODIUM 1 TABLET: 50; 8.6 TABLET ORAL at 09:03

## 2023-03-15 NOTE — PT/OT/SLP PROGRESS
Occupational Therapy  Treatment    Niru Reyes   MRN: [id number]   Admitting Diagnosis: Nontraumatic hematoma of muscle    OT Date of Treatment: 03/15/23   OT Start Time: 0932  OT Stop Time: 1010  OT Total Time (min): 38 min    Billable Minutes:  Self Care/Home Management 15, Therapeutic Activity 15, and Therapeutic Exercise 8    OT/JEFFY: OT          General Precautions: Standard, fall  Orthopedic Precautions: N/A  Braces: N/A  Subjective:  Communicated with nurse and epic chart review prior to session.  Pain/Comfort  Pain Rating 1: 8/10  Location - Side 1: Left  Location - Orientation 1: generalized  Location 1: hip  Pain Rating Post-Intervention 1: 8/10    Objective:  Patient found with: telemetry, peripheral IV, PureWick, bed alarm     Functional Mobility:  Bed Mobility:  Cga with rolling l<r with l le leg lifting    Activities of Daily Living:   UE  min a with je hospital robe     LE total a with  je socks   G/h task with (s) with set up only      Balance:   Static Sit: FAIR+: Able to take MINIMAL challenges from all directions  Dynamic Sit: FAIR: Cannot move trunk without losing balance  Therapeutic Activities and Exercises:  Patient educated on role of OT in acute setting and benefits of participation. Educated on techniques to use to increase independence and decrease fall risk with functional transfers. Educated on importance of SITTING EOB activity and calling for A to transfer back to bed. Encouraged completion of B UE AROM therex throughout the day to tolerance to increase functional strength and activity tolerance. Patient stated understanding and in agreement with POC.      AM-PAC 6 CLICK ADL   How much help from another person does this patient currently need?   1 = Unable, Total/Dependent Assistance  2 = A lot, Maximum/Moderate Assistance  3 = A little, Minimum/Contact Guard/Supervision  4 = None, Modified [place]/Independent    Putting on and taking off regular lower body clothing? : 2  Bathing  "(including washing, rinsing, drying)?: 2  Toileting, which includes using toilet, bedpan, or urinal? : 2 (purewick placement)  Putting on and taking off regular upper body clothing?: 3  Taking care of personal grooming such as brushing teeth?: 3  Eating meals?: 3  Daily Activity Total Score: 15     AM-PAC Raw Score CMS "G-Code Modifier Level of Impairment Assistance   6 % Total / Unable   7 - 8 CM 80 - 100% Maximal Assist   9-13 CL 60 - 80% Moderate Assist   14 - 19 CK 40 - 60% Moderate Assist   20 - 22 CJ 20 - 40% Minimal Assist   23 CI 1-20% SBA / CGA   24 CH 0% Independent/ Mod I       Patient left up in chair with     ASSESSMENT:  Niru Reyes is a 77 y.o. female with a medical diagnosis of Nontraumatic hematoma of muscle and presents with DEBILITY AND GENERALIZED WEAKNESS.    Rehab identified problem list/impairments:  weakness, impaired functional mobility, impaired endurance, gait instability, impaired balance, impaired self care skills, decreased safety awareness    Rehab potential is good.    Activity tolerance: Good    Discharge recommendations: nursing facility, skilled   Barriers to discharge:      Equipment recommendations:  (TBD at next level of care)    GOALS:   Multidisciplinary Problems       Occupational Therapy Goals          Problem: Occupational Therapy    Goal Priority Disciplines Outcome Interventions   Occupational Therapy Goal     OT, PT/OT Ongoing, Progressing    Description: Goals to be met by: 3/24/23     Patient will increase functional independence with ADLs by performing:    Grooming while supine with Minimal Assistance.  Supine to sit with Moderate Assistance.  Increased functional strength in B UE by 1/2 MM grade.                         Plan:  Patient to be seen 2 x/week to address the above listed problems via self-care/home management, therapeutic exercises, therapeutic activities  Plan of Care expires: 03/24/23  Plan of Care reviewed with: patient         03/15/2023  "

## 2023-03-15 NOTE — PLAN OF CARE
03/15/23 1524   Medicare Message   Important Message from Medicare regarding Discharge Appeal Rights Signed/date by patient/caregiver   Date IMM was signed 03/15/23   Time IMM was signed [id number]

## 2023-03-15 NOTE — PLAN OF CARE
03/15/23 1109   Post-Acute Status   Post-Acute Authorization Placement   Post-Acute Placement Status Set-up Complete/Auth obtained   Discharge Plan   Discharge Plan A Skilled Nursing Facility     Pt [facility]. Pending covid swab, snf order, dc orders, and pt's daughter to sign paperwork.

## 2023-03-15 NOTE — PLAN OF CARE
[facility]     Department of Hospital Medicine     [address] [place] [address]     [phone] after hours  [phone] fax       NURSING HOME ORDERS    03/15/2023    Admit to Nursing Home:  Skilled Bed                                                  Diagnoses:  Active Hospital Problems    Diagnosis  POA    *Nontraumatic hematoma of muscle [M79.81]  Yes    Discharge planning issues [Z02.9]  Not Applicable    Slow transit constipation [K59.01]  Yes    Pulmonary hypertension [I27.20]  Yes    Bilateral lower extremity edema [R60.0]  Yes    Cellulitis [L03.90]  Yes    Acute blood loss anemia [D62]  Yes    HTN (hypertension) [I10]  Yes      Resolved Hospital Problems   No resolved problems to display.       Allergies:Review of patient's allergies indicates:  No Known Allergies    Vitals:  Every shift (Skilled Nursing patients)    Diet: cardiac   Supplement:  1 can every three times a day with meals                         Type:  House          Acitivities: Per PT     LABS:  Per facility protocol    Nursing Precautions:      - Fall precautions per nursing home protocol   - Decubitus precautions:        -  for positioning   - Pressure reducing foam mattress   - Turn patient every two hours. Use wedge pillows to anchor patient   - Aspiration precautions        CONSULTS:      Physical Therapy to evaluate and treat     Occupational Therapy to evaluate and treat     Nutrition to evaluate and recommend diet     Speech Therapy  to evaluate and treat     Psychiatry to evaluate and follow patients for delirium    MISCELLANEOUS CARE:     Routine Skin for Bedridden Patients:  Apply moisture barrier cream to all    skin folds and wet areas in perineal area daily and after baths and                           all bowel movements.                      Medications: Discontinue all previous medication orders, if any. See new list below.    Current Discharge Medication List         START taking these medications    Details   amLODIPine (NORVASC) 5 MG tablet Take 1 tablet (5 mg total) by mouth once daily.  Qty: 30 tablet, Refills: 11    Comments: .      cephALEXin (KEFLEX) 500 MG capsule Take 1 capsule (500 mg total) by mouth every 8 (eight) hours. for 5 days  Qty: 15 capsule, Refills: 0      HYDROcodone-acetaminophen (NORCO) 7.5-325 mg per tablet Take 1 tablet by mouth every 6 (six) hours as needed for Pain.  Qty: 10 tablet, Refills: 0    Comments: Quantity prescribed more than 7 day supply? No      polyethylene glycol (GLYCOLAX) 17 gram PwPk Take 17 g by mouth once daily.  Refills: 0      senna-docusate 8.6-50 mg (PERICOLACE) 8.6-50 mg per tablet Take 1 tablet by mouth 2 (two) times daily.           CONTINUE these medications which have NOT CHANGED    Details   hydroCHLOROthiazide (HYDRODIURIL) 25 MG tablet TAKE 1 TABLET(25 MG) BY MOUTH EVERY DAY  Qty: 14 tablet, Refills: 0           STOP taking these medications       aspirin (ECOTRIN) 81 MG EC tablet Comments:   Reason for Stopping:                       _________________________________  Carine Pavon MD  03/15/2023

## 2023-03-15 NOTE — DISCHARGE SUMMARY
"[facility] Medicine  Discharge Summary      Patient Name: Niru Reyes  MRN: [id number]  Patient Class: IP- Inpatient  Admission Date: 3/7/2023  Hospital Length of Stay: 7 days  Discharge Date and Time: No discharge date for patient encounter.  Attending Physician: Carine Pavon MD   Discharging Provider: Carine Pavon MD  Primary Care Provider: Jordana Shirley MD      HPI:   Niru Reyes is a 77 year old female with history of obesity, lymphedema, and hypertension who presents to ED of acute left hip pain that began this morning while lying in bed. Patient reports he felt something "pop" when she rolled over. She denies injury or trauma prior to lying in bed. Prior to this event, ambulated with walker.    In ED, X ray of hip unremarkable. CT Pelvis shows large are of hemorrhage in the left pelvis overlying the left iliacus muscle extending to femur. Orthopedic surgery has been consulted. Patient will be placed in observation under the care of Hospital Medicine      * No surgery found *      Hospital Course:   77 year old female, under the care of hospital medicine for left psoas muscle hematoma. Decrease in Hgb this AM, 9.8-->8.1, recommended to order CTA of pelvis, no detrimental changes. Erythema noted to bilateral lower extremities, concern for early cellulitis, initiated rocephin. Vitals stable. Hgb: decreased this AM to 7.6 from 8.3, ordered 1U PRBC. Patient continues to not be able to get up with PT due to pain, extensive discussion with patient on living situation and needs after discharge. Patient in agreement that she can not take care of herself at this time. Recommend SNF, PT/OT to eval.   3/10: SNF placement needed. Will cont on cephalexin for BL LE cellulitis. Obtain echo and LE US for edema.   3/11: pulm HTN noted on echo. Lasix 20mg IV given once. Cont cephalexin for cellulitis. US negative for DVT. Will need outpatient f/u with PCP and pulmonology for sleep study.   3/12: " antihypertensives adjusted. Otherwise medically stable for dc to SNF.  3/13-3/14: pending DC to SNF  3/15: pt DC to SNF in stable condition. Outpatient f/u with ortho. Continue to hold asa       Goals of Care Treatment Preferences:  Code Status: Full Code      Consults:   Consults (From admission, onward)        Status Ordering Provider     Inpatient virtual consult to Hospital Medicine  Once        Provider:  Carine Pavon MD    Completed LE, MIGUELINA     Inpatient consult to Social Work  Once        Provider:  (Not yet assigned)    Completed LE, MIGUELINA          No new Assessment & Plan notes have been filed under this hospital service since the last note was generated.  Service: Hospital Medicine    Final Active Diagnoses:    Diagnosis Date Noted POA    PRINCIPAL PROBLEM:  Nontraumatic hematoma of muscle [M79.81] 03/07/2023 Yes    Discharge planning issues [Z02.9] 03/13/2023 Not Applicable    Slow transit constipation [K59.01] 03/13/2023 Yes    Pulmonary hypertension [I27.20] 03/11/2023 Yes    Bilateral lower extremity edema [R60.0] 03/10/2023 Yes    Cellulitis [L03.90] 03/09/2023 Yes    Acute blood loss anemia [D62] 03/07/2023 Yes    HTN (hypertension) [I10] 12/04/2014 Yes      Problems Resolved During this Admission:       Discharged Condition: stable    Disposition: Skilled Nursing Facility    Follow Up:    Patient Instructions:      Ambulatory referral/consult to Cardiology   Standing Status: Future   Referral Priority: Routine Referral Type: Consultation   Referral Reason: Specialty Services Required   Requested Specialty: Cardiology   Number of Visits Requested: 1     Ambulatory referral/consult to Sleep Disorders   Standing Status: Future   Referral Priority: Routine Referral Type: Consultation   Requested Specialty: Sleep Medicine   Number of Visits Requested: 1     Ambulatory referral/consult to Orthopedics   Standing Status: Future   Referral Priority: Routine Referral Type: Consultation   Requested  Specialty: Orthopedic Surgery   Number of Visits Requested: 1     Diet Cardiac     Notify your health care provider if you experience any of the following:  temperature >100.4     Notify your health care provider if you experience any of the following:  persistent nausea and vomiting or diarrhea     Notify your health care provider if you experience any of the following:  severe uncontrolled pain     Notify your health care provider if you experience any of the following:  redness, tenderness, or signs of infection (pain, swelling, redness, odor or green/yellow discharge around incision site)     Notify your health care provider if you experience any of the following:  difficulty breathing or increased cough     Notify your health care provider if you experience any of the following:  severe persistent headache     Notify your health care provider if you experience any of the following:  worsening rash     Notify your health care provider if you experience any of the following:  persistent dizziness, light-headedness, or visual disturbances     Notify your health care provider if you experience any of the following:  increased confusion or weakness     Send follow up & questions to   Order Comments: Patient's primary care physician: Jordana Shirley MD     Activity as tolerated       Significant Diagnostic Studies: Labs: CMP No results for input(s): NA, K, CL, CO2, GLU, BUN, CREATININE, CALCIUM, PROT, ALBUMIN, BILITOT, ALKPHOS, AST, ALT, ANIONGAP, ESTGFRAFRICA, EGFRNONAA in the last 48 hours. and CBC No results for input(s): WBC, HGB, HCT, PLT in the last 48 hours.    Pending Diagnostic Studies:     Procedure Component Value Units Date/Time    COVID-19 Rapid Screening [[id number]] Collected: 03/15/23 1249    Order Status: Sent Lab Status: In process Updated: 03/15/23 1250    Specimen: Nasal Swab     COVID-19 Routine Screening Extended Care Placement [[id number]] Collected: 03/15/23 1223    Order Status: Sent  Lab Status: In process Updated: 03/15/23 1238    Specimen: Nasopharyngeal          Medications:  Reconciled Home Medications:      Medication List      START taking these medications    amLODIPine 5 MG tablet  Commonly known as: NORVASC  Take 1 tablet (5 mg total) by mouth once daily.     cephALEXin 500 MG capsule  Commonly known as: KEFLEX  Take 1 capsule (500 mg total) by mouth every 8 (eight) hours. for 5 days     HYDROcodone-acetaminophen 7.5-325 mg per tablet  Commonly known as: NORCO  Take 1 tablet by mouth every 6 (six) hours as needed for Pain.     polyethylene glycol 17 gram Pwpk  Commonly known as: GLYCOLAX  Take 17 g by mouth once daily.     senna-docusate 8.6-50 mg 8.6-50 mg per tablet  Commonly known as: PERICOLACE  Take 1 tablet by mouth 2 (two) times daily.        CONTINUE taking these medications    hydroCHLOROthiazide 25 MG tablet  Commonly known as: HYDRODIURIL  TAKE 1 TABLET(25 MG) BY MOUTH EVERY DAY        STOP taking these medications    aspirin 81 MG EC tablet  Commonly known as: ECOTRIN            Indwelling Lines/Drains at time of discharge:   Lines/Drains/Airways     Drain  Duration           Female External Urinary Catheter 03/08/23 0744 7 days                Time spent on the discharge of patient: 39 minutes         The attending portion of this evaluation, treatment, and documentation was performed per Carine Pavon MD via Telemedicine AudioVisual using the secure Vidyo software platform with 2 way audio/video. The provider was located off-site and the patient is located in the hospital. The aforementioned video software was utilized to document the relevant history and physical exam    Carine Pavon MD  Department of Hospital Medicine  [facility]

## 2023-03-15 NOTE — NURSING
Pt is resting in bed. No signs of distress noted. No adverse events overnight. Pt remains free from falls. Q12 chart check complete.

## 2023-03-15 NOTE — PLAN OF CARE
JINA spoke with pt's daughter regarding accepting facilities. Pt's daughter stated she wants SW to call [facility] and [facility]. SW did explain the importance of choosing an facility today because pt is medically cleared for dc. JINA stated that if the two requested facilities couldn't give an decision today then pt's daughter has to chose from accepting facilities.

## 2023-03-15 NOTE — PROGRESS NOTES
"[facility]  Orthopedics  Progress Note    Patient Name: Niru Reyes  MRN: [id number]  Admission Date: 3/7/2023  Hospital Length of Stay: 7 days  Attending Provider: Carine Pavon MD  Primary Care Provider: Jordana Shirley MD    Subjective:     Principal Problem:Nontraumatic hematoma of muscle    Principal Orthopedic Problem: Left hip hematoma    Interval History: Niru Reyes is a 77-year-old female with past medical history significant for major depression, hypertension, osteoarthritis of the knees, anemia, and atherosclerosis of the arteries of the extremities who was admitted for left hip hematoma.  Patient is resting comfortably in bed.  No new complaints at this time.  Patient reports that her hip is continuing to feel better.    Review of patient's allergies indicates:  No Known Allergies    Current Facility-Administered Medications   Medication    0.9%  NaCl infusion (for blood administration)    aluminum-magnesium hydroxide-simethicone 200-200-20 mg/5 mL suspension 30 mL    amLODIPine tablet 5 mg    bisacodyL suppository 10 mg    cephALEXin capsule 500 mg    dextrose 5 % (D5W) infusion    hydrALAZINE injection 10 mg    hydroCHLOROthiazide tablet 25 mg    HYDROcodone-acetaminophen 7.5-325 mg per tablet 1 tablet    hydrOXYzine pamoate capsule 25 mg    lactulose 20 gram/30 mL solution Soln 20 g    magnesium hydroxide 400 mg/5 ml suspension 2,400 mg    ondansetron injection 4 mg    polyethylene glycol packet 17 g    senna-docusate 8.6-50 mg per tablet 1 tablet     Objective:     Vital Signs (Most Recent):  Temp: 97.9 °F (36.6 °C) (03/15/23 0745)  Pulse: 69 (03/15/23 0745)  Resp: 18 (03/15/23 0745)  BP: (!) 145/64 (03/15/23 0745)  SpO2: 95 % (03/15/23 0800)   Vital Signs (24h Range):  Temp:  [97.9 °F (36.6 °C)-98.2 °F (36.8 °C)] 97.9 °F (36.6 °C)  Pulse:  [68-81] 69  Resp:  [16-18] 18  SpO2:  [92 %-98 %] 95 %  BP: (133-151)/(61-65) 145/64     Weight: (!) 179.8 kg (396 lb 6.2 oz)  Height: 5' 8" " (172.7 cm)  Body mass index is 60.27 kg/m².      Intake/Output Summary (Last 24 hours) at 3/15/2023 0915  Last data filed at 3/15/2023 0815  Gross per 24 hour   Intake 670 ml   Output 1050 ml   Net -380 ml       Ortho/SPM Exam  Left hip:  Skin is intact  Mild edema   Minimal TTP laterally and posteriorly   No pain with logroll  ROM of the hip is not tested secondary to pain  Calf and compartments are soft and compressible  Motor exam normal   Sensation and pulses intact     GEN: Well developed, well nourished female. AAOX3. No acute distress.   Head: Normocephalic, atraumatic.   Eyes: VITA  Neck: Trachea is midline, no adenopathy  Resp: Breathing unlabored.  Neuro: Motor function normal, Cranial nerves intact  Psych: Mood and affect appropriate.    Significant Labs:   Recent Lab Results       None          All pertinent labs within the past 24 hours have been reviewed.    Significant Imaging: I have reviewed and interpreted all pertinent imaging results/findings.    Assessment/Plan:     Active Diagnoses:    Diagnosis Date Noted POA    PRINCIPAL PROBLEM:  Nontraumatic hematoma of muscle [M79.81] 03/07/2023 Yes    Discharge planning issues [Z02.9] 03/13/2023 Not Applicable    Slow transit constipation [K59.01] 03/13/2023 Yes    Pulmonary hypertension [I27.20] 03/11/2023 Yes    Bilateral lower extremity edema [R60.0] 03/10/2023 Yes    Cellulitis [L03.90] 03/09/2023 Yes    Acute blood loss anemia [D62] 03/07/2023 Yes    HTN (hypertension) [I10] 12/04/2014 Yes      Problems Resolved During this Admission:     Assessment:  77-year-old female with past medical history significant for hypertension, major depression, osteoarthritis of the knees, anemia, and atherosclerosis of the arteries of the extremities who was admitted for left hip hematoma     Plan:  Patient has stabilized relative to her H&H  CTA ruled out active bleed   No orthopedic intervention is indicated at this time   Medical management per primary team    Patient is awaiting SNF/rehab placement  Patient is ready for discharge from orthopedic standpoint once this placement been arranged   We can follow-up with the patient as an outpatient    Jorge Cm PA-C  Orthopedics  [facility]

## 2023-03-15 NOTE — PLAN OF CARE
Problem: Adult Inpatient Plan of Care  Goal: Plan of Care Review  Outcome: Ongoing, Progressing  Goal: Patient-Specific Goal (Individualized)  Outcome: Ongoing, Progressing  Goal: Absence of Hospital-Acquired Illness or Injury  Outcome: Ongoing, Progressing  Goal: Optimal Comfort and Wellbeing  Outcome: Ongoing, Progressing  Goal: Readiness for Transition of Care  Outcome: Ongoing, Progressing     Problem: Bariatric Environmental Safety  Goal: Safety Maintained with Care  Outcome: Ongoing, Progressing     Problem: Skin Injury Risk Increased  Goal: Skin Health and Integrity  Outcome: Ongoing, Progressing     Problem: Skin or Soft Tissue Infection  Goal: Absence of Infection Signs and Symptoms  Outcome: Ongoing, Progressing     Problem: Pain Acute  Goal: Acceptable Pain Control and Functional Ability  Outcome: Ongoing, Progressing     Problem: Impaired Wound Healing  Goal: Optimal Wound Healing  Outcome: Ongoing, Progressing

## 2023-03-15 NOTE — PLAN OF CARE
03/15/23 1131   Final Note   Assessment Type Final Discharge Note   Anticipated Discharge Disposition SNF   Post-Acute Status   Post-Acute Authorization Placement   Post-Acute Placement Status Set-up Complete/Auth obtained

## 2023-03-16 ENCOUNTER — TELEPHONE (OUTPATIENT)
Dept: ORTHOPEDICS | Facility: CLINIC | Age: 78
End: 2023-03-16
Payer: MEDICARE

## 2023-03-16 NOTE — PROGRESS NOTES
"[facility]  Orthopedics  Progress Note    Patient Name: Niru Reyes  MRN: [id number]  Admission Date: 3/7/2023  Hospital Length of Stay: 5 days  Attending Provider: Carine Pavon MD  Primary Care Provider: Jordana Shirley MD    Subjective:     Principal Problem:Nontraumatic hematoma of muscle    Principal Orthopedic Problem:  Left hip hematoma    Interval History: Niru Reyes is a 77-year-old female with past medical history significant for major depression, hypertension, osteoarthritis of the knees, anemia, and atherosclerosis of the arteries of the extremities who was admitted for left hip hematoma.  Patient is resting comfortably in bed.  No new complaints at this time.  She reports continued improvement of pain.    Review of patient's allergies indicates:  No Known Allergies    Current Facility-Administered Medications   Medication    0.9%  NaCl infusion (for blood administration)    aluminum-magnesium hydroxide-simethicone 200-200-20 mg/5 mL suspension 30 mL    amLODIPine tablet 5 mg    cephALEXin capsule 500 mg    dextrose 5 % (D5W) infusion    hydrALAZINE injection 10 mg    hydroCHLOROthiazide tablet 25 mg    HYDROcodone-acetaminophen 7.5-325 mg per tablet 1 tablet    hydrOXYzine pamoate capsule 25 mg    ondansetron injection 4 mg    polyethylene glycol packet 17 g    senna-docusate 8.6-50 mg per tablet 1 tablet     Objective:     Vital Signs (Most Recent):  Temp: 98.3 °F (36.8 °C) (03/13/23 0729)  Pulse: 79 (03/13/23 0729)  Resp: 17 (03/13/23 0729)  BP: (!) 160/70 (03/13/23 0729)  SpO2: (!) 92 % (03/13/23 0729)   Vital Signs (24h Range):  Temp:  [98.1 °F (36.7 °C)-99.8 °F (37.7 °C)] 98.3 °F (36.8 °C)  Pulse:  [79-93] 79  Resp:  [16-20] 17  SpO2:  [90 %-95 %] 92 %  BP: (140-164)/(63-86) 160/70     Weight: (!) 191.5 kg (422 lb 2.9 oz)  Height: 5' 8" (172.7 cm)  Body mass index is 64.19 kg/m².      Intake/Output Summary (Last 24 hours) at 3/13/2023 0936  Last data filed at 3/13/2023 " 0727  Gross per 24 hour   Intake --   Output 1100 ml   Net -1100 ml       Ortho/SPM Exam  Left hip:  Skin is intact  Mild edema   Mild TTP laterally and posteriorly   Pain increases with internal/external rotation  ROM of the hip is not tested secondary to pain  Calf and compartments are soft and compressible  Motor exam normal   Sensation and pulses intact     GEN: Well developed, well nourished female. AAOX3. No acute distress.   Head: Normocephalic, atraumatic.   Eyes: VITA  Neck: Trachea is midline, no adenopathy  Resp: Breathing unlabored.  Neuro: Motor function normal, Cranial nerves intact  Psych: Mood and affect appropriate.      Significant Labs:   Recent Lab Results         03/13/23  0610        Anion Gap 10       Baso # 0.10       Basophil % 1.0       BUN 15       Calcium 8.6       Chloride 102       CO2 25       Creatinine 0.7       Differential Method Automated       eGFR >60       Eos # 0.4       Eosinophil % 4.5       Glucose 112       Gran # (ANC) 6.9       Gran % 72.3       Hematocrit 32.1       Hemoglobin 9.8       Immature Grans (Abs) 0.06  Comment: Mild elevation in immature granulocytes is non specific and   can be seen in a variety of conditions including stress response,   acute inflammation, trauma and pregnancy. Correlation with other   laboratory and clinical findings is essential.         Immature Granulocytes 0.6       Lymph # 1.2       Lymph % 12.2       Magnesium 1.9       MCH 26.6       MCHC 30.5       MCV 87       Mono # 0.9       Mono % 9.4       MPV 12.5       nRBC 0       Phosphorus 3.5       Platelets 162       Potassium 4.8       RBC 3.69       RDW 16.6       Sodium 137       WBC 9.57             All pertinent labs within the past 24 hours have been reviewed.    Significant Imaging: I have reviewed and interpreted all pertinent imaging results/findings.    Assessment/Plan:     Active Diagnoses:    Diagnosis Date Noted POA    PRINCIPAL PROBLEM:  Nontraumatic hematoma of muscle  [M79.81] 03/07/2023 Yes    Discharge planning issues [Z02.9] 03/13/2023 Not Applicable    Pulmonary hypertension [I27.20] 03/11/2023 Yes    Bilateral lower extremity edema [R60.0] 03/10/2023 Yes    Cellulitis [L03.90] 03/09/2023 Yes    Acute blood loss anemia [D62] 03/07/2023 Yes    HTN (hypertension) [I10] 12/04/2014 Yes      Problems Resolved During this Admission:     Assessment:  77-year-old female with past medical history significant for hypertension, major depression, osteoarthritis of the knees, anemia, and atherosclerosis of the arteries of the extremities who was admitted for left hip hematoma     Plan:  Patient is has stabilized   CTA ruled out active bleed   Patient is being managed by primary team   No orthopedic intervention is indicated at this time   Patient is going to go to SNF   She is ready for discharge from orthopedic standpoint once this placement has been arranged   We will see her back in our clinic as an outpatient    Jorge Cm PA-C  Orthopedics  [facility]   Cephalexin Counseling: I counseled the patient regarding use of cephalexin as an antibiotic for prophylactic and/or therapeutic purposes. Cephalexin (commonly prescribed under brand name Keflex) is a cephalosporin antibiotic which is active against numerous classes of bacteria, including most skin bacteria. Side effects may include nausea, diarrhea, gastrointestinal upset, rash, hives, yeast infections, and in rare cases, hepatitis, kidney disease, seizures, fever, confusion, neurologic symptoms, and others. Patients with severe allergies to penicillin medications are cautioned that there is about a 10% incidence of cross-reactivity with cephalosporins. When possible, patients with penicillin allergies should use alternatives to cephalosporins for antibiotic therapy.

## 2023-03-16 NOTE — TELEPHONE ENCOUNTER
----- Message from Jorge Cm PA-C sent at 3/15/2023  6:14 PM CDT -----  This patient needs to come to [facility]o [facility]uma Clinic around 04/03/2023

## 2023-03-16 NOTE — TELEPHONE ENCOUNTER
Spoke with patient and Marcelino Duron and scheduled patient's follow up appointment. Understanding verbalized of appointment date,time and location.

## 2023-03-31 DIAGNOSIS — R10.2 PAIN IN PELVIS: Primary | ICD-10-CM

## 2023-04-12 ENCOUNTER — TELEPHONE (OUTPATIENT)
Dept: FAMILY MEDICINE | Facility: CLINIC | Age: 78
End: 2023-04-12
Payer: MEDICARE

## 2023-04-12 NOTE — TELEPHONE ENCOUNTER
----- Message from Kacey Ludwig sent at 4/12/2023  3:01 PM CDT -----  Contact: maynor  Patient is calling to speak with a nurse regarding orders.. Reports home health nurse would like to take samples instead of patient coming into the clinic due to the patient not being able to walk. Also , states it will be easier for the patient and the home health nurse. Please give patient a callback at [phone] .  [facility] [phone] or fax [phone] .  Thanks  AK

## 2023-04-12 NOTE — TELEPHONE ENCOUNTER
She needs [facility] . We have not seen her in almost 3 years. SHe can do video visit or audio visit ( 40 min)

## 2023-04-13 ENCOUNTER — TELEPHONE (OUTPATIENT)
Dept: FAMILY MEDICINE | Facility: CLINIC | Age: 78
End: 2023-04-13
Payer: MEDICARE

## 2023-04-13 NOTE — TELEPHONE ENCOUNTER
----- Message from Ivanna Mathur sent at 4/13/2023  4:47 PM CDT -----  Contact: Niru  .Type:  Patient Returning Call    Who Called:Niru  Who Left Message for Patient:nurse  Does the patient know what this is regarding?:yes  Would the patient rather a call back or a response via [facility]? Call back  Best Call Back Number: [phone]     Additional Information: na        Thanks  LILIANA

## 2023-04-17 ENCOUNTER — OFFICE VISIT (OUTPATIENT)
Dept: FAMILY MEDICINE | Facility: CLINIC | Age: 78
End: 2023-04-17
Attending: FAMILY MEDICINE
Payer: MEDICARE

## 2023-04-17 DIAGNOSIS — L03.115 BILATERAL LOWER LEG CELLULITIS: ICD-10-CM

## 2023-04-17 DIAGNOSIS — E66.01 MORBID (SEVERE) OBESITY DUE TO EXCESS CALORIES: ICD-10-CM

## 2023-04-17 DIAGNOSIS — L03.116 BILATERAL LOWER LEG CELLULITIS: ICD-10-CM

## 2023-04-17 DIAGNOSIS — Z74.8 ASSISTANCE NEEDED WITH TRANSPORTATION: ICD-10-CM

## 2023-04-17 DIAGNOSIS — D62 ACUTE BLOOD LOSS ANEMIA: ICD-10-CM

## 2023-04-17 DIAGNOSIS — M79.81: Primary | ICD-10-CM

## 2023-04-17 DIAGNOSIS — R06.3 PERIODIC BREATHING: ICD-10-CM

## 2023-04-17 DIAGNOSIS — I10 PRIMARY HYPERTENSION: ICD-10-CM

## 2023-04-17 DIAGNOSIS — I27.20 PULMONARY HTN: ICD-10-CM

## 2023-04-17 PROCEDURE — 99443 PR PHYSICIAN TELEPHONE EVALUATION 21-30 MIN: ICD-10-PCS | Mod: 95,,, | Performed by: FAMILY MEDICINE

## 2023-04-17 PROCEDURE — 99443 PR PHYSICIAN TELEPHONE EVALUATION 21-30 MIN: CPT | Mod: 95,,, | Performed by: FAMILY MEDICINE

## 2023-04-17 RX ORDER — HYDROCHLOROTHIAZIDE 25 MG/1
25 TABLET ORAL DAILY
Qty: 90 TABLET | Refills: 1 | Status: SHIPPED | OUTPATIENT
Start: 2023-04-17 | End: 2023-09-18

## 2023-04-17 NOTE — PROGRESS NOTES
"Subjective:       Patient ID: Niru Reyes is a 77 y.o. female.    Chief Complaint: No chief complaint on file.                   77 y old female with obesity , lymphedema , HTN  f.u on hospitalization today via Audio visit   [place] [facility] [place] to acute left hip pain after rolling of the bed . She was found to have a "large  area of hemorrhage on left pelvis  overlying left iliacus muscle extending to femur" . Ortho was consulted. Hemoglobin dropped from 9.8 to 8.1 . Repeated CT did not demonstrated worsening hematoma . She was started on Rocephin since she had LE erythema concerning  for cellulitis . .Hemoglobin continued to decreased to 7.6 . She did received 1 U of PRBC . Echocardiogram ordered  due to SWAPNIL demonstrated pulmonary HTN . Venous u/s was neg thurman DVT . She was discharged on 3/14 to [facility] . She has been home for 1 w . Started on amlodipine  5 mg due to poorly controlled BP . She has not taken  hctz in a while , she did not know she supposed to continue it  . She has home health with [facility], doing well except for constipation . MIralax upsets her stomach  . She was told BP today was in the 160 (s) . Unable to keep [facility]o [facility] due to difficulties with transportation and most likely will cancel  pulm [facility] for pulmonary HTN for the same reason  . She lives alone .     Review of Systems   Constitutional: Negative.    HENT: Negative.     Eyes: Negative.    Respiratory: Negative.     Cardiovascular: Negative.    Gastrointestinal:  Positive for constipation.   Genitourinary: Negative.    Musculoskeletal: Negative.    Skin: Negative.    Hematological: Negative.      Objective:      Physical Exam    Assessment:       1. Nontraumatic hematoma of muscle    2. Pulmonary HTN    3. Bilateral lower leg cellulitis    4. Acute blood loss anemia    5. Primary hypertension        Plan:     Diagnoses and all orders for this visit:    Nontraumatic hematoma of muscle    Pulmonary HTN    Bilateral lower leg " cellulitis    Acute blood loss anemia    Primary hypertension     Diagnoses and all orders for this visit:    Nontraumatic hematoma of muscle  -     CBC Auto Differential; Future  -     Comprehensive Metabolic Panel; Future    Pulmonary HTN    Bilateral lower leg cellulitis    Acute blood loss anemia  -     CBC Auto Differential; Future  -     Comprehensive Metabolic Panel; Future    Primary hypertension  -     Comprehensive Metabolic Panel; Future  -     Ambulatory referral[facility] to [facility] - Medical & Palliative; Future    Assistance needed with transportation  -     Ambulatory referral/consult to Social Work; Future  -     Ambulatory referral/consult to [facility] - Medical & Palliative; Future    Morbid (severe) obesity due to excess calories  -     Ambulatory referral/consult to Social Work; Future  -     Ambulatory referral/consult to [facility] - Medical & Palliative; Future    Periodic breathing  -     Ambulatory referral/consult to Social Work; Future    Other orders  -     hydroCHLOROthiazide (HYDRODIURIL) 25 MG tablet; Take 1 tablet (25 mg total) by mouth once daily.  -     linaCLOtide (LINZESS) 72 mcg Cap capsule; Take 1 capsule (72 mcg total) by mouth before breakfast.     PT was asked to have nurse contact us in 1 week with BP readings .

## 2023-04-19 ENCOUNTER — PES CALL (OUTPATIENT)
Dept: ADMINISTRATIVE | Facility: CLINIC | Age: 78
End: 2023-04-19
Payer: MEDICARE

## 2023-07-24 ENCOUNTER — TELEPHONE (OUTPATIENT)
Dept: FAMILY MEDICINE | Facility: CLINIC | Age: 78
End: 2023-07-24
Payer: MEDICARE

## 2023-07-24 NOTE — TELEPHONE ENCOUNTER
----- Message from Melissa Maldonado sent at 7/24/2023  4:04 PM CDT -----  Patient is requesting a call back concerning a rash she has. Call back at [phone]

## 2023-07-25 ENCOUNTER — TELEPHONE (OUTPATIENT)
Dept: FAMILY MEDICINE | Facility: CLINIC | Age: 78
End: 2023-07-25
Payer: MEDICARE

## 2023-07-25 NOTE — TELEPHONE ENCOUNTER
----- Message from Kathryn Echevarria sent at 7/25/2023  4:27 PM CDT -----  Regarding: pt called  Type:  Patient Returning Call    Who Called: LESLIE VIVEROS [[id number]]    Who Left Message for Patient: [place]    Does the patient know what this is regarding? Requesting call back about her rash    Best Call Back Number: [phone]

## 2023-07-25 NOTE — TELEPHONE ENCOUNTER
Spoke to pt, she states her home health nurse noticed a rash on her leg yesterday and was told to contact dr. Myers to get something called in. She states it is from her knee down to ankle. She states she had cellulitis twice around March. She mentioned that she is homebound and cannot come in for appt.

## 2023-07-26 ENCOUNTER — TELEPHONE (OUTPATIENT)
Dept: FAMILY MEDICINE | Facility: CLINIC | Age: 78
End: 2023-07-26
Payer: MEDICARE

## 2023-07-26 NOTE — TELEPHONE ENCOUNTER
----- Message from Catrina Gore sent at 7/26/2023  3:46 PM CDT -----  Contact: Niru  Type:  Patient Returning Call    Who Called:Niru  Who Left Message for Patient:Sheryl  Does the patient know what this is regarding?:missed call  Would the patient rather a call back or a response via [facility]? Call back  Best Call Back Number:[phone]  Additional Information: Niru missed a call and would like a call back    Thanks  ELLE

## 2023-07-26 NOTE — TELEPHONE ENCOUNTER
Good afternoon!   We see that Ms. Reyes's appointment was canceled with Evelyn Diallo NP on 4/24 for the [facility] visit because she saw Dr. Myers on 4/17/23, however, that was just an audio visit. Is there a way that another appointment can be made because patient is unable to get out and come in for appointment. Dr. Myers wants someone to look at her rash on her leg.     I'm not sure who to reach out to but I saw that you had initially scheduled it.    Please let me know if you have any questions!

## 2023-07-26 NOTE — TELEPHONE ENCOUNTER
----- Message from Ivanna Mathur sent at 7/26/2023  1:39 PM CDT -----  Contact: Niru Prabhakar is calling in regards to her having an rash on both legs but the one on the left leg is worse and needing something called into the pharmacy.please call back at [phone]        [id number] - [place], [place] - [address] [facility] [address] [place] [address] [place] [address] [facility] [address]  Phone: [phone] Fax: [phone]                  Thanks  CLAUDIA

## 2023-07-26 NOTE — TELEPHONE ENCOUNTER
We have not heard from NP. Pt is requesting something be called in because she cannot come for appt.

## 2023-07-26 NOTE — TELEPHONE ENCOUNTER
Spoke to patient and informed her that Dr. Myers cannot send anything in because it needs to be looked at. Advised her that we are trying to see if [facility] can come and look at it. She verbalized understanding.

## 2023-07-28 ENCOUNTER — TELEPHONE (OUTPATIENT)
Dept: FAMILY MEDICINE | Facility: CLINIC | Age: 78
End: 2023-07-28
Payer: MEDICARE

## 2023-07-28 ENCOUNTER — PES CALL (OUTPATIENT)
Dept: ADMINISTRATIVE | Facility: CLINIC | Age: 78
End: 2023-07-28
Payer: MEDICARE

## 2023-07-28 DIAGNOSIS — R21 RASH: Primary | ICD-10-CM

## 2023-07-28 DIAGNOSIS — I10 PRIMARY HYPERTENSION: ICD-10-CM

## 2023-07-28 DIAGNOSIS — L03.115 BILATERAL LOWER LEG CELLULITIS: Primary | ICD-10-CM

## 2023-07-28 DIAGNOSIS — I27.20 PULMONARY HTN: ICD-10-CM

## 2023-07-28 DIAGNOSIS — L03.116 BILATERAL LOWER LEG CELLULITIS: Primary | ICD-10-CM

## 2023-07-28 DIAGNOSIS — R06.3 PERIODIC BREATHING: ICD-10-CM

## 2023-07-28 NOTE — TELEPHONE ENCOUNTER
----- Message from Geovanna Marquez sent at 7/28/2023  1:15 PM CDT -----  Contact: Niru  Patient is calling to speak with the nurse regarding the status of NP doing a house visit. Explain someone supposed to come out and look at patient leg. Patient is home bound. Please give a callback at [phone] as requested.  Thanks  LR

## 2023-08-03 ENCOUNTER — CARE AT HOME (OUTPATIENT)
Dept: HOME HEALTH SERVICES | Facility: CLINIC | Age: 78
End: 2023-08-03
Attending: FAMILY MEDICINE
Payer: MEDICARE

## 2023-08-03 VITALS
SYSTOLIC BLOOD PRESSURE: 142 MMHG | DIASTOLIC BLOOD PRESSURE: 70 MMHG | OXYGEN SATURATION: 96 % | RESPIRATION RATE: 18 BRPM | HEART RATE: 72 BPM

## 2023-08-03 DIAGNOSIS — R21 RASH: Primary | ICD-10-CM

## 2023-08-03 DIAGNOSIS — M17.0 OSTEOARTHRITIS OF BOTH KNEES, UNSPECIFIED OSTEOARTHRITIS TYPE: ICD-10-CM

## 2023-08-03 DIAGNOSIS — I10 PRIMARY HYPERTENSION: ICD-10-CM

## 2023-08-03 DIAGNOSIS — K59.00 CONSTIPATION, UNSPECIFIED CONSTIPATION TYPE: ICD-10-CM

## 2023-08-03 DIAGNOSIS — L30.9 DERMATITIS: ICD-10-CM

## 2023-08-03 PROCEDURE — 99350 PR HOME VISIT,ESTAB PATIENT,LEVEL IV: ICD-10-PCS | Mod: S$GLB,,,

## 2023-08-03 PROCEDURE — 99350 HOME/RES VST EST HIGH MDM 60: CPT | Mod: S$GLB,,,

## 2023-08-03 RX ORDER — DOCUSATE SODIUM 100 MG/1
100 CAPSULE, LIQUID FILLED ORAL 2 TIMES DAILY PRN
Qty: 60 CAPSULE | Refills: 1 | Status: SHIPPED | OUTPATIENT
Start: 2023-08-03

## 2023-08-03 RX ORDER — CELECOXIB 100 MG/1
100 CAPSULE ORAL 2 TIMES DAILY
Qty: 60 CAPSULE | Refills: 1 | Status: SHIPPED | OUTPATIENT
Start: 2023-08-03 | End: 2023-10-05 | Stop reason: SDUPTHER

## 2023-08-03 NOTE — ASSESSMENT & PLAN NOTE
Chronic  Has been taking alleve prn with some relief  Will try celebrex twice daily to aid in relief

## 2023-08-03 NOTE — PROGRESS NOTES
[facility]  Medical Home Visit    Visit Date: 8/4/2023  Encounter Provider: Scout Diez  PCP:  Jordana Shirley MD    Subjective:      Patient ID: Niru Reyes is a 78 y.o. female.    Consult Requested By:  Dr. Jordana Myers-*  Reason for Consult:  Follow up rash/cellulitis    Niru is being seen at home due to being seen at home due to physical debility that presents a taxing effort to leave the home, to mitigate high risk of hospital readmission and/or due to the limited availability of reliable or safe options for transportation to the point of access to the provider. Prior to treatment on this visit the chart was reviewed and patient verbal consent was obtained.    Chief Complaint: Rash      Patient is a 78 y.o. female with medical diagnoses including obesity, lymphedema, and HTN being seen today for evaluation of BLE rash. She reports rash has been present approximately 2 months.  Reports rash does not itch or hurt. She has been applying Jergens lotion daily with no relief of symptoms. She has chronic BLE lymphedema. She has limited mobility and is wheelchair bound. Reports constipation.  Has been taking stool softener with laxative.  Will send prescription to pharmacy. Reports bilateral knee pain due to osteoarthritis. Takes alleve prn with some relief.  Will send celebrex to pharmacy. She currently has [facility] seeing her. She lives alone with family close by that checks in on her. Denies any further complaints or concerns at this time. Questions elicited. Time allowed for questions, all issues addressed. Contact info given for any concerns or changes. Will follow up in 4 weeks or prn.   Derm e-consult suggests that rash is likely stasis dermatitis v. allergic contact dermatitis. Recommends Triamcinolone twice daily under occlusion and Mupirocin twice daily to open sores for 14 days.  Will re-evaluate after 2 weeks and re consult derm if no improvement. Appreciate derm's assistance.             Review of Systems   Constitutional: Negative.    HENT: Negative.     Eyes: Negative.    Respiratory: Negative.  Negative for chest tightness.    Cardiovascular: Negative.  Negative for leg swelling.   Gastrointestinal:  Positive for constipation.   Endocrine: Negative.    Genitourinary: Negative.    Musculoskeletal:  Positive for arthralgias and gait problem.   Skin:  Positive for rash.   Allergic/Immunologic: Negative.    Neurological:  Positive for weakness.   Hematological: Negative.    Psychiatric/Behavioral: Negative.  Negative for agitation.    All other systems reviewed and are negative.      Assessments:  Environmental: Patient lives in a mobile home.  There are steps and a ramp at the entrance. There is adequate lighting and the temperature is comfortable.    Functional Status: Bed bound  Safety: Fall precautions.   Nutritional: Adequate food in the home.   Home Health/DME/Supplies: [facility].     Objective:   Physical Exam  Vitals reviewed.   Constitutional:       General: She is not in acute distress.     Appearance: Normal appearance. She is well-developed. She is obese.   HENT:      Head: Normocephalic and atraumatic.      Nose: Nose normal.      Mouth/Throat:      Mouth: Mucous membranes are dry.      Pharynx: Oropharynx is clear.   Eyes:      Pupils: Pupils are equal, round, and reactive to light.   Cardiovascular:      Rate and Rhythm: Normal rate and regular rhythm.      Pulses: Normal pulses.      Heart sounds: Normal heart sounds.   Pulmonary:      Effort: Pulmonary effort is normal.      Breath sounds: Normal breath sounds.   Abdominal:      General: Bowel sounds are normal.      Palpations: Abdomen is soft.   Musculoskeletal:         General: Normal range of motion.      Cervical back: Normal range of motion and neck supple.      Right lower leg: Edema present.      Left lower leg: Edema present.   Skin:     General: Skin is warm and dry.      Findings: Rash present.   Neurological:       General: No focal deficit present.      Mental Status: She is alert and oriented to person, place, and time. Mental status is at baseline.      Motor: Weakness present.      Gait: Gait abnormal.   Psychiatric:         Mood and Affect: Mood normal.         Behavior: Behavior normal.         Thought Content: Thought content normal.         Judgment: Judgment normal.                 Vitals:    08/03/23 1418   BP: (!) 142/70   Pulse: 72   Resp: 18   SpO2: 96%     There is no height or weight on file to calculate BMI.    Assessment:     1. Rash    2. Primary hypertension    3. Osteoarthritis of both knees, unspecified osteoarthritis type    4. Constipation, unspecified constipation type    5. Dermatitis        Plan:     Ethical / Legal: Advance Care Planning   Surrogate decision maker:  Alexandria Hernandez, Relationship: Daughter  Code Status:  Full  [place]:  None  Other advance directive:  None, Capacity to make medical decisions:  Yes, Conflict No       Problem List Items Addressed This Visit          Cardiac/Vascular    HTN (hypertension)    Current Assessment & Plan     Stable during visit  Continue current medication            Orthopedic    OA (osteoarthritis) of knee    Current Assessment & Plan     Chronic  Has been taking alleve prn with some relief  Will try celebrex twice daily to aid in relief           Relevant Medications    celecoxib (CELEBREX) 100 MG capsule     Other Visit Diagnoses       Rash    -  Primary    present atleast 2 months per patient  Does not hurt or itch  Has been applying jergens daily with no relief  Derm consult    Relevant Orders    E-Consult to Dermatology (Completed)    Constipation, unspecified constipation type        Has periods of constipation and diarrhea  Diarrhe has been due to laxative use  Ordered ducosate prn    Relevant Medications    docusate sodium (COLACE) 100 MG capsule    Dermatitis        derm consult likely stasis dermatitis v. contact dermatitis  Triamcinolone 0.1% twice  daily under occlusion x 2 weeks  Mupirocin to open sores  Monitor    Relevant Medications    triamcinolone acetonide 0.1% (KENALOG) 0.1 % cream    mupirocin (BACTROBAN) 2 % ointment           - Continue all medications, treatments and therapies as ordered.   - Follow all instructions, recommendations as discussed.  - Maintain Safety Precautions at all times.  - Attend all medical appointments as scheduled.  - For worsening symptoms: call Primary Care Physician or Nurse Practitioner.  - For emergencies, call 911 or immediately report to the nearest emergency room.   Were controlled substances prescribed?  No  Total visit time: 57 min  Follow Up Appointments:   No future appointments.      Signature: Scout Diez NP

## 2023-08-04 ENCOUNTER — E-CONSULT (OUTPATIENT)
Dept: DERMATOLOGY | Facility: CLINIC | Age: 78
End: 2023-08-04

## 2023-08-04 DIAGNOSIS — L30.9 DERMATITIS, UNSPECIFIED: Primary | ICD-10-CM

## 2023-08-04 PROCEDURE — 99451 NTRPROF PH1/NTRNET/EHR 5/>: CPT | Mod: ,,, | Performed by: STUDENT IN AN ORGANIZED HEALTH CARE EDUCATION/TRAINING PROGRAM

## 2023-08-04 PROCEDURE — 99451 PR INTERPROF, PHONE/INTERNET/EHR, CONSULT, >= 5MINS: ICD-10-PCS | Mod: ,,, | Performed by: STUDENT IN AN ORGANIZED HEALTH CARE EDUCATION/TRAINING PROGRAM

## 2023-08-04 RX ORDER — TRIAMCINOLONE ACETONIDE 1 MG/G
CREAM TOPICAL 2 TIMES DAILY
Qty: 28.4 G | Refills: 0 | Status: SHIPPED | OUTPATIENT
Start: 2023-08-04 | End: 2023-08-18

## 2023-08-04 RX ORDER — MUPIROCIN 20 MG/G
OINTMENT TOPICAL 2 TIMES DAILY
Qty: 1 G | Refills: 0 | Status: SHIPPED | OUTPATIENT
Start: 2023-08-04 | End: 2023-08-18

## 2023-08-04 NOTE — CONSULTS
[facility]  Response for E-Consult     Patient Name: Niru Reyes  MRN: [id number]  Primary Care Provider: Jordana Shirley MD   Requesting Provider: Scout Diez NP  E-Consult to Dermatology  Consult performed by: Mary Moran MD  Consult ordered by: Scout Diez NP  Reason for consult: Rash  Assessment/Recommendations: Thank you for this consult. I am not sure if the primary rash she has is due to stasis dermatitis or perhaps allergic contact dermatitis, but I would recommend treatment with triamcinolone 0.1% ointment bid under occlusion for 2 weeks. The open sores are likely impetiginized, so I would also recommend mupirocin or gentamicin ointment to the open sores           Recommendation: As above     Contingency: Re-consult if not improved by 2 weeks     Total time of Consultation: 10 minute    I did not speak to the requesting provider verbally about this.     *This eConsult is based on the clinical data available to me and is furnished without benefit of a physical examination. The eConsult will need to be interpreted in light of any clinical issues or changes in patient status not available to me at the time of filing this eConsults. Significant changes in patient condition or level of acuity should result in immediate formal consultation and reevaluation. Please alert me if you have further questions.    Thank you for this eConsult referral.     Mary Moran MD  [facility]

## 2023-09-18 RX ORDER — HYDROCHLOROTHIAZIDE 25 MG/1
25 TABLET ORAL
Qty: 90 TABLET | Refills: 1 | Status: ON HOLD | OUTPATIENT
Start: 2023-09-18 | End: 2024-03-19 | Stop reason: HOSPADM

## 2023-09-18 NOTE — TELEPHONE ENCOUNTER
Refill Routing Note     Refill Routing Note   Medication(s) are not appropriate for processing by [facility] for the following reason(s):      Required vitals abnormal    [facility] action(s):  Defer Care Due:  None identified            Appointments  past 12m or future 3m with PCP    Date Provider   Last Visit   4/17/2023 Jordana Shirley MD   Next Visit   Visit date not found Jordana Shirley MD   ED visits in past 90 days: 0        Note composed:5:27 PM 09/18/2023

## 2023-09-18 NOTE — TELEPHONE ENCOUNTER
No care due was identified.  [facility] Embedded Care Due Messages. Reference number: [id number].   9/18/2023 8:40:12 AM CDT

## 2023-09-19 ENCOUNTER — CARE AT HOME (OUTPATIENT)
Dept: HOME HEALTH SERVICES | Facility: CLINIC | Age: 78
End: 2023-09-19
Payer: MEDICARE

## 2023-09-19 ENCOUNTER — E-CONSULT (OUTPATIENT)
Dept: DERMATOLOGY | Facility: CLINIC | Age: 78
End: 2023-09-19

## 2023-09-19 VITALS
OXYGEN SATURATION: 99 % | RESPIRATION RATE: 18 BRPM | HEART RATE: 77 BPM | DIASTOLIC BLOOD PRESSURE: 72 MMHG | SYSTOLIC BLOOD PRESSURE: 128 MMHG | TEMPERATURE: 98 F

## 2023-09-19 DIAGNOSIS — M17.0 PRIMARY OSTEOARTHRITIS OF BOTH KNEES: ICD-10-CM

## 2023-09-19 DIAGNOSIS — I10 PRIMARY HYPERTENSION: ICD-10-CM

## 2023-09-19 DIAGNOSIS — L01.00 IMPETIGO: Primary | ICD-10-CM

## 2023-09-19 DIAGNOSIS — L30.9 DERMATITIS: Primary | ICD-10-CM

## 2023-09-19 DIAGNOSIS — I87.2 VENOUS STASIS DERMATITIS, UNSPECIFIED LATERALITY: ICD-10-CM

## 2023-09-19 PROCEDURE — 99349 PR HOME VISIT,ESTAB PATIENT,LEVEL III: ICD-10-PCS | Mod: S$GLB,,,

## 2023-09-19 PROCEDURE — 99451 NTRPROF PH1/NTRNET/EHR 5/>: CPT | Mod: ,,, | Performed by: STUDENT IN AN ORGANIZED HEALTH CARE EDUCATION/TRAINING PROGRAM

## 2023-09-19 PROCEDURE — 99451 PR INTERPROF, PHONE/INTERNET/EHR, CONSULT, >= 5MINS: ICD-10-PCS | Mod: ,,, | Performed by: STUDENT IN AN ORGANIZED HEALTH CARE EDUCATION/TRAINING PROGRAM

## 2023-09-19 PROCEDURE — 99349 HOME/RES VST EST MOD MDM 40: CPT | Mod: S$GLB,,,

## 2023-09-19 NOTE — CONSULTS
[facility]  Response for E-Consult     Patient Name: Niru Reyes  MRN: [id number]  Primary Care Provider: Jordana Shirley MD   Requesting Provider: Scout Diez NP  E-Consult to Dermatology  Consult performed by: Mary Moran MD  Consult ordered by: Scout Diez NP  Reason for consult: Rash  Assessment/Recommendations: Thank you for this consult. I favor this is stasis dermatitis with secondary impetigo, so I like your plan of triamcinolone and mupirocin for open sores. I recommend culturing the open sores, and tailoring antibiotics accordingly. If not contraindicated, leg elevation and compression stocking use may help.           Recommendation: as above    Contingency: Reconsult if not improved in 2 weeks     Total time of Consultation: 10 minute    I did not speak to the requesting provider verbally about this.     *This eConsult is based on the clinical data available to me and is furnished without benefit of a physical examination. The eConsult will need to be interpreted in light of any clinical issues or changes in patient status not available to me at the time of filing this eConsults. Significant changes in patient condition or level of acuity should result in immediate formal consultation and reevaluation. Please alert me if you have further questions.    Thank you for this eConsult referral.     Mary Moran MD  [facility]

## 2023-09-19 NOTE — PROGRESS NOTES
[facility]  Medical Home Visit    Visit Date: 9/19/2023  Encounter Provider: Scout Diez  PCP:  Jordana Shirley MD    Subjective:      Patient ID: Niru Reyes is a 78 y.o. female.    Consult Requested By:  No ref. provider found  Reason for Consult:  Routine follow up, Dermatitis    Niru is being seen at home due to being seen at home due to physical debility that presents a taxing effort to leave the home, to mitigate high risk of hospital readmission and/or due to the limited availability of reliable or safe options for transportation to the point of access to the provider. Prior to treatment on this visit the chart was reviewed and patient verbal consent was obtained.    Chief Complaint: Follow-up      Ms. Marrufo is a 78 y.o. female being seen today for a follow up visit with dermatitis. She has medical diagnoses including obesity, lymphedema, and HTN. She has been having this rash for a few months. Prescribed mupirocin and triamcinolone for two weeks with some relief. Denies itching. Applies Jergens lotion daily after cleaning with mild soap. Will message Derm for additional treatment recommendations.   Reports bilateral knee pain improved with addition of celebrex. She is working with [facility] and has a nurse seeing her weekly. Denies any further complaints or concerns at this time. Questions elicited. Time allowed for questions, all issues addressed. Contact info given for any concerns or changes. Will follow up in 4 weeks/prn.              Review of Systems   Constitutional: Negative.    HENT: Negative.     Eyes: Negative.    Respiratory: Negative.  Negative for chest tightness.    Cardiovascular:  Positive for leg swelling.   Gastrointestinal: Negative.    Endocrine: Negative.    Genitourinary: Negative.    Musculoskeletal:  Positive for arthralgias and gait problem.   Skin:  Positive for color change and rash.   Allergic/Immunologic: Negative.    Neurological:  Positive for  weakness.   Hematological: Negative.    Psychiatric/Behavioral: Negative.  Negative for agitation.    All other systems reviewed and are negative.      Assessments:  Environmental: Patient lives in a mobile home with steps and ramp at the entrance.  There is adequate lighting and the temperature is comfortable.    Functional Status: Chair bound. Able to walk short distance with walker.   Safety: Fall precautions.   Nutritional: Adequate food in the home.   Home Health/DME/Supplies: [facility].    Objective:   Physical Exam  Vitals reviewed.   Constitutional:       General: She is not in acute distress.     Appearance: Normal appearance. She is well-developed. She is obese.   HENT:      Head: Normocephalic and atraumatic.      Nose: Nose normal.      Mouth/Throat:      Mouth: Mucous membranes are dry.      Pharynx: Oropharynx is clear.   Eyes:      Pupils: Pupils are equal, round, and reactive to light.   Cardiovascular:      Rate and Rhythm: Normal rate and regular rhythm.      Pulses: Normal pulses.      Heart sounds: Normal heart sounds.   Pulmonary:      Effort: Pulmonary effort is normal.      Breath sounds: Normal breath sounds.   Abdominal:      General: Bowel sounds are normal.      Palpations: Abdomen is soft.   Musculoskeletal:         General: Normal range of motion.      Cervical back: Normal range of motion and neck supple.      Right lower leg: Edema present.      Left lower leg: Edema present.   Skin:     General: Skin is warm and dry.      Findings: Rash present.      Comments: Generalized nodules BLE   Neurological:      General: No focal deficit present.      Mental Status: She is alert and oriented to person, place, and time. Mental status is at baseline.      Motor: Weakness present.      Gait: Gait abnormal.   Psychiatric:         Mood and Affect: Mood normal.         Behavior: Behavior normal.         Thought Content: Thought content normal.         Judgment: Judgment normal.                Vitals:    09/19/23 1528   BP: 128/72   Pulse: 77   Resp: 18   Temp: 98.3 °F (36.8 °C)   SpO2: 99%     There is no height or weight on file to calculate BMI.    Assessment:     1. Dermatitis    2. Primary hypertension    3. Primary osteoarthritis of both knees        Plan:     Ethical / Legal: Advance Care Planning   Surrogate decision maker:  Alexandria Hernandez, Relationship: Daughter  Code Status:  Full  [place]:  [place]  Other advance directive:  Living Will, Capacity to make medical decisions:  Yes, Conflict No       Problem List Items Addressed This Visit          Cardiac/Vascular    HTN (hypertension)    Current Assessment & Plan     /72 during visit  Continue current medication            Orthopedic    OA (osteoarthritis) of knee    Current Assessment & Plan     Improved with celebrex since last visit  Continue current medication          Other Visit Diagnoses       Dermatitis    -  Primary    Tried triamcinolone 0.1% and mupirocin with no resolution  Will reach out to derm for further treatment recommendations    Relevant Orders    E-Consult to Dermatology           - Continue all medications, treatments and therapies as ordered.   - Follow all instructions, recommendations as discussed.  - Maintain Safety Precautions at all times.  - Attend all medical appointments as scheduled.  - For worsening symptoms: call Primary Care Physician or Nurse Practitioner.  - For emergencies, call 911 or immediately report to the nearest emergency room.   Were controlled substances prescribed?  No    Total visit time: 30+ minutes of total time spent on the encounter, which includes face to face time and non-face to face time preparing to see the patient (eg, review of tests), Obtaining and/or reviewing separately obtained history, documenting clinical information in the electronic or other health record, independently interpreting results (not separately reported) and communicating results to the  patient/family/caregiver, or Care coordination (not separately reported).     Follow Up Appointments:   No future appointments.    Signature: Scout Diez NP

## 2023-09-26 DIAGNOSIS — L01.00 IMPETIGO: Primary | ICD-10-CM

## 2023-09-26 DIAGNOSIS — I87.2 VENOUS STASIS DERMATITIS OF BOTH LOWER EXTREMITIES: ICD-10-CM

## 2023-09-26 RX ORDER — CEPHALEXIN 500 MG/1
500 CAPSULE ORAL 4 TIMES DAILY
Qty: 28 CAPSULE | Refills: 0 | Status: SHIPPED | OUTPATIENT
Start: 2023-09-26 | End: 2023-10-03

## 2023-10-05 DIAGNOSIS — M17.0 OSTEOARTHRITIS OF BOTH KNEES, UNSPECIFIED OSTEOARTHRITIS TYPE: ICD-10-CM

## 2023-10-05 RX ORDER — CELECOXIB 100 MG/1
100 CAPSULE ORAL 2 TIMES DAILY
Qty: 60 CAPSULE | Refills: 1 | Status: SHIPPED | OUTPATIENT
Start: 2023-10-05 | End: 2023-12-08 | Stop reason: SDUPTHER

## 2023-10-05 NOTE — TELEPHONE ENCOUNTER
No care due was identified.  [facility] Embedded Care Due Messages. Reference number: [id number].   10/05/2023 2:43:12 PM CDT

## 2023-10-05 NOTE — TELEPHONE ENCOUNTER
----- Message from Rohan Lin sent at 10/5/2023  1:57 PM CDT -----  Please refill the medication(s) listed below.Please call the patient when the prescription(s) is ready for  at this phone number        Medication #1 celecoxib (CELEBREX) 100 MG capsule   Medication #2  Medication #3      Preferred Pharmacy:.  [id number] [address] [address] AT [facility] [address] & [facility]  [address]  [facility] [address]  Phone: [phone] Fax: [phone]            Would the patient rather a call back or a response via [facility]? CALL    Best Call Back Number:.Telephone Information:  Mobile          [phone]        Additional Information: RX refill

## 2023-11-28 ENCOUNTER — TELEPHONE (OUTPATIENT)
Dept: FAMILY MEDICINE | Facility: CLINIC | Age: 78
End: 2023-11-28
Payer: MEDICARE

## 2023-11-28 NOTE — TELEPHONE ENCOUNTER
----- Message from Minoo Cedillo sent at 11/28/2023 12:06 PM CST -----  Mary Medeiros  daughter Mary calling regarding Patient Advice for wanting to speak to the nurse to inform that the PT is having trouble with moving around without assistance, she is feeling very weak in the limbs/ upper body as of yesterday around 11 pm that night and is wanting to know what can be done to help with this sudden issue that the PT is having, call back [phone]

## 2023-11-28 NOTE — TELEPHONE ENCOUNTER
Spoke with pt. Daughter Mary about patient being to weak to get in and out of the bed and wheelchair without assistance. Mary explained that the patient  feeling very weak in the limbs/ upper body as of yesterday around 11 pm.  and is wanting to know if we can offer physical therapy or some type of assistance.  Please advise

## 2023-12-08 DIAGNOSIS — M17.0 OSTEOARTHRITIS OF BOTH KNEES, UNSPECIFIED OSTEOARTHRITIS TYPE: ICD-10-CM

## 2023-12-08 RX ORDER — CELECOXIB 100 MG/1
100 CAPSULE ORAL 2 TIMES DAILY
Qty: 60 CAPSULE | Refills: 1 | Status: SHIPPED | OUTPATIENT
Start: 2023-12-08 | End: 2024-03-05 | Stop reason: SDUPTHER

## 2023-12-08 NOTE — TELEPHONE ENCOUNTER
----- Message from Yasmeen Paez MA sent at 12/8/2023  2:47 PM CST -----  Requesting an RX refill or new RX.    RX name and strength: Celebrex    Is this a refill or new RX: Refill    Is this a 30 day or 90 day RX: 30 day    Pharmacy name and phone:     [facility] #[id number] - [place], [place] - [address] [address] AT [facility] [address] & [place] [address] [address] [address] [address]  Phone: [phone] Fax: [phone]          Need it for the weekend,

## 2023-12-08 NOTE — TELEPHONE ENCOUNTER
Care Due:                  Date            Visit Type   Department     Provider  --------------------------------------------------------------------------------                                VIRTUAL      [facility] INTERNAL  Jordana MACK  Last Visit: 04-      AUDIO ONLY   MEDICINE       [place]  Next Visit: None Scheduled  None         None Found                                                            Last  Test          Frequency    Reason                     Performed    Due Date  --------------------------------------------------------------------------------    CBC.........  12 months..  celecoxib................  03- 03-    CMP.........  12 months..  celecoxib,                 03-   03-                             hydroCHLOROthiazide......    [employer] Embedded Care Due Messages. Reference number: [id number].   12/08/2023 2:57:07 PM CST

## 2024-01-31 DIAGNOSIS — Z78.0 MENOPAUSE: ICD-10-CM

## 2024-03-05 DIAGNOSIS — M17.0 OSTEOARTHRITIS OF BOTH KNEES, UNSPECIFIED OSTEOARTHRITIS TYPE: ICD-10-CM

## 2024-03-05 RX ORDER — CELECOXIB 100 MG/1
100 CAPSULE ORAL 2 TIMES DAILY
Qty: 60 CAPSULE | Refills: 1 | Status: SHIPPED | OUTPATIENT
Start: 2024-03-05

## 2024-03-05 RX ORDER — AMLODIPINE BESYLATE 5 MG/1
5 TABLET ORAL DAILY
Qty: 30 TABLET | Refills: 11 | Status: SHIPPED | OUTPATIENT
Start: 2024-03-05 | End: 2024-05-15 | Stop reason: SDUPTHER

## 2024-03-05 NOTE — TELEPHONE ENCOUNTER
Care Due:                  Date            Visit Type   Department     Provider  --------------------------------------------------------------------------------                                VIRTUAL      [facility] INTERNAL  Jordana MACK  Last Visit: 04-      AUDIO ONLY   MEDICINE       [place]  Next Visit: None Scheduled  None         None Found                                                            Last  Test          Frequency    Reason                     Performed    Due Date  --------------------------------------------------------------------------------    CMP.........  12 months..  hydroCHLOROthiazide......  03-   03-    [facility] Embedded Care Due Messages. Reference number: [id number].   3/05/2024 3:15:35 PM CST

## 2024-03-05 NOTE — TELEPHONE ENCOUNTER
----- Message from Mely Mathur sent at 3/5/2024  2:17 PM CST -----  Contact: Niru Marrufo is need a call back in regards to refilling her amLODIPine (NORVASC) 5 MG tablet and celecoxib (CELEBREX) 100 MG capsule please give her a call back at [phone]       [facility] #[id number] [place], [place] - [address] [address] AT [facility] & [place] [address] [address] [address] [address]  Phone: [phone] Fax: [phone]

## 2024-03-14 ENCOUNTER — HOSPITAL ENCOUNTER (INPATIENT)
Facility: HOSPITAL | Age: 79
LOS: 5 days | Discharge: HOME HEALTH | DRG: 177 | End: 2024-03-19
Attending: EMERGENCY MEDICINE | Admitting: INTERNAL MEDICINE
Payer: MEDICARE

## 2024-03-14 DIAGNOSIS — U07.1 COVID-19: Primary | ICD-10-CM

## 2024-03-14 DIAGNOSIS — R79.89 ELEVATED BRAIN NATRIURETIC PEPTIDE (BNP) LEVEL: ICD-10-CM

## 2024-03-14 DIAGNOSIS — R09.02 HYPOXIA: ICD-10-CM

## 2024-03-14 DIAGNOSIS — R07.9 CHEST PAIN: ICD-10-CM

## 2024-03-14 DIAGNOSIS — I48.91 ATRIAL FIBRILLATION WITH RAPID VENTRICULAR RESPONSE: ICD-10-CM

## 2024-03-14 DIAGNOSIS — J96.01 ACUTE HYPOXEMIC RESPIRATORY FAILURE: ICD-10-CM

## 2024-03-14 DIAGNOSIS — I50.31 ACUTE DIASTOLIC CHF (CONGESTIVE HEART FAILURE): ICD-10-CM

## 2024-03-14 DIAGNOSIS — U07.1 COVID-19 VIRUS DETECTED: ICD-10-CM

## 2024-03-14 DIAGNOSIS — E66.01 MORBID OBESITY: ICD-10-CM

## 2024-03-14 DIAGNOSIS — I48.91 A-FIB: ICD-10-CM

## 2024-03-14 PROBLEM — J12.82 PNEUMONIA DUE TO COVID-19 VIRUS: Status: ACTIVE | Noted: 2024-03-14

## 2024-03-14 PROBLEM — R73.9 HYPERGLYCEMIA: Status: ACTIVE | Noted: 2024-03-14

## 2024-03-14 LAB
ALBUMIN SERPL BCP-MCNC: 3.3 G/DL (ref 3.5–5.2)
ALLENS TEST: ABNORMAL
ALP SERPL-CCNC: 80 U/L (ref 55–135)
ALT SERPL W/O P-5'-P-CCNC: 11 U/L (ref 10–44)
ANION GAP SERPL CALC-SCNC: 10 MMOL/L (ref 8–16)
AST SERPL-CCNC: 19 U/L (ref 10–40)
BASOPHILS # BLD AUTO: 0.05 K/UL (ref 0–0.2)
BASOPHILS NFR BLD: 0.7 % (ref 0–1.9)
BILIRUB SERPL-MCNC: 1.1 MG/DL (ref 0.1–1)
BNP SERPL-MCNC: 464 PG/ML (ref 0–99)
BUN SERPL-MCNC: 13 MG/DL (ref 8–23)
CALCIUM SERPL-MCNC: 8.6 MG/DL (ref 8.7–10.5)
CHLORIDE SERPL-SCNC: 106 MMOL/L (ref 95–110)
CO2 SERPL-SCNC: 21 MMOL/L (ref 23–29)
CREAT SERPL-MCNC: 0.8 MG/DL (ref 0.5–1.4)
CRP SERPL-MCNC: 17.1 MG/L (ref 0–8.2)
DELSYS: ABNORMAL
DIFFERENTIAL METHOD BLD: ABNORMAL
EOSINOPHIL # BLD AUTO: 0 K/UL (ref 0–0.5)
EOSINOPHIL NFR BLD: 0.3 % (ref 0–8)
ERYTHROCYTE [DISTWIDTH] IN BLOOD BY AUTOMATED COUNT: 15.1 % (ref 11.5–14.5)
EST. GFR  (NO RACE VARIABLE): >60 ML/MIN/1.73 M^2
FERRITIN SERPL-MCNC: 92 NG/ML (ref 20–300)
FLOW: 8
GLUCOSE SERPL-MCNC: 149 MG/DL (ref 70–110)
HCO3 UR-SCNC: 28.5 MMOL/L (ref 24–28)
HCT VFR BLD AUTO: 37.5 % (ref 37–48.5)
HCV AB SERPL QL IA: NEGATIVE
HEP C VIRUS HOLD SPECIMEN: NORMAL
HGB BLD-MCNC: 11.5 G/DL (ref 12–16)
HIV 1+2 AB+HIV1 P24 AG SERPL QL IA: NEGATIVE
IMM GRANULOCYTES # BLD AUTO: 0.05 K/UL (ref 0–0.04)
IMM GRANULOCYTES NFR BLD AUTO: 0.7 % (ref 0–0.5)
INFLUENZA A, MOLECULAR: NEGATIVE
INFLUENZA B, MOLECULAR: NEGATIVE
LACTATE SERPL-SCNC: 1 MMOL/L (ref 0.5–2.2)
LYMPHOCYTES # BLD AUTO: 0.3 K/UL (ref 1–4.8)
LYMPHOCYTES NFR BLD: 3.6 % (ref 18–48)
MAGNESIUM SERPL-MCNC: 1.9 MG/DL (ref 1.6–2.6)
MCH RBC QN AUTO: 26.4 PG (ref 27–31)
MCHC RBC AUTO-ENTMCNC: 30.7 G/DL (ref 32–36)
MCV RBC AUTO: 86 FL (ref 82–98)
MODE: ABNORMAL
MONOCYTES # BLD AUTO: 0.6 K/UL (ref 0.3–1)
MONOCYTES NFR BLD: 7.8 % (ref 4–15)
NEUTROPHILS # BLD AUTO: 6.3 K/UL (ref 1.8–7.7)
NEUTROPHILS NFR BLD: 86.9 % (ref 38–73)
NRBC BLD-RTO: 0 /100 WBC
PCO2 BLDA: 52.4 MMHG (ref 35–45)
PH SMN: 7.34 [PH] (ref 7.35–7.45)
PLATELET # BLD AUTO: 198 K/UL (ref 150–450)
PMV BLD AUTO: 10.5 FL (ref 9.2–12.9)
PO2 BLDA: 89 MMHG (ref 80–100)
POC BE: 3 MMOL/L
POC SATURATED O2: 96 % (ref 95–100)
POTASSIUM SERPL-SCNC: 4.9 MMOL/L (ref 3.5–5.1)
PROCALCITONIN SERPL IA-MCNC: 0.03 NG/ML
PROT SERPL-MCNC: 7 G/DL (ref 6–8.4)
RBC # BLD AUTO: 4.36 M/UL (ref 4–5.4)
SAMPLE: ABNORMAL
SARS-COV-2 RDRP RESP QL NAA+PROBE: POSITIVE
SITE: ABNORMAL
SODIUM SERPL-SCNC: 137 MMOL/L (ref 136–145)
SPECIMEN SOURCE: NORMAL
TROPONIN I SERPL DL<=0.01 NG/ML-MCNC: <0.006 NG/ML (ref 0–0.03)
TSH SERPL DL<=0.005 MIU/L-ACNC: 0.09 UIU/ML (ref 0.4–4)
WBC # BLD AUTO: 7.22 K/UL (ref 3.9–12.7)

## 2024-03-14 PROCEDURE — 84145 PROCALCITONIN (PCT): CPT | Performed by: INTERNAL MEDICINE

## 2024-03-14 PROCEDURE — 84439 ASSAY OF FREE THYROXINE: CPT | Performed by: INTERNAL MEDICINE

## 2024-03-14 PROCEDURE — 27000221 HC OXYGEN, UP TO 24 HOURS

## 2024-03-14 PROCEDURE — 27100171 HC OXYGEN HIGH FLOW UP TO 24 HOURS

## 2024-03-14 PROCEDURE — 86140 C-REACTIVE PROTEIN: CPT | Performed by: INTERNAL MEDICINE

## 2024-03-14 PROCEDURE — U0002 COVID-19 LAB TEST NON-CDC: HCPCS | Performed by: EMERGENCY MEDICINE

## 2024-03-14 PROCEDURE — 25000003 PHARM REV CODE 250: Performed by: EMERGENCY MEDICINE

## 2024-03-14 PROCEDURE — 87040 BLOOD CULTURE FOR BACTERIA: CPT | Performed by: EMERGENCY MEDICINE

## 2024-03-14 PROCEDURE — 94761 N-INVAS EAR/PLS OXIMETRY MLT: CPT | Mod: XB

## 2024-03-14 PROCEDURE — 82728 ASSAY OF FERRITIN: CPT | Performed by: INTERNAL MEDICINE

## 2024-03-14 PROCEDURE — 99285 EMERGENCY DEPT VISIT HI MDM: CPT | Mod: 25

## 2024-03-14 PROCEDURE — 36415 COLL VENOUS BLD VENIPUNCTURE: CPT | Performed by: INTERNAL MEDICINE

## 2024-03-14 PROCEDURE — 27000207 HC ISOLATION

## 2024-03-14 PROCEDURE — 63600175 PHARM REV CODE 636 W HCPCS: Performed by: EMERGENCY MEDICINE

## 2024-03-14 PROCEDURE — 21400001 HC TELEMETRY ROOM

## 2024-03-14 PROCEDURE — 96375 TX/PRO/DX INJ NEW DRUG ADDON: CPT

## 2024-03-14 PROCEDURE — 80053 COMPREHEN METABOLIC PANEL: CPT | Performed by: EMERGENCY MEDICINE

## 2024-03-14 PROCEDURE — 63600175 PHARM REV CODE 636 W HCPCS: Performed by: INTERNAL MEDICINE

## 2024-03-14 PROCEDURE — 86803 HEPATITIS C AB TEST: CPT | Performed by: EMERGENCY MEDICINE

## 2024-03-14 PROCEDURE — 82803 BLOOD GASES ANY COMBINATION: CPT

## 2024-03-14 PROCEDURE — 83036 HEMOGLOBIN GLYCOSYLATED A1C: CPT | Performed by: INTERNAL MEDICINE

## 2024-03-14 PROCEDURE — 85025 COMPLETE CBC W/AUTO DIFF WBC: CPT | Performed by: EMERGENCY MEDICINE

## 2024-03-14 PROCEDURE — 87081 CULTURE SCREEN ONLY: CPT | Performed by: INTERNAL MEDICINE

## 2024-03-14 PROCEDURE — 87502 INFLUENZA DNA AMP PROBE: CPT | Performed by: EMERGENCY MEDICINE

## 2024-03-14 PROCEDURE — 87389 HIV-1 AG W/HIV-1&-2 AB AG IA: CPT | Performed by: EMERGENCY MEDICINE

## 2024-03-14 PROCEDURE — 83735 ASSAY OF MAGNESIUM: CPT | Performed by: INTERNAL MEDICINE

## 2024-03-14 PROCEDURE — 99900035 HC TECH TIME PER 15 MIN (STAT)

## 2024-03-14 PROCEDURE — 36600 WITHDRAWAL OF ARTERIAL BLOOD: CPT

## 2024-03-14 PROCEDURE — 93010 ELECTROCARDIOGRAM REPORT: CPT | Mod: ,,, | Performed by: INTERNAL MEDICINE

## 2024-03-14 PROCEDURE — 84443 ASSAY THYROID STIM HORMONE: CPT | Performed by: INTERNAL MEDICINE

## 2024-03-14 PROCEDURE — 25000003 PHARM REV CODE 250: Performed by: INTERNAL MEDICINE

## 2024-03-14 PROCEDURE — 93005 ELECTROCARDIOGRAM TRACING: CPT

## 2024-03-14 PROCEDURE — 83880 ASSAY OF NATRIURETIC PEPTIDE: CPT | Performed by: EMERGENCY MEDICINE

## 2024-03-14 PROCEDURE — XW033E5 INTRODUCTION OF REMDESIVIR ANTI-INFECTIVE INTO PERIPHERAL VEIN, PERCUTANEOUS APPROACH, NEW TECHNOLOGY GROUP 5: ICD-10-PCS | Performed by: INTERNAL MEDICINE

## 2024-03-14 PROCEDURE — 83605 ASSAY OF LACTIC ACID: CPT | Performed by: EMERGENCY MEDICINE

## 2024-03-14 PROCEDURE — 96365 THER/PROPH/DIAG IV INF INIT: CPT

## 2024-03-14 PROCEDURE — 84484 ASSAY OF TROPONIN QUANT: CPT | Performed by: EMERGENCY MEDICINE

## 2024-03-14 RX ORDER — METOPROLOL SUCCINATE 25 MG/1
25 TABLET, EXTENDED RELEASE ORAL DAILY
Status: DISCONTINUED | OUTPATIENT
Start: 2024-03-15 | End: 2024-03-15

## 2024-03-14 RX ORDER — ENOXAPARIN SODIUM 150 MG/ML
150 INJECTION SUBCUTANEOUS EVERY 12 HOURS
Status: DISCONTINUED | OUTPATIENT
Start: 2024-03-14 | End: 2024-03-19 | Stop reason: HOSPADM

## 2024-03-14 RX ORDER — ACETAMINOPHEN 325 MG/1
650 TABLET ORAL EVERY 6 HOURS PRN
Status: DISCONTINUED | OUTPATIENT
Start: 2024-03-14 | End: 2024-03-19 | Stop reason: HOSPADM

## 2024-03-14 RX ORDER — DEXAMETHASONE SODIUM PHOSPHATE 4 MG/ML
6 INJECTION, SOLUTION INTRA-ARTICULAR; INTRALESIONAL; INTRAMUSCULAR; INTRAVENOUS; SOFT TISSUE DAILY
Status: DISCONTINUED | OUTPATIENT
Start: 2024-03-15 | End: 2024-03-19 | Stop reason: HOSPADM

## 2024-03-14 RX ORDER — FUROSEMIDE 10 MG/ML
40 INJECTION INTRAMUSCULAR; INTRAVENOUS EVERY 12 HOURS
Status: DISCONTINUED | OUTPATIENT
Start: 2024-03-15 | End: 2024-03-19 | Stop reason: HOSPADM

## 2024-03-14 RX ORDER — LEVALBUTEROL INHALATION SOLUTION 0.63 MG/3ML
1.25 SOLUTION RESPIRATORY (INHALATION) EVERY 6 HOURS PRN
Status: DISCONTINUED | OUTPATIENT
Start: 2024-03-14 | End: 2024-03-19 | Stop reason: HOSPADM

## 2024-03-14 RX ORDER — HYDRALAZINE HYDROCHLORIDE 20 MG/ML
10 INJECTION INTRAMUSCULAR; INTRAVENOUS EVERY 6 HOURS PRN
Status: DISCONTINUED | OUTPATIENT
Start: 2024-03-14 | End: 2024-03-19 | Stop reason: HOSPADM

## 2024-03-14 RX ORDER — ENOXAPARIN SODIUM 100 MG/ML
60 INJECTION SUBCUTANEOUS EVERY 12 HOURS
Status: DISCONTINUED | OUTPATIENT
Start: 2024-03-14 | End: 2024-03-14

## 2024-03-14 RX ORDER — FUROSEMIDE 10 MG/ML
40 INJECTION INTRAMUSCULAR; INTRAVENOUS
Status: COMPLETED | OUTPATIENT
Start: 2024-03-14 | End: 2024-03-14

## 2024-03-14 RX ORDER — ONDANSETRON HYDROCHLORIDE 2 MG/ML
4 INJECTION, SOLUTION INTRAVENOUS EVERY 6 HOURS PRN
Status: DISCONTINUED | OUTPATIENT
Start: 2024-03-14 | End: 2024-03-19 | Stop reason: HOSPADM

## 2024-03-14 RX ORDER — DEXAMETHASONE SODIUM PHOSPHATE 4 MG/ML
8 INJECTION, SOLUTION INTRA-ARTICULAR; INTRALESIONAL; INTRAMUSCULAR; INTRAVENOUS; SOFT TISSUE
Status: COMPLETED | OUTPATIENT
Start: 2024-03-14 | End: 2024-03-14

## 2024-03-14 RX ORDER — METOPROLOL TARTRATE 25 MG/1
25 TABLET, FILM COATED ORAL ONCE
Status: COMPLETED | OUTPATIENT
Start: 2024-03-14 | End: 2024-03-14

## 2024-03-14 RX ORDER — IBUPROFEN 200 MG
16 TABLET ORAL
Status: DISCONTINUED | OUTPATIENT
Start: 2024-03-14 | End: 2024-03-19 | Stop reason: HOSPADM

## 2024-03-14 RX ORDER — AMLODIPINE BESYLATE 5 MG/1
5 TABLET ORAL DAILY
Status: DISCONTINUED | OUTPATIENT
Start: 2024-03-15 | End: 2024-03-14

## 2024-03-14 RX ORDER — NALOXONE HCL 0.4 MG/ML
0.02 VIAL (ML) INJECTION
Status: DISCONTINUED | OUTPATIENT
Start: 2024-03-14 | End: 2024-03-19 | Stop reason: HOSPADM

## 2024-03-14 RX ORDER — MUPIROCIN 20 MG/G
OINTMENT TOPICAL 2 TIMES DAILY
Status: COMPLETED | OUTPATIENT
Start: 2024-03-14 | End: 2024-03-19

## 2024-03-14 RX ORDER — GLUCAGON 1 MG
1 KIT INJECTION
Status: DISCONTINUED | OUTPATIENT
Start: 2024-03-14 | End: 2024-03-19 | Stop reason: HOSPADM

## 2024-03-14 RX ORDER — SODIUM CHLORIDE 0.9 % (FLUSH) 0.9 %
10 SYRINGE (ML) INJECTION EVERY 12 HOURS PRN
Status: DISCONTINUED | OUTPATIENT
Start: 2024-03-14 | End: 2024-03-19 | Stop reason: HOSPADM

## 2024-03-14 RX ORDER — IBUPROFEN 200 MG
24 TABLET ORAL
Status: DISCONTINUED | OUTPATIENT
Start: 2024-03-14 | End: 2024-03-19 | Stop reason: HOSPADM

## 2024-03-14 RX ORDER — IPRATROPIUM BROMIDE AND ALBUTEROL SULFATE 2.5; .5 MG/3ML; MG/3ML
3 SOLUTION RESPIRATORY (INHALATION) EVERY 6 HOURS PRN
Status: DISCONTINUED | OUTPATIENT
Start: 2024-03-14 | End: 2024-03-14

## 2024-03-14 RX ORDER — ALBUTEROL SULFATE 90 UG/1
2 AEROSOL, METERED RESPIRATORY (INHALATION) EVERY 8 HOURS
Status: DISCONTINUED | OUTPATIENT
Start: 2024-03-15 | End: 2024-03-15

## 2024-03-14 RX ORDER — METOPROLOL TARTRATE 1 MG/ML
5 INJECTION, SOLUTION INTRAVENOUS EVERY 5 MIN PRN
Status: DISCONTINUED | OUTPATIENT
Start: 2024-03-14 | End: 2024-03-19 | Stop reason: HOSPADM

## 2024-03-14 RX ADMIN — VANCOMYCIN HYDROCHLORIDE 2500 MG: 500 INJECTION, POWDER, LYOPHILIZED, FOR SOLUTION INTRAVENOUS at 08:03

## 2024-03-14 RX ADMIN — CEFEPIME 2 G: 2 INJECTION, POWDER, FOR SOLUTION INTRAVENOUS at 08:03

## 2024-03-14 RX ADMIN — ENOXAPARIN SODIUM 150 MG: 150 INJECTION SUBCUTANEOUS at 11:03

## 2024-03-14 RX ADMIN — FUROSEMIDE 40 MG: 10 INJECTION, SOLUTION INTRAMUSCULAR; INTRAVENOUS at 08:03

## 2024-03-14 RX ADMIN — DEXAMETHASONE SODIUM PHOSPHATE 8 MG: 4 INJECTION INTRA-ARTICULAR; INTRALESIONAL; INTRAMUSCULAR; INTRAVENOUS; SOFT TISSUE at 08:03

## 2024-03-14 RX ADMIN — METOPROLOL TARTRATE 25 MG: 25 TABLET, FILM COATED ORAL at 07:03

## 2024-03-14 RX ADMIN — REMDESIVIR 200 MG: 100 INJECTION, POWDER, LYOPHILIZED, FOR SOLUTION INTRAVENOUS at 11:03

## 2024-03-14 RX ADMIN — METOROPROLOL TARTRATE 5 MG: 5 INJECTION, SOLUTION INTRAVENOUS at 06:03

## 2024-03-14 RX ADMIN — MUPIROCIN: 20 OINTMENT TOPICAL at 11:03

## 2024-03-14 NOTE — Clinical Note
Diagnosis: COVID-19 [[id number]]   Future Attending Provider: CHENCHO CALERO [[id number]]   Reason for IP Medical Treatment  (Clinical interventions that can only be accomplished in the IP setting? ) :: Covid 19, hypoxia, atrial fibrillation with rvr   I certify that Inpatient services for greater than or equal to 2 midnights are medically necessary:: Yes   Plans for Post-Acute care--if anticipated (pick the single best option):: A. No post acute care anticipated at this time

## 2024-03-14 NOTE — ED PROVIDER NOTES
SCRIBE #1 NOTE: I, Kyle Enriquez with [facility], am scribing for, and in the presence of, Demond James DO. I have scribed the entire note.       History     Chief Complaint   Patient presents with    Shortness of Breath     Pt reports SOB. Fire department found pt 83% on RA and put her on 8L and came up to 94%. [facility] heard inspiratory wheezing and diminished breath sounds to R. Side. Pt given duo neb and pt's HR then became irregular. Afib with HR going into 200s. Pt denies hx of a.fib, copd, or chf. Pt has been treated for BLE cellulitis.      Review of patient's allergies indicates:  No Known Allergies      History of Present Illness     HPI    3/14/2024, 6:03 PM  History obtained from the  EMS and patient      History of Present Illness: Niru Reyes is a 78 y.o. female patient with a PMHx of HTN and cellulitis who presents to the Emergency Department for evaluation of SOB. which onset approximately 3 days ago. Symptoms are constant and moderate in severity. No mitigating or exacerbating factors reported. Associated sxs include BLE rash, easily bruising, generalized abd pain, and BLE swelling. Patient denies any CP, chest tightness, fevers, chills, and all other sxs at this time. Pt is on at home wound care for cellulitis and denies any CV history.     Per EMS stated that the pt was wheezing PTA as well as diminished breathing sounds to the right side. She was given duo neb Tx. Pt began to experience Afib shortly after with HR peaking 200. She arrived wearing a non-rebreather 8L O2 due to SpO2 in the 80s with improvement.     Arrival mode:  EMS     PCP: Jordana Shirley MD        Past Medical History:  Past Medical History:   Diagnosis Date    Hypertension        Past Surgical History:  Past Surgical History:   Procedure Laterality Date     SECTION      COLONOSCOPY N/A 2019    Procedure: COLONOSCOPY;  Surgeon: Janeth Leroy MD;  Location: [facility];  Service:  Endoscopy;  Laterality: N/A;    HYSTERECTOMY           Family History:  Family History   Problem Relation Age of Onset    Heart disease Mother         valvulopathy    Muscular dystrophy Father     Heart disease Sister         CAD     Birth defects Daughter        Social History:  Social History     Tobacco Use    Smoking status: Never    Smokeless tobacco: Not on file   Substance and Sexual Activity    Alcohol use: No    Drug use: No    Sexual activity: Never        Review of Systems     Review of Systems   Constitutional:  Negative for chills and fever.   Respiratory:  Positive for shortness of breath and wheezing (Per EMS). Negative for chest tightness.    Cardiovascular:  Positive for palpitations (Per EMS) and leg swelling (BLE). Negative for chest pain.   Gastrointestinal:  Positive for abdominal pain (generalized).   Skin:  Positive for rash (BLE).   Hematological:  Bruises/bleeds easily.      Physical Exam     Initial Vitals   BP Pulse Resp Temp SpO2   03/14/24 1802 03/14/24 1802 03/14/24 1802 03/14/24 1807 03/14/24 1802   135/87 (!) 141 (!) 24 99.8 °F (37.7 °C) (!) 93 %      MAP       --                 Physical Exam  Constitutional:       General: She is in acute distress.   Cardiovascular:      Comments: Tachycardic rate and irregular rhythm  Pulmonary:      Comments: Tachypneic, decreased breath sounds bilaterally  Abdominal:      Palpations: Abdomen is soft.      Tenderness: There is no abdominal tenderness.   Musculoskeletal:         General: Normal range of motion.   Skin:     General: Skin is warm.      Findings: No rash.   Neurological:      General: No focal deficit present.      Mental Status: She is alert and oriented to person, place, and time.       Nursing Notes and Vital Signs Reviewed.       ED Course   Critical Care    Date/Time: 3/14/2024 8:30 PM    Performed by: Demond James DO  Authorized by: Demond James DO  Direct patient critical care time: 15 minutes  Additional  history critical care time: 5 minutes  Ordering / reviewing critical care time: 5 minutes  Documentation critical care time: 5 minutes  Consulting other physicians critical care time: 5 minutes  Total critical care time (exclusive of procedural time) : 35 minutes  Critical care time was exclusive of separately billable procedures and treating other patients.  Critical care was necessary to treat or prevent imminent or life-threatening deterioration of the following conditions: cardiac failure, sepsis and respiratory failure.  Critical care was time spent personally by me on the following activities: discussions with consultants, interpretation of cardiac output measurements, evaluation of patient's response to treatment, examination of patient, obtaining history from patient or surrogate, ordering and performing treatments and interventions, ordering and review of laboratory studies, ordering and review of radiographic studies, pulse oximetry, re-evaluation of patient's condition and review of old charts.        ED Vital Signs:  Vitals:    03/17/24 1520 03/17/24 1559 03/17/24 1843 03/17/24 1914   BP:  (!) 130/58     Pulse: 87 81 80 88   Resp:  16  18   Temp:  97.4 °F (36.3 °C)     TempSrc:  Oral     SpO2:  96%  97%   Weight:       Height:        03/17/24 2000 03/17/24 2020 03/18/24 0043 03/18/24 0400   BP:  138/65 136/62    Pulse: 76 72 75 86   Resp:  20 19    Temp:  97.6 °F (36.4 °C) 97.6 °F (36.4 °C)    TempSrc:  Oral Oral    SpO2:  95% 96%    Weight:       Height:        03/18/24 0534 03/18/24 0700 03/18/24 0734 03/18/24 0828   BP: (!) 166/85  (!) 171/91    Pulse: 94 88 77 94   Resp: 18 16 18    Temp: 97.7 °F (36.5 °C)  97.7 °F (36.5 °C)    TempSrc: Oral  Oral    SpO2: 96% 98% 95%    Weight:       Height:        03/18/24 0950 03/18/24 1038 03/18/24 1105   BP:      Pulse: 84 84 81   Resp:      Temp:      TempSrc:      SpO2:  (!) 93%    Weight:      Height:          Abnormal Lab Results:  Labs Reviewed   CBC W/  AUTO DIFFERENTIAL - Abnormal; Notable for the following components:       Result Value    Hemoglobin 11.5 (*)     MCH 26.4 (*)     MCHC 30.7 (*)     RDW 15.1 (*)     Immature Granulocytes 0.7 (*)     Immature Grans (Abs) 0.05 (*)     Lymph # 0.3 (*)     Gran % 86.9 (*)     Lymph % 3.6 (*)     All other components within normal limits    Narrative:     Release to patient->Immediate   COMPREHENSIVE METABOLIC PANEL - Abnormal; Notable for the following components:    CO2 21 (*)     Glucose 149 (*)     Calcium 8.6 (*)     Albumin 3.3 (*)     Total Bilirubin 1.1 (*)     All other components within normal limits    Narrative:     Release to patient->Immediate   B-TYPE NATRIURETIC PEPTIDE - Abnormal; Notable for the following components:     (*)     All other components within normal limits    Narrative:     Release to patient->Immediate   SARS-COV-2 RNA AMPLIFICATION, QUAL - Abnormal; Notable for the following components:    SARS-CoV-2 RNA, Amplification, Qual Positive (*)     All other components within normal limits   ISTAT PROCEDURE - Abnormal; Notable for the following components:    POC PH 7.343 (*)     POC PCO2 52.4 (*)     POC HCO3 28.5 (*)     POC BE 3 (*)     All other components within normal limits   INFLUENZA A & B BY MOLECULAR   HIV 1 / 2 ANTIBODY    Narrative:     Release to patient->Immediate   HEPATITIS C ANTIBODY    Narrative:     Release to patient->Immediate   HEP C VIRUS HOLD SPECIMEN    Narrative:     Release to patient->Immediate   TROPONIN I    Narrative:     Release to patient->Immediate   LACTIC ACID, PLASMA        All Lab Results:  Results for orders placed or performed during the hospital encounter of 03/14/24   Influenza A & B by Molecular    Specimen: Nasopharyngeal Swab   Result Value Ref Range    Influenza A, Molecular Negative Negative    Influenza B, Molecular Negative Negative    Flu A & B Source Nasal swab    Blood culture #1 **CANNOT BE ORDERED STAT**    Specimen: Peripheral,  Hand, Left; Blood   Result Value Ref Range    Blood Culture, Routine No Growth to date     Blood Culture, Routine No Growth to date     Blood Culture, Routine No Growth to date     Blood Culture, Routine No Growth to date    Blood culture #2 **CANNOT BE ORDERED STAT**    Specimen: Peripheral, Forearm, Right; Blood   Result Value Ref Range    Blood Culture, Routine No Growth to date     Blood Culture, Routine No Growth to date     Blood Culture, Routine No Growth to date     Blood Culture, Routine No Growth to date    Culture, MRSA    Specimen: Nares, Left; MRSA source   Result Value Ref Range    MRSA Surveillance Screen No MRSA isolated    Culture, Respiratory with Gram Stain    Specimen: Sputum   Result Value Ref Range    Respiratory Culture Normal respiratory marco a     Respiratory Culture No S aureus or Pseudomonas isolated.     Gram Stain (Respiratory) <10 epithelial cells per low power field.     Gram Stain (Respiratory) Rare WBC's     Gram Stain (Respiratory) Rare Gram positive cocci     Gram Stain (Respiratory) Rare yeast    HIV 1/2 Ag/Ab (4th Gen)   Result Value Ref Range    HIV 1/2 Ag/Ab Negative Negative   Hepatitis C Antibody   Result Value Ref Range    Hepatitis C Ab Negative Negative   HCV Virus Hold Specimen   Result Value Ref Range    HEP C Virus Hold Specimen Hold for HCV sendout    CBC auto differential   Result Value Ref Range    WBC 7.22 3.90 - 12.70 K/uL    RBC 4.36 4.00 - 5.40 M/uL    Hemoglobin 11.5 (L) 12.0 - 16.0 g/dL    Hematocrit 37.5 37.0 - 48.5 %    MCV 86 82 - 98 fL    MCH 26.4 (L) 27.0 - 31.0 pg    MCHC 30.7 (L) 32.0 - 36.0 g/dL    RDW 15.1 (H) 11.5 - 14.5 %    Platelets 198 150 - 450 K/uL    MPV 10.5 9.2 - 12.9 fL    Immature Granulocytes 0.7 (H) 0.0 - 0.5 %    Gran # (ANC) 6.3 1.8 - 7.7 K/uL    Immature Grans (Abs) 0.05 (H) 0.00 - 0.04 K/uL    Lymph # 0.3 (L) 1.0 - 4.8 K/uL    Mono # 0.6 0.3 - 1.0 K/uL    Eos # 0.0 0.0 - 0.5 K/uL    Baso # 0.05 0.00 - 0.20 K/uL    nRBC 0 0 /100 WBC     Gran % 86.9 (H) 38.0 - 73.0 %    Lymph % 3.6 (L) 18.0 - 48.0 %    Mono % 7.8 4.0 - 15.0 %    Eosinophil % 0.3 0.0 - 8.0 %    Basophil % 0.7 0.0 - 1.9 %    Differential Method Automated    Comprehensive metabolic panel   Result Value Ref Range    Sodium 137 136 - 145 mmol/L    Potassium 4.9 3.5 - 5.1 mmol/L    Chloride 106 95 - 110 mmol/L    CO2 21 (L) 23 - 29 mmol/L    Glucose 149 (H) 70 - 110 mg/dL    BUN 13 8 - 23 mg/dL    Creatinine 0.8 0.5 - 1.4 mg/dL    Calcium 8.6 (L) 8.7 - 10.5 mg/dL    Total Protein 7.0 6.0 - 8.4 g/dL    Albumin 3.3 (L) 3.5 - 5.2 g/dL    Total Bilirubin 1.1 (H) 0.1 - 1.0 mg/dL    Alkaline Phosphatase 80 55 - 135 U/L    AST 19 10 - 40 U/L    ALT 11 10 - 44 U/L    eGFR >60 >60 mL/min/1.73 m^2    Anion Gap 10 8 - 16 mmol/L   Troponin I   Result Value Ref Range    Troponin I <0.006 0.000 - 0.026 ng/mL   Brain natriuretic peptide   Result Value Ref Range     (H) 0 - 99 pg/mL   COVID-19 Rapid Screening   Result Value Ref Range    SARS-CoV-2 RNA, Amplification, Qual Positive (A) Negative   Lactic acid, plasma   Result Value Ref Range    Lactate (Lactic Acid) 1.0 0.5 - 2.2 mmol/L   Procalcitonin   Result Value Ref Range    Procalcitonin 0.03 <0.25 ng/mL   Hemoglobin A1c   Result Value Ref Range    Hemoglobin A1C 5.4 4.0 - 5.6 %    Estimated Avg Glucose 108 68 - 131 mg/dL   TSH   Result Value Ref Range    TSH 0.089 (L) 0.400 - 4.000 uIU/mL   Magnesium   Result Value Ref Range    Magnesium 1.9 1.6 - 2.6 mg/dL   Ferritin   Result Value Ref Range    Ferritin 92 20.0 - 300.0 ng/mL   C-reactive protein   Result Value Ref Range    CRP 17.1 (H) 0.0 - 8.2 mg/L   T4, Free   Result Value Ref Range    Free T4 1.14 0.71 - 1.51 ng/dL   CBC Auto Differential   Result Value Ref Range    WBC 5.15 3.90 - 12.70 K/uL    RBC 4.84 4.00 - 5.40 M/uL    Hemoglobin 12.8 12.0 - 16.0 g/dL    Hematocrit 43.6 37.0 - 48.5 %    MCV 90 82 - 98 fL    MCH 26.4 (L) 27.0 - 31.0 pg    MCHC 29.4 (L) 32.0 - 36.0 g/dL    RDW 15.1  (H) 11.5 - 14.5 %    Platelets 188 150 - 450 K/uL    MPV 11.0 9.2 - 12.9 fL    Immature Granulocytes 1.2 (H) 0.0 - 0.5 %    Gran # (ANC) 4.3 1.8 - 7.7 K/uL    Immature Grans (Abs) 0.06 (H) 0.00 - 0.04 K/uL    Lymph # 0.7 (L) 1.0 - 4.8 K/uL    Mono # 0.1 (L) 0.3 - 1.0 K/uL    Eos # 0.0 0.0 - 0.5 K/uL    Baso # 0.04 0.00 - 0.20 K/uL    nRBC 0 0 /100 WBC    Gran % 82.9 (H) 38.0 - 73.0 %    Lymph % 12.6 (L) 18.0 - 48.0 %    Mono % 2.5 (L) 4.0 - 15.0 %    Eosinophil % 0.0 0.0 - 8.0 %    Basophil % 0.8 0.0 - 1.9 %    Differential Method Automated    Comprehensive metabolic panel   Result Value Ref Range    Sodium 141 136 - 145 mmol/L    Potassium 4.8 3.5 - 5.1 mmol/L    Chloride 103 95 - 110 mmol/L    CO2 27 23 - 29 mmol/L    Glucose 143 (H) 70 - 110 mg/dL    BUN 13 8 - 23 mg/dL    Creatinine 0.8 0.5 - 1.4 mg/dL    Calcium 8.5 (L) 8.7 - 10.5 mg/dL    Total Protein 6.9 6.0 - 8.4 g/dL    Albumin 3.4 (L) 3.5 - 5.2 g/dL    Total Bilirubin 0.9 0.1 - 1.0 mg/dL    Alkaline Phosphatase 83 55 - 135 U/L    AST 14 10 - 40 U/L    ALT 9 (L) 10 - 44 U/L    eGFR >60 >60 mL/min/1.73 m^2    Anion Gap 11 8 - 16 mmol/L   CBC Auto Differential   Result Value Ref Range    WBC 4.53 3.90 - 12.70 K/uL    RBC 4.27 4.00 - 5.40 M/uL    Hemoglobin 11.3 (L) 12.0 - 16.0 g/dL    Hematocrit 38.2 37.0 - 48.5 %    MCV 90 82 - 98 fL    MCH 26.5 (L) 27.0 - 31.0 pg    MCHC 29.6 (L) 32.0 - 36.0 g/dL    RDW 14.9 (H) 11.5 - 14.5 %    Platelets 161 150 - 450 K/uL    MPV 11.6 9.2 - 12.9 fL    Immature Granulocytes 0.7 (H) 0.0 - 0.5 %    Gran # (ANC) 2.8 1.8 - 7.7 K/uL    Immature Grans (Abs) 0.03 0.00 - 0.04 K/uL    Lymph # 0.8 (L) 1.0 - 4.8 K/uL    Mono # 0.9 0.3 - 1.0 K/uL    Eos # 0.0 0.0 - 0.5 K/uL    Baso # 0.03 0.00 - 0.20 K/uL    nRBC 0 0 /100 WBC    Gran % 62.0 38.0 - 73.0 %    Lymph % 17.0 (L) 18.0 - 48.0 %    Mono % 19.6 (H) 4.0 - 15.0 %    Eosinophil % 0.0 0.0 - 8.0 %    Basophil % 0.7 0.0 - 1.9 %    Differential Method Automated    Comprehensive  metabolic panel   Result Value Ref Range    Sodium 140 136 - 145 mmol/L    Potassium 4.3 3.5 - 5.1 mmol/L    Chloride 103 95 - 110 mmol/L    CO2 28 23 - 29 mmol/L    Glucose 101 70 - 110 mg/dL    BUN 19 8 - 23 mg/dL    Creatinine 0.8 0.5 - 1.4 mg/dL    Calcium 8.2 (L) 8.7 - 10.5 mg/dL    Total Protein 6.4 6.0 - 8.4 g/dL    Albumin 3.0 (L) 3.5 - 5.2 g/dL    Total Bilirubin 0.7 0.1 - 1.0 mg/dL    Alkaline Phosphatase 69 55 - 135 U/L    AST 17 10 - 40 U/L    ALT 10 10 - 44 U/L    eGFR >60 >60 mL/min/1.73 m^2    Anion Gap 9 8 - 16 mmol/L   Magnesium   Result Value Ref Range    Magnesium 1.9 1.6 - 2.6 mg/dL   VANCOMYCIN, TROUGH   Result Value Ref Range    Vancomycin-Trough 21.5 10.0 - 22.0 ug/mL   Basic metabolic panel   Result Value Ref Range    Sodium 142 136 - 145 mmol/L    Potassium 3.9 3.5 - 5.1 mmol/L    Chloride 100 95 - 110 mmol/L    CO2 36 (H) 23 - 29 mmol/L    Glucose 88 70 - 110 mg/dL    BUN 22 8 - 23 mg/dL    Creatinine 0.8 0.5 - 1.4 mg/dL    Calcium 8.1 (L) 8.7 - 10.5 mg/dL    Anion Gap 6 (L) 8 - 16 mmol/L    eGFR >60 >60 mL/min/1.73 m^2   BNP   Result Value Ref Range     (H) 0 - 99 pg/mL   Basic metabolic panel   Result Value Ref Range    Sodium 142 136 - 145 mmol/L    Potassium 4.0 3.5 - 5.1 mmol/L    Chloride 97 95 - 110 mmol/L    CO2 37 (H) 23 - 29 mmol/L    Glucose 82 70 - 110 mg/dL    BUN 20 8 - 23 mg/dL    Creatinine 0.7 0.5 - 1.4 mg/dL    Calcium 8.0 (L) 8.7 - 10.5 mg/dL    Anion Gap 8 8 - 16 mmol/L    eGFR >60 >60 mL/min/1.73 m^2   Magnesium   Result Value Ref Range    Magnesium 1.9 1.6 - 2.6 mg/dL   CBC auto differential   Result Value Ref Range    WBC 6.38 3.90 - 12.70 K/uL    RBC 4.72 4.00 - 5.40 M/uL    Hemoglobin 12.4 12.0 - 16.0 g/dL    Hematocrit 41.1 37.0 - 48.5 %    MCV 87 82 - 98 fL    MCH 26.3 (L) 27.0 - 31.0 pg    MCHC 30.2 (L) 32.0 - 36.0 g/dL    RDW 14.4 11.5 - 14.5 %    Platelets 191 150 - 450 K/uL    MPV 10.3 9.2 - 12.9 fL    Immature Granulocytes 0.5 0.0 - 0.5 %    Gran #  (ANC) 4.0 1.8 - 7.7 K/uL    Immature Grans (Abs) 0.03 0.00 - 0.04 K/uL    Lymph # 1.4 1.0 - 4.8 K/uL    Mono # 0.8 0.3 - 1.0 K/uL    Eos # 0.2 0.0 - 0.5 K/uL    Baso # 0.02 0.00 - 0.20 K/uL    nRBC 0 0 /100 WBC    Gran % 61.9 38.0 - 73.0 %    Lymph % 21.8 18.0 - 48.0 %    Mono % 12.4 4.0 - 15.0 %    Eosinophil % 3.1 0.0 - 8.0 %    Basophil % 0.3 0.0 - 1.9 %    Differential Method Automated    EKG 12-lead   Result Value Ref Range    QRS Duration 100 ms    [facility]HS QTC Calculation 450 ms   EKG 12-lead   Result Value Ref Range    QRS Duration 102 ms    OHS QTC Calculation 488 ms   Echo   Result Value Ref Range    BSA 3.06 m2    LVOT stroke volume 56.93 cm3    LVIDd 4.07 3.5 - 6.0 cm    LV Systolic Volume 29.19 mL    LV Systolic Volume Index 10.3 mL/m2    LVIDs 2.79 2.1 - 4.0 cm    LV Diastolic Volume 72.93 mL    LV Diastolic Volume Index 25.77 mL/m2    IVS 1.50 (A) 0.6 - 1.1 cm    LVOT diameter 2.03 cm    LVOT area 3.2 cm2    FS 31 28 - 44 %    Left Ventricle Relative Wall Thickness 0.79 cm    Posterior Wall 1.60 (A) 0.6 - 1.1 cm    LV mass 251.18 g    LV Mass Index 89 g/m2    MV Peak E Mendez 1.47 m/s    TDI LATERAL 0.17 m/s    TDI SEPTAL 0.10 m/s    E/E' ratio 10.89 m/s    MV Peak A Menedz 0.26 m/s    TR Max Mendez 3.23 m/s    E/A ratio 5.65     IVRT 74.22 msec    E wave deceleration time 264.25 msec    LV SEPTAL E/E' RATIO 14.70 m/s    LV LATERAL E/E' RATIO 8.65 m/s    LVOT peak mendez 0.83 m/s    Left Ventricular Outflow Tract Mean Velocity 0.68 cm/s    Left Ventricular Outflow Tract Mean Gradient 1.98 mmHg    RVOT peak VTI 20.4 cm    TAPSE 2.05 cm    LA size 5.01 cm    Left Atrium Minor Axis 6.99 cm    Left Atrium Major Axis 6.71 cm    RA Major Axis 5.19 cm    AV mean gradient 12 mmHg    AV peak gradient 17 mmHg    Ao peak mendez 2.05 m/s    Ao VTI 42.10 cm    LVOT peak VTI 17.60 cm    AV valve area 1.35 cm²    AV Velocity Ratio 0.40     AV index (prosthetic) 0.42     ROSITA by Velocity Ratio 1.31 cm²    Mr max mendez 5.15 m/s    MV  stenosis pressure 1/2 time 76.63 ms    MV valve area p 1/2 method 2.87 cm2    Triscuspid Valve Regurgitation Peak Gradient 42 mmHg    PV mean gradient 3 mmHg    RVOT peak tiffany 0.92 m/s    Ao root annulus 3.04 cm    STJ 3.70 cm    Ascending aorta 3.96 cm    IVC diameter 3.06 cm    Mean e' 0.14 m/s    ZLVIDS -16.98     ZLVIDD -23.33     LA Volume Index 51.5 mL/m2    LA volume 145.79 cm3    LA WIDTH 5.0 cm    RA Width 4.1 cm    TV resting pulmonary artery pressure 45 mmHg    RV TB RVSP 6 mmHg    Est. RA pres 3 mmHg   ISTAT PROCEDURE   Result Value Ref Range    POC PH 7.343 (L) 7.35 - 7.45    POC PCO2 52.4 (H) 35 - 45 mmHg    POC PO2 89 80 - 100 mmHg    POC HCO3 28.5 (H) 24 - 28 mmol/L    POC BE 3 (H) -2 to 2 mmol/L    POC SATURATED O2 96 95 - 100 %    Sample ARTERIAL     Site RR     Allens Test Pass     DelSys Nasal Can     Mode SPONT     Flow 8         Imaging Results:  Imaging Results              X-Ray Chest AP Portable (Final result)  Result time 03/14/24 19:21:26      Final result by Sam Capellan MD (03/14/24 19:21:26)                   Impression:      Bibasilar opacities favoring edema and atelectasis.      Electronically signed by: Sam Capellan  Date:    03/14/2024  Time:    19:21               Narrative:    EXAMINATION:  XR CHEST AP PORTABLE    CLINICAL HISTORY:  CHF;    TECHNIQUE:  Single frontal view of the chest was performed.    COMPARISON:  None    FINDINGS:  Bibasilar pulmonary opacities favoring edema and atelectasis.  No definite pleural effusions.  No pneumothorax.    The cardiac silhouette is enlarged.  The hilar and mediastinal contours are unremarkable.    Bones are intact.                                       The EKG was ordered, reviewed, and independently interpreted by the ED provider.  Interpretation time: 18:01  Rate: 135 BPM  Rhythm: atrial fibrillation with rapid ventricular response.  Interpretation: Low voltage QRS. Cannot rule out Anterior infarct ,age undetermined. No STEMI.            The Emergency Provider reviewed the vital signs and test results, which are outlined above.     ED Discussion       7:51 PM: Discussed pt's case with Dr. Acacia Motta ([facility] medicine) who recommends decadron and ABG.   8:29 PM: Discussed case with Yamileth Vernon NP ([facility]). Dr. Acacia Motta agrees with current care and management of pt and accepts admission.   Admitting Service: Hospital Medicine   Admitting Physician: Dr. Acacia Motta   Admit to: inpatient med/tele     8:30 PM: Re-evaluated pt. I have discussed test results, shared treatment plan, and the need for admission with patient and family at bedside. Pt and family express understanding at this time and agree with all information. All questions answered. Pt and family have no further questions or concerns at this time. Pt is ready for admit.       ED Course as of 03/18/24 1258   Thu Mar 14, 2024   1812 Patient is higher risk heart failure exacerbation therefore will hold off on IV fluids and observe for changes in blood pressure. [CD]   1831 CBC auto differential(!)  Chronic anemia and other nonspecific findings [CD]   1855 Lactic acid, plasma  Within normal limits [CD]   1901 Brain natriuretic peptide(!)  Elevated [CD]   1947 COVID-19 Rapid Screening(!)  Positive [CD]   1947 Influenza A & B by Molecular  Negative [CD]      ED Course User Index  [CD] Demond James, DO     Medical Decision Making  Patient presented to the department in respiratory distress and atrial fibrillation with rapid ventricular response.  She states that she does not have a history of this.  Sepsis protocol was initiated with vancomycin and cefepime and IV fluids were held for suspicion of heart failure exacerbation.  The patient was given doses of IV Lopressor which did decrease her heart rate to below 100 but remained in atrial fibrillation.  She was then given a dose of oral metoprolol.  Blood pressures remained stable.  Lactic acid reviewed  and was within normal limits.  Patient was found to be COVID positive.  Patient is still requiring need for high-flow oxygen remaining stable while on high-flow oxygen.  Informed Hospital Medicine and discussed all the above findings and they will admit for further workup and treatment.    Amount and/or Complexity of Data Reviewed  Independent Historian: EMS  Labs: ordered. Decision-making details documented in ED Course.     Details: CMP was reviewed and showed hyperglycemia and other nonspecific findings, troponin is within normal limits, BNP is elevated ABG shows hypercapnia but with a relatively stable PH, lactic acid is wnl.  Radiology: ordered. Decision-making details documented in ED Course.     Details: Chest x-ray showed bibasilar opacities favoring edema and atelectasis  ECG/medicine tests: ordered and independent interpretation performed. Decision-making details documented in ED Course.    Risk  Prescription drug management.  Decision regarding hospitalization.                ED Medication(s):  Medications   metoprolol injection 5 mg (5 mg Intravenous Given 3/14/24 1851)   mupirocin 2 % ointment ( Nasal Given 3/18/24 0940)   sodium chloride 0.9% flush 10 mL (has no administration in time range)   naloxone 0.4 mg/mL injection 0.02 mg (has no administration in time range)   glucose chewable tablet 16 g (has no administration in time range)   glucose chewable tablet 24 g (has no administration in time range)   glucagon (human recombinant) injection 1 mg (has no administration in time range)   dexAMETHasone injection 6 mg (6 mg Intravenous Given 3/18/24 0941)   acetaminophen tablet 650 mg (650 mg Oral Given 3/15/24 2236)   ondansetron injection 4 mg (has no administration in time range)   hydrALAZINE injection 10 mg (10 mg Intravenous Given 3/15/24 0502)   furosemide injection 40 mg (40 mg Intravenous Given 3/18/24 0941)   enoxaparin injection 150 mg (150 mg Subcutaneous Given 3/18/24 0941)   levalbuterol  nebulizer solution 1.2495 mg (has no administration in time range)   albuterol inhaler 2 puff (2 puffs Inhalation Given 3/18/24 0700)   0.9%  NaCl infusion ( Intravenous Stopped 3/15/24 1843)   benzonatate capsule 100 mg (100 mg Oral Given 3/15/24 0903)   metoprolol succinate (TOPROL-XL) 24 hr tablet 25 mg (0 mg Oral Hold 3/15/24 0945)   metoprolol succinate (TOPROL-XL) 24 hr tablet 50 mg (50 mg Oral Given 3/18/24 0941)   HYDROcodone-acetaminophen 7.5-325 mg per tablet 1 tablet (1 tablet Oral Given 3/17/24 0838)   cefTRIAXone (Rocephin) 1 g in dextrose 5 % in water (D5W) 100 mL IVPB (MB+) (0 g Intravenous Stopped 3/17/24 1635)   magnesium oxide tablet 400 mg (has no administration in time range)   metoprolol tartrate (LOPRESSOR) tablet 25 mg (25 mg Oral Given 3/14/24 1907)   furosemide injection 40 mg (40 mg Intravenous Given 3/14/24 2042)   ceFEPIme (MAXIPIME) 2 g in dextrose 5 % in water (D5W) 100 mL IVPB (MB+) (0 g Intravenous Stopped 3/14/24 2048)   vancomycin (VANCOCIN) 2,500 mg in dextrose 5 % (D5W) 500 mL IVPB (0 mg Intravenous Stopped 3/14/24 2321)   dexAMETHasone injection 8 mg (8 mg Intravenous Given 3/14/24 2002)   remdesivir 200 mg in sodium chloride 0.9% 250 mL infusion (0 mg Intravenous Stopped 3/15/24 0018)     Followed by   remdesivir 100 mg in sodium chloride 0.9% 250 mL infusion (0 mg Intravenous Stopped 3/18/24 1100)       Current Discharge Medication List                  Scribe Attestation:   Scribe #1: I performed the above scribed service and the documentation accurately describes the services I performed. I attest to the accuracy of the note.     Attending:   Physician Attestation Statement for Scribe #1: I, Demond James DO, personally performed the services described in this documentation, as scribed by Kyle Enriquez with [facility], in my presence, and it is both accurate and complete.           Clinical Impression       ICD-10-CM ICD-9-CM   1. COVID-19  U07.1 079.89   2.  Hypoxia  R09.02 799.02   3. Atrial fibrillation with rapid ventricular response  I48.91 427.31   4. Chest pain  R07.9 786.50   5. Elevated brain natriuretic peptide (BNP) level  R79.89 790.99   6. A-fib  I48.91 427.31       Disposition:   Disposition: Admitted  Condition: Demond Hardin, DO  03/18/24 1314

## 2024-03-15 LAB
ALBUMIN SERPL BCP-MCNC: 3.4 G/DL (ref 3.5–5.2)
ALP SERPL-CCNC: 83 U/L (ref 55–135)
ALT SERPL W/O P-5'-P-CCNC: 9 U/L (ref 10–44)
ANION GAP SERPL CALC-SCNC: 11 MMOL/L (ref 8–16)
AORTIC ROOT ANNULUS: 3.04 CM
ASCENDING AORTA: 3.96 CM
AST SERPL-CCNC: 14 U/L (ref 10–40)
AV INDEX (PROSTH): 0.42
AV MEAN GRADIENT: 12 MMHG
AV PEAK GRADIENT: 17 MMHG
AV VALVE AREA BY VELOCITY RATIO: 1.31 CM²
AV VALVE AREA: 1.35 CM²
AV VELOCITY RATIO: 0.4
BASOPHILS # BLD AUTO: 0.04 K/UL (ref 0–0.2)
BASOPHILS NFR BLD: 0.8 % (ref 0–1.9)
BILIRUB SERPL-MCNC: 0.9 MG/DL (ref 0.1–1)
BSA FOR ECHO PROCEDURE: 3.06 M2
BUN SERPL-MCNC: 13 MG/DL (ref 8–23)
CALCIUM SERPL-MCNC: 8.5 MG/DL (ref 8.7–10.5)
CHLORIDE SERPL-SCNC: 103 MMOL/L (ref 95–110)
CO2 SERPL-SCNC: 27 MMOL/L (ref 23–29)
CREAT SERPL-MCNC: 0.8 MG/DL (ref 0.5–1.4)
CV ECHO LV RWT: 0.79 CM
DIFFERENTIAL METHOD BLD: ABNORMAL
DOP CALC AO PEAK VEL: 2.05 M/S
DOP CALC AO VTI: 42.1 CM
DOP CALC LVOT AREA: 3.2 CM2
DOP CALC LVOT DIAMETER: 2.03 CM
DOP CALC LVOT PEAK VEL: 0.83 M/S
DOP CALC LVOT STROKE VOLUME: 56.93 CM3
DOP CALC RVOT PEAK VEL: 0.92 M/S
DOP CALC RVOT VTI: 20.4 CM
DOP CALCLVOT PEAK VEL VTI: 17.6 CM
E WAVE DECELERATION TIME: 264.25 MSEC
E/A RATIO: 5.65
E/E' RATIO: 10.89 M/S
ECHO LV POSTERIOR WALL: 1.6 CM (ref 0.6–1.1)
EOSINOPHIL # BLD AUTO: 0 K/UL (ref 0–0.5)
EOSINOPHIL NFR BLD: 0 % (ref 0–8)
ERYTHROCYTE [DISTWIDTH] IN BLOOD BY AUTOMATED COUNT: 15.1 % (ref 11.5–14.5)
EST. GFR  (NO RACE VARIABLE): >60 ML/MIN/1.73 M^2
ESTIMATED AVG GLUCOSE: 108 MG/DL (ref 68–131)
FRACTIONAL SHORTENING: 31 % (ref 28–44)
GLUCOSE SERPL-MCNC: 143 MG/DL (ref 70–110)
HBA1C MFR BLD: 5.4 % (ref 4–5.6)
HCT VFR BLD AUTO: 43.6 % (ref 37–48.5)
HGB BLD-MCNC: 12.8 G/DL (ref 12–16)
IMM GRANULOCYTES # BLD AUTO: 0.06 K/UL (ref 0–0.04)
IMM GRANULOCYTES NFR BLD AUTO: 1.2 % (ref 0–0.5)
INTERVENTRICULAR SEPTUM: 1.5 CM (ref 0.6–1.1)
IVC DIAMETER: 3.06 CM
IVRT: 74.22 MSEC
LA MAJOR: 6.71 CM
LA MINOR: 6.99 CM
LA WIDTH: 5 CM
LEFT ATRIUM SIZE: 5.01 CM
LEFT ATRIUM VOLUME INDEX: 51.5 ML/M2
LEFT ATRIUM VOLUME: 145.79 CM3
LEFT INTERNAL DIMENSION IN SYSTOLE: 2.79 CM (ref 2.1–4)
LEFT VENTRICLE DIASTOLIC VOLUME INDEX: 25.77 ML/M2
LEFT VENTRICLE DIASTOLIC VOLUME: 72.93 ML
LEFT VENTRICLE MASS INDEX: 89 G/M2
LEFT VENTRICLE SYSTOLIC VOLUME INDEX: 10.3 ML/M2
LEFT VENTRICLE SYSTOLIC VOLUME: 29.19 ML
LEFT VENTRICULAR INTERNAL DIMENSION IN DIASTOLE: 4.07 CM (ref 3.5–6)
LEFT VENTRICULAR MASS: 251.18 G
LV LATERAL E/E' RATIO: 8.65 M/S
LV SEPTAL E/E' RATIO: 14.7 M/S
LVOT MG: 1.98 MMHG
LVOT MV: 0.68 CM/S
LYMPHOCYTES # BLD AUTO: 0.7 K/UL (ref 1–4.8)
LYMPHOCYTES NFR BLD: 12.6 % (ref 18–48)
MCH RBC QN AUTO: 26.4 PG (ref 27–31)
MCHC RBC AUTO-ENTMCNC: 29.4 G/DL (ref 32–36)
MCV RBC AUTO: 90 FL (ref 82–98)
MONOCYTES # BLD AUTO: 0.1 K/UL (ref 0.3–1)
MONOCYTES NFR BLD: 2.5 % (ref 4–15)
MV PEAK A VEL: 0.26 M/S
MV PEAK E VEL: 1.47 M/S
MV STENOSIS PRESSURE HALF TIME: 76.63 MS
MV VALVE AREA P 1/2 METHOD: 2.87 CM2
NEUTROPHILS # BLD AUTO: 4.3 K/UL (ref 1.8–7.7)
NEUTROPHILS NFR BLD: 82.9 % (ref 38–73)
NRBC BLD-RTO: 0 /100 WBC
OHS QRS DURATION: 100 MS
OHS QTC CALCULATION: 450 MS
PISA MRMAX VEL: 5.15 M/S
PISA TR MAX VEL: 3.23 M/S
PLATELET # BLD AUTO: 188 K/UL (ref 150–450)
PMV BLD AUTO: 11 FL (ref 9.2–12.9)
POTASSIUM SERPL-SCNC: 4.8 MMOL/L (ref 3.5–5.1)
PROT SERPL-MCNC: 6.9 G/DL (ref 6–8.4)
PV MEAN GRADIENT: 3 MMHG
RA MAJOR: 5.19 CM
RA PRESSURE ESTIMATED: 3 MMHG
RA WIDTH: 4.1 CM
RBC # BLD AUTO: 4.84 M/UL (ref 4–5.4)
RV TB RVSP: 6 MMHG
SODIUM SERPL-SCNC: 141 MMOL/L (ref 136–145)
STJ: 3.7 CM
T4 FREE SERPL-MCNC: 1.14 NG/DL (ref 0.71–1.51)
TDI LATERAL: 0.17 M/S
TDI SEPTAL: 0.1 M/S
TDI: 0.14 M/S
TR MAX PG: 42 MMHG
TRICUSPID ANNULAR PLANE SYSTOLIC EXCURSION: 2.05 CM
TV REST PULMONARY ARTERY PRESSURE: 45 MMHG
WBC # BLD AUTO: 5.15 K/UL (ref 3.9–12.7)
Z-SCORE OF LEFT VENTRICULAR DIMENSION IN END DIASTOLE: -23.33
Z-SCORE OF LEFT VENTRICULAR DIMENSION IN END SYSTOLE: -16.98

## 2024-03-15 PROCEDURE — 25000003 PHARM REV CODE 250: Performed by: INTERNAL MEDICINE

## 2024-03-15 PROCEDURE — 25000003 PHARM REV CODE 250: Performed by: HOSPITALIST

## 2024-03-15 PROCEDURE — 99900035 HC TECH TIME PER 15 MIN (STAT)

## 2024-03-15 PROCEDURE — 63600175 PHARM REV CODE 636 W HCPCS: Performed by: INTERNAL MEDICINE

## 2024-03-15 PROCEDURE — 85025 COMPLETE CBC W/AUTO DIFF WBC: CPT | Performed by: INTERNAL MEDICINE

## 2024-03-15 PROCEDURE — 27100171 HC OXYGEN HIGH FLOW UP TO 24 HOURS

## 2024-03-15 PROCEDURE — 93005 ELECTROCARDIOGRAM TRACING: CPT

## 2024-03-15 PROCEDURE — 94640 AIRWAY INHALATION TREATMENT: CPT

## 2024-03-15 PROCEDURE — 80053 COMPREHEN METABOLIC PANEL: CPT | Performed by: INTERNAL MEDICINE

## 2024-03-15 PROCEDURE — 25000242 PHARM REV CODE 250 ALT 637 W/ HCPCS: Performed by: INTERNAL MEDICINE

## 2024-03-15 PROCEDURE — 99222 1ST HOSP IP/OBS MODERATE 55: CPT | Mod: 25,,, | Performed by: PHYSICIAN ASSISTANT

## 2024-03-15 PROCEDURE — 27000207 HC ISOLATION

## 2024-03-15 PROCEDURE — 21400001 HC TELEMETRY ROOM

## 2024-03-15 PROCEDURE — 94761 N-INVAS EAR/PLS OXIMETRY MLT: CPT

## 2024-03-15 PROCEDURE — 36415 COLL VENOUS BLD VENIPUNCTURE: CPT | Performed by: INTERNAL MEDICINE

## 2024-03-15 PROCEDURE — 93010 ELECTROCARDIOGRAM REPORT: CPT | Mod: ,,, | Performed by: INTERNAL MEDICINE

## 2024-03-15 PROCEDURE — 63600175 PHARM REV CODE 636 W HCPCS: Performed by: HOSPITALIST

## 2024-03-15 RX ORDER — ALBUTEROL SULFATE 90 UG/1
2 AEROSOL, METERED RESPIRATORY (INHALATION) EVERY 6 HOURS
Status: DISCONTINUED | OUTPATIENT
Start: 2024-03-15 | End: 2024-03-15

## 2024-03-15 RX ORDER — METOPROLOL SUCCINATE 50 MG/1
50 TABLET, EXTENDED RELEASE ORAL DAILY
Status: DISCONTINUED | OUTPATIENT
Start: 2024-03-16 | End: 2024-03-19 | Stop reason: HOSPADM

## 2024-03-15 RX ORDER — ALBUTEROL SULFATE 90 UG/1
2 AEROSOL, METERED RESPIRATORY (INHALATION) EVERY 6 HOURS
Status: DISCONTINUED | OUTPATIENT
Start: 2024-03-15 | End: 2024-03-19 | Stop reason: HOSPADM

## 2024-03-15 RX ORDER — BENZONATATE 100 MG/1
100 CAPSULE ORAL 3 TIMES DAILY PRN
Status: DISCONTINUED | OUTPATIENT
Start: 2024-03-15 | End: 2024-03-19 | Stop reason: HOSPADM

## 2024-03-15 RX ORDER — METOPROLOL SUCCINATE 25 MG/1
25 TABLET, EXTENDED RELEASE ORAL DAILY
Status: ACTIVE | OUTPATIENT
Start: 2024-03-15 | End: 2024-03-15

## 2024-03-15 RX ORDER — SODIUM CHLORIDE 9 MG/ML
INJECTION, SOLUTION INTRAVENOUS
Status: DISCONTINUED | OUTPATIENT
Start: 2024-03-15 | End: 2024-03-19 | Stop reason: HOSPADM

## 2024-03-15 RX ADMIN — ALBUTEROL SULFATE 2 PUFF: 90 AEROSOL, METERED RESPIRATORY (INHALATION) at 12:03

## 2024-03-15 RX ADMIN — SODIUM CHLORIDE: 9 INJECTION, SOLUTION INTRAVENOUS at 09:03

## 2024-03-15 RX ADMIN — ALBUTEROL SULFATE 2 PUFF: 90 AEROSOL, METERED RESPIRATORY (INHALATION) at 07:03

## 2024-03-15 RX ADMIN — ENOXAPARIN SODIUM 150 MG: 150 INJECTION SUBCUTANEOUS at 09:03

## 2024-03-15 RX ADMIN — METOPROLOL SUCCINATE 25 MG: 25 TABLET, EXTENDED RELEASE ORAL at 09:03

## 2024-03-15 RX ADMIN — SODIUM CHLORIDE: 9 INJECTION, SOLUTION INTRAVENOUS at 04:03

## 2024-03-15 RX ADMIN — DEXAMETHASONE SODIUM PHOSPHATE 6 MG: 4 INJECTION INTRA-ARTICULAR; INTRALESIONAL; INTRAMUSCULAR; INTRAVENOUS; SOFT TISSUE at 09:03

## 2024-03-15 RX ADMIN — VANCOMYCIN HYDROCHLORIDE 2000 MG: 10 INJECTION, POWDER, LYOPHILIZED, FOR SOLUTION INTRAVENOUS at 09:03

## 2024-03-15 RX ADMIN — FUROSEMIDE 40 MG: 10 INJECTION, SOLUTION INTRAMUSCULAR; INTRAVENOUS at 10:03

## 2024-03-15 RX ADMIN — REMDESIVIR 100 MG: 100 INJECTION, POWDER, LYOPHILIZED, FOR SOLUTION INTRAVENOUS at 04:03

## 2024-03-15 RX ADMIN — FUROSEMIDE 40 MG: 10 INJECTION, SOLUTION INTRAMUSCULAR; INTRAVENOUS at 09:03

## 2024-03-15 RX ADMIN — ENOXAPARIN SODIUM 150 MG: 150 INJECTION SUBCUTANEOUS at 08:03

## 2024-03-15 RX ADMIN — MUPIROCIN: 20 OINTMENT TOPICAL at 08:03

## 2024-03-15 RX ADMIN — HYDRALAZINE HYDROCHLORIDE 10 MG: 20 INJECTION, SOLUTION INTRAMUSCULAR; INTRAVENOUS at 05:03

## 2024-03-15 RX ADMIN — ACETAMINOPHEN 650 MG: 325 TABLET ORAL at 10:03

## 2024-03-15 RX ADMIN — CEFEPIME 2 G: 2 INJECTION, POWDER, FOR SOLUTION INTRAVENOUS at 06:03

## 2024-03-15 RX ADMIN — BENZONATATE 100 MG: 100 CAPSULE ORAL at 09:03

## 2024-03-15 RX ADMIN — VANCOMYCIN HYDROCHLORIDE 2000 MG: 10 INJECTION, POWDER, LYOPHILIZED, FOR SOLUTION INTRAVENOUS at 08:03

## 2024-03-15 RX ADMIN — MUPIROCIN: 20 OINTMENT TOPICAL at 09:03

## 2024-03-15 RX ADMIN — CEFEPIME 2 G: 2 INJECTION, POWDER, FOR SOLUTION INTRAVENOUS at 09:03

## 2024-03-15 RX ADMIN — SODIUM CHLORIDE: 9 INJECTION, SOLUTION INTRAVENOUS at 06:03

## 2024-03-15 NOTE — ASSESSMENT & PLAN NOTE
Patient with Hypoxic Respiratory failure which is Acute.  she is not on home oxygen. Supplemental oxygen was provided and noted-      .   Signs/symptoms of respiratory failure include- tachypnea, increased work of breathing, and respiratory distress. Contributing diagnoses includes - CHF, Pneumonia, and COVID  Labs and images were reviewed. Patient Has recent ABG, which has been reviewed. Will treat underlying causes and adjust management of respiratory failure as follows- continue high-flow O2 via nasal cannula, continuous pulse oximetry monitoring.  See discussion for acute diastolic CHF exacerbation and COVID-19 pneumonia for additional evaluation and treatment plan.

## 2024-03-15 NOTE — ASSESSMENT & PLAN NOTE
Patient with Paroxysmal (<7 days) atrial fibrillation which is uncontrolled currently with Beta Blocker. Patient is currently in sinus rhythm.[id number] Score: 4.  Anticoagulation indicated. Anticoagulation done with treatment dose Lovenox for now .  Cardiology consult.  Telemetry monitoring.  Check TSH and magnesium level.

## 2024-03-15 NOTE — PLAN OF CARE
[facility] - Telemetry (Hospital)  Initial Discharge Assessment       Primary Care Provider: Jordana Shirley MD    Admission Diagnosis: Hypoxia [R09.02]  Atrial fibrillation with rapid ventricular response [I48.91]  COVID-19 [U07.1]    Admission Date: 3/14/2024  Expected Discharge Date:     Transition of Care Barriers: Bariatric, Transportation    Payor: MEDICARE / Plan: MEDICARE PART A & B / Product Type: Government /     Extended Emergency Contact Information  Primary Emergency Contact: Mary Medeiros   [facility]  Work Phone: [phone]  Mobile Phone: [phone]  Relation: Daughter    Discharge Plan A: [id number] - [place] [facility] [address] [place] [address] [address] [facility] [address] [place] [address] [address] [place] [address] [address]  Phone: [phone] Fax: [phone]      Initial Assessment (most recent)       Adult Discharge Assessment - 03/15/24 1332          Discharge Assessment    Assessment Type Discharge Planning Assessment     Confirmed/corrected address, phone number and insurance Yes     Confirmed Demographics Correct on Facesheet     Source of Information family     Communicated JERONIMO with patient/caregiver Date not available/Unable to determine     Reason For Admission SOB; Covid     People in Home alone     Do you expect to return to your current living situation? Yes     Do you have help at home or someone to help you manage your care at home? No     Prior to hospitilization cognitive status: Alert/Oriented     Current cognitive status: Alert/Oriented     Walking or Climbing Stairs Difficulty yes     Walking or Climbing Stairs ambulation difficulty, requires equipment     Mobility Management rolling walker and wheelchair     Dressing/Bathing Difficulty no     Home Accessibility other (see comments)   has ramp; unable to get w/c through main door    Home Layout Able to live on 1st floor     Equipment Currently Used at Home walker,  Patient ID: Stacey Wade is a 54 y.o. female.    Chief Complaint: at high risk for breast cancer    HPI: Patient presents for 6 month f/u breast exam.     Pt has been calculated to be high risk for breast cancer- Her breast cancer risk assessment score of 25.79% was calculated at the time of her annual mammogram 12/18/17. June 2018 had linda MRI and was wnl. Most recent risk score calculated at 39.64%    Pt was due for MRI in June 2019 but was unable to afford the co-pay associated with the test .     Mammogram was performed  today- results pending.     Has a history of a breast mass noted during her routine gyn exam back in March 2017. Negative right breast imaging with mammo and ultrasound at that time. We have been checking it clinically at 3- 6 mon intervals - had an episode of it increasing increase in size slightly last year. Pt had been using more caffeine than usual. Recommended seeing surgeon and pt wanted to decrease her caffeine to see if it would decrease the size of the area. It did go back to her baseline measurement. Pt denies any change. No new areas of concern.     MRI on 6/12/18 was wnl    Mammogram 12/19/19- wnl- score 39.64%    Pt not exercising most days of the week due to the covid pandemic- has gained about 4 # since Dec 2019    Pt is completely off her hormones now.     Review of Systems   Constitutional: Negative.  Negative for appetite change and unexpected weight change.   HENT: Negative.    Eyes: Negative.  Negative for visual disturbance.   Respiratory: Negative.  Negative for cough and shortness of breath.    Cardiovascular: Negative.  Negative for chest pain.   Gastrointestinal: Negative.  Negative for abdominal pain and diarrhea.        No reflux   Endocrine: Negative.    Genitourinary: Negative.  Negative for frequency.   Musculoskeletal: Negative.  Negative for back pain.   Skin: Positive for rash (faint appearance over chin and face where mask comes in contact).    Allergic/Immunologic: Negative.    Neurological: Negative.  Negative for headaches.   Hematological: Negative.  Negative for adenopathy.   Psychiatric/Behavioral: Negative.  The patient is not nervous/anxious.    Breast: pt denies any nipple discharge or palpable mass that she has noted. Has had bilateral nipple tenderness intermittently. She has continued to decrease her cafeine intake.     Current Outpatient Medications   Medication Sig Dispense Refill    doxepin (SINEQUAN) 10 MG capsule Take 10 mg by mouth.      EPINEPHrine (EPIPEN 2-TERRY) 0.3 mg/0.3 mL AtIn Inject 0.3 mg into the muscle daily as needed.      famotidine (PEPCID) 40 MG tablet 1/2 pill daily      hydrOXYzine HCL (ATARAX) 25 MG tablet Take 25 mg by mouth 2 (two) times daily as needed.      montelukast (SINGULAIR) 10 mg tablet Take 10 mg by mouth.      aspirin (ECOTRIN) 81 MG EC tablet Take 81 mg by mouth once daily.      betamethasone dipropionate (DIPROLENE) 0.05 % cream APPLY TOPICALLY 2 TIMES DAILY 45 g 3    clorazepate (TRANXENE) 3.75 MG Tab TAKE 1 TABLET BY MOUTH EVERY DAY AS NEEDED 30 tablet 2    doxepin (SINEQUAN) 10 MG capsule TAKE 2 CAPSULES BY MOUTH 3 TIMES A  capsule 3    famotidine (PEPCID) 20 MG tablet Take 1 tablet (20 mg total) by mouth 2 (two) times daily. 60 tablet 11    famotidine (PEPCID) 40 MG tablet       fexofenadine (ALLEGRA) 180 MG tablet TAKE ONE TABLET BY MOUTH EVERY DAY 30 tablet 0    mometasone (NASONEX) 50 mcg/actuation nasal spray INHALE 2 SPRAYS BY NASAL ROUTE ONCE DAILY 17 g 11    montelukast (SINGULAIR) 10 mg tablet Take 1 tablet (10 mg total) by mouth once daily. 30 tablet 11    ondansetron (ZOFRAN-ODT) 4 MG TbDL Take 1 tablet (4 mg total) by mouth every 8 (eight) hours as needed (nausea). 15 tablet 0    potassium chloride (MICRO-K) 10 MEQ CpSR Take 1 capsule (10 mEq total) by mouth once daily. 30 capsule 11    predniSONE (DELTASONE) 20 MG tablet Take 2 tablets daily for 3 days, then 1  rolling;wheelchair     Readmission within 30 days? No     Patient currently being followed by outpatient case management? No     Do you currently have service(s) that help you manage your care at home? Yes     Name and Contact number of agency [facility]     Is the pt/caregiver preference to resume services with current agency Yes     Do you take prescription medications? Yes     Do you have prescription coverage? Yes     Coverage BCBS     Do you have any problems affording any of your prescribed medications? No     Who is going to help you get home at discharge? daughter     How do you get to doctors appointments? family or friend will provide     Are you on dialysis? No     Do you take coumadin? No     Discharge Plan A Home Health     DME Needed Upon Discharge  oxygen     Discharge Plan discussed with: Adult children     Transition of Care Barriers Bariatric;Transportation     SDOH Lack of internet access;Lack of phone access        Financial Resource Strain    How hard is it for you to pay for the very basics like food, housing, medical care, and heating? Not hard at all        Housing Stability    In the last 12 months, was there a time when you were not able to pay the mortgage or rent on time? No     In the last 12 months, was there a time when you did not have a steady place to sleep or slept in a shelter (including now)? No        Transportation Needs    In the past 12 months, has lack of transportation kept you from medical appointments or from getting medications? Yes (P)      In the past 12 months, has lack of transportation kept you from meetings, work, or from getting things needed for daily living? Yes (P)         Food Insecurity    Within the past 12 months, you worried that your food would run out before you got the money to buy more. Never true (P)      Within the past 12 months, the food you bought just didn't last and you didn't have money to get more. Never true (P)                     Spoke with daughter Mary over the phone as patient is covid positive and on oxygen.  Patient lives alone.  She uses a rolling walker to take a couple of steps but mostly spends her days in the wheelchair.  Patient tells her daughter that she is bathing herself.  Patient is now unable to get out of her home due to w/c not fitting through her trailer door.  They are working on getting the door widened.      [facility] visits once weekly as well as [facility] wound care visits once weekly.  LUIS spoke to Kiara @ [facility] and BRYSON Lopez from [facility].  NP with [facility] feels patient could benefit from a dermatology consult and biopsy of her lower extremity rash.  NP will send her notes.  Will share with MD and see if biopsy can be done during hospital stay.      Current discharge plan is home resuming [facility].    Will need NP at home for hospital follow up.              tablet daily for 3 days, then 1/2 tablet daily for 2 days. 10 tablet 0    XOLAIR 150 mg/mL injection        No current facility-administered medications for this visit.        Review of patient's allergies indicates:   Allergen Reactions    Benzalkonium chloride     Black pepper      Other reaction(s): Hives    Chocolate flavor      Other reaction(s): Hives    Citrus and derivatives Hives     LEMON PRODUCTS    Furosemide     Grapefruit      Other reaction(s): Hives    Latex      Other reaction(s): Hives    Lemon balm (casper officinalis) Hives    Neomycin-bacitracin-polymyxin      Other reaction(s): Rash    Oats (richard) Hives     Oat foods (oatmeal, etc...)    Wheat containing prod        Past Medical History:   Diagnosis Date    Allergic rhinitis     Anxiety     Hypertension     Urticaria        Past Surgical History:   Procedure Laterality Date    COLONOSCOPY N/A 1/18/2018    Procedure: COLONOSCOPY;  Surgeon: Yong Smith MD;  Location: [facility];  Service: Endoscopy;  Laterality: N/A;    HERNIA REPAIR Left        Family History   Problem Relation Age of Onset    Lung cancer Paternal Grandfather     Ovarian cancer Maternal Grandmother     Hyperlipidemia Mother     Hypertension Mother     Breast cancer Mother     Arthritis Mother     Lung cancer Father     Breast cancer Maternal Aunt     Heart failure Other         Great aunt    Prostate cancer Brother        Social History     Socioeconomic History    Marital status: Single     Spouse name: Not on file    Number of children: Not on file    Years of education: Not on file    Highest education level: Not on file   Occupational History    Not on file   Social Needs    Financial resource strain: Not on file    Food insecurity     Worry: Not on file     Inability: Not on file    Transportation needs     Medical: Not on file     Non-medical: Not on file   Tobacco Use    Smoking status: Never Smoker    Smokeless tobacco: Never  Used   Substance and Sexual Activity    Alcohol use: No    Drug use: No    Sexual activity: Not Currently     Partners: Male     Birth control/protection: OCP, None   Lifestyle    Physical activity     Days per week: Not on file     Minutes per session: Not on file    Stress: Not on file   Relationships    Social connections     Talks on phone: Not on file     Gets together: Not on file     Attends Restoration service: Not on file     Active member of club or organization: Not on file     Attends meetings of clubs or organizations: Not on file     Relationship status: Not on file   Other Topics Concern    Not on file   Social History Narrative    Patient is single has no children and works as a pharmacy specialist. She cares for her mother, who lives with her.       Vitals:    06/30/20 1103   BP: 137/85   Pulse: 74       Physical Exam  Vitals signs reviewed.   Constitutional:       Appearance: She is well-developed.   HENT:      Head: Normocephalic and atraumatic.      Right Ear: External ear normal.      Left Ear: External ear normal.      Mouth/Throat:      Pharynx: No oropharyngeal exudate.   Eyes:      General: No scleral icterus.        Right eye: No discharge.         Left eye: No discharge.      Conjunctiva/sclera: Conjunctivae normal.      Pupils: Pupils are equal, round, and reactive to light.   Neck:      Musculoskeletal: Normal range of motion and neck supple.      Thyroid: No thyromegaly.   Cardiovascular:      Rate and Rhythm: Normal rate and regular rhythm.      Heart sounds: Normal heart sounds.   Pulmonary:      Effort: Pulmonary effort is normal.      Breath sounds: Normal breath sounds.   Chest:      Breasts:         Right: No inverted nipple, mass, nipple discharge, skin change or tenderness.         Left: No inverted nipple, mass, nipple discharge, skin change or tenderness.   Abdominal:      General: Bowel sounds are normal.      Palpations: Abdomen is soft.   Musculoskeletal: Normal range  "of motion.      Right shoulder: She exhibits no crepitus and normal strength.   Lymphadenopathy:      Head:      Right side of head: No submental, submandibular, tonsillar, preauricular, posterior auricular or occipital adenopathy.      Left side of head: No submental, submandibular, tonsillar, preauricular, posterior auricular or occipital adenopathy.      Cervical: No cervical adenopathy.      Right cervical: No superficial or posterior cervical adenopathy.     Left cervical: No superficial or posterior cervical adenopathy.      Upper Body:      Right upper body: No supraclavicular adenopathy.      Left upper body: No supraclavicular adenopathy.   Skin:     General: Skin is warm and dry.      Coloration: Skin is not pale.      Findings: No erythema or rash (fine barely visible papules over chin. No other areas noted).   Neurological:      Mental Status: She is alert and oriented to person, place, and time.      Deep Tendon Reflexes: Reflexes are normal and symmetric.   Psychiatric:         Behavior: Behavior normal.         Thought Content: Thought content normal.         Judgment: Judgment normal.       IMAGING: today- results pending    Menarche at 20 y/o      No history of hormone use other than birth control and no history of radiation to the neck or chest wall.     Ht: 5'2"  Wt: 178    FH: Maternal aunt breast cancer at 51 y/o, Maternal GM ovarian cancer, Father lung and prostate cancer, Brother - prostate cancer    Assessment & Plan:  1. Area of prominent glandular tissue noted at the 3 oclock location of the right breast. Fibrous texture and blends more medially and laterally- stable measurements today  2. Mammogram 19 - wnl - risk score 39.64%  3. linda screening mammogram Dec 2020 and exam  4. BSE recommended monthly- call for any changes.    5. Encouraged to get back into exercise and wt loss- pt agrees  6. Discussed genetic testing - pt declines at this time  7. Pt declines tamoxifen use for " chemoprevention due to potential side effects   8. Discussed topicals to use for itching. sarna and caladryl clear may be helpful. Discouraged topical steroids on face. Benadryl cream may help

## 2024-03-15 NOTE — PROGRESS NOTES
"Pharmacokinetic Initial Assessment: IV Vancomycin    Assessment/Plan:    Initiate intravenous vancomycin with loading dose of 2500 mg once followed by a maintenance dose of vancomycin 2000mg IV every 12 hours  Desired empiric serum trough concentration is 15 to 20 mcg/mL  Draw vancomycin trough level 60 min prior to fourth dose on 3/16 at approximately 0800  Pharmacy will continue to follow and monitor vancomycin.      Please contact pharmacy at extension [phone] with any questions regarding this assessment.     Thank you for the consult,   Daniasilvia Heck       Patient brief summary:  Niru Reyes is a 78 y.o. female initiated on antimicrobial therapy with IV Vancomycin for treatment of suspected sepsis    Drug Allergies:   Review of patient's allergies indicates:  No Known Allergies    Actual Body Weight:   203.8kg    Renal Function:   Estimated Creatinine Clearance: 113.2 mL/min (based on SCr of 0.8 mg/dL).,     Dialysis Method (if applicable):  N/A    CBC (last 72 hours):  Recent Labs   Lab Result Units 03/14/24  1820   WBC K/uL 7.22   Hemoglobin g/dL 11.5*   Hematocrit % 37.5   Platelets K/uL 198   Gran % % 86.9*   Lymph % % 3.6*   Mono % % 7.8   Eosinophil % % 0.3   Basophil % % 0.7   Differential Method  Automated       Metabolic Panel (last 72 hours):  Recent Labs   Lab Result Units 03/14/24  1820   Sodium mmol/L 137   Potassium mmol/L 4.9   Chloride mmol/L 106   CO2 mmol/L 21*   Glucose mg/dL 149*   BUN mg/dL 13   Creatinine mg/dL 0.8   Albumin g/dL 3.3*   Total Bilirubin mg/dL 1.1*   Alkaline Phosphatase U/L 80   AST U/L 19   ALT U/L 11       Drug levels (last 3 results):  No results for input(s): "VANCOMYCINRA", "VANCORANDOM", "VANCOMYCINPE", "VANCOPEAK", "VANCOMYCINTR", "VANCOTROUGH" in the last 72 hours.    Microbiologic Results:  Microbiology Results (last 7 days)       Procedure Component Value Units Date/Time    Influenza A & B by Molecular [[id number]] Collected: 03/14/24 5951    Order Status: " Completed Specimen: Nasopharyngeal Swab Updated: 03/14/24 1942     Influenza A, Molecular Negative     Influenza B, Molecular Negative     Flu A & B Source Nasal swab    Blood culture #1 **CANNOT BE ORDERED STAT** [[id number]] Collected: 03/14/24 1821    Order Status: Sent Specimen: Blood from Peripheral, Hand, Left     Blood culture #2 **CANNOT BE ORDERED STAT** [[id number]] Collected: 03/14/24 1821    Order Status: Sent Specimen: Blood from Peripheral, Forearm, Right

## 2024-03-15 NOTE — CONSULTS
[facility] - Tele[facility] ([facility])  Cardiology  Consult Note    Patient Name: Niru Reyes  MRN: [id number]  Admission Date: 3/14/2024  Hospital Length of Stay: 1 days  Code Status: Full Code   Attending Provider: Dima Minor MD   Consulting Provider: Sue Fischer PA-C  Primary Care Physician: Jordana Shirley MD  Principal Problem:Acute hypoxemic respiratory failure    Patient information was obtained from patient, past medical records, and ER records.     Inpatient consult to Cardiology  Consult performed by: Sue Fischer PA-C  Consult ordered by: Acacia Motta MD        Subjective:     Chief Complaint:  SOB    HPI:   HPI obtained from chart as patient is currently on isolation precautions due to COVID-19      Ms. Reyes is a 78 year old female patient whose current medical conditions include OA, anemia, PAD, pulmonary HTN, morbid obesity, and HTN who presented to [facility] ED via EMS due to worsening SOB. She was noted to be hypoxic upon EMS arrival with O2 sat of 83%. Associated symptoms included non-productive cough, LE edema, and fatigue. She denied any associated fever, chills, nica chest pain, palpitations, near syncope, or syncope. She converted to afib with RVR after receiving a duoneb. Initial labs revealed BNP of 464. CXR showed bilateral opacities favoring edema, and patient was subsequently admitted for further evaluation and treatment. Cardiology consulted to assist with management. Chart reviewed. Troponin x 1 negative. Patient with no apparent CV history-no afib/CHF. Remains in afib with HR in  range, BB up-titrated. Being AC with full dose Lovenox, recommend Eliquis upon d/c. TTE pending.      Past Medical History:   Diagnosis Date    Hypertension        Past Surgical History:   Procedure Laterality Date     SECTION      COLONOSCOPY N/A 2019    Procedure: COLONOSCOPY;  Surgeon: Janeth Leroy MD;  Location: [facility];  Service: Endoscopy;  Laterality:  N/A;    HYSTERECTOMY         Review of patient's allergies indicates:  No Known Allergies    No current facility-administered medications on file prior to encounter.     Current Outpatient Medications on File Prior to Encounter   Medication Sig    amLODIPine (NORVASC) 5 MG tablet Take 1 tablet (5 mg total) by mouth once daily.    celecoxib (CELEBREX) 100 MG capsule Take 1 capsule (100 mg total) by mouth 2 (two) times daily.    docusate sodium (COLACE) 100 MG capsule Take 1 capsule (100 mg total) by mouth 2 (two) times daily as needed for Constipation.    hydroCHLOROthiazide (HYDRODIURIL) 25 MG tablet TAKE 1 TABLET(25 MG) BY MOUTH EVERY DAY    triamcinolone acetonide 0.1% (KENALOG) 0.1 % cream Apply topically 2 (two) times daily. Under occlusion for 14 days     Family History       Problem Relation (Age of Onset)    Birth defects Daughter    Heart disease Mother, Sister    Muscular dystrophy Father          Tobacco Use    Smoking status: Never    Smokeless tobacco: Not on file   Substance and Sexual Activity    Alcohol use: No    Drug use: No    Sexual activity: Never     Review of Systems   Reason unable to perform ROS: as per hpi.   Constitutional: Positive for malaise/fatigue.   Cardiovascular:  Positive for dyspnea on exertion and leg swelling.   Respiratory:  Positive for cough and shortness of breath.      Objective:     Vital Signs (Most Recent):  Temp: 97.3 °F (36.3 °C) (03/15/24 0907)  Pulse: 92 (03/15/24 0935)  Resp: 18 (03/15/24 0935)  BP: 131/67 (03/15/24 0740)  SpO2: 96 % (03/15/24 0933) Vital Signs (24h Range):  Temp:  [97.3 °F (36.3 °C)-99.8 °F (37.7 °C)] 97.3 °F (36.3 °C)  Pulse:  [] 92  Resp:  [18-47] 18  SpO2:  [88 %-98 %] 96 %  BP: (114-162)/(64-98) 131/67     Weight: (!) 195.6 kg (431 lb 3.5 oz)  Body mass index is 65.57 kg/m².    SpO2: 96 %         Intake/Output Summary (Last 24 hours) at 3/15/2024 09  Last data filed at 3/14/2024 2348  Gross per 24 hour   Intake 100 ml   Output 1450 ml    Net -1350 ml       Lines/Drains/Airways       Peripheral Intravenous Line  Duration                  Peripheral IV - Single Lumen 03/14/24 1822 20 G Anterior;Right Forearm <1 day         Peripheral IV - Single Lumen 03/14/24 1822 20 G Left;Posterior Hand <1 day                     Physical Exam  Vitals and nursing note reviewed.          Significant Labs: CMP   Recent Labs   Lab 03/14/24  1820 03/15/24  0432    141   K 4.9 4.8    103   CO2 21* 27   * 143*   BUN 13 13   CREATININE 0.8 0.8   CALCIUM 8.6* 8.5*   PROT 7.0 6.9   ALBUMIN 3.3* 3.4*   BILITOT 1.1* 0.9   ALKPHOS 80 83   AST 19 14   ALT 11 9*   ANIONGAP 10 11   , CBC   Recent Labs   Lab 03/14/24  1820 03/15/24  0432   WBC 7.22 5.15   HGB 11.5* 12.8   HCT 37.5 43.6    188   , Troponin   Recent Labs   Lab 03/14/24 1820   TROPONINI <0.006   , and All pertinent lab results from the last 24 hours have been reviewed.    Significant Imaging: Echocardiogram: Transthoracic echo (TTE) complete (Cupid Only):   Results for orders placed or performed during the hospital encounter of 03/07/23   Echo Saline Bubble? No   Result Value Ref Range    BSA 2.96 m2    LV LATERAL E/E' RATIO 10.50 m/s    LA WIDTH 3.50 cm    IVC diameter 2.7 cm    Left Ventricular Outflow Tract Mean Velocity 0.81 cm/s    Left Ventricular Outflow Tract Mean Gradient 2.95 mmHg    TDI LATERAL 0.12 m/s    LVIDd 4.25 3.5 - 6.0 cm    IVS 1.53 (A) 0.6 - 1.1 cm    Posterior Wall 1.38 (A) 0.6 - 1.1 cm    Ao root annulus 3.12 cm    LVIDs 2.91 2.1 - 4.0 cm    FS 32 28 - 44 %    LA volume 62.83 cm3    STJ 3.45 cm    Ascending aorta 3.56 cm    LV mass 242.13 g    LA size 3.57 cm    TAPSE 2.70 cm    Left Ventricle Relative Wall Thickness 0.65 cm    AV mean gradient 9 mmHg    AV Velocity Ratio 0.56     AV index (prosthetic) 0.59     MV valve area p 1/2 method 2.96 cm2    E/A ratio 1.03     E wave deceleration time 255.94 msec    IVRT 59.94 msec    LVOT peak tiffany 1.14 m/s    LVOT peak VTI  25.60 cm    Ao peak mendez 2.03 m/s    Ao VTI 43.2 cm    RVOT peak mendez 1.16 m/s    RVOT peak VTI 32.1 cm    AV peak gradient 16 mmHg    PV mean gradient 4.59 mmHg    MV Peak E Mendez 1.26 m/s    TR Max Mendez 3.00 m/s    MV stenosis pressure 1/2 time 74.22 ms    MV Peak A Mendez 1.22 m/s    LV Systolic Volume 32.40 mL    LV Systolic Volume Index 11.7 mL/m2    LV Diastolic Volume 80.62 mL    LV Diastolic Volume Index 29.21 mL/m2    LA Volume Index 22.8 mL/m2    LV Mass Index 88 g/m2    RA Major Axis 4.78 cm    Left Atrium Minor Axis 5.69 cm    Left Atrium Major Axis 6.16 cm    Triscuspid Valve Regurgitation Peak Gradient 36 mmHg    Right Atrial Pressure (from IVC) 15 mmHg    EF 60 %    TV resting pulmonary artery pressure 51 mmHg    Narrative    · The left ventricle is normal in size with concentric hypertrophy and   normal systolic function.  · The estimated ejection fraction is 60%.  · Normal left ventricular diastolic function.  · Normal right ventricular size with normal right ventricular systolic   function.  · Elevated central venous pressure (15 mmHg).  · There is pulmonary hypertension.  · The estimated PA systolic pressure is 51 mmHg.       and EKG: Reviewed  Assessment and Plan:   Patient who presents with new onset afib/CHF in setting of COVID-19. HR reasonably controlled, BB dose increased. Diurese prn. Check TTE.     * Acute hypoxemic respiratory failure  -In setting of CHF/COVID  -Continue IV diuresis, nebs/steroids    Morbid obesity  -Weight loss    Atrial fibrillation with RVR  -Presents with new onset afib in setting of hypoxia/COVID-19  -HR in 's range  -Toprol XL increased to 50 mg daily, up-titrate as needed  -Continue Lovenox for AC, recommend Eliquis upon d/c  -TTE pending    Acute diastolic CHF (congestive heart failure)  -BNP > 400  -Diurese prn  -Check TTE  -Continue BB  -Strict I's/O's    Pneumonia due to COVID-19 virus  -Mgmt as per primary team    HTN (hypertension)  -Continue BB  -Monitor BP  trend        VTE Risk Mitigation (From admission, onward)           Ordered     enoxaparin injection 150 mg  Every 12 hours         03/14/24 2226     IP VTE HIGH RISK PATIENT  Once         03/14/24 2222     Place sequential compression device  Until discontinued         03/14/24 2222                    Thank you for your consult. I will follow-up with patient. Please contact us if you have any additional questions.    Sue Fischer PA-C  Cardiology   [facility] - Telemetry ([facility])

## 2024-03-15 NOTE — SUBJECTIVE & OBJECTIVE
Past Medical History:   Diagnosis Date    Hypertension        Past Surgical History:   Procedure Laterality Date     SECTION      COLONOSCOPY N/A 2019    Procedure: COLONOSCOPY;  Surgeon: Janeth Leroy MD;  Location: [facility];  Service: Endoscopy;  Laterality: N/A;    HYSTERECTOMY         Review of patient's allergies indicates:  No Known Allergies    No current facility-administered medications on file prior to encounter.     Current Outpatient Medications on File Prior to Encounter   Medication Sig    amLODIPine (NORVASC) 5 MG tablet Take 1 tablet (5 mg total) by mouth once daily.    celecoxib (CELEBREX) 100 MG capsule Take 1 capsule (100 mg total) by mouth 2 (two) times daily.    docusate sodium (COLACE) 100 MG capsule Take 1 capsule (100 mg total) by mouth 2 (two) times daily as needed for Constipation.    hydroCHLOROthiazide (HYDRODIURIL) 25 MG tablet TAKE 1 TABLET(25 MG) BY MOUTH EVERY DAY    triamcinolone acetonide 0.1% (KENALOG) 0.1 % cream Apply topically 2 (two) times daily. Under occlusion for 14 days     Family History       Problem Relation (Age of Onset)    Birth defects Daughter    Heart disease Mother, Sister    Muscular dystrophy Father          Tobacco Use    Smoking status: Never    Smokeless tobacco: Not on file   Substance and Sexual Activity    Alcohol use: No    Drug use: No    Sexual activity: Never     Review of Systems   Constitutional:  Negative for chills and fever.   Respiratory:  Positive for cough and shortness of breath. Negative for wheezing.    Cardiovascular:  Positive for palpitations and leg swelling. Negative for chest pain.   Gastrointestinal:  Negative for nausea and vomiting.   All other systems reviewed and are negative.    Objective:     Vital Signs (Most Recent):  Temp: 98.7 °F (37.1 °C) (24)  Pulse: 84 (24)  Resp: 19 (24)  BP: (!) 150/75 (24)  SpO2: 96 % (24) Vital Signs (24h Range):  Temp:  [98.7  °F (37.1 °C)-99.8 °F (37.7 °C)] 98.7 °F (37.1 °C)  Pulse:  [] 84  Resp:  [19-47] 19  SpO2:  [88 %-97 %] 96 %  BP: (114-151)/(64-87) 150/75     Weight: (!) 194.4 kg (428 lb 9.2 oz)  Body mass index is 65.16 kg/m².     Physical Exam  Vitals reviewed.   Constitutional:       General: She is in acute distress.      Appearance: She is obese. She is not diaphoretic.   HENT:      Nose: Nose normal.   Eyes:      Conjunctiva/sclera: Conjunctivae normal.   Cardiovascular:      Rate and Rhythm: Normal rate.   Pulmonary:      Effort: Respiratory distress present.      Breath sounds: No wheezing.   Abdominal:      Tenderness: There is no abdominal tenderness.   Musculoskeletal:         General: Swelling present.      Right lower leg: Edema present.      Left lower leg: Edema present.   Skin:     General: Skin is warm and dry.   Neurological:      Mental Status: She is alert and oriented to person, place, and time.   Psychiatric:         Mood and Affect: Mood normal.         Behavior: Behavior normal.                Significant Labs: All pertinent labs within the past 24 hours have been reviewed.  Recent Lab Results         03/14/24 2014 03/14/24  1851   03/14/24  1820        Influenza A, Molecular   Negative         Influenza B, Molecular   Negative         Albumin     3.3       ALP     80       Allens Test Pass           ALT     11       Anion Gap     10       AST     19       Baso #     0.05       Basophil %     0.7       BILIRUBIN TOTAL     1.1  Comment: For infants and newborns, interpretation of results should be based  on gestational age, weight and in agreement with clinical  observations.    Premature Infant recommended reference ranges:  Up to 24 hours.............<8.0 mg/dL  Up to 48 hours............<12.0 mg/dL  3-5 days..................<15.0 mg/dL  6-29 days.................<15.0 mg/dL         BNP     464  Comment: Values of less than 100 pg/ml are consistent with non-CHF populations.       Site RR            BUN     13       Calcium     8.6       Chloride     106       CO2     21       Creatinine     0.8       DelSys Nasal Can           Differential Method     Automated       eGFR     >60       Eos #     0.0       Eos %     0.3       Flow 8           Flu A & B Source   Nasal swab         Glucose     149       Gran # (ANC)     6.3       Gran %     86.9       Hematocrit     37.5       Hemoglobin     11.5       Hepatitis C Ab     Negative       HEP C Virus Hold Specimen     Hold for HCV sendout       HIV 1/2 Ag/Ab     Negative       Immature Grans (Abs)     0.05  Comment: Mild elevation in immature granulocytes is non specific and   can be seen in a variety of conditions including stress response,   acute inflammation, trauma and pregnancy. Correlation with other   laboratory and clinical findings is essential.         Immature Granulocytes     0.7       Lactic Acid Level     1.0  Comment: Falsely low lactic acid results can be found in samples   containing >=13.0 mg/dL total bilirubin and/or >=3.5 mg/dL   direct bilirubin.         Lymph #     0.3       Lymph %     3.6       MCH     26.4       MCHC     30.7       MCV     86       Mode SPONT           Mono #     0.6       Mono %     7.8       MPV     10.5       nRBC     0       Platelet Count     198       POC BE 3           POC HCO3 28.5           POC PCO2 52.4           POC PH 7.343           POC PO2 89           POC SATURATED O2 96           Potassium     4.9       PROTEIN TOTAL     7.0       RBC     4.36       RDW     15.1       Sample ARTERIAL           SARS-CoV-2 RNA, Amplification, Qual   Positive  Comment: This test utilizes isothermal nucleic acid amplification technology   to   detect the SARS-CoV-2 RdRp nucleic acid segment. The analytical   sensitivity   (limit of detection) is 500 copies/swab.    A POSITIVE result is indicative of the presence of SARS-CoV-2 RNA;   clinical   correlation with patient history and other diagnostic information is   necessary to  determine patient infection status.    A NEGATIVE result means that SARS-CoV-2 nucleic acids are not present   above   the limit of detection. A NEGATIVE result should be treated as   presumptive.   It does not rule out the possibility of COVID-19 and should not be   the sole   basis for treatment decisions.    If COVID-19 is strongly suspected based on clinical and exposure   history,   re-testing using an alternate molecular assay should be considered.    This test is Food and Drug Administration (FDA) approved. Performance   characteristics of this has been independently verified by [facility] Department of Pathology and Laboratory Medicine.           Sodium     137       Troponin I     <0.006  Comment: The reference interval for Troponin I represents the 99th percentile   cutoff   for our facility and is consistent with 3rd generation assay   performance.         WBC     7.22               Significant Imaging: I have reviewed all pertinent imaging results/findings within the past 24 hours.  X-Ray Chest AP Portable   Final Result      Bibasilar opacities favoring edema and atelectasis.         Electronically signed by: Sam Capellan   Date:    03/14/2024   Time:    19:21

## 2024-03-15 NOTE — ASSESSMENT & PLAN NOTE
Chronic, uncontrolled. Latest blood pressure and vitals reviewed-     Temp:  [98.7 °F (37.1 °C)-99.8 °F (37.7 °C)]   Pulse:  []   Resp:  [19-47]   BP: (114-151)/(64-87)   SpO2:  [88 %-97 %] .   Home meds for hypertension were reviewed and noted below.   Hypertension Medications               amLODIPine (NORVASC) 5 MG tablet Take 1 tablet (5 mg total) by mouth once daily.    hydroCHLOROthiazide (HYDRODIURIL) 25 MG tablet TAKE 1 TABLET(25 MG) BY MOUTH EVERY DAY            While in the hospital, will manage blood pressure as follows; Adjust home antihypertensive regimen as follows- change Norvasc to Toprol-XL 25 mg p.o. daily in the setting of new onset AFib.    Will utilize p.r.n. blood pressure medication only if patient's blood pressure greater than 160/100 and she develops symptoms such as worsening chest pain or shortness of breath.

## 2024-03-15 NOTE — SUBJECTIVE & OBJECTIVE
Past Medical History:   Diagnosis Date    Hypertension        Past Surgical History:   Procedure Laterality Date     SECTION      COLONOSCOPY N/A 2019    Procedure: COLONOSCOPY;  Surgeon: Janeth Leroy MD;  Location: [facility];  Service: Endoscopy;  Laterality: N/A;    HYSTERECTOMY         Review of patient's allergies indicates:  No Known Allergies    No current facility-administered medications on file prior to encounter.     Current Outpatient Medications on File Prior to Encounter   Medication Sig    amLODIPine (NORVASC) 5 MG tablet Take 1 tablet (5 mg total) by mouth once daily.    celecoxib (CELEBREX) 100 MG capsule Take 1 capsule (100 mg total) by mouth 2 (two) times daily.    docusate sodium (COLACE) 100 MG capsule Take 1 capsule (100 mg total) by mouth 2 (two) times daily as needed for Constipation.    hydroCHLOROthiazide (HYDRODIURIL) 25 MG tablet TAKE 1 TABLET(25 MG) BY MOUTH EVERY DAY    triamcinolone acetonide 0.1% (KENALOG) 0.1 % cream Apply topically 2 (two) times daily. Under occlusion for 14 days     Family History       Problem Relation (Age of Onset)    Birth defects Daughter    Heart disease Mother, Sister    Muscular dystrophy Father          Tobacco Use    Smoking status: Never    Smokeless tobacco: Not on file   Substance and Sexual Activity    Alcohol use: No    Drug use: No    Sexual activity: Never     Review of Systems   Reason unable to perform ROS: as per hpi.   Constitutional: Positive for malaise/fatigue.   Cardiovascular:  Positive for dyspnea on exertion and leg swelling.   Respiratory:  Positive for cough and shortness of breath.      Objective:     Vital Signs (Most Recent):  Temp: 97.3 °F (36.3 °C) (03/15/24 0907)  Pulse: 92 (03/15/24 0935)  Resp: 18 (03/15/24 0935)  BP: 131/67 (03/15/24 0740)  SpO2: 96 % (03/15/24 0933) Vital Signs (24h Range):  Temp:  [97.3 °F (36.3 °C)-99.8 °F (37.7 °C)] 97.3 °F (36.3 °C)  Pulse:  [] 92  Resp:  [18-47] 18  SpO2:  [88  %-98 %] 96 %  BP: (114-162)/(64-98) 131/67     Weight: (!) 195.6 kg (431 lb 3.5 oz)  Body mass index is 65.57 kg/m².    SpO2: 96 %         Intake/Output Summary (Last 24 hours) at 3/15/2024 0947  Last data filed at 3/14/2024 2348  Gross per 24 hour   Intake 100 ml   Output 1450 ml   Net -1350 ml       Lines/Drains/Airways       Peripheral Intravenous Line  Duration                  Peripheral IV - Single Lumen 03/14/24 1822 20 G Anterior;Right Forearm <1 day         Peripheral IV - Single Lumen 03/14/24 1822 20 G Left;Posterior Hand <1 day                     Physical Exam  Vitals and nursing note reviewed.          Significant Labs: CMP   Recent Labs   Lab 03/14/24  1820 03/15/24  0432    141   K 4.9 4.8    103   CO2 21* 27   * 143*   BUN 13 13   CREATININE 0.8 0.8   CALCIUM 8.6* 8.5*   PROT 7.0 6.9   ALBUMIN 3.3* 3.4*   BILITOT 1.1* 0.9   ALKPHOS 80 83   AST 19 14   ALT 11 9*   ANIONGAP 10 11   , CBC   Recent Labs   Lab 03/14/24 1820 03/15/24  0432   WBC 7.22 5.15   HGB 11.5* 12.8   HCT 37.5 43.6    188   , Troponin   Recent Labs   Lab 03/14/24 1820   TROPONINI <0.006   , and All pertinent lab results from the last 24 hours have been reviewed.    Significant Imaging: Echocardiogram: Transthoracic echo (TTE) complete (Cupid Only):   Results for orders placed or performed during the hospital encounter of 03/07/23   Echo Saline Bubble? No   Result Value Ref Range    BSA 2.96 m2    LV LATERAL E/E' RATIO 10.50 m/s    LA WIDTH 3.50 cm    IVC diameter 2.7 cm    Left Ventricular Outflow Tract Mean Velocity 0.81 cm/s    Left Ventricular Outflow Tract Mean Gradient 2.95 mmHg    TDI LATERAL 0.12 m/s    LVIDd 4.25 3.5 - 6.0 cm    IVS 1.53 (A) 0.6 - 1.1 cm    Posterior Wall 1.38 (A) 0.6 - 1.1 cm    Ao root annulus 3.12 cm    LVIDs 2.91 2.1 - 4.0 cm    FS 32 28 - 44 %    LA volume 62.83 cm3    STJ 3.45 cm    Ascending aorta 3.56 cm    LV mass 242.13 g    LA size 3.57 cm    TAPSE 2.70 cm    Left  Ventricle Relative Wall Thickness 0.65 cm    AV mean gradient 9 mmHg    AV Velocity Ratio 0.56     AV index (prosthetic) 0.59     MV valve area p 1/2 method 2.96 cm2    E/A ratio 1.03     E wave deceleration time 255.94 msec    IVRT 59.94 msec    LVOT peak mendez 1.14 m/s    LVOT peak VTI 25.60 cm    Ao peak mendez 2.03 m/s    Ao VTI 43.2 cm    RVOT peak mendez 1.16 m/s    RVOT peak VTI 32.1 cm    AV peak gradient 16 mmHg    PV mean gradient 4.59 mmHg    MV Peak E Mendez 1.26 m/s    TR Max Mendez 3.00 m/s    MV stenosis pressure 1/2 time 74.22 ms    MV Peak A Mendez 1.22 m/s    LV Systolic Volume 32.40 mL    LV Systolic Volume Index 11.7 mL/m2    LV Diastolic Volume 80.62 mL    LV Diastolic Volume Index 29.21 mL/m2    LA Volume Index 22.8 mL/m2    LV Mass Index 88 g/m2    RA Major Axis 4.78 cm    Left Atrium Minor Axis 5.69 cm    Left Atrium Major Axis 6.16 cm    Triscuspid Valve Regurgitation Peak Gradient 36 mmHg    Right Atrial Pressure (from IVC) 15 mmHg    EF 60 %    TV resting pulmonary artery pressure 51 mmHg    Narrative    · The left ventricle is normal in size with concentric hypertrophy and   normal systolic function.  · The estimated ejection fraction is 60%.  · Normal left ventricular diastolic function.  · Normal right ventricular size with normal right ventricular systolic   function.  · Elevated central venous pressure (15 mmHg).  · There is pulmonary hypertension.  · The estimated PA systolic pressure is 51 mmHg.       and EKG: Reviewed

## 2024-03-15 NOTE — HPI
78-year-old white woman with history of hypertension, major depression, osteoarthritis, anemia, peripheral arterial disease, bilateral lower extremity edema, cellulitis, pulmonary hypertension, and morbid obesity who presented to the emergency department via EMS with complaint of shortness a breath.  On arrival by the fire department, patient was 83% on room air and placed on 8 L nasal cannula and saturating 94%.  Patient did receive a DuoNeb and went into AFib with RVR.  Patient denies any previous history of arrhythmias, CHF, or COPD.  Shortness for breath has been present over the last several days, associated with nonproductive cough and lower extremity edema, no associated chest pain, moderate to severe in nature, constant, no aggravating or alleviating factor identified.  Patient denies any nausea, vomiting, fevers, chills.  Patient denies any recent sick contacts or hospitalizations.

## 2024-03-15 NOTE — ASSESSMENT & PLAN NOTE
Body mass index is 65.16 kg/m². Morbid obesity complicates all aspects of disease management from diagnostic modalities to treatment. Weight loss encouraged and health benefits explained to patient.  Patient is high risk of decompensation in the setting of COVID-19 pneumonia with acute hypoxic respiratory failure complicated by acute diastolic CHF exacerbation.

## 2024-03-15 NOTE — HOSPITAL COURSE
The patient was admitted 3/15/24 for new onset Afib with RVR, COVID PNA, and CHF exacerbation on Remdisivir, IV dexamethasone, IV Lasix, therapeutic Lovenox, broad spectrum IV antibiotics. Cardiology consulted for Afib with RVR and recommended Toprol XL and Eliquis on discharge. HR controlled. Cardiology recommended EP referral   Slowly, SOB improved. Swelling improved. Pt was weaned down to 1 liters NC. She will be discharged on oral Lasix 40mg daily, po Dexamethasone, and Albuterol inhaler. She was provided instructions on 2gm sodium fluid restriction diet. SNF offered to pt to provide moderate intensity therapy as recommended by therapy. Pt declined. Home health arranged. [facility] at home ordered. Discharge instructions reviewed with pt and daughter. The patient was seen and examined today and determined to be stable for discharge.

## 2024-03-15 NOTE — ASSESSMENT & PLAN NOTE
Patient is identified as having Diastolic (HFpEF) heart failure that is Acute. CHF is currently uncontrolled due to Continued edema of extremities. Latest ECHO performed and demonstrates- Results for orders placed during the hospital encounter of 03/07/23    Echo Saline Bubble? No    Interpretation Summary  · The left ventricle is normal in size with concentric hypertrophy and normal systolic function.  · The estimated ejection fraction is 60%.  · Normal left ventricular diastolic function.  · Normal right ventricular size with normal right ventricular systolic function.  · Elevated central venous pressure (15 mmHg).  · There is pulmonary hypertension.  · The estimated PA systolic pressure is 51 mmHg.  . Continue Beta Blocker and Furosemide and monitor clinical status closely. Monitor on telemetry. Patient is on CHF pathway.  Monitor strict Is&Os and daily weights.  Place on fluid restriction of 1.5 L. Cardiology has been consulted. Continue to stress to patient importance of self efficacy and  on diet for CHF. Last BNP reviewed- and noted below   Recent Labs   Lab 03/14/24  1820   *   Lasix 40 mg IV b.i.d., strict Is&Os, daily weights  Pending cardiology consult  Previous echocardiogram 03/10/2023 with EF 60%.  Repeat echocardiogram.

## 2024-03-15 NOTE — HPI
HPI obtained from chart as patient is currently on isolation precautions due to COVID-19      Ms. Reyes is a 78 year old female patient whose current medical conditions include OA, anemia, PAD, pulmonary HTN, morbid obesity, and HTN who presented to [facility] ED via EMS due to worsening SOB. She was noted to be hypoxic upon EMS arrival with O2 sat of 83%. Associated symptoms included non-productive cough, LE edema, and fatigue. She denied any associated fever, chills, nica chest pain, palpitations, near syncope, or syncope. She converted to afib with RVR after receiving a duoneb. Initial labs revealed BNP of 464. CXR showed bilateral opacities favoring edema, and patient was subsequently admitted for further evaluation and treatment. Cardiology consulted to assist with management. Chart reviewed. Troponin x 1 negative. Patient with no apparent CV history-no afib/CHF. Remains in afib with HR in  range, BB up-titrated. Being AC with full dose Lovenox, recommend Eliquis upon d/c. TTE pending.

## 2024-03-15 NOTE — CONSULTS
[facility] - Telemetry ([facility])  Wound Care    Patient Name:  Niru Reyes   MRN:  [id number]  Date: 3/15/2024  Diagnosis: Acute hypoxemic respiratory failure    History:     Past Medical History:   Diagnosis Date    Hypertension        Social History     Socioeconomic History    Marital status:    Tobacco Use    Smoking status: Never   Substance and Sexual Activity    Alcohol use: No    Drug use: No    Sexual activity: Never       Precautions:     Allergies as of 03/14/2024    (No Known Allergies)       [facility] Assessment Details/Treatment     Consulted on this 79 y/o F patient due to present on admission altered skin integrity. Patient admitted with COVID 19 virus, respiratory failure/sob/and AFib RVR, and has pMH significant for HTN, obesity, skin changes and cellulitis related chronic lymphedema to [facility]. She reports she is essentially bedbound at home, and uses underpads for toiletting, lives with her daughter.  BLE assessed - patient reports hx of lymphedema and cellulitis that has been treated recently. She reports she keeps her legs wrapped at home, but removed them on admit. BLE skin intact, skin changes including hyperkeratosis and elephantiasis nostras verrucosa. Skin intact with no ulcerations. Cleansed with bath wipes and moisturizer applied to BLE. Recommend patient reapply lymphedema wraps from home per her usual schedule, can defer until discharge.  Noted to soiled with urine and stool. Incontinence care provided with 2 staff members - cleansed with no rinse foaming cleanser and wipes, patted dry.  MASD to buttocks/ischium/josé-rectal skin with redness, thin layer clinical protect moisture barrier applied. Full thickness ulceration noted to right posterior proximal thigh consistent with stage 3 pressure injury: measures 2.5x2x0.1cm, moist red and yellow wound bed, scant serous drainage. Cleansed with saline and patted dry. Attempted to apply foam dressing, however, would not adhere to skin, so covered  with thick layer of clinical protect paste.  Patient with difficulty in turning in bed, will order specialty low air loss hercules bed from [employer] to improve bed mobility.   Versette external urinary catheter applied after incontinence care. Recommend avoid diapers, turn q2 hours.      03/15/24 0943   WOCN Assessment   WOCN Total Time (mins) 60   Visit Date 03/15/24   Visit Time 0943   Consult Type New   WOCN Speciality Wound;Continence   Wound moisture;At risk for pressure Injury;other   Continence Type Urinary;Fecal   Intervention assessed;applied;chart review;coordination of care;team conference;orders   Teaching on-going;complication;discharge        Altered Skin Integrity 03/14/24 2200 Right posterior Thigh   Date First Assessed/Time First Assessed: 03/14/24 2200   Altered Skin Integrity Present on Admission - Did Patient arrive to the hospital with altered skin?: yes  Side: Right  Orientation: posterior  Location: Thigh   Wound Image    Dressing Appearance Open to air   Drainage Amount Scant   Drainage Characteristics/Odor Serous   Appearance Red;Yellow;Moist;Adipose   Tissue loss description Full thickness   Red (%), Wound Tissue Color 50 %   Yellow (%), Wound Tissue Color 50 %   Periwound Area Intact   Wound Edges Defined;Irregular   Wound Length (cm) 2.5 cm   Wound Width (cm) 2 cm   Wound Depth (cm) 0.1 cm   Wound Volume (cm^3) 0.5 cm^3   Wound Surface Area (cm^2) 5 cm^2   Care Cleansed with:;Wound cleanser;Applied:;Skin Barrier  (clinical protect paste)        Altered Skin Integrity 03/15/24 Buttocks Incontinence associated dermatitis   Date First Assessed: 03/15/24   Altered Skin Integrity Present on Admission - Did Patient arrive to the hospital with altered skin?: yes  Location: Buttocks  Primary Wound Type: Incontinence associated dermatitis   Wound Image    Drainage Amount None   Appearance Red   Tissue loss description Not applicable   Care Cleansed with:;Soap and water;Applied:;Skin  Barrier  (clinical protect paste)       Recommendations made to primary team for wound care, pressure injury prevention interventions, moisture management, specialty MÓNICA bariatric bed . Orders placed.     03/15/2024

## 2024-03-15 NOTE — PLAN OF CARE
Pt oriented x4 VSS  Plan of care reviewed. pt verbalizes understanding.  Patient moving/ turning with max assist.  Patient afib on monitor  Bed low, side rails up x2, wheels locked, call light in reach.  Pt instructed to call for assistance

## 2024-03-15 NOTE — H&P
[facility]  History & Physical    Patient Name: Niru Reyes  MRN: [id number]  Patient Class: IP- Inpatient  Admission Date: 3/14/2024  Attending Physician:  Acacia Motta MD  Primary Care Provider: Jordana Shirley MD         Patient information was obtained from patient, ER records, and ER physician .     Subjective:     Principal Problem:Acute hypoxemic respiratory failure    Chief Complaint:   Chief Complaint   Patient presents with    Shortness of Breath     Pt reports SOB. Fire department found pt 83% on RA and put her on 8L and came up to 94%. [place] heard inspiratory wheezing and diminished breath sounds to R. Side. Pt given duo neb and pt's HR then became irregular. Afib with HR going into 200s. Pt denies hx of a.fib, copd, or chf. Pt has been treated for BLE cellulitis.         HPI: 78-year-old white woman with history of hypertension, major depression, osteoarthritis, anemia, peripheral arterial disease, bilateral lower extremity edema, cellulitis, pulmonary hypertension, and morbid obesity who presented to the emergency department via EMS with complaint of shortness a breath.  On arrival by the fire department, patient was 83% on room air and placed on 8 L nasal cannula and saturating 94%.  Patient did receive a DuoNeb and went into AFib with RVR.  Patient denies any previous history of arrhythmias, CHF, or COPD.  Shortness for breath has been present over the last several days, associated with nonproductive cough and lower extremity edema, no associated chest pain, moderate to severe in nature, constant, no aggravating or alleviating factor identified.  Patient denies any nausea, vomiting, fevers, chills.  Patient denies any recent sick contacts or hospitalizations.    Past Medical History:   Diagnosis Date    Hypertension        Past Surgical History:   Procedure Laterality Date     SECTION      COLONOSCOPY N/A 2019    Procedure:  COLONOSCOPY;  Surgeon: Janeth Leroy MD;  Location: [facility];  Service: Endoscopy;  Laterality: N/A;    HYSTERECTOMY         Review of patient's allergies indicates:  No Known Allergies    No current facility-administered medications on file prior to encounter.     Current Outpatient Medications on File Prior to Encounter   Medication Sig    amLODIPine (NORVASC) 5 MG tablet Take 1 tablet (5 mg total) by mouth once daily.    celecoxib (CELEBREX) 100 MG capsule Take 1 capsule (100 mg total) by mouth 2 (two) times daily.    docusate sodium (COLACE) 100 MG capsule Take 1 capsule (100 mg total) by mouth 2 (two) times daily as needed for Constipation.    hydroCHLOROthiazide (HYDRODIURIL) 25 MG tablet TAKE 1 TABLET(25 MG) BY MOUTH EVERY DAY    triamcinolone acetonide 0.1% (KENALOG) 0.1 % cream Apply topically 2 (two) times daily. Under occlusion for 14 days     Family History       Problem Relation (Age of Onset)    Birth defects Daughter    Heart disease Mother, Sister    Muscular dystrophy Father          Tobacco Use    Smoking status: Never    Smokeless tobacco: Not on file   Substance and Sexual Activity    Alcohol use: No    Drug use: No    Sexual activity: Never     Review of Systems   Constitutional:  Negative for chills and fever.   Respiratory:  Positive for cough and shortness of breath. Negative for wheezing.    Cardiovascular:  Positive for palpitations and leg swelling. Negative for chest pain.   Gastrointestinal:  Negative for nausea and vomiting.   All other systems reviewed and are negative.    Objective:     Vital Signs (Most Recent):  Temp: 98.7 °F (37.1 °C) (03/14/24 2139)  Pulse: 84 (03/14/24 2139)  Resp: 19 (03/14/24 2139)  BP: (!) 150/75 (03/14/24 2139)  SpO2: 96 % (03/14/24 2139) Vital Signs (24h Range):  Temp:  [98.7 °F (37.1 °C)-99.8 °F (37.7 °C)] 98.7 °F (37.1 °C)  Pulse:  [] 84  Resp:  [19-47] 19  SpO2:  [88 %-97 %] 96 %  BP: (114-151)/(64-87) 150/75     Weight: (!) 194.4 kg (428 lb  9.2 oz)  Body mass index is 65.16 kg/m².     Physical Exam  Vitals reviewed.   Constitutional:       General: She is in acute distress.      Appearance: She is obese. She is not diaphoretic.   HENT:      Nose: Nose normal.   Eyes:      Conjunctiva/sclera: Conjunctivae normal.   Cardiovascular:      Rate and Rhythm: Normal rate.   Pulmonary:      Effort: Respiratory distress present.      Breath sounds: No wheezing.   Abdominal:      Tenderness: There is no abdominal tenderness.   Musculoskeletal:         General: Swelling present.      Right lower leg: Edema present.      Left lower leg: Edema present.   Skin:     General: Skin is warm and dry.   Neurological:      Mental Status: She is alert and oriented to person, place, and time.   Psychiatric:         Mood and Affect: Mood normal.         Behavior: Behavior normal.                Significant Labs: All pertinent labs within the past 24 hours have been reviewed.  Recent Lab Results         03/14/24 2014 03/14/24  1851   03/14/24  1820        Influenza A, Molecular   Negative         Influenza B, Molecular   Negative         Albumin     3.3       ALP     80       Allens Test Pass           ALT     11       Anion Gap     10       AST     19       Baso #     0.05       Basophil %     0.7       BILIRUBIN TOTAL     1.1  Comment: For infants and newborns, interpretation of results should be based  on gestational age, weight and in agreement with clinical  observations.    Premature Infant recommended reference ranges:  Up to 24 hours.............<8.0 mg/dL  Up to 48 hours............<12.0 mg/dL  3-5 days..................<15.0 mg/dL  6-29 days.................<15.0 mg/dL         BNP     464  Comment: Values of less than 100 pg/ml are consistent with non-CHF populations.       Site RR           BUN     13       Calcium     8.6       Chloride     106       CO2     21       Creatinine     0.8       DelSys Nasal Can           Differential Method     Automated       eGFR      >60       Eos #     0.0       Eos %     0.3       Flow 8           Flu A & B Source   Nasal swab         Glucose     149       Gran # (ANC)     6.3       Gran %     86.9       Hematocrit     37.5       Hemoglobin     11.5       Hepatitis C Ab     Negative       HEP C Virus Hold Specimen     Hold for HCV sendout       HIV 1/2 Ag/Ab     Negative       Immature Grans (Abs)     0.05  Comment: Mild elevation in immature granulocytes is non specific and   can be seen in a variety of conditions including stress response,   acute inflammation, trauma and pregnancy. Correlation with other   laboratory and clinical findings is essential.         Immature Granulocytes     0.7       Lactic Acid Level     1.0  Comment: Falsely low lactic acid results can be found in samples   containing >=13.0 mg/dL total bilirubin and/or >=3.5 mg/dL   direct bilirubin.         Lymph #     0.3       Lymph %     3.6       MCH     26.4       MCHC     30.7       MCV     86       Mode SPONT           Mono #     0.6       Mono %     7.8       MPV     10.5       nRBC     0       Platelet Count     198       POC BE 3           POC HCO3 28.5           POC PCO2 52.4           POC PH 7.343           POC PO2 89           POC SATURATED O2 96           Potassium     4.9       PROTEIN TOTAL     7.0       RBC     4.36       RDW     15.1       Sample ARTERIAL           SARS-CoV-2 RNA, Amplification, Qual   Positive  Comment: This test utilizes isothermal nucleic acid amplification technology   to   detect the SARS-CoV-2 RdRp nucleic acid segment. The analytical   sensitivity   (limit of detection) is 500 copies/swab.    A POSITIVE result is indicative of the presence of SARS-CoV-2 RNA;   clinical   correlation with patient history and other diagnostic information is   necessary to determine patient infection status.    A NEGATIVE result means that SARS-CoV-2 nucleic acids are not present   above   the limit of detection. A NEGATIVE result should be treated  as   presumptive.   It does not rule out the possibility of COVID-19 and should not be   the sole   basis for treatment decisions.    If COVID-19 is strongly suspected based on clinical and exposure   history,   re-testing using an alternate molecular assay should be considered.    This test is Food and Drug Administration (FDA) approved. Performance   characteristics of this has been independently verified by [facility] Department of Pathology and Laboratory Medicine.           Sodium     137       Troponin I     <0.006  Comment: The reference interval for Troponin I represents the 99th percentile   cutoff   for our facility and is consistent with 3rd generation assay   performance.         WBC     7.22               Significant Imaging: I have reviewed all pertinent imaging results/findings within the past 24 hours.  X-Ray Chest AP Portable   Final Result      Bibasilar opacities favoring edema and atelectasis.         Electronically signed by: Sam Capellan   Date:    03/14/2024   Time:    19:21         Assessment/Plan:     * Acute hypoxemic respiratory failure  Patient with Hypoxic Respiratory failure which is Acute.  she is not on home oxygen. Supplemental oxygen was provided and noted-      .   Signs/symptoms of respiratory failure include- tachypnea, increased work of breathing, and respiratory distress. Contributing diagnoses includes - CHF, Pneumonia, and COVID  Labs and images were reviewed. Patient Has recent ABG, which has been reviewed. Will treat underlying causes and adjust management of respiratory failure as follows- continue high-flow O2 via nasal cannula, continuous pulse oximetry monitoring.  See discussion for acute diastolic CHF exacerbation and COVID-19 pneumonia for additional evaluation and treatment plan.    Pneumonia due to COVID-19 virus  Patient is identified as Severe COVID-19 based on hypoxemia with O2 saturations <94% on room air or on ambulation and High risk for  severe complications of COVID 19 based on COVID risk score of 4   Initiate standard COVID protocols; COVID-19 testing ,Infection Control notification  and isolation- respiratory, contact and droplet per protocol    Diagnostics: CBC, CMP, Ferritin, CRP, and Portable CXR  White blood cell count 7.22, lactic acid level 1.0, LFTs unremarkable, ferritin pending, CRP pending, , influenza a/B negative, COVID-19 positive, HIV negative  Chest x-ray with bibasilar opacities favoring edema and atelectasis    Management: Initiate targeted therapy with Remdesivir, 200mg IV x1, followed by 100mg IV daily x5 days total and Dexamethasone PO/IV 6mg daily x10 days, Maintain oxygen saturations 92-96% via High Flow Nasal Cannula  % FiO2 and monitor with continuous/intermittent pulse oximetry. , Inhaled bronchodilators as needed for shortness of breath., Continuous cardiac monitoring., and Manage respiratory failure (O2 requirement >10LPM or needing NIPPV/Mechanical ventilation) and/or Pneumonia (active chest infiltrates) separately as described below.  -encourage frequent incentive spirometry, scheduled albuterol inhaler, p.r.n. Xopenex nebs  -empiric IV antibiotics with cefepime and vancomycin  -check procalcitonin level, MRSA culture,, and sputum culture    Advance Care Planning Current advance care plan has been discussed with patient/family/POA and patient currently wishes Full Code.     Acute diastolic CHF (congestive heart failure)  Patient is identified as having Diastolic (HFpEF) heart failure that is Acute. CHF is currently uncontrolled due to Continued edema of extremities. Latest ECHO performed and demonstrates- Results for orders placed during the hospital encounter of 03/07/23    Echo Saline Bubble? No    Interpretation Summary  · The left ventricle is normal in size with concentric hypertrophy and normal systolic function.  · The estimated ejection fraction is 60%.  · Normal left ventricular diastolic function.  ·  Normal right ventricular size with normal right ventricular systolic function.  · Elevated central venous pressure (15 mmHg).  · There is pulmonary hypertension.  · The estimated PA systolic pressure is 51 mmHg.  . Continue Beta Blocker and Furosemide and monitor clinical status closely. Monitor on telemetry. Patient is on CHF pathway.  Monitor strict Is&Os and daily weights.  Place on fluid restriction of 1.5 L. Cardiology has been consulted. Continue to stress to patient importance of self efficacy and  on diet for CHF. Last BNP reviewed- and noted below   Recent Labs   Lab 03/14/24  1820   *   Lasix 40 mg IV b.i.d., strict Is&Os, daily weights  Pending cardiology consult  Previous echocardiogram 03/10/2023 with EF 60%.  Repeat echocardiogram.    Atrial fibrillation with RVR  Patient with Paroxysmal (<7 days) atrial fibrillation which is uncontrolled currently with Beta Blocker. Patient is currently in sinus rhythm.[id number] Score: 4.  Anticoagulation indicated. Anticoagulation done with treatment dose Lovenox for now .  Cardiology consult.  Telemetry monitoring.  Check TSH and magnesium level.    Morbid obesity  Body mass index is 65.16 kg/m². Morbid obesity complicates all aspects of disease management from diagnostic modalities to treatment. Weight loss encouraged and health benefits explained to patient.  Patient is high risk of decompensation in the setting of COVID-19 pneumonia with acute hypoxic respiratory failure complicated by acute diastolic CHF exacerbation.         Hyperglycemia  No known history of diabetes mellitus.  May be related to stress response.  Check A1c.      HTN (hypertension)  Chronic, uncontrolled. Latest blood pressure and vitals reviewed-     Temp:  [98.7 °F (37.1 °C)-99.8 °F (37.7 °C)]   Pulse:  []   Resp:  [19-47]   BP: (114-151)/(64-87)   SpO2:  [88 %-97 %] .   Home meds for hypertension were reviewed and noted below.   Hypertension Medications                amLODIPine (NORVASC) 5 MG tablet Take 1 tablet (5 mg total) by mouth once daily.    hydroCHLOROthiazide (HYDRODIURIL) 25 MG tablet TAKE 1 TABLET(25 MG) BY MOUTH EVERY DAY            While in the hospital, will manage blood pressure as follows; Adjust home antihypertensive regimen as follows- change Norvasc to Toprol-XL 25 mg p.o. daily in the setting of new onset AFib.    Will utilize p.r.n. blood pressure medication only if patient's blood pressure greater than 160/100 and she develops symptoms such as worsening chest pain or shortness of breath.      VTE Risk Mitigation (From admission, onward)           Ordered     enoxaparin injection 150 mg  Every 12 hours         03/14/24 2226     IP VTE HIGH RISK PATIENT  Once         03/14/24 2222     Place sequential compression device  Until discontinued         03/14/24 2222                               Pharmacist Renal Dose Adjustment Note    Niru Reyes is a 78 y.o. female being treated with the medication enoxaparin    Patient Data:    Vital Signs (Most Recent):  Temp: 98.7 °F (37.1 °C) (03/14/24 2139)  Pulse: 84 (03/14/24 2139)  Resp: 19 (03/14/24 2139)  BP: (!) 150/75 (03/14/24 2139)  SpO2: 96 % (03/14/24 2139) Vital Signs (72h Range):  Temp:  [98.7 °F (37.1 °C)-99.8 °F (37.7 °C)]   Pulse:  []   Resp:  [19-47]   BP: (114-151)/(64-87)   SpO2:  [88 %-97 %]      Recent Labs   Lab 03/14/24 1820   CREATININE 0.8     Serum creatinine: 0.8 mg/dL 03/14/24 1820  Estimated creatinine clearance: 109.7 mL/min    Enoxaparin 40 mg q12h will be changed to enoxaparin 60 mg q12h for CrCl >30 mL/min and BMI >50.     Pharmacist's Name: Katerina Milian  Pharmacist's Extension: [phone]         Acacia Motta MD  Department of Hospital Medicine  [facility] - Telemetry ([facility])

## 2024-03-15 NOTE — PLAN OF CARE
POC reviewed with pt. Pt verbalizes understanding of POC. No questions at this time.  AAOx3. NADN.  Afib on cardiac monitor.  Pt remains free of falls.  No complaints at this time.  Safety measures in place. Will continue to monitor.  Informed pt to call for assistance before getting up. Pt verbalizes understanding.  Hourly rounding and chart check complete.   IV antibiotics   Tessalon pearls PRN   Size wise bed to be delivered.

## 2024-03-15 NOTE — ASSESSMENT & PLAN NOTE
Patient is identified as Severe COVID-19 based on hypoxemia with O2 saturations <94% on room air or on ambulation and High risk for severe complications of COVID 19 based on COVID risk score of 4   Initiate standard COVID protocols; COVID-19 testing ,Infection Control notification  and isolation- respiratory, contact and droplet per protocol    Diagnostics: CBC, CMP, Ferritin, CRP, and Portable CXR  White blood cell count 7.22, lactic acid level 1.0, LFTs unremarkable, ferritin pending, CRP pending, , influenza a/B negative, COVID-19 positive, HIV negative  Chest x-ray with bibasilar opacities favoring edema and atelectasis    Management: Initiate targeted therapy with Remdesivir, 200mg IV x1, followed by 100mg IV daily x5 days total and Dexamethasone PO/IV 6mg daily x10 days, Maintain oxygen saturations 92-96% via High Flow Nasal Cannula  % FiO2 and monitor with continuous/intermittent pulse oximetry. , Inhaled bronchodilators as needed for shortness of breath., Continuous cardiac monitoring., and Manage respiratory failure (O2 requirement >10LPM or needing NIPPV/Mechanical ventilation) and/or Pneumonia (active chest infiltrates) separately as described below.  -encourage frequent incentive spirometry, scheduled albuterol inhaler, p.r.n. Xopenex nebs  -empiric IV antibiotics with cefepime and vancomycin  -check procalcitonin level, MRSA culture,, and sputum culture    Advance Care Planning  Current advance care plan has been discussed with patient/family/POA and patient currently wishes Full Code.

## 2024-03-15 NOTE — PROGRESS NOTES
Pharmacist Renal Dose Adjustment Note    Niru Reyes is a 78 y.o. female being treated with the medication enoxaparin    Patient Data:    Vital Signs (Most Recent):  Temp: 98.7 °F (37.1 °C) (03/14/24 2139)  Pulse: 84 (03/14/24 2139)  Resp: 19 (03/14/24 2139)  BP: (!) 150/75 (03/14/24 2139)  SpO2: 96 % (03/14/24 2139) Vital Signs (72h Range):  Temp:  [98.7 °F (37.1 °C)-99.8 °F (37.7 °C)]   Pulse:  []   Resp:  [19-47]   BP: (114-151)/(64-87)   SpO2:  [88 %-97 %]      Recent Labs   Lab 03/14/24 1820   CREATININE 0.8     Serum creatinine: 0.8 mg/dL 03/14/24 1820  Estimated creatinine clearance: 109.7 mL/min    Enoxaparin 40 mg q12h will be changed to enoxaparin 60 mg q12h for CrCl >30 mL/min and BMI >50.     Pharmacist's Name: Katerina Milian  Pharmacist's Extension: [phone]

## 2024-03-15 NOTE — ASSESSMENT & PLAN NOTE
-Presents with new onset afib in setting of hypoxia/COVID-19  -HR in 's range  -Toprol XL increased to 50 mg daily, up-titrate as needed  -Continue Lovenox for AC, recommend Eliquis upon d/c  -TTE pending

## 2024-03-16 LAB
ALBUMIN SERPL BCP-MCNC: 3 G/DL (ref 3.5–5.2)
ALP SERPL-CCNC: 69 U/L (ref 55–135)
ALT SERPL W/O P-5'-P-CCNC: 10 U/L (ref 10–44)
ANION GAP SERPL CALC-SCNC: 9 MMOL/L (ref 8–16)
AST SERPL-CCNC: 17 U/L (ref 10–40)
BASOPHILS # BLD AUTO: 0.03 K/UL (ref 0–0.2)
BASOPHILS NFR BLD: 0.7 % (ref 0–1.9)
BILIRUB SERPL-MCNC: 0.7 MG/DL (ref 0.1–1)
BUN SERPL-MCNC: 19 MG/DL (ref 8–23)
CALCIUM SERPL-MCNC: 8.2 MG/DL (ref 8.7–10.5)
CHLORIDE SERPL-SCNC: 103 MMOL/L (ref 95–110)
CO2 SERPL-SCNC: 28 MMOL/L (ref 23–29)
CREAT SERPL-MCNC: 0.8 MG/DL (ref 0.5–1.4)
DIFFERENTIAL METHOD BLD: ABNORMAL
EOSINOPHIL # BLD AUTO: 0 K/UL (ref 0–0.5)
EOSINOPHIL NFR BLD: 0 % (ref 0–8)
ERYTHROCYTE [DISTWIDTH] IN BLOOD BY AUTOMATED COUNT: 14.9 % (ref 11.5–14.5)
EST. GFR  (NO RACE VARIABLE): >60 ML/MIN/1.73 M^2
GLUCOSE SERPL-MCNC: 101 MG/DL (ref 70–110)
HCT VFR BLD AUTO: 38.2 % (ref 37–48.5)
HGB BLD-MCNC: 11.3 G/DL (ref 12–16)
IMM GRANULOCYTES # BLD AUTO: 0.03 K/UL (ref 0–0.04)
IMM GRANULOCYTES NFR BLD AUTO: 0.7 % (ref 0–0.5)
LYMPHOCYTES # BLD AUTO: 0.8 K/UL (ref 1–4.8)
LYMPHOCYTES NFR BLD: 17 % (ref 18–48)
MAGNESIUM SERPL-MCNC: 1.9 MG/DL (ref 1.6–2.6)
MCH RBC QN AUTO: 26.5 PG (ref 27–31)
MCHC RBC AUTO-ENTMCNC: 29.6 G/DL (ref 32–36)
MCV RBC AUTO: 90 FL (ref 82–98)
MONOCYTES # BLD AUTO: 0.9 K/UL (ref 0.3–1)
MONOCYTES NFR BLD: 19.6 % (ref 4–15)
NEUTROPHILS # BLD AUTO: 2.8 K/UL (ref 1.8–7.7)
NEUTROPHILS NFR BLD: 62 % (ref 38–73)
NRBC BLD-RTO: 0 /100 WBC
PLATELET # BLD AUTO: 161 K/UL (ref 150–450)
PMV BLD AUTO: 11.6 FL (ref 9.2–12.9)
POTASSIUM SERPL-SCNC: 4.3 MMOL/L (ref 3.5–5.1)
PROT SERPL-MCNC: 6.4 G/DL (ref 6–8.4)
RBC # BLD AUTO: 4.27 M/UL (ref 4–5.4)
SODIUM SERPL-SCNC: 140 MMOL/L (ref 136–145)
VANCOMYCIN TROUGH SERPL-MCNC: 21.5 UG/ML (ref 10–22)
WBC # BLD AUTO: 4.53 K/UL (ref 3.9–12.7)

## 2024-03-16 PROCEDURE — 99232 SBSQ HOSP IP/OBS MODERATE 35: CPT | Mod: ,,, | Performed by: INTERNAL MEDICINE

## 2024-03-16 PROCEDURE — 85025 COMPLETE CBC W/AUTO DIFF WBC: CPT | Performed by: HOSPITALIST

## 2024-03-16 PROCEDURE — 36415 COLL VENOUS BLD VENIPUNCTURE: CPT | Mod: XB | Performed by: INTERNAL MEDICINE

## 2024-03-16 PROCEDURE — 63600175 PHARM REV CODE 636 W HCPCS: Performed by: INTERNAL MEDICINE

## 2024-03-16 PROCEDURE — 80202 ASSAY OF VANCOMYCIN: CPT | Performed by: INTERNAL MEDICINE

## 2024-03-16 PROCEDURE — 21400001 HC TELEMETRY ROOM

## 2024-03-16 PROCEDURE — 80053 COMPREHEN METABOLIC PANEL: CPT | Performed by: HOSPITALIST

## 2024-03-16 PROCEDURE — 99900035 HC TECH TIME PER 15 MIN (STAT)

## 2024-03-16 PROCEDURE — 36415 COLL VENOUS BLD VENIPUNCTURE: CPT | Performed by: HOSPITALIST

## 2024-03-16 PROCEDURE — 27100171 HC OXYGEN HIGH FLOW UP TO 24 HOURS

## 2024-03-16 PROCEDURE — 63600175 PHARM REV CODE 636 W HCPCS: Performed by: HOSPITALIST

## 2024-03-16 PROCEDURE — 87205 SMEAR GRAM STAIN: CPT | Performed by: INTERNAL MEDICINE

## 2024-03-16 PROCEDURE — 27000207 HC ISOLATION

## 2024-03-16 PROCEDURE — 25000003 PHARM REV CODE 250: Performed by: PHYSICIAN ASSISTANT

## 2024-03-16 PROCEDURE — 94799 UNLISTED PULMONARY SVC/PX: CPT | Mod: XB

## 2024-03-16 PROCEDURE — 83735 ASSAY OF MAGNESIUM: CPT | Performed by: HOSPITALIST

## 2024-03-16 PROCEDURE — 87070 CULTURE OTHR SPECIMN AEROBIC: CPT | Performed by: INTERNAL MEDICINE

## 2024-03-16 PROCEDURE — 94761 N-INVAS EAR/PLS OXIMETRY MLT: CPT

## 2024-03-16 PROCEDURE — 25000003 PHARM REV CODE 250: Performed by: HOSPITALIST

## 2024-03-16 PROCEDURE — 25000003 PHARM REV CODE 250: Performed by: INTERNAL MEDICINE

## 2024-03-16 PROCEDURE — 94640 AIRWAY INHALATION TREATMENT: CPT

## 2024-03-16 RX ADMIN — CEFEPIME 2 G: 2 INJECTION, POWDER, FOR SOLUTION INTRAVENOUS at 09:03

## 2024-03-16 RX ADMIN — MUPIROCIN: 20 OINTMENT TOPICAL at 09:03

## 2024-03-16 RX ADMIN — FUROSEMIDE 40 MG: 10 INJECTION, SOLUTION INTRAMUSCULAR; INTRAVENOUS at 10:03

## 2024-03-16 RX ADMIN — ALBUTEROL SULFATE 2 PUFF: 90 AEROSOL, METERED RESPIRATORY (INHALATION) at 07:03

## 2024-03-16 RX ADMIN — ALBUTEROL SULFATE 2 PUFF: 90 AEROSOL, METERED RESPIRATORY (INHALATION) at 02:03

## 2024-03-16 RX ADMIN — METOPROLOL SUCCINATE 50 MG: 50 TABLET, EXTENDED RELEASE ORAL at 09:03

## 2024-03-16 RX ADMIN — DEXAMETHASONE SODIUM PHOSPHATE 6 MG: 4 INJECTION INTRA-ARTICULAR; INTRALESIONAL; INTRAMUSCULAR; INTRAVENOUS; SOFT TISSUE at 10:03

## 2024-03-16 RX ADMIN — CEFEPIME 2 G: 2 INJECTION, POWDER, FOR SOLUTION INTRAVENOUS at 01:03

## 2024-03-16 RX ADMIN — CEFEPIME 2 G: 2 INJECTION, POWDER, FOR SOLUTION INTRAVENOUS at 04:03

## 2024-03-16 RX ADMIN — VANCOMYCIN HYDROCHLORIDE 1500 MG: 1.5 INJECTION, POWDER, LYOPHILIZED, FOR SOLUTION INTRAVENOUS at 01:03

## 2024-03-16 RX ADMIN — ALBUTEROL SULFATE 2 PUFF: 90 AEROSOL, METERED RESPIRATORY (INHALATION) at 12:03

## 2024-03-16 RX ADMIN — REMDESIVIR 100 MG: 100 INJECTION, POWDER, LYOPHILIZED, FOR SOLUTION INTRAVENOUS at 09:03

## 2024-03-16 RX ADMIN — ENOXAPARIN SODIUM 150 MG: 150 INJECTION SUBCUTANEOUS at 09:03

## 2024-03-16 NOTE — PROGRESS NOTES
Pharmacokinetic Assessment Follow Up: IV Vancomycin    Vancomycin serum concentration assessment(s):    The trough level was drawn correctly and can be used to guide therapy at this time. The measurement is above the desired definitive target range of 10 to 20 mcg/mL.    Vancomycin Regimen Plan:    Change regimen to Vancomycin 1500 mg IV every 12 hours with next serum trough concentration measured at 60 minutes prior to 4th dose on 3/17/24    Drug levels (last 3 results):  Recent Labs   Lab Result Units 03/16/24  0844   Vancomycin-Trough ug/mL 21.5       Pharmacy will continue to follow and monitor vancomycin.    Please contact pharmacy at extension [phone] for questions regarding this assessment.    Thank you for the consult,   Curtis Howe       Patient brief summary:  Niru Reyes is a 78 y.o. female initiated on antimicrobial therapy with IV Vancomycin for treatment of sepsis    The patient's previous regimen was 2000 mg IV every 12 hours    Drug Allergies:   Review of patient's allergies indicates:  No Known Allergies    Actual Body Weight:   195.6 kg    Renal Function:   Estimated Creatinine Clearance: 110.1 mL/min (based on SCr of 0.8 mg/dL).,     Dialysis Method (if applicable):  N/A    CBC (last 72 hours):  Recent Labs   Lab Result Units 03/14/24  1820 03/14/24  2309 03/15/24  0432 03/16/24  0425   WBC K/uL 7.22  --  5.15 4.53   Hemoglobin g/dL 11.5*  --  12.8 11.3*   Hemoglobin A1C %  --  5.4  --   --    Hematocrit % 37.5  --  43.6 38.2   Platelets K/uL 198  --  188 161   Gran % % 86.9*  --  82.9* 62.0   Lymph % % 3.6*  --  12.6* 17.0*   Mono % % 7.8  --  2.5* 19.6*   Eosinophil % % 0.3  --  0.0 0.0   Basophil % % 0.7  --  0.8 0.7   Differential Method  Automated  --  Automated Automated       Metabolic Panel (last 72 hours):  Recent Labs   Lab Result Units 03/14/24  1820 03/14/24  2309 03/15/24  0432 03/16/24  0425   Sodium mmol/L 137  --  141 140   Potassium mmol/L 4.9  --  4.8 4.3   Chloride  mmol/L 106  --  103 103   CO2 mmol/L 21*  --  27 28   Glucose mg/dL 149*  --  143* 101   BUN mg/dL 13  --  13 19   Creatinine mg/dL 0.8  --  0.8 0.8   Albumin g/dL 3.3*  --  3.4* 3.0*   Total Bilirubin mg/dL 1.1*  --  0.9 0.7   Alkaline Phosphatase U/L 80  --  83 69   AST U/L 19  --  14 17   ALT U/L 11  --  9* 10   Magnesium mg/dL  --  1.9  --  1.9       Vancomycin Administrations:  vancomycin given in the last 96 hours                     vancomycin 1,500 mg in dextrose 5 % (D5W) 250 mL IVPB (Vial-Mate) (mg) 1,500 mg New Bag 03/16/24 1306    vancomycin 2 g in dextrose 5 % 500 mL IVPB (mg) 2,000 mg New Bag 03/15/24 2059      Restarted  1019     2,000 mg New Bag  0923    vancomycin (VANCOCIN) 2,500 mg in dextrose 5 % (D5W) 500 mL IVPB (mg) 2,500 mg New Bag 03/14/24 2051                    Microbiologic Results:  Microbiology Results (last 7 days)       Procedure Component Value Units Date/Time    Blood culture #1 **CANNOT BE ORDERED STAT** [[id number]] Collected: 03/14/24 1821    Order Status: Completed Specimen: Blood from Peripheral, Hand, Left Updated: 03/16/24 0612     Blood Culture, Routine No Growth to date      No Growth to date    Blood culture #2 **CANNOT BE ORDERED STAT** [[id number]] Collected: 03/14/24 1821    Order Status: Completed Specimen: Blood from Peripheral, Forearm, Right Updated: 03/16/24 0612     Blood Culture, Routine No Growth to date      No Growth to date    Culture, MRSA [[id number]] Collected: 03/14/24 2349    Order Status: Sent Specimen: MRSA source from Nares, Left Updated: 03/15/24 1626    Culture, Respiratory with Gram Stain [[id number]]     Order Status: No result Specimen: Respiratory     Influenza A & B by Molecular [[id number]] Collected: 03/14/24 1851    Order Status: Completed Specimen: Nasopharyngeal Swab Updated: 03/14/24 1942     Influenza A, Molecular Negative     Influenza B, Molecular Negative     Flu A & B Source Nasal swab

## 2024-03-16 NOTE — PROGRESS NOTES
[facility] - Telemetry ([facility])  Cardiology  Consult Note    Patient Name: Niru Reyes  MRN: [id number]  Admission Date: 3/14/2024  Hospital Length of Stay: 2 days  Code Status: Full Code   Attending Provider: Dima Minor MD   Consulting Provider: Chloe Neves  Primary Care Physician: Jordana Shirley MD  Principal Problem:Acute hypoxemic respiratory failure    Patient information was obtained from patient, past medical records, and ER records.     Consults  Subjective:     Chief Complaint:  SOB    HPI:   HPI obtained from chart as patient is currently on isolation precautions due to COVID-19      Ms. Reyes is a 78 year old female patient whose current medical conditions include OA, anemia, PAD, pulmonary HTN, morbid obesity, and HTN who presented to [facility] ED via EMS due to worsening SOB. She was noted to be hypoxic upon EMS arrival with O2 sat of 83%. Associated symptoms included non-productive cough, LE edema, and fatigue. She denied any associated fever, chills, nica chest pain, palpitations, near syncope, or syncope. She converted to afib with RVR after receiving a duoneb. Initial labs revealed BNP of 464. CXR showed bilateral opacities favoring edema, and patient was subsequently admitted for further evaluation and treatment. Cardiology consulted to assist with management. Chart reviewed. Troponin x 1 negative. Patient with no apparent CV history-no afib/CHF. Remains in afib with HR in  range, BB up-titrated. Being AC with full dose Lovenox, recommend Eliquis upon d/c. TTE pending.    Hospital Course:  3-16-24 Pt in AFIB with controlled HR in 70s. Continue betablockers and anticoagulants.   Past Medical History:   Diagnosis Date    Hypertension                 Past Surgical History:   Procedure Laterality Date     SECTION        COLONOSCOPY N/A 2019     Procedure: COLONOSCOPY;  Surgeon: Janeth Leroy MD;  Location: [facility];  Service: Endoscopy;  Laterality: N/A;     HYSTERECTOMY             Review of patient's allergies indicates:  No Known Allergies     No current facility-administered medications on file prior to encounter.           Current Outpatient Medications on File Prior to Encounter   Medication Sig    amLODIPine (NORVASC) 5 MG tablet Take 1 tablet (5 mg total) by mouth once daily.    celecoxib (CELEBREX) 100 MG capsule Take 1 capsule (100 mg total) by mouth 2 (two) times daily.    docusate sodium (COLACE) 100 MG capsule Take 1 capsule (100 mg total) by mouth 2 (two) times daily as needed for Constipation.    hydroCHLOROthiazide (HYDRODIURIL) 25 MG tablet TAKE 1 TABLET(25 MG) BY MOUTH EVERY DAY    triamcinolone acetonide 0.1% (KENALOG) 0.1 % cream Apply topically 2 (two) times daily. Under occlusion for 14 days      Family History         Problem Relation (Age of Onset)     Birth defects Daughter     Heart disease Mother, Sister     Muscular dystrophy Father                  Tobacco Use    Smoking status: Never    Smokeless tobacco: Not on file   Substance and Sexual Activity    Alcohol use: No    Drug use: No    Sexual activity: Never      Review of Systems   Reason unable to perform ROS: as per hpi.   Constitutional: Positive for malaise/fatigue.   Cardiovascular:  Positive for dyspnea on exertion and leg swelling.   Respiratory:  Positive for cough and shortness of breath.       Objective:      Vital Signs (Most Recent):  Temp: 97.3 °F (36.3 °C) (03/15/24 0907)  Pulse: 92 (03/15/24 0935)  Resp: 18 (03/15/24 0935)  BP: 131/67 (03/15/24 0740)  SpO2: 96 % (03/15/24 0933) Vital Signs (24h Range):  Temp:  [97.3 °F (36.3 °C)-99.8 °F (37.7 °C)] 97.3 °F (36.3 °C)  Pulse:  [] 92  Resp:  [18-47] 18  SpO2:  [88 %-98 %] 96 %  BP: (114-162)/(64-98) 131/67      Weight: (!) 195.6 kg (431 lb 3.5 oz)  Body mass index is 65.57 kg/m².     SpO2: 96 %           Intake/Output Summary (Last 24 hours) at 3/15/2024 0932  Last data filed at 3/14/2024 4467      Gross per 24 hour    Intake 100 ml   Output 1450 ml   Net -1350 ml         Lines/Drains/Airways         Peripheral Intravenous Line  Duration                     Peripheral IV - Single Lumen 03/14/24 1822 20 G Anterior;Right Forearm <1 day          Peripheral IV - Single Lumen 03/14/24 1822 20 G Left;Posterior Hand <1 day                          Physical Exam  Vitals and nursing note reviewed.            Significant Labs: CMP        Recent Labs   Lab 03/14/24  1820 03/15/24  0432    141   K 4.9 4.8    103   CO2 21* 27   * 143*   BUN 13 13   CREATININE 0.8 0.8   CALCIUM 8.6* 8.5*   PROT 7.0 6.9   ALBUMIN 3.3* 3.4*   BILITOT 1.1* 0.9   ALKPHOS 80 83   AST 19 14   ALT 11 9*   ANIONGAP 10 11   , CBC        Recent Labs   Lab 03/14/24  1820 03/15/24  0432   WBC 7.22 5.15   HGB 11.5* 12.8   HCT 37.5 43.6    188   , Troponin       Recent Labs   Lab 03/14/24 1820   TROPONINI <0.006   , and All pertinent lab results from the last 24 hours have been reviewed.     Significant Imaging: Echocardiogram: Transthoracic echo (TTE) complete (Cupid Only):         Results for orders placed or performed during the hospital encounter of 03/07/23   Echo Saline Bubble? No   Result Value Ref Range     BSA 2.96 m2     LV LATERAL E/E' RATIO 10.50 m/s     LA WIDTH 3.50 cm     IVC diameter 2.7 cm     Left Ventricular Outflow Tract Mean Velocity 0.81 cm/s     Left Ventricular Outflow Tract Mean Gradient 2.95 mmHg     TDI LATERAL 0.12 m/s     LVIDd 4.25 3.5 - 6.0 cm     IVS 1.53 (A) 0.6 - 1.1 cm     Posterior Wall 1.38 (A) 0.6 - 1.1 cm     Ao root annulus 3.12 cm     LVIDs 2.91 2.1 - 4.0 cm     FS 32 28 - 44 %     LA volume 62.83 cm3     STJ 3.45 cm     Ascending aorta 3.56 cm     LV mass 242.13 g     LA size 3.57 cm     TAPSE 2.70 cm     Left Ventricle Relative Wall Thickness 0.65 cm     AV mean gradient 9 mmHg     AV Velocity Ratio 0.56       AV index (prosthetic) 0.59       MV valve area p 1/2 method 2.96 cm2     E/A ratio 1.03       E  wave deceleration time 255.94 msec     IVRT 59.94 msec     LVOT peak mendez 1.14 m/s     LVOT peak VTI 25.60 cm     Ao peak mendez 2.03 m/s     Ao VTI 43.2 cm     RVOT peak mendez 1.16 m/s     RVOT peak VTI 32.1 cm     AV peak gradient 16 mmHg     PV mean gradient 4.59 mmHg     MV Peak E Mendez 1.26 m/s     TR Max Mendez 3.00 m/s     MV stenosis pressure 1/2 time 74.22 ms     MV Peak A Mendez 1.22 m/s     LV Systolic Volume 32.40 mL     LV Systolic Volume Index 11.7 mL/m2     LV Diastolic Volume 80.62 mL     LV Diastolic Volume Index 29.21 mL/m2     LA Volume Index 22.8 mL/m2     LV Mass Index 88 g/m2     RA Major Axis 4.78 cm     Left Atrium Minor Axis 5.69 cm     Left Atrium Major Axis 6.16 cm     Triscuspid Valve Regurgitation Peak Gradient 36 mmHg     Right Atrial Pressure (from IVC) 15 mmHg     EF 60 %     TV resting pulmonary artery pressure 51 mmHg     Narrative     · The left ventricle is normal in size with concentric hypertrophy and   normal systolic function.  · The estimated ejection fraction is 60%.  · Normal left ventricular diastolic function.  · Normal right ventricular size with normal right ventricular systolic   function.  · Elevated central venous pressure (15 mmHg).  · There is pulmonary hypertension.  · The estimated PA systolic pressure is 51 mmHg.       and EKG: Reviewed    Assessment and Plan:   Patient who presents with new onset afib/CHF in setting of COVID-19. HR reasonably controlled, BB dose increased. Diurese prn. Check TTE.     * Acute hypoxemic respiratory failure  -In setting of CHF/COVID  -Continue IV diuresis, nebs/steroids     Morbid obesity  -Weight loss     Atrial fibrillation with RVR  -Presents with new onset afib in setting of hypoxia/COVID-19  -HR in 's range  -Toprol XL increased to 50 mg daily, up-titrate as needed  -Continue Lovenox for AC, recommend Eliquis upon d/c  -TTE pending     Acute diastolic CHF (congestive heart failure)  -BNP > 400  -Diurese prn  -Check TTE  -Continue  BB  -Strict I's/O's     Pneumonia due to COVID-19 virus  -Mgmt as per primary team     HTN (hypertension)  -Continue BB  -Monitor BP trend       Assessment & plan notes cannot be loaded without a specified hospital service.      VTE Risk Mitigation (From admission, onward)           Ordered     enoxaparin injection 150 mg  Every 12 hours         03/14/24 2226     IP VTE HIGH RISK PATIENT  Once         03/14/24 2222     Place sequential compression device  Until discontinued         03/14/24 2222                    Thank you for your consult. I will follow-up with patient. Please contact us if you have any additional questions.    Chloe Neves  Cardiology   [facility] - Telemetry ([facility])

## 2024-03-16 NOTE — PLAN OF CARE
Discussed POC with ptverbalized understanding.  Patient remains free from injury.  Safety and fall precautions maintained.   Call light and personal belongings within reach, bed in lowest position with bed wheels locked.   No s/s of acute distress.  Purposeful rounding continued this shift.  IVF antibiotics  Cardiac monitoring in place, tele box number [id number]. Reading a-fib  Blood glucose monitoring continued this shift.   Diet orders continued, pt diet: cardiac w/ 1500mL fluid restrictions   Vital signs continued per orders this shift.   Patient mobility status bed/chairfast   Chart and orders review completed. Pt education about care completed.

## 2024-03-16 NOTE — PROGRESS NOTES
[facility] Medicine  Progress Note    Patient Name: Niru Reyes  MRN: [id number]  Patient Class: IP- Inpatient   Admission Date: 3/14/2024  Length of Stay: 2 days  Attending Physician: Dima Minor MD  Primary Care Provider: Jordana Shirley MD        Subjective:     Principal Problem:Acute hypoxemic respiratory failure        HPI:  78-year-old white woman with history of hypertension, major depression, osteoarthritis, anemia, peripheral arterial disease, bilateral lower extremity edema, cellulitis, pulmonary hypertension, and morbid obesity who presented to the emergency department via EMS with complaint of shortness a breath.  On arrival by the fire department, patient was 83% on room air and placed on 8 L nasal cannula and saturating 94%.  Patient did receive a DuoNeb and went into AFib with RVR.  Patient denies any previous history of arrhythmias, CHF, or COPD.  Shortness for breath has been present over the last several days, associated with nonproductive cough and lower extremity edema, no associated chest pain, moderate to severe in nature, constant, no aggravating or alleviating factor identified.  Patient denies any nausea, vomiting, fevers, chills.  Patient denies any recent sick contacts or hospitalizations.    Overview/Hospital Course:  3/15/24  Admitted for COVID PNA and CHF exacerbation  NAEON, on 5 L HFNC, not on home O2  Reports fatigue, coughing    3/16/24  Down to 4L NC today  Anasarca improving, continue IV lasix  Continue remdisivir, dexamethasone, broad spectrum IV antibiotics      Review of Systems   All other systems reviewed and are negative.    Objective:     Vital Signs (Most Recent):  Temp: 98.7 °F (37.1 °C) (03/16/24 1244)  Pulse: 82 (03/16/24 1422)  Resp: 18 (03/16/24 1422)  BP: 134/67 (03/16/24 1244)  SpO2: 95 % (03/16/24 1422) Vital Signs (24h Range):  Temp:  [97.1 °F (36.2 °C)-98.7 °F (37.1 °C)] 98.7 °F (37.1 °C)  Pulse:  [] 82  Resp:   [16-20] 18  SpO2:  [90 %-97 %] 95 %  BP: (121-165)/(61-78) 134/67     Weight: (!) 195.6 kg (431 lb 3.5 oz)  Body mass index is 65.57 kg/m².    Intake/Output Summary (Last 24 hours) at 3/16/2024 1513  Last data filed at 3/16/2024 0701  Gross per 24 hour   Intake 632.85 ml   Output 2200 ml   Net -1567.15 ml           Physical Exam  Vitals and nursing note reviewed.   Constitutional:       General: She is not in acute distress.     Appearance: Normal appearance. She is normal weight. She is ill-appearing.   Cardiovascular:      Rate and Rhythm: Normal rate and regular rhythm.      Heart sounds: No murmur heard.  Pulmonary:      Effort: Respiratory distress present.      Breath sounds: Wheezing and rhonchi present.      Comments: 5 L NC  Neurological:      General: No focal deficit present.      Mental Status: She is alert and oriented to person, place, and time.   Psychiatric:         Mood and Affect: Mood normal.         Behavior: Behavior normal.             Significant Labs: All pertinent labs within the past 24 hours have been reviewed.  Recent Lab Results         03/16/24  0844   03/16/24  0425        Albumin   3.0       ALP   69       ALT   10       Anion Gap   9       AST   17       Baso #   0.03       Basophil %   0.7       BILIRUBIN TOTAL   0.7  Comment: For infants and newborns, interpretation of results should be based  on gestational age, weight and in agreement with clinical  observations.    Premature Infant recommended reference ranges:  Up to 24 hours.............<8.0 mg/dL  Up to 48 hours............<12.0 mg/dL  3-5 days..................<15.0 mg/dL  6-29 days.................<15.0 mg/dL         BUN   19       Calcium   8.2       Chloride   103       CO2   28       Creatinine   0.8       Differential Method   Automated       eGFR   >60       Eos #   0.0       Eos %   0.0       Glucose   101       Gran # (ANC)   2.8       Gran %   62.0       Hematocrit   38.2       Hemoglobin   11.3       Immature Grans  (Abs)   0.03  Comment: Mild elevation in immature granulocytes is non specific and   can be seen in a variety of conditions including stress response,   acute inflammation, trauma and pregnancy. Correlation with other   laboratory and clinical findings is essential.         Immature Granulocytes   0.7       Lymph #   0.8       Lymph %   17.0       Magnesium    1.9       MCH   26.5       MCHC   29.6       MCV   90       Mono #   0.9       Mono %   19.6       MPV   11.6       nRBC   0       Platelet Count   161       Potassium   4.3       PROTEIN TOTAL   6.4       RBC   4.27       RDW   14.9       Sodium   140       Vancomycin-Trough 21.5         WBC   4.53               Significant Imaging: I have reviewed all pertinent imaging results/findings within the past 24 hours.    X-Ray Chest AP Portable   Final Result      Bibasilar opacities favoring edema and atelectasis.         Electronically signed by: Sam Capellan   Date:    03/14/2024   Time:    19:21            Assessment/Plan:      * Acute hypoxemic respiratory failure  Patient with Hypoxic Respiratory failure which is Acute.  she is not on home oxygen. Supplemental oxygen was provided and noted-      .   Signs/symptoms of respiratory failure include- tachypnea, increased work of breathing, and respiratory distress. Contributing diagnoses includes - CHF, Pneumonia, and COVID  Labs and images were reviewed. Patient Has recent ABG, which has been reviewed. Will treat underlying causes and adjust management of respiratory failure as follows- continue high-flow O2 via nasal cannula, continuous pulse oximetry monitoring.  See discussion for acute diastolic CHF exacerbation and COVID-19 pneumonia for additional evaluation and treatment plan.    Morbid obesity  Body mass index is 65.16 kg/m². Morbid obesity complicates all aspects of disease management from diagnostic modalities to treatment. Weight loss encouraged and health benefits explained to patient.  Patient is  high risk of decompensation in the setting of COVID-19 pneumonia with acute hypoxic respiratory failure complicated by acute diastolic CHF exacerbation.         Hyperglycemia  No known history of diabetes mellitus.  May be related to stress response.  Check A1c.      Atrial fibrillation with RVR  Patient with Paroxysmal (<7 days) atrial fibrillation which is uncontrolled currently with Beta Blocker. Patient is currently in sinus rhythm.[id number] Score: 4.  Anticoagulation indicated. Anticoagulation done with treatment dose Lovenox for now .  Cardiology consult.  Telemetry monitoring.  Check TSH and magnesium level.    Acute diastolic CHF (congestive heart failure)  Patient is identified as having Diastolic (HFpEF) heart failure that is Acute. CHF is currently uncontrolled due to Continued edema of extremities. Latest ECHO performed and demonstrates- Results for orders placed during the hospital encounter of 03/07/23    Echo Saline Bubble? No    Interpretation Summary  · The left ventricle is normal in size with concentric hypertrophy and normal systolic function.  · The estimated ejection fraction is 60%.  · Normal left ventricular diastolic function.  · Normal right ventricular size with normal right ventricular systolic function.  · Elevated central venous pressure (15 mmHg).  · There is pulmonary hypertension.  · The estimated PA systolic pressure is 51 mmHg.  . Continue Beta Blocker and Furosemide and monitor clinical status closely. Monitor on telemetry. Patient is on CHF pathway.  Monitor strict Is&Os and daily weights.  Place on fluid restriction of 1.5 L. Cardiology has been consulted. Continue to stress to patient importance of self efficacy and  on diet for CHF. Last BNP reviewed- and noted below   Recent Labs   Lab 03/14/24  1820   *   Lasix 40 mg IV b.i.d., strict Is&Os, daily weights  Pending cardiology consult  Previous echocardiogram 03/10/2023 with EF 60%.  Repeat  echocardiogram.    Pneumonia due to COVID-19 virus  Patient is identified as Severe COVID-19 based on hypoxemia with O2 saturations <94% on room air or on ambulation and High risk for severe complications of COVID 19 based on COVID risk score of 4   Initiate standard COVID protocols; COVID-19 testing ,Infection Control notification  and isolation- respiratory, contact and droplet per protocol    Diagnostics: CBC, CMP, Ferritin, CRP, and Portable CXR  White blood cell count 7.22, lactic acid level 1.0, LFTs unremarkable, ferritin pending, CRP pending, , influenza a/B negative, COVID-19 positive, HIV negative  Chest x-ray with bibasilar opacities favoring edema and atelectasis    Management: Initiate targeted therapy with Remdesivir, 200mg IV x1, followed by 100mg IV daily x5 days total and Dexamethasone PO/IV 6mg daily x10 days, Maintain oxygen saturations 92-96% via High Flow Nasal Cannula  % FiO2 and monitor with continuous/intermittent pulse oximetry. , Inhaled bronchodilators as needed for shortness of breath., Continuous cardiac monitoring., and Manage respiratory failure (O2 requirement >10LPM or needing NIPPV/Mechanical ventilation) and/or Pneumonia (active chest infiltrates) separately as described below.  -encourage frequent incentive spirometry, scheduled albuterol inhaler, p.r.n. Xopenex nebs  -empiric IV antibiotics with cefepime and vancomycin  -check procalcitonin level, MRSA culture,, and sputum culture    Advance Care Planning Current advance care plan has been discussed with patient/family/POA and patient currently wishes Full Code.     HTN (hypertension)  Chronic, uncontrolled. Latest blood pressure and vitals reviewed-     Temp:  [98.7 °F (37.1 °C)-99.8 °F (37.7 °C)]   Pulse:  []   Resp:  [19-47]   BP: (114-151)/(64-87)   SpO2:  [88 %-97 %] .   Home meds for hypertension were reviewed and noted below.   Hypertension Medications               amLODIPine (NORVASC) 5 MG tablet Take 1  tablet (5 mg total) by mouth once daily.    hydroCHLOROthiazide (HYDRODIURIL) 25 MG tablet TAKE 1 TABLET(25 MG) BY MOUTH EVERY DAY            While in the hospital, will manage blood pressure as follows; Adjust home antihypertensive regimen as follows- change Norvasc to Toprol-XL 25 mg p.o. daily in the setting of new onset AFib.    Will utilize p.r.n. blood pressure medication only if patient's blood pressure greater than 160/100 and she develops symptoms such as worsening chest pain or shortness of breath.      VTE Risk Mitigation (From admission, onward)           Ordered     enoxaparin injection 150 mg  Every 12 hours         03/14/24 2226     IP VTE HIGH RISK PATIENT  Once         03/14/24 2222     Place sequential compression device  Until discontinued         03/14/24 2222                    Discharge Planning   JERONIMO:      Code Status: Full Code   Is the patient medically ready for discharge?:     Reason for patient still in hospital (select all that apply): Patient trending condition, Laboratory test, and Treatment  Discharge Plan A: Home Health                  Dima Minor MD  Department of Hospital Medicine   [facility] - Telemetry ([facility])

## 2024-03-16 NOTE — SUBJECTIVE & OBJECTIVE
Review of Systems   All other systems reviewed and are negative.    Objective:     Vital Signs (Most Recent):  Temp: 97.8 °F (36.6 °C) (03/15/24 1633)  Pulse: 79 (03/15/24 2019)  Resp: 16 (03/15/24 1920)  BP: 131/78 (03/15/24 1633)  SpO2: 97 % (03/15/24 1920) Vital Signs (24h Range):  Temp:  [97.3 °F (36.3 °C)-98.7 °F (37.1 °C)] 97.8 °F (36.6 °C)  Pulse:  [] 79  Resp:  [16-20] 16  SpO2:  [95 %-98 %] 97 %  BP: (131-162)/(67-98) 131/78     Weight: (!) 195.6 kg (431 lb 3.5 oz)  Body mass index is 65.57 kg/m².    Intake/Output Summary (Last 24 hours) at 3/15/2024 2035  Last data filed at 3/15/2024 1908  Gross per 24 hour   Intake 1600.85 ml   Output 2800 ml   Net -1199.15 ml         Physical Exam  Vitals and nursing note reviewed.   Constitutional:       General: She is not in acute distress.     Appearance: Normal appearance. She is normal weight. She is ill-appearing.   Cardiovascular:      Rate and Rhythm: Normal rate and regular rhythm.      Heart sounds: No murmur heard.  Pulmonary:      Effort: Respiratory distress present.      Breath sounds: Wheezing present.      Comments: 5 L NC  Neurological:      General: No focal deficit present.      Mental Status: She is alert and oriented to person, place, and time.   Psychiatric:         Mood and Affect: Mood normal.         Behavior: Behavior normal.             Significant Labs: All pertinent labs within the past 24 hours have been reviewed.  Recent Lab Results         03/15/24  0814   03/15/24  0432   03/14/24  2309        Procalcitonin     0.03  Comment: A concentration < 0.25 ng/mL represents a low risk of bacterial   infection.  Procalcitonin may not be accurate among patients with localized   infection, recent trauma or major surgery, immunosuppressed state,   invasive fungal infection, renal dysfunction. Decisions regarding   initiation or continuation of antibiotic therapy should not be based   solely on procalcitonin levels.         Albumin   3.4          ALP   83         ALT   9         Anion Gap   11         Ao root annulus 3.04           Ascending aorta 3.96           Ao peak tiffany 2.05           Ao VTI 42.10           AST   14         AV valve area 1.35           ROSITA by Velocity Ratio 1.31           AV mean gradient 12           AV index (prosthetic) 0.42           AV peak gradient 17           AV Velocity Ratio 0.40           Baso #   0.04         Basophil %   0.8         BILIRUBIN TOTAL   0.9  Comment: For infants and newborns, interpretation of results should be based  on gestational age, weight and in agreement with clinical  observations.    Premature Infant recommended reference ranges:  Up to 24 hours.............<8.0 mg/dL  Up to 48 hours............<12.0 mg/dL  3-5 days..................<15.0 mg/dL  6-29 days.................<15.0 mg/dL           BSA 3.06           BUN   13         Calcium   8.5         Chloride   103         CO2   27         Creatinine   0.8         CRP     17.1       Left Ventricle Relative Wall Thickness 0.79           Differential Method   Automated         E/A ratio 5.65           E/E' ratio 10.89           eGFR   >60         Eos #   0.0         Eos %   0.0         Estimated Avg Glucose     108       E wave deceleration time 264.25           Ferritin     92       Free T4     1.14       FS 31           Glucose   143         Gran # (ANC)   4.3         Gran %   82.9         Hematocrit   43.6         Hemoglobin   12.8         Hemoglobin A1C External     5.4  Comment: ADA Screening Guidelines:  5.7-6.4%  Consistent with prediabetes  >or=6.5%  Consistent with diabetes    High levels of fetal hemoglobin interfere with the HbA1C  assay. Heterozygous hemoglobin variants (HbS, HgC, etc)do  not significantly interfere with this assay.   However, presence of multiple variants may affect accuracy.         Immature Grans (Abs)   0.06  Comment: Mild elevation in immature granulocytes is non specific and   can be seen in a variety of conditions  including stress response,   acute inflammation, trauma and pregnancy. Correlation with other   laboratory and clinical findings is essential.           Immature Granulocytes   1.2         IVC diameter 3.06           IVRT 74.22           IVSd 1.50           LA WIDTH 5.0           Left Atrium Major Axis 6.71           Left Atrium Minor Axis 6.99           LA size 5.01           LA volume 145.79           LA vol index 51.5           LVOT area 3.2           LV LATERAL E/E' RATIO 8.65           LV SEPTAL E/E' RATIO 14.70           LV EDV BP 72.93           LV Diastolic Volume Index 25.77           LVIDd 4.07           LVIDs 2.79           LV mass 251.18           LV Mass Index 89           Left Ventricular Outflow Tract Mean Gradient 1.98           Left Ventricular Outflow Tract Mean Velocity 0.68           LVOT diameter 2.03           LVOT peak mendez 0.83           LVOT stroke volume 56.93           LVOT peak VTI 17.60           LV ESV BP 29.19           LV Systolic Volume Index 10.3           Lymph #   0.7         Lymph %   12.6         Magnesium      1.9       MCH   26.4         MCHC   29.4         MCV   90         Mean e' 0.14           Mono #   0.1         Mono %   2.5         MPV   11.0         Mr max mendez 5.15           MV valve area p 1/2 method 2.87           MV Peak A Mendez 0.26           MV Peak E Mendez 1.47           MV stenosis pressure 1/2 time 76.63           nRBC   0         Platelet Count   188         Potassium   4.8         PROTEIN TOTAL   6.9         PV mean gradient 3           Posterior Wall 1.60           RA Major Axis 5.19           Est. RA pres 3           RA Width 4.1           RBC   4.84         RDW   15.1         RV TB RVSP 6           RVOT peak mendez 0.92           RVOT peak VTI 20.4           Sodium   141         STJ 3.70           TAPSE 2.05           TDI SEPTAL 0.10           TDI LATERAL 0.17           Triscuspid Valve Regurgitation Peak Gradient 42           TR Max Mendez 3.23           TSH      0.089       TV resting pulmonary artery pressure 45           WBC   5.15         ZLVIDD -23.33           ZLVIDS -16.98                   Significant Imaging: I have reviewed all pertinent imaging results/findings within the past 24 hours.    X-Ray Chest AP Portable   Final Result      Bibasilar opacities favoring edema and atelectasis.         Electronically signed by: Sam Capellan   Date:    03/14/2024   Time:    19:21

## 2024-03-16 NOTE — PROGRESS NOTES
[facility] Medicine  Progress Note    Patient Name: Niru Reyes  MRN: [id number]  Patient Class: IP- Inpatient   Admission Date: 3/14/2024  Length of Stay: 1 days  Attending Physician: Dima Minor MD  Primary Care Provider: Jordana Shirley MD        Subjective:     Principal Problem:Acute hypoxemic respiratory failure        HPI:  78-year-old white woman with history of hypertension, major depression, osteoarthritis, anemia, peripheral arterial disease, bilateral lower extremity edema, cellulitis, pulmonary hypertension, and morbid obesity who presented to the emergency department via EMS with complaint of shortness a breath.  On arrival by the fire department, patient was 83% on room air and placed on 8 L nasal cannula and saturating 94%.  Patient did receive a DuoNeb and went into AFib with RVR.  Patient denies any previous history of arrhythmias, CHF, or COPD.  Shortness for breath has been present over the last several days, associated with nonproductive cough and lower extremity edema, no associated chest pain, moderate to severe in nature, constant, no aggravating or alleviating factor identified.  Patient denies any nausea, vomiting, fevers, chills.  Patient denies any recent sick contacts or hospitalizations.    Overview/Hospital Course:  3/15/24  NAEON, on 5 L HFNC, not on home O2  Reports fatigue, coughing        Review of Systems   All other systems reviewed and are negative.    Objective:     Vital Signs (Most Recent):  Temp: 97.8 °F (36.6 °C) (03/15/24 1633)  Pulse: 79 (03/15/24 2019)  Resp: 16 (03/15/24 1920)  BP: 131/78 (03/15/24 1633)  SpO2: 97 % (03/15/24 1920) Vital Signs (24h Range):  Temp:  [97.3 °F (36.3 °C)-98.7 °F (37.1 °C)] 97.8 °F (36.6 °C)  Pulse:  [] 79  Resp:  [16-20] 16  SpO2:  [95 %-98 %] 97 %  BP: (131-162)/(67-98) 131/78     Weight: (!) 195.6 kg (431 lb 3.5 oz)  Body mass index is 65.57 kg/m².    Intake/Output Summary (Last 24 hours) at  3/15/2024 2035  Last data filed at 3/15/2024 1908  Gross per 24 hour   Intake 1600.85 ml   Output 2800 ml   Net -1199.15 ml         Physical Exam  Vitals and nursing note reviewed.   Constitutional:       General: She is not in acute distress.     Appearance: Normal appearance. She is normal weight. She is ill-appearing.   Cardiovascular:      Rate and Rhythm: Normal rate and regular rhythm.      Heart sounds: No murmur heard.  Pulmonary:      Effort: Respiratory distress present.      Breath sounds: Wheezing present.      Comments: 5 L NC  Neurological:      General: No focal deficit present.      Mental Status: She is alert and oriented to person, place, and time.   Psychiatric:         Mood and Affect: Mood normal.         Behavior: Behavior normal.             Significant Labs: All pertinent labs within the past 24 hours have been reviewed.  Recent Lab Results         03/15/24  0814   03/15/24  0432   03/14/24  2309        Procalcitonin     0.03  Comment: A concentration < 0.25 ng/mL represents a low risk of bacterial   infection.  Procalcitonin may not be accurate among patients with localized   infection, recent trauma or major surgery, immunosuppressed state,   invasive fungal infection, renal dysfunction. Decisions regarding   initiation or continuation of antibiotic therapy should not be based   solely on procalcitonin levels.         Albumin   3.4         ALP   83         ALT   9         Anion Gap   11         Ao root annulus 3.04           Ascending aorta 3.96           Ao peak tiffany 2.05           Ao VTI 42.10           AST   14         AV valve area 1.35           ROSITA by Velocity Ratio 1.31           AV mean gradient 12           AV index (prosthetic) 0.42           AV peak gradient 17           AV Velocity Ratio 0.40           Baso #   0.04         Basophil %   0.8         BILIRUBIN TOTAL   0.9  Comment: For infants and newborns, interpretation of results should be based  on gestational age, weight and  in agreement with clinical  observations.    Premature Infant recommended reference ranges:  Up to 24 hours.............<8.0 mg/dL  Up to 48 hours............<12.0 mg/dL  3-5 days..................<15.0 mg/dL  6-29 days.................<15.0 mg/dL           BSA 3.06           BUN   13         Calcium   8.5         Chloride   103         CO2   27         Creatinine   0.8         CRP     17.1       Left Ventricle Relative Wall Thickness 0.79           Differential Method   Automated         E/A ratio 5.65           E/E' ratio 10.89           eGFR   >60         Eos #   0.0         Eos %   0.0         Estimated Avg Glucose     108       E wave deceleration time 264.25           Ferritin     92       Free T4     1.14       FS 31           Glucose   143         Gran # (ANC)   4.3         Gran %   82.9         Hematocrit   43.6         Hemoglobin   12.8         Hemoglobin A1C External     5.4  Comment: ADA Screening Guidelines:  5.7-6.4%  Consistent with prediabetes  >or=6.5%  Consistent with diabetes    High levels of fetal hemoglobin interfere with the HbA1C  assay. Heterozygous hemoglobin variants (HbS, HgC, etc)do  not significantly interfere with this assay.   However, presence of multiple variants may affect accuracy.         Immature Grans (Abs)   0.06  Comment: Mild elevation in immature granulocytes is non specific and   can be seen in a variety of conditions including stress response,   acute inflammation, trauma and pregnancy. Correlation with other   laboratory and clinical findings is essential.           Immature Granulocytes   1.2         IVC diameter 3.06           IVRT 74.22           IVSd 1.50           LA WIDTH 5.0           Left Atrium Major Axis 6.71           Left Atrium Minor Axis 6.99           LA size 5.01           LA volume 145.79           LA vol index 51.5           LVOT area 3.2           LV LATERAL E/E' RATIO 8.65           LV SEPTAL E/E' RATIO 14.70           LV EDV BP 72.93           LV  Diastolic Volume Index 25.77           LVIDd 4.07           LVIDs 2.79           LV mass 251.18           LV Mass Index 89           Left Ventricular Outflow Tract Mean Gradient 1.98           Left Ventricular Outflow Tract Mean Velocity 0.68           LVOT diameter 2.03           LVOT peak mendez 0.83           LVOT stroke volume 56.93           LVOT peak VTI 17.60           LV ESV BP 29.19           LV Systolic Volume Index 10.3           Lymph #   0.7         Lymph %   12.6         Magnesium      1.9       MCH   26.4         MCHC   29.4         MCV   90         Mean e' 0.14           Mono #   0.1         Mono %   2.5         MPV   11.0         Mr max mendez 5.15           MV valve area p 1/2 method 2.87           MV Peak A Mendez 0.26           MV Peak E Mendez 1.47           MV stenosis pressure 1/2 time 76.63           nRBC   0         Platelet Count   188         Potassium   4.8         PROTEIN TOTAL   6.9         PV mean gradient 3           Posterior Wall 1.60           RA Major Axis 5.19           Est. RA pres 3           RA Width 4.1           RBC   4.84         RDW   15.1         RV TB RVSP 6           RVOT peak mendez 0.92           RVOT peak VTI 20.4           Sodium   141         STJ 3.70           TAPSE 2.05           TDI SEPTAL 0.10           TDI LATERAL 0.17           Triscuspid Valve Regurgitation Peak Gradient 42           TR Max Menedz 3.23           TSH     0.089       TV resting pulmonary artery pressure 45           WBC   5.15         ZLVIDD -23.33           ZLVIDS -16.98                   Significant Imaging: I have reviewed all pertinent imaging results/findings within the past 24 hours.    X-Ray Chest AP Portable   Final Result      Bibasilar opacities favoring edema and atelectasis.         Electronically signed by: Sam Capellan   Date:    03/14/2024   Time:    19:21            Assessment/Plan:      * Acute hypoxemic respiratory failure  Patient with Hypoxic Respiratory failure which is Acute.  she is not  on home oxygen. Supplemental oxygen was provided and noted-      .   Signs/symptoms of respiratory failure include- tachypnea, increased work of breathing, and respiratory distress. Contributing diagnoses includes - CHF, Pneumonia, and COVID  Labs and images were reviewed. Patient Has recent ABG, which has been reviewed. Will treat underlying causes and adjust management of respiratory failure as follows- continue high-flow O2 via nasal cannula, continuous pulse oximetry monitoring.  See discussion for acute diastolic CHF exacerbation and COVID-19 pneumonia for additional evaluation and treatment plan.    Morbid obesity  Body mass index is 65.16 kg/m². Morbid obesity complicates all aspects of disease management from diagnostic modalities to treatment. Weight loss encouraged and health benefits explained to patient.  Patient is high risk of decompensation in the setting of COVID-19 pneumonia with acute hypoxic respiratory failure complicated by acute diastolic CHF exacerbation.         Hyperglycemia  No known history of diabetes mellitus.  May be related to stress response.  Check A1c.      Atrial fibrillation with RVR  Patient with Paroxysmal (<7 days) atrial fibrillation which is uncontrolled currently with Beta Blocker. Patient is currently in sinus rhythm.[id number] Score: 4.  Anticoagulation indicated. Anticoagulation done with treatment dose Lovenox for now .  Cardiology consult.  Telemetry monitoring.  Check TSH and magnesium level.    Acute diastolic CHF (congestive heart failure)  Patient is identified as having Diastolic (HFpEF) heart failure that is Acute. CHF is currently uncontrolled due to Continued edema of extremities. Latest ECHO performed and demonstrates- Results for orders placed during the hospital encounter of 03/07/23    Echo Saline Bubble? No    Interpretation Summary  · The left ventricle is normal in size with concentric hypertrophy and normal systolic function.  · The estimated ejection  fraction is 60%.  · Normal left ventricular diastolic function.  · Normal right ventricular size with normal right ventricular systolic function.  · Elevated central venous pressure (15 mmHg).  · There is pulmonary hypertension.  · The estimated PA systolic pressure is 51 mmHg.  . Continue Beta Blocker and Furosemide and monitor clinical status closely. Monitor on telemetry. Patient is on CHF pathway.  Monitor strict Is&Os and daily weights.  Place on fluid restriction of 1.5 L. Cardiology has been consulted. Continue to stress to patient importance of self efficacy and  on diet for CHF. Last BNP reviewed- and noted below   Recent Labs   Lab 03/14/24  1820   *   Lasix 40 mg IV b.i.d., strict Is&Os, daily weights  Pending cardiology consult  Previous echocardiogram 03/10/2023 with EF 60%.  Repeat echocardiogram.    Pneumonia due to COVID-19 virus  Patient is identified as Severe COVID-19 based on hypoxemia with O2 saturations <94% on room air or on ambulation and High risk for severe complications of COVID 19 based on COVID risk score of 4   Initiate standard COVID protocols; COVID-19 testing ,Infection Control notification  and isolation- respiratory, contact and droplet per protocol    Diagnostics: CBC, CMP, Ferritin, CRP, and Portable CXR  White blood cell count 7.22, lactic acid level 1.0, LFTs unremarkable, ferritin pending, CRP pending, , influenza a/B negative, COVID-19 positive, HIV negative  Chest x-ray with bibasilar opacities favoring edema and atelectasis    Management: Initiate targeted therapy with Remdesivir, 200mg IV x1, followed by 100mg IV daily x5 days total and Dexamethasone PO/IV 6mg daily x10 days, Maintain oxygen saturations 92-96% via High Flow Nasal Cannula  % FiO2 and monitor with continuous/intermittent pulse oximetry. , Inhaled bronchodilators as needed for shortness of breath., Continuous cardiac monitoring., and Manage respiratory failure (O2 requirement >10LPM or  needing NIPPV/Mechanical ventilation) and/or Pneumonia (active chest infiltrates) separately as described below.  -encourage frequent incentive spirometry, scheduled albuterol inhaler, p.r.n. Xopenex nebs  -empiric IV antibiotics with cefepime and vancomycin  -check procalcitonin level, MRSA culture,, and sputum culture    Advance Care Planning Current advance care plan has been discussed with patient/family/POA and patient currently wishes Full Code.     HTN (hypertension)  Chronic, uncontrolled. Latest blood pressure and vitals reviewed-     Temp:  [98.7 °F (37.1 °C)-99.8 °F (37.7 °C)]   Pulse:  []   Resp:  [19-47]   BP: (114-151)/(64-87)   SpO2:  [88 %-97 %] .   Home meds for hypertension were reviewed and noted below.   Hypertension Medications               amLODIPine (NORVASC) 5 MG tablet Take 1 tablet (5 mg total) by mouth once daily.    hydroCHLOROthiazide (HYDRODIURIL) 25 MG tablet TAKE 1 TABLET(25 MG) BY MOUTH EVERY DAY            While in the hospital, will manage blood pressure as follows; Adjust home antihypertensive regimen as follows- change Norvasc to Toprol-XL 25 mg p.o. daily in the setting of new onset AFib.    Will utilize p.r.n. blood pressure medication only if patient's blood pressure greater than 160/100 and she develops symptoms such as worsening chest pain or shortness of breath.      VTE Risk Mitigation (From admission, onward)           Ordered     enoxaparin injection 150 mg  Every 12 hours         03/14/24 2226     IP VTE HIGH RISK PATIENT  Once         03/14/24 2222     Place sequential compression device  Until discontinued         03/14/24 2222                    Discharge Planning   JERONIMO:      Code Status: Full Code   Is the patient medically ready for discharge?:     Reason for patient still in hospital (select all that apply): Patient trending condition, Laboratory test, Treatment, and Consult recommendations  Discharge Plan A: Home Health                  Dima Minor  MD  Department of Hospital Medicine   [facility] - Telemetry ([facility])

## 2024-03-16 NOTE — SUBJECTIVE & OBJECTIVE
Review of Systems   All other systems reviewed and are negative.    Objective:     Vital Signs (Most Recent):  Temp: 98.7 °F (37.1 °C) (03/16/24 1244)  Pulse: 82 (03/16/24 1422)  Resp: 18 (03/16/24 1422)  BP: 134/67 (03/16/24 1244)  SpO2: 95 % (03/16/24 1422) Vital Signs (24h Range):  Temp:  [97.1 °F (36.2 °C)-98.7 °F (37.1 °C)] 98.7 °F (37.1 °C)  Pulse:  [] 82  Resp:  [16-20] 18  SpO2:  [90 %-97 %] 95 %  BP: (121-165)/(61-78) 134/67     Weight: (!) 195.6 kg (431 lb 3.5 oz)  Body mass index is 65.57 kg/m².    Intake/Output Summary (Last 24 hours) at 3/16/2024 1513  Last data filed at 3/16/2024 0701  Gross per 24 hour   Intake 632.85 ml   Output 2200 ml   Net -1567.15 ml           Physical Exam  Vitals and nursing note reviewed.   Constitutional:       General: She is not in acute distress.     Appearance: Normal appearance. She is normal weight. She is ill-appearing.   Cardiovascular:      Rate and Rhythm: Normal rate and regular rhythm.      Heart sounds: No murmur heard.  Pulmonary:      Effort: Respiratory distress present.      Breath sounds: Wheezing and rhonchi present.      Comments: 5 L NC  Neurological:      General: No focal deficit present.      Mental Status: She is alert and oriented to person, place, and time.   Psychiatric:         Mood and Affect: Mood normal.         Behavior: Behavior normal.             Significant Labs: All pertinent labs within the past 24 hours have been reviewed.  Recent Lab Results         03/16/24  0844   03/16/24  0425        Albumin   3.0       ALP   69       ALT   10       Anion Gap   9       AST   17       Baso #   0.03       Basophil %   0.7       BILIRUBIN TOTAL   0.7  Comment: For infants and newborns, interpretation of results should be based  on gestational age, weight and in agreement with clinical  observations.    Premature Infant recommended reference ranges:  Up to 24 hours.............<8.0 mg/dL  Up to 48 hours............<12.0 mg/dL  3-5  days..................<15.0 mg/dL  6-29 days.................<15.0 mg/dL         BUN   19       Calcium   8.2       Chloride   103       CO2   28       Creatinine   0.8       Differential Method   Automated       eGFR   >60       Eos #   0.0       Eos %   0.0       Glucose   101       Gran # (ANC)   2.8       Gran %   62.0       Hematocrit   38.2       Hemoglobin   11.3       Immature Grans (Abs)   0.03  Comment: Mild elevation in immature granulocytes is non specific and   can be seen in a variety of conditions including stress response,   acute inflammation, trauma and pregnancy. Correlation with other   laboratory and clinical findings is essential.         Immature Granulocytes   0.7       Lymph #   0.8       Lymph %   17.0       Magnesium    1.9       MCH   26.5       MCHC   29.6       MCV   90       Mono #   0.9       Mono %   19.6       MPV   11.6       nRBC   0       Platelet Count   161       Potassium   4.3       PROTEIN TOTAL   6.4       RBC   4.27       RDW   14.9       Sodium   140       Vancomycin-Trough 21.5         WBC   4.53               Significant Imaging: I have reviewed all pertinent imaging results/findings within the past 24 hours.    X-Ray Chest AP Portable   Final Result      Bibasilar opacities favoring edema and atelectasis.         Electronically signed by: Sam Capellan   Date:    03/14/2024   Time:    19:21

## 2024-03-16 NOTE — PLAN OF CARE
A207/A207 ENOC Reyes is a 78 y.o.female admitted on 3/14/2024 for Acute hypoxemic respiratory failure   Code Status: Full Code MRN: [id number]   Review of patient's allergies indicates:  No Known Allergies  Past Medical History:   Diagnosis Date    Hypertension       PRN meds    sodium chloride 0.9%, , PRN  acetaminophen, 650 mg, Q6H PRN  benzonatate, 100 mg, TID PRN  glucagon (human recombinant), 1 mg, PRN  glucose, 16 g, PRN  glucose, 24 g, PRN  hydrALAZINE, 10 mg, Q6H PRN  levalbuterol, 1.2495 mg, Q6H PRN  metoprolol, 5 mg, Q5 Min PRN  naloxone, 0.02 mg, PRN  ondansetron, 4 mg, Q6H PRN  sodium chloride 0.9%, 10 mL, Q12H PRN  vancomycin - pharmacy to dose, , pharmacy to manage frequency      Patient voiced no complaints or concerns  throughout duration of shift, no falls noted. Isolation precautions in place, cardiac monitoring continues. Chart check completed. Will continue plan of care.      Orientation: oriented x 4  [place] Coma Scale Score: 15     Lead Monitored: Lead II Rhythm: atrial rhythm    Cardiac/Telemetry Box Number: [id number]    Last Bowel Movement: 03/14/24  Diet Cardiac Low Sodium,2gm; Fluid - 1500mL  Voiding Characteristics: external catheter  Doroteo Score: 15  Fall Risk Score: 14  Accucheck []   Freq?      Lines/Drains/Airways       Peripheral Intravenous Line  Duration                  Peripheral IV - Single Lumen 03/15/24 1720 22 G Posterior;Right Forearm <1 day         Peripheral IV - Single Lumen 03/16/24 1044 22 G Anterior;Left Upper Arm <1 day

## 2024-03-17 PROBLEM — R73.9 HYPERGLYCEMIA: Status: RESOLVED | Noted: 2024-03-14 | Resolved: 2024-03-17

## 2024-03-17 LAB
ANION GAP SERPL CALC-SCNC: 6 MMOL/L (ref 8–16)
BUN SERPL-MCNC: 22 MG/DL (ref 8–23)
CALCIUM SERPL-MCNC: 8.1 MG/DL (ref 8.7–10.5)
CHLORIDE SERPL-SCNC: 100 MMOL/L (ref 95–110)
CO2 SERPL-SCNC: 36 MMOL/L (ref 23–29)
CREAT SERPL-MCNC: 0.8 MG/DL (ref 0.5–1.4)
EST. GFR  (NO RACE VARIABLE): >60 ML/MIN/1.73 M^2
GLUCOSE SERPL-MCNC: 88 MG/DL (ref 70–110)
MRSA SPEC QL CULT: NORMAL
OHS QRS DURATION: 102 MS
OHS QTC CALCULATION: 488 MS
POTASSIUM SERPL-SCNC: 3.9 MMOL/L (ref 3.5–5.1)
SODIUM SERPL-SCNC: 142 MMOL/L (ref 136–145)

## 2024-03-17 PROCEDURE — 25000003 PHARM REV CODE 250: Performed by: INTERNAL MEDICINE

## 2024-03-17 PROCEDURE — 94640 AIRWAY INHALATION TREATMENT: CPT

## 2024-03-17 PROCEDURE — 99232 SBSQ HOSP IP/OBS MODERATE 35: CPT | Mod: ,,, | Performed by: INTERNAL MEDICINE

## 2024-03-17 PROCEDURE — 80048 BASIC METABOLIC PNL TOTAL CA: CPT | Performed by: HOSPITALIST

## 2024-03-17 PROCEDURE — 25000003 PHARM REV CODE 250: Performed by: PHYSICIAN ASSISTANT

## 2024-03-17 PROCEDURE — 25000003 PHARM REV CODE 250: Performed by: HOSPITALIST

## 2024-03-17 PROCEDURE — 27100171 HC OXYGEN HIGH FLOW UP TO 24 HOURS

## 2024-03-17 PROCEDURE — 94799 UNLISTED PULMONARY SVC/PX: CPT | Mod: XB

## 2024-03-17 PROCEDURE — 36415 COLL VENOUS BLD VENIPUNCTURE: CPT | Performed by: HOSPITALIST

## 2024-03-17 PROCEDURE — 63600175 PHARM REV CODE 636 W HCPCS: Performed by: HOSPITALIST

## 2024-03-17 PROCEDURE — 63600175 PHARM REV CODE 636 W HCPCS: Performed by: INTERNAL MEDICINE

## 2024-03-17 PROCEDURE — 21400001 HC TELEMETRY ROOM

## 2024-03-17 PROCEDURE — 99900035 HC TECH TIME PER 15 MIN (STAT)

## 2024-03-17 PROCEDURE — 27000207 HC ISOLATION

## 2024-03-17 PROCEDURE — 94761 N-INVAS EAR/PLS OXIMETRY MLT: CPT

## 2024-03-17 RX ORDER — HYDROCODONE BITARTRATE AND ACETAMINOPHEN 7.5; 325 MG/1; MG/1
1 TABLET ORAL EVERY 6 HOURS PRN
Status: DISCONTINUED | OUTPATIENT
Start: 2024-03-17 | End: 2024-03-19 | Stop reason: HOSPADM

## 2024-03-17 RX ADMIN — ALBUTEROL SULFATE 2 PUFF: 90 AEROSOL, METERED RESPIRATORY (INHALATION) at 01:03

## 2024-03-17 RX ADMIN — ALBUTEROL SULFATE 2 PUFF: 90 AEROSOL, METERED RESPIRATORY (INHALATION) at 07:03

## 2024-03-17 RX ADMIN — HYDROCODONE BITARTRATE AND ACETAMINOPHEN 1 TABLET: 7.5; 325 TABLET ORAL at 08:03

## 2024-03-17 RX ADMIN — CEFTRIAXONE 1 G: 1 INJECTION, POWDER, FOR SOLUTION INTRAMUSCULAR; INTRAVENOUS at 04:03

## 2024-03-17 RX ADMIN — FUROSEMIDE 40 MG: 10 INJECTION, SOLUTION INTRAMUSCULAR; INTRAVENOUS at 09:03

## 2024-03-17 RX ADMIN — DEXAMETHASONE SODIUM PHOSPHATE 6 MG: 4 INJECTION INTRA-ARTICULAR; INTRALESIONAL; INTRAMUSCULAR; INTRAVENOUS; SOFT TISSUE at 09:03

## 2024-03-17 RX ADMIN — ENOXAPARIN SODIUM 150 MG: 150 INJECTION SUBCUTANEOUS at 08:03

## 2024-03-17 RX ADMIN — METOPROLOL SUCCINATE 50 MG: 50 TABLET, EXTENDED RELEASE ORAL at 08:03

## 2024-03-17 RX ADMIN — ALBUTEROL SULFATE 2 PUFF: 90 AEROSOL, METERED RESPIRATORY (INHALATION) at 12:03

## 2024-03-17 RX ADMIN — CEFEPIME 2 G: 2 INJECTION, POWDER, FOR SOLUTION INTRAVENOUS at 08:03

## 2024-03-17 RX ADMIN — VANCOMYCIN HYDROCHLORIDE 1500 MG: 1.5 INJECTION, POWDER, LYOPHILIZED, FOR SOLUTION INTRAVENOUS at 12:03

## 2024-03-17 RX ADMIN — CEFEPIME 2 G: 2 INJECTION, POWDER, FOR SOLUTION INTRAVENOUS at 02:03

## 2024-03-17 RX ADMIN — REMDESIVIR 100 MG: 100 INJECTION, POWDER, LYOPHILIZED, FOR SOLUTION INTRAVENOUS at 08:03

## 2024-03-17 RX ADMIN — MUPIROCIN: 20 OINTMENT TOPICAL at 09:03

## 2024-03-17 RX ADMIN — MUPIROCIN: 20 OINTMENT TOPICAL at 08:03

## 2024-03-17 NOTE — PROGRESS NOTES
Therapy with vancomycin complete and/or consult discontinued by provider.  Pharmacy will sign off, please re-consult as needed.    Thank you for allowing us to participate in this patient's care.   Katherine McArdle, Pharm.D. 3/17/2024 10:36 AM

## 2024-03-17 NOTE — PROGRESS NOTES
[facility]  Progress Note    Patient Name: Niru Reyes  MRN: [id number]  Patient Class: IP- Inpatient   Admission Date: 3/14/2024  Length of Stay: 3 days  Attending Physician: Dima Minor MD  Primary Care Provider: Jordana Shirley MD        Subjective:     Principal Problem:Acute hypoxemic respiratory failure        HPI:  78-year-old white woman with history of hypertension, major depression, osteoarthritis, anemia, peripheral arterial disease, bilateral lower extremity edema, cellulitis, pulmonary hypertension, and morbid obesity who presented to the emergency department via EMS with complaint of shortness a breath.  On arrival by the fire department, patient was 83% on room air and placed on 8 L nasal cannula and saturating 94%.  Patient did receive a DuoNeb and went into AFib with RVR.  Patient denies any previous history of arrhythmias, CHF, or COPD.  Shortness for breath has been present over the last several days, associated with nonproductive cough and lower extremity edema, no associated chest pain, moderate to severe in nature, constant, no aggravating or alleviating factor identified.  Patient denies any nausea, vomiting, fevers, chills.  Patient denies any recent sick contacts or hospitalizations.    Overview/Hospital Course:  3/15/24  Admitted for COVID PNA and CHF exacerbation  NAEON, on 5 L HFNC, not on home O2  Reports fatigue, coughing    3/16/24  Down to 4L NC today  Anasarca improving, continue IV lasix  Continue remdisivir, dexamethasone, broad spectrum IV antibiotics    3/17/24  NAEON, down to 3L NC  Pt in bed, reports fatigue  Continue remedsivir, dexamethasone, IV diuretics  De-escalate IV antibiotics to ceftriaxone        Review of Systems   All other systems reviewed and are negative.    Objective:     Vital Signs (Most Recent):  Temp: 97.4 °F (36.3 °C) (03/17/24 1559)  Pulse: 81 (03/17/24 1559)  Resp: 16 (03/17/24 1559)  BP: (!) 130/58  (03/17/24 1559)  SpO2: 96 % (03/17/24 1559) Vital Signs (24h Range):  Temp:  [97.4 °F (36.3 °C)-98.9 °F (37.2 °C)] 97.4 °F (36.3 °C)  Pulse:  [72-99] 81  Resp:  [16-20] 16  SpO2:  [95 %-99 %] 96 %  BP: (130-169)/(58-88) 130/58     Weight: (!) 195.6 kg (431 lb 3.5 oz)  Body mass index is 65.57 kg/m².    Intake/Output Summary (Last 24 hours) at 3/17/2024 1658  Last data filed at 3/17/2024 1642  Gross per 24 hour   Intake 674.78 ml   Output 2500 ml   Net -1825.22 ml           Physical Exam  Vitals and nursing note reviewed.   Constitutional:       General: She is not in acute distress.     Appearance: Normal appearance. She is obese. She is ill-appearing.   Cardiovascular:      Rate and Rhythm: Normal rate and regular rhythm.      Heart sounds: No murmur heard.  Pulmonary:      Effort: No respiratory distress.      Breath sounds: Wheezing and rales present.      Comments: 5 L NC  Musculoskeletal:      Right lower leg: Edema present.      Left lower leg: Edema present.   Neurological:      General: No focal deficit present.      Mental Status: She is alert and oriented to person, place, and time.   Psychiatric:         Mood and Affect: Mood normal.         Behavior: Behavior normal.             Significant Labs: All pertinent labs within the past 24 hours have been reviewed.  Recent Lab Results         03/17/24  0809        Anion Gap 6       BUN 22       Calcium 8.1       Chloride 100       CO2 36       Creatinine 0.8       eGFR >60       Glucose 88       Potassium 3.9       Sodium 142               Significant Imaging: I have reviewed all pertinent imaging results/findings within the past 24 hours.    X-Ray Chest AP Portable   Final Result      Bibasilar opacities favoring edema and atelectasis.         Electronically signed by: Sam Capellan   Date:    03/14/2024   Time:    19:21            Assessment/Plan:      * Acute hypoxemic respiratory failure  Patient with Hypoxic Respiratory failure which is Acute.  she is not  on home oxygen. Supplemental oxygen was provided and noted- Oxygen Concentration (%):  [18] 18    Signs/symptoms of respiratory failure include- tachypnea, increased work of breathing, and respiratory distress. Contributing diagnoses includes - CHF, Pneumonia, and COVID  Labs and images were reviewed. Patient Has recent ABG, which has been reviewed. Will treat underlying causes and adjust management of respiratory failure as follows- continue high-flow O2 via nasal cannula, continuous pulse oximetry monitoring.  See discussion for acute diastolic CHF exacerbation and COVID-19 pneumonia for additional evaluation and treatment plan.    Acute diastolic CHF (congestive heart failure)  Patient is identified as having Diastolic (HFpEF) heart failure that is Acute. CHF is currently uncontrolled due to Continued edema of extremities. Latest ECHO performed and demonstrates- Results for orders placed during the hospital encounter of 03/07/23    Echo Saline Bubble? No    Interpretation Summary  · The left ventricle is normal in size with concentric hypertrophy and normal systolic function.  · The estimated ejection fraction is 60%.  · Normal left ventricular diastolic function.  · Normal right ventricular size with normal right ventricular systolic function.  · Elevated central venous pressure (15 mmHg).  · There is pulmonary hypertension.  · The estimated PA systolic pressure is 51 mmHg.  . Continue Beta Blocker and Furosemide and monitor clinical status closely. Monitor on telemetry. Patient is on CHF pathway.  Monitor strict Is&Os and daily weights.  Place on fluid restriction of 1.5 L. Cardiology has been consulted. Continue to stress to patient importance of self efficacy and  on diet for CHF. Last BNP reviewed- and noted below   Recent Labs   Lab 03/14/24  1820   *     Continue IV diuretics  Cardiology following    Pneumonia due to COVID-19 virus  Patient is identified as Severe COVID-19 based on hypoxemia  with O2 saturations <94% on room air or on ambulation and High risk for severe complications of COVID 19 based on COVID risk score of 5   Initiate standard COVID protocols; COVID-19 testing ,Infection Control notification  and isolation- respiratory, contact and droplet per protocol    Diagnostics: CBC, CMP, Ferritin, CRP, and Portable CXR  White blood cell count 7.22, lactic acid level 1.0, LFTs unremarkable, ferritin pending, CRP pending, , influenza a/B negative, COVID-19 positive, HIV negative  Chest x-ray with bibasilar opacities favoring edema and atelectasis    Management: Initiate targeted therapy with Remdesivir, 200mg IV x1, followed by 100mg IV daily x5 days total and Dexamethasone PO/IV 6mg daily x10 days, Maintain oxygen saturations 92-96% via High Flow Nasal Cannula  % FiO2 and monitor with continuous/intermittent pulse oximetry. , Inhaled bronchodilators as needed for shortness of breath., Continuous cardiac monitoring., and Manage respiratory failure (O2 requirement >10LPM or needing NIPPV/Mechanical ventilation) and/or Pneumonia (active chest infiltrates) separately as described below.  -encourage frequent incentive spirometry, scheduled albuterol inhaler, p.r.n. Xopenex nebs  -empiric IV antibiotics with cefepime and vancomycin  -check procalcitonin level, MRSA culture,, and sputum culture    3/17/24  -de-escalate IV antibiotics to ceftriaxone    Advance Care Planning Current advance care plan has been discussed with patient/family/POA and patient currently wishes Full Code.     HTN (hypertension)  Chronic, controlled. Latest blood pressure and vitals reviewed-     Temp:  [97.4 °F (36.3 °C)-98.9 °F (37.2 °C)]   Pulse:  [72-99]   Resp:  [16-20]   BP: (130-169)/(58-88)   SpO2:  [95 %-99 %] .   Home meds for hypertension were reviewed and noted below.   Hypertension Medications               amLODIPine (NORVASC) 5 MG tablet Take 1 tablet (5 mg total) by mouth once daily.    hydroCHLOROthiazide  (HYDRODIURIL) 25 MG tablet TAKE 1 TABLET(25 MG) BY MOUTH EVERY DAY            While in the hospital, will manage blood pressure as follows; Continue home antihypertensive regimen    Will utilize p.r.n. blood pressure medication only if patient's blood pressure greater than 180/110 and she develops symptoms such as worsening chest pain or shortness of breath.      Atrial fibrillation with RVR  Patient with Paroxysmal (<7 days) atrial fibrillation which is uncontrolled currently with Beta Blocker. Patient is currently in sinus rhythm.[id number] Score: 4.  Anticoagulation indicated. Anticoagulation done with treatment dose Lovenox for now .  Cardiology consult.  Telemetry monitoring.     Morbid obesity  Body mass index is 65.57 kg/m². Morbid obesity complicates all aspects of disease management from diagnostic modalities to treatment. Weight loss encouraged and health benefits explained to patient.  Patient is high risk of decompensation in the setting of COVID-19 pneumonia with acute hypoxic respiratory failure complicated by acute diastolic CHF exacerbation.           VTE Risk Mitigation (From admission, onward)           Ordered     enoxaparin injection 150 mg  Every 12 hours         03/14/24 2226     IP VTE HIGH RISK PATIENT  Once         03/14/24 2222     Place sequential compression device  Until discontinued         03/14/24 2222                    Discharge Planning   JERONIMO:      Code Status: Full Code   Is the patient medically ready for discharge?:     Reason for patient still in hospital (select all that apply): Patient trending condition, Laboratory test, and Treatment  Discharge Plan A: Home Health                  Dima Minor MD  Department of Hospital Medicine   [facility] - Telemetry ([facility])

## 2024-03-17 NOTE — ASSESSMENT & PLAN NOTE
Chronic, controlled. Latest blood pressure and vitals reviewed-     Temp:  [97.4 °F (36.3 °C)-98.9 °F (37.2 °C)]   Pulse:  [72-99]   Resp:  [16-20]   BP: (130-169)/(58-88)   SpO2:  [95 %-99 %] .   Home meds for hypertension were reviewed and noted below.   Hypertension Medications               amLODIPine (NORVASC) 5 MG tablet Take 1 tablet (5 mg total) by mouth once daily.    hydroCHLOROthiazide (HYDRODIURIL) 25 MG tablet TAKE 1 TABLET(25 MG) BY MOUTH EVERY DAY            While in the hospital, will manage blood pressure as follows; Continue home antihypertensive regimen    Will utilize p.r.n. blood pressure medication only if patient's blood pressure greater than 180/110 and she develops symptoms such as worsening chest pain or shortness of breath.

## 2024-03-17 NOTE — ASSESSMENT & PLAN NOTE
Patient with Paroxysmal (<7 days) atrial fibrillation which is uncontrolled currently with Beta Blocker. Patient is currently in sinus rhythm.[id number] Score: 4.  Anticoagulation indicated. Anticoagulation done with treatment dose Lovenox for now .  Cardiology consult.  Telemetry monitoring.

## 2024-03-17 NOTE — ASSESSMENT & PLAN NOTE
Patient is identified as Severe COVID-19 based on hypoxemia with O2 saturations <94% on room air or on ambulation and High risk for severe complications of COVID 19 based on COVID risk score of 5   Initiate standard COVID protocols; COVID-19 testing ,Infection Control notification  and isolation- respiratory, contact and droplet per protocol    Diagnostics: CBC, CMP, Ferritin, CRP, and Portable CXR  White blood cell count 7.22, lactic acid level 1.0, LFTs unremarkable, ferritin pending, CRP pending, , influenza a/B negative, COVID-19 positive, HIV negative  Chest x-ray with bibasilar opacities favoring edema and atelectasis    Management: Initiate targeted therapy with Remdesivir, 200mg IV x1, followed by 100mg IV daily x5 days total and Dexamethasone PO/IV 6mg daily x10 days, Maintain oxygen saturations 92-96% via High Flow Nasal Cannula  % FiO2 and monitor with continuous/intermittent pulse oximetry. , Inhaled bronchodilators as needed for shortness of breath., Continuous cardiac monitoring., and Manage respiratory failure (O2 requirement >10LPM or needing NIPPV/Mechanical ventilation) and/or Pneumonia (active chest infiltrates) separately as described below.  -encourage frequent incentive spirometry, scheduled albuterol inhaler, p.r.n. Xopenex nebs  -empiric IV antibiotics with cefepime and vancomycin  -check procalcitonin level, MRSA culture,, and sputum culture    3/17/24  -de-escalate IV antibiotics to ceftriaxone    Advance Care Planning  Current advance care plan has been discussed with patient/family/POA and patient currently wishes Full Code.

## 2024-03-17 NOTE — ASSESSMENT & PLAN NOTE
Patient with Hypoxic Respiratory failure which is Acute.  she is not on home oxygen. Supplemental oxygen was provided and noted- Oxygen Concentration (%):  [18] 18    Signs/symptoms of respiratory failure include- tachypnea, increased work of breathing, and respiratory distress. Contributing diagnoses includes - CHF, Pneumonia, and COVID  Labs and images were reviewed. Patient Has recent ABG, which has been reviewed. Will treat underlying causes and adjust management of respiratory failure as follows- continue high-flow O2 via nasal cannula, continuous pulse oximetry monitoring.  See discussion for acute diastolic CHF exacerbation and COVID-19 pneumonia for additional evaluation and treatment plan.

## 2024-03-17 NOTE — ASSESSMENT & PLAN NOTE
Body mass index is 65.57 kg/m². Morbid obesity complicates all aspects of disease management from diagnostic modalities to treatment. Weight loss encouraged and health benefits explained to patient.  Patient is high risk of decompensation in the setting of COVID-19 pneumonia with acute hypoxic respiratory failure complicated by acute diastolic CHF exacerbation.

## 2024-03-17 NOTE — SUBJECTIVE & OBJECTIVE
Review of Systems   All other systems reviewed and are negative.    Objective:     Vital Signs (Most Recent):  Temp: 97.4 °F (36.3 °C) (03/17/24 1559)  Pulse: 81 (03/17/24 1559)  Resp: 16 (03/17/24 1559)  BP: (!) 130/58 (03/17/24 1559)  SpO2: 96 % (03/17/24 1559) Vital Signs (24h Range):  Temp:  [97.4 °F (36.3 °C)-98.9 °F (37.2 °C)] 97.4 °F (36.3 °C)  Pulse:  [72-99] 81  Resp:  [16-20] 16  SpO2:  [95 %-99 %] 96 %  BP: (130-169)/(58-88) 130/58     Weight: (!) 195.6 kg (431 lb 3.5 oz)  Body mass index is 65.57 kg/m².    Intake/Output Summary (Last 24 hours) at 3/17/2024 1658  Last data filed at 3/17/2024 1642  Gross per 24 hour   Intake 674.78 ml   Output 2500 ml   Net -1825.22 ml           Physical Exam  Vitals and nursing note reviewed.   Constitutional:       General: She is not in acute distress.     Appearance: Normal appearance. She is obese. She is ill-appearing.   Cardiovascular:      Rate and Rhythm: Normal rate and regular rhythm.      Heart sounds: No murmur heard.  Pulmonary:      Effort: No respiratory distress.      Breath sounds: Wheezing and rales present.      Comments: 5 L NC  Musculoskeletal:      Right lower leg: Edema present.      Left lower leg: Edema present.   Neurological:      General: No focal deficit present.      Mental Status: She is alert and oriented to person, place, and time.   Psychiatric:         Mood and Affect: Mood normal.         Behavior: Behavior normal.             Significant Labs: All pertinent labs within the past 24 hours have been reviewed.  Recent Lab Results         03/17/24  0809        Anion Gap 6       BUN 22       Calcium 8.1       Chloride 100       CO2 36       Creatinine 0.8       eGFR >60       Glucose 88       Potassium 3.9       Sodium 142               Significant Imaging: I have reviewed all pertinent imaging results/findings within the past 24 hours.    X-Ray Chest AP Portable   Final Result      Bibasilar opacities favoring edema and atelectasis.          Electronically signed by: Sam Capellan   Date:    03/14/2024   Time:    19:21

## 2024-03-17 NOTE — ASSESSMENT & PLAN NOTE
Patient is identified as having Diastolic (HFpEF) heart failure that is Acute. CHF is currently uncontrolled due to Continued edema of extremities. Latest ECHO performed and demonstrates- Results for orders placed during the hospital encounter of 03/07/23    Echo Saline Bubble? No    Interpretation Summary  · The left ventricle is normal in size with concentric hypertrophy and normal systolic function.  · The estimated ejection fraction is 60%.  · Normal left ventricular diastolic function.  · Normal right ventricular size with normal right ventricular systolic function.  · Elevated central venous pressure (15 mmHg).  · There is pulmonary hypertension.  · The estimated PA systolic pressure is 51 mmHg.  . Continue Beta Blocker and Furosemide and monitor clinical status closely. Monitor on telemetry. Patient is on CHF pathway.  Monitor strict Is&Os and daily weights.  Place on fluid restriction of 1.5 L. Cardiology has been consulted. Continue to stress to patient importance of self efficacy and  on diet for CHF. Last BNP reviewed- and noted below   Recent Labs   Lab 03/14/24  1820   *     Continue IV diuretics  Cardiology following

## 2024-03-17 NOTE — PROGRESS NOTES
[facility] - Telemetry ([facility])  Cardiology  Consult Note     Patient Name: Niru Reyes  MRN: [id number]  Admission Date: 3/14/2024  Hospital Length of Stay: 2 days  Code Status: Full Code   Attending Provider: Dima Minor MD   Consulting Provider: Chloe Neves  Primary Care Physician: Jordana Shirley MD  Principal Problem:Acute hypoxemic respiratory failure     Patient information was obtained from patient, past medical records, and ER records.      Consults  Subjective:      Chief Complaint:  SOB     HPI:   HPI obtained from chart as patient is currently on isolation precautions due to COVID-19        Ms. Reyes is a 78 year old female patient whose current medical conditions include OA, anemia, PAD, pulmonary HTN, morbid obesity, and HTN who presented to [facility] ED via EMS due to worsening SOB. She was noted to be hypoxic upon EMS arrival with O2 sat of 83%. Associated symptoms included non-productive cough, LE edema, and fatigue. She denied any associated fever, chills, nica chest pain, palpitations, near syncope, or syncope. She converted to afib with RVR after receiving a duoneb. Initial labs revealed BNP of 464. CXR showed bilateral opacities favoring edema, and patient was subsequently admitted for further evaluation and treatment. Cardiology consulted to assist with management. Chart reviewed. Troponin x 1 negative. Patient with no apparent CV history-no afib/CHF. Remains in afib with HR in  range, BB up-titrated. Being AC with full dose Lovenox, recommend Eliquis upon d/c. TTE pending.     Hospital Course:  3-16-24 Pt in AFIB with controlled HR in 70s. Continue betablockers and anticoagulants.     3-17-24 Pt in AFIB with controlled rate in the 70s. Continue metoprolol and lovenox. Transition to eliquis upon discharge.            Past Medical History:   Diagnosis Date    Hypertension                     Past Surgical History:   Procedure Laterality Date     SECTION        COLONOSCOPY  N/A 11/25/2019     Procedure: COLONOSCOPY;  Surgeon: Janeth Leroy MD;  Location: [facility];  Service: Endoscopy;  Laterality: N/A;    HYSTERECTOMY             Review of patient's allergies indicates:  No Known Allergies     No current facility-administered medications on file prior to encounter.              Current Outpatient Medications on File Prior to Encounter   Medication Sig    amLODIPine (NORVASC) 5 MG tablet Take 1 tablet (5 mg total) by mouth once daily.    celecoxib (CELEBREX) 100 MG capsule Take 1 capsule (100 mg total) by mouth 2 (two) times daily.    docusate sodium (COLACE) 100 MG capsule Take 1 capsule (100 mg total) by mouth 2 (two) times daily as needed for Constipation.    hydroCHLOROthiazide (HYDRODIURIL) 25 MG tablet TAKE 1 TABLET(25 MG) BY MOUTH EVERY DAY    triamcinolone acetonide 0.1% (KENALOG) 0.1 % cream Apply topically 2 (two) times daily. Under occlusion for 14 days      Family History         Problem Relation (Age of Onset)     Birth defects Daughter     Heart disease Mother, Sister     Muscular dystrophy Father                     Tobacco Use    Smoking status: Never    Smokeless tobacco: Not on file   Substance and Sexual Activity    Alcohol use: No    Drug use: No    Sexual activity: Never      Review of Systems   Reason unable to perform ROS: as per hpi.   Constitutional: Positive for malaise/fatigue.   Cardiovascular:  Positive for dyspnea on exertion and leg swelling.   Respiratory:  Positive for cough and shortness of breath.       Objective:      Vital Signs (Most Recent):  Temp: 97.3 °F (36.3 °C) (03/15/24 0907)  Pulse: 92 (03/15/24 0935)  Resp: 18 (03/15/24 0935)  BP: 131/67 (03/15/24 0740)  SpO2: 96 % (03/15/24 0933) Vital Signs (24h Range):  Temp:  [97.3 °F (36.3 °C)-99.8 °F (37.7 °C)] 97.3 °F (36.3 °C)  Pulse:  [] 92  Resp:  [18-47] 18  SpO2:  [88 %-98 %] 96 %  BP: (114-162)/(64-98) 131/67      Weight: (!) 195.6 kg (431 lb 3.5 oz)  Body mass index is 65.57  kg/m².     SpO2: 96 %           Intake/Output Summary (Last 24 hours) at 3/15/2024 0947  Last data filed at 3/14/2024 2348        Gross per 24 hour   Intake 100 ml   Output 1450 ml   Net -1350 ml         Lines/Drains/Airways         Peripheral Intravenous Line  Duration                     Peripheral IV - Single Lumen 03/14/24 1822 20 G Anterior;Right Forearm <1 day          Peripheral IV - Single Lumen 03/14/24 1822 20 G Left;Posterior Hand <1 day                          Physical Exam  Vitals and nursing note reviewed.            Significant Labs: CMP           Recent Labs   Lab 03/14/24  1820 03/15/24  0432    141   K 4.9 4.8    103   CO2 21* 27   * 143*   BUN 13 13   CREATININE 0.8 0.8   CALCIUM 8.6* 8.5*   PROT 7.0 6.9   ALBUMIN 3.3* 3.4*   BILITOT 1.1* 0.9   ALKPHOS 80 83   AST 19 14   ALT 11 9*   ANIONGAP 10 11   , CBC           Recent Labs   Lab 03/14/24  1820 03/15/24  0432   WBC 7.22 5.15   HGB 11.5* 12.8   HCT 37.5 43.6    188   , Troponin         Recent Labs   Lab 03/14/24 1820   TROPONINI <0.006   , and All pertinent lab results from the last 24 hours have been reviewed.     Significant Imaging: Echocardiogram: Transthoracic echo (TTE) complete (Cupid Only):             Results for orders placed or performed during the hospital encounter of 03/07/23   Echo Saline Bubble? No   Result Value Ref Range     BSA 2.96 m2     LV LATERAL E/E' RATIO 10.50 m/s     LA WIDTH 3.50 cm     IVC diameter 2.7 cm     Left Ventricular Outflow Tract Mean Velocity 0.81 cm/s     Left Ventricular Outflow Tract Mean Gradient 2.95 mmHg     TDI LATERAL 0.12 m/s     LVIDd 4.25 3.5 - 6.0 cm     IVS 1.53 (A) 0.6 - 1.1 cm     Posterior Wall 1.38 (A) 0.6 - 1.1 cm     Ao root annulus 3.12 cm     LVIDs 2.91 2.1 - 4.0 cm     FS 32 28 - 44 %     LA volume 62.83 cm3     STJ 3.45 cm     Ascending aorta 3.56 cm     LV mass 242.13 g     LA size 3.57 cm     TAPSE 2.70 cm     Left Ventricle Relative Wall Thickness 0.65  cm     AV mean gradient 9 mmHg     AV Velocity Ratio 0.56       AV index (prosthetic) 0.59       MV valve area p 1/2 method 2.96 cm2     E/A ratio 1.03       E wave deceleration time 255.94 msec     IVRT 59.94 msec     LVOT peak mendez 1.14 m/s     LVOT peak VTI 25.60 cm     Ao peak mendez 2.03 m/s     Ao VTI 43.2 cm     RVOT peak mendez 1.16 m/s     RVOT peak VTI 32.1 cm     AV peak gradient 16 mmHg     PV mean gradient 4.59 mmHg     MV Peak E Mendez 1.26 m/s     TR Max Mendez 3.00 m/s     MV stenosis pressure 1/2 time 74.22 ms     MV Peak A Mendez 1.22 m/s     LV Systolic Volume 32.40 mL     LV Systolic Volume Index 11.7 mL/m2     LV Diastolic Volume 80.62 mL     LV Diastolic Volume Index 29.21 mL/m2     LA Volume Index 22.8 mL/m2     LV Mass Index 88 g/m2     RA Major Axis 4.78 cm     Left Atrium Minor Axis 5.69 cm     Left Atrium Major Axis 6.16 cm     Triscuspid Valve Regurgitation Peak Gradient 36 mmHg     Right Atrial Pressure (from IVC) 15 mmHg     EF 60 %     TV resting pulmonary artery pressure 51 mmHg     Narrative     · The left ventricle is normal in size with concentric hypertrophy and   normal systolic function.  · The estimated ejection fraction is 60%.  · Normal left ventricular diastolic function.  · Normal right ventricular size with normal right ventricular systolic   function.  · Elevated central venous pressure (15 mmHg).  · There is pulmonary hypertension.  · The estimated PA systolic pressure is 51 mmHg.       and EKG: Reviewed     Assessment and Plan:   Patient who presents with new onset afib/CHF in setting of COVID-19. HR reasonably controlled, BB dose increased. Diurese prn. Check TTE.      * Acute hypoxemic respiratory failure  -In setting of CHF/COVID  -Continue IV diuresis, nebs/steroids     Morbid obesity  -Weight loss     Atrial fibrillation with RVR  -Presents with new onset afib in setting of hypoxia/COVID-19  -HR in 's range  -Toprol XL increased to 50 mg daily, up-titrate as  needed  -Continue Lovenox for AC, recommend Eliquis upon d/c  -TTE pending     Acute diastolic CHF (congestive heart failure)  -BNP > 400  -Diurese prn  -Check TTE  -Continue BB  -Strict I's/O's     Pneumonia due to COVID-19 virus  -Mgmt as per primary team     HTN (hypertension)  -Continue BB  -Monitor BP trend        Assessment & plan notes cannot be loaded without a specified hospital service.        VTE Risk Mitigation (From admission, onward)              Ordered       enoxaparin injection 150 mg  Every 12 hours         03/14/24 2226       IP VTE HIGH RISK PATIENT  Once         03/14/24 2222       Place sequential compression device  Until discontinued         03/14/24 2222                          Thank you for your consult. I will follow-up with patient. Please contact us if you have any additional questions.     Chloe Neves  Cardiology   [facility] - Telemetry ([facility])

## 2024-03-17 NOTE — PLAN OF CARE
A207/A207 ENOC Reyes is a 78 y.o.female admitted on 3/14/2024 for Acute hypoxemic respiratory failure   Code Status: Full Code MRN: [id number]   Review of patient's allergies indicates:  No Known Allergies  Past Medical History:   Diagnosis Date    Hypertension       PRN meds    sodium chloride 0.9%, , PRN  acetaminophen, 650 mg, Q6H PRN  benzonatate, 100 mg, TID PRN  glucagon (human recombinant), 1 mg, PRN  glucose, 16 g, PRN  glucose, 24 g, PRN  hydrALAZINE, 10 mg, Q6H PRN  HYDROcodone-acetaminophen, 1 tablet, Q6H PRN  levalbuterol, 1.2495 mg, Q6H PRN  metoprolol, 5 mg, Q5 Min PRN  naloxone, 0.02 mg, PRN  ondansetron, 4 mg, Q6H PRN  sodium chloride 0.9%, 10 mL, Q12H PRN      New wounds noted on left lower abdomen and left upper thigh. Dr. Minor notified, wound cleaned, and charted. Wound care completed on previously established wounds. Bed alarm placed due to fall risk. No falls were noted on shift and hourly rounding is complete. Cardiac and lab monitoring continues.  Chart check completed. Will continue plan of care.      Orientation: oriented x 4  [place] Coma Scale Score: 15     Lead Monitored: Lead II Rhythm: atrial rhythm    Cardiac/Telemetry Box Number: [id number]  VTE Required Core Measure: (SCDs) Sequential compression device initiated/maintained Last Bowel Movement: 03/15/24  Diet Cardiac Low Sodium,2gm; Fluid - 1500mL  Voiding Characteristics: external catheter  Doroteo Score: 15  Fall Risk Score: 16  Accucheck []   Freq?      Lines/Drains/Airways       Peripheral Intravenous Line  Duration                  Peripheral IV - Single Lumen 03/15/24 1720 22 G Posterior;Right Forearm 1 day

## 2024-03-18 LAB
ANION GAP SERPL CALC-SCNC: 8 MMOL/L (ref 8–16)
BACTERIA SPEC AEROBE CULT: NORMAL
BACTERIA SPEC AEROBE CULT: NORMAL
BASOPHILS # BLD AUTO: 0.02 K/UL (ref 0–0.2)
BASOPHILS NFR BLD: 0.3 % (ref 0–1.9)
BNP SERPL-MCNC: 274 PG/ML (ref 0–99)
BUN SERPL-MCNC: 20 MG/DL (ref 8–23)
CALCIUM SERPL-MCNC: 8 MG/DL (ref 8.7–10.5)
CHLORIDE SERPL-SCNC: 97 MMOL/L (ref 95–110)
CO2 SERPL-SCNC: 37 MMOL/L (ref 23–29)
CREAT SERPL-MCNC: 0.7 MG/DL (ref 0.5–1.4)
DIFFERENTIAL METHOD BLD: ABNORMAL
EOSINOPHIL # BLD AUTO: 0.2 K/UL (ref 0–0.5)
EOSINOPHIL NFR BLD: 3.1 % (ref 0–8)
ERYTHROCYTE [DISTWIDTH] IN BLOOD BY AUTOMATED COUNT: 14.4 % (ref 11.5–14.5)
EST. GFR  (NO RACE VARIABLE): >60 ML/MIN/1.73 M^2
GLUCOSE SERPL-MCNC: 82 MG/DL (ref 70–110)
GRAM STN SPEC: NORMAL
HCT VFR BLD AUTO: 41.1 % (ref 37–48.5)
HGB BLD-MCNC: 12.4 G/DL (ref 12–16)
IMM GRANULOCYTES # BLD AUTO: 0.03 K/UL (ref 0–0.04)
IMM GRANULOCYTES NFR BLD AUTO: 0.5 % (ref 0–0.5)
LYMPHOCYTES # BLD AUTO: 1.4 K/UL (ref 1–4.8)
LYMPHOCYTES NFR BLD: 21.8 % (ref 18–48)
MAGNESIUM SERPL-MCNC: 1.9 MG/DL (ref 1.6–2.6)
MCH RBC QN AUTO: 26.3 PG (ref 27–31)
MCHC RBC AUTO-ENTMCNC: 30.2 G/DL (ref 32–36)
MCV RBC AUTO: 87 FL (ref 82–98)
MONOCYTES # BLD AUTO: 0.8 K/UL (ref 0.3–1)
MONOCYTES NFR BLD: 12.4 % (ref 4–15)
NEUTROPHILS # BLD AUTO: 4 K/UL (ref 1.8–7.7)
NEUTROPHILS NFR BLD: 61.9 % (ref 38–73)
NRBC BLD-RTO: 0 /100 WBC
PLATELET # BLD AUTO: 191 K/UL (ref 150–450)
PMV BLD AUTO: 10.3 FL (ref 9.2–12.9)
POTASSIUM SERPL-SCNC: 4 MMOL/L (ref 3.5–5.1)
RBC # BLD AUTO: 4.72 M/UL (ref 4–5.4)
SODIUM SERPL-SCNC: 142 MMOL/L (ref 136–145)
WBC # BLD AUTO: 6.38 K/UL (ref 3.9–12.7)

## 2024-03-18 PROCEDURE — 83735 ASSAY OF MAGNESIUM: CPT | Performed by: PHYSICIAN ASSISTANT

## 2024-03-18 PROCEDURE — 97166 OT EVAL MOD COMPLEX 45 MIN: CPT

## 2024-03-18 PROCEDURE — 25000003 PHARM REV CODE 250: Performed by: INTERNAL MEDICINE

## 2024-03-18 PROCEDURE — 94618 PULMONARY STRESS TESTING: CPT

## 2024-03-18 PROCEDURE — 94640 AIRWAY INHALATION TREATMENT: CPT

## 2024-03-18 PROCEDURE — 94799 UNLISTED PULMONARY SVC/PX: CPT | Mod: XB

## 2024-03-18 PROCEDURE — 85025 COMPLETE CBC W/AUTO DIFF WBC: CPT | Performed by: PHYSICIAN ASSISTANT

## 2024-03-18 PROCEDURE — 97530 THERAPEUTIC ACTIVITIES: CPT

## 2024-03-18 PROCEDURE — 99900035 HC TECH TIME PER 15 MIN (STAT)

## 2024-03-18 PROCEDURE — 21400001 HC TELEMETRY ROOM

## 2024-03-18 PROCEDURE — 94761 N-INVAS EAR/PLS OXIMETRY MLT: CPT

## 2024-03-18 PROCEDURE — 25000003 PHARM REV CODE 250: Performed by: HOSPITALIST

## 2024-03-18 PROCEDURE — 36415 COLL VENOUS BLD VENIPUNCTURE: CPT | Performed by: NURSE PRACTITIONER

## 2024-03-18 PROCEDURE — 27000221 HC OXYGEN, UP TO 24 HOURS

## 2024-03-18 PROCEDURE — 63600175 PHARM REV CODE 636 W HCPCS: Performed by: INTERNAL MEDICINE

## 2024-03-18 PROCEDURE — 99233 SBSQ HOSP IP/OBS HIGH 50: CPT | Mod: ,,, | Performed by: INTERNAL MEDICINE

## 2024-03-18 PROCEDURE — 83880 ASSAY OF NATRIURETIC PEPTIDE: CPT | Performed by: NURSE PRACTITIONER

## 2024-03-18 PROCEDURE — 80048 BASIC METABOLIC PNL TOTAL CA: CPT | Performed by: PHYSICIAN ASSISTANT

## 2024-03-18 PROCEDURE — 27000207 HC ISOLATION

## 2024-03-18 PROCEDURE — 97162 PT EVAL MOD COMPLEX 30 MIN: CPT

## 2024-03-18 PROCEDURE — 25000003 PHARM REV CODE 250: Performed by: PHYSICIAN ASSISTANT

## 2024-03-18 PROCEDURE — 63600175 PHARM REV CODE 636 W HCPCS: Performed by: HOSPITALIST

## 2024-03-18 RX ORDER — LANOLIN ALCOHOL/MO/W.PET/CERES
400 CREAM (GRAM) TOPICAL ONCE
Status: COMPLETED | OUTPATIENT
Start: 2024-03-18 | End: 2024-03-18

## 2024-03-18 RX ADMIN — CEFTRIAXONE 1 G: 1 INJECTION, POWDER, FOR SOLUTION INTRAMUSCULAR; INTRAVENOUS at 03:03

## 2024-03-18 RX ADMIN — ENOXAPARIN SODIUM 150 MG: 150 INJECTION SUBCUTANEOUS at 09:03

## 2024-03-18 RX ADMIN — BENZONATATE 100 MG: 100 CAPSULE ORAL at 01:03

## 2024-03-18 RX ADMIN — ALBUTEROL SULFATE 2 PUFF: 90 AEROSOL, METERED RESPIRATORY (INHALATION) at 08:03

## 2024-03-18 RX ADMIN — Medication 400 MG: at 01:03

## 2024-03-18 RX ADMIN — HYDRALAZINE HYDROCHLORIDE 10 MG: 20 INJECTION, SOLUTION INTRAMUSCULAR; INTRAVENOUS at 01:03

## 2024-03-18 RX ADMIN — FUROSEMIDE 40 MG: 10 INJECTION, SOLUTION INTRAMUSCULAR; INTRAVENOUS at 09:03

## 2024-03-18 RX ADMIN — ALBUTEROL SULFATE 2 PUFF: 90 AEROSOL, METERED RESPIRATORY (INHALATION) at 12:03

## 2024-03-18 RX ADMIN — ALBUTEROL SULFATE 2 PUFF: 90 AEROSOL, METERED RESPIRATORY (INHALATION) at 01:03

## 2024-03-18 RX ADMIN — METOPROLOL SUCCINATE 50 MG: 50 TABLET, EXTENDED RELEASE ORAL at 09:03

## 2024-03-18 RX ADMIN — SODIUM CHLORIDE: 9 INJECTION, SOLUTION INTRAVENOUS at 03:03

## 2024-03-18 RX ADMIN — REMDESIVIR 100 MG: 100 INJECTION, POWDER, LYOPHILIZED, FOR SOLUTION INTRAVENOUS at 09:03

## 2024-03-18 RX ADMIN — ALBUTEROL SULFATE 2 PUFF: 90 AEROSOL, METERED RESPIRATORY (INHALATION) at 07:03

## 2024-03-18 RX ADMIN — MUPIROCIN: 20 OINTMENT TOPICAL at 09:03

## 2024-03-18 RX ADMIN — DEXAMETHASONE SODIUM PHOSPHATE 6 MG: 4 INJECTION INTRA-ARTICULAR; INTRALESIONAL; INTRAMUSCULAR; INTRAVENOUS; SOFT TISSUE at 09:03

## 2024-03-18 NOTE — PLAN OF CARE
Problem: Adult Inpatient Plan of Care  Goal: Plan of Care Review  3/18/2024 0419 by Ruthann Choe LPN  Outcome: Ongoing, Progressing  3/18/2024 0419 by Ruthann Choe LPN  Outcome: Ongoing, Progressing  Goal: Patient-Specific Goal (Individualized)  3/18/2024 0419 by Ruthann Choe LPN  Outcome: Ongoing, Progressing  3/18/2024 0419 by Ruthann Choe LPN  Outcome: Ongoing, Progressing  Goal: Absence of Hospital-Acquired Illness or Injury  3/18/2024 0419 by Ruthann Choe LPN  Outcome: Ongoing, Progressing  3/18/2024 0419 by Ruthann Choe LPN  Outcome: Ongoing, Progressing  Goal: Optimal Comfort and Wellbeing  3/18/2024 0419 by Ruthann Choe LPN  Outcome: Ongoing, Progressing  3/18/2024 0419 by Ruthann Choe LPN  Outcome: Ongoing, Progressing  Goal: Readiness for Transition of Care  3/18/2024 0419 by Ruthann Choe LPN  Outcome: Ongoing, Progressing  3/18/2024 0419 by Ruthann Choe LPN  Outcome: Ongoing, Progressing     Problem: Impaired Wound Healing  Goal: Optimal Wound Healing  3/18/2024 0419 by Ruthann Choe LPN  Outcome: Ongoing, Progressing  3/18/2024 0419 by Ruthann Choe LPN  Outcome: Ongoing, Progressing     Problem: Skin Injury Risk Increased  Goal: Skin Health and Integrity  3/18/2024 0419 by Ruthann Choe LPN  Outcome: Ongoing, Progressing  3/18/2024 0419 by Ruthann Choe LPN  Outcome: Ongoing, Progressing     Problem: Fall Injury Risk  Goal: Absence of Fall and Fall-Related Injury  3/18/2024 0419 by Ruthann Choe LPN  Outcome: Ongoing, Progressing  3/18/2024 0419 by Ruthann Choe LPN  Outcome: Ongoing, Progressing     Problem: Self-Care Deficit  Goal: Improved Ability to Complete Activities of Daily Living  3/18/2024 0419 by Ruthann Choe LPN  Outcome: Ongoing, Progressing  3/18/2024 0419 by Ruthann Choe LPN  Outcome: Ongoing, Progressing

## 2024-03-18 NOTE — RESPIRATORY THERAPY
Home Oxygen Evaluation - [facility] - Cardiopulmonary Department      Date Performed: 3/18/2024      1) Patient's Home O2 Sat on room air, while at rest: Room Air SpO2 At Rest: (!) 87 %        If O2 sats on room air at rest are 88% or below, patient qualifies.  Document O2 liter flow needed in Step 2.  If O2 sats are 89% or above, complete Step 3.        2)  If patient is not ambulated and O2 sats are <88%, what is the O2 liter flow required to meet ordered saturation? Home O2 Eval Comments: pt qualifies for home oxygen    If O2 sats on room air while exercising remain 89% or above patient does not qualify, no further testing needed Document N/A in step 3. If O2 sats on room air while exercising are 88% or below, continue to Step 4.    3) Patient's O2 Sat on room air while exercisin) Patient's O2 Sat while exercising on O2: SpO2 During Ambulation on O2: 94 % at Ambulation O2 LPM: 1 LPM         (Must show improvement from #4 for patients to qualify)

## 2024-03-18 NOTE — PLAN OF CARE
PT EVAL complete. Required SBA-CGA for bed mobility. Recommending low vs moderate intensity therapy pending progress.

## 2024-03-18 NOTE — ASSESSMENT & PLAN NOTE
Patient with Hypoxic Respiratory failure which is Acute.  she is not on home oxygen. Supplemental oxygen was provided and noted-      Signs/symptoms of respiratory failure include- tachypnea, increased work of breathing, and respiratory distress. Contributing diagnoses includes - CHF, Pneumonia, and COVID  Labs and images were reviewed. Patient Has recent ABG, which has been reviewed. Will treat underlying causes and adjust management of respiratory failure as follows- continue high-flow O2 via nasal cannula, continuous pulse oximetry monitoring.  See discussion for acute diastolic CHF exacerbation and COVID-19 pneumonia for additional evaluation and treatment plan.

## 2024-03-18 NOTE — HOSPITAL COURSE
3/18/24-Chart reviewed. HR controlled. Diuresing well. Brief run of NSVT overnight, Mg 1.9. K 4.0. EF normal.

## 2024-03-18 NOTE — ASSESSMENT & PLAN NOTE
Patient is identified as having Diastolic (HFpEF) heart failure that is Acute. CHF is currently uncontrolled due to Continued edema of extremities. Latest ECHO performed and demonstrates- Results for orders placed during the hospital encounter of 03/07/23    Echo Saline Bubble? No    Interpretation Summary  · The left ventricle is normal in size with concentric hypertrophy and normal systolic function.  · The estimated ejection fraction is 60%.  · Normal left ventricular diastolic function.  · Normal right ventricular size with normal right ventricular systolic function.  · Elevated central venous pressure (15 mmHg).  · There is pulmonary hypertension.  · The estimated PA systolic pressure is 51 mmHg.  . Continue Beta Blocker and Furosemide and monitor clinical status closely. Monitor on telemetry. Patient is on CHF pathway.  Monitor strict Is&Os and daily weights.  Place on fluid restriction of 1.5 L. Cardiology has been consulted. Continue to stress to patient importance of self efficacy and  on diet for CHF. Last BNP reviewed- and noted below   Recent Labs   Lab 03/18/24  0825   *     Continue IV diuretics  Cardiology following

## 2024-03-18 NOTE — PLAN OF CARE
POC reviewed with pt. Pt verbalizes understanding of POC. No questions at this time.  AAOx4. NADN.  A-fib on cardiac monitor.  Pt remains free of falls. Bariatric bed in use.   1LNC.   IV abx given today.   IV steroids given today.  IV lasix given today.   No complaints at this time.  Safety measures in place. Will continue to monitor.  Informed pt to call for assistance before getting up. Pt verbalizes understanding.  Hourly rounding and chart check complete.   Anticipating discharge home tomorrow.

## 2024-03-18 NOTE — PT/OT/SLP EVAL
"Occupational Therapy   Evaluation    Name: Niru Reyes  MRN: [id number]  Admitting Diagnosis: Acute hypoxemic respiratory failure  Recent Surgery: * No surgery found *      Recommendations:     Discharge Recommendations: Moderate Intensity Therapy (vs low intensity therapy pending pt progress)  Discharge Equipment Recommendations:  to be determined by next level of care  Barriers to discharge:  Decreased caregiver support, Inaccessible home environment    Assessment:     Niru Reyes is a 78 y.o. female with a medical diagnosis of Acute hypoxemic respiratory failure.  She presents with the following performance deficits affecting function: weakness, impaired endurance, impaired self care skills, impaired functional mobility, gait instability, impaired balance, decreased lower extremity function, decreased safety awareness, pain, decreased ROM, edema, impaired cardiopulmonary response to activity.      Rehab Prognosis: Good; patient would benefit from acute skilled OT services to address these deficits and reach maximum level of function.       Plan:     Patient to be seen 2 x/week to address the above listed problems via self-care/home management, therapeutic activities, therapeutic exercises  Plan of Care Expires: 04/01/24  Plan of Care Reviewed with: patient    Subjective     Chief Complaint: weakness, L knee pain  Patient/Family Comments/goals: improve strength, mobility, and independence    Occupational Profile:  Living Environment: lives alone in a mobile home with ramp to enter. Pt reports family and neighbor checks in. Pt currently unable to fit w/c through her front door, reports they are working on getting the door widened.   Previous level of function: Pt Mod (I) with ADLs and functional mobility. Pt uses w/c for functional mobility and performs squat pivot t/fs with RW. Pt reports w/c is ill-fitting and feels "wobbly." Pt reports taking sponge-baths in bed and using diapers/pads for toileting. Previously " receiving HH for wound care.  Roles and Routines: does not drive  Equipment Used at Home: wheelchair, walker, rolling  Assistance upon Discharge: family    Pain/Comfort:  Pain Rating 1: 2/10  Location - Side 1: Left  Location - Orientation 1: generalized  Location 1: knee  Pain Addressed 1: Reposition, Distraction  Pain Rating Post-Intervention 1: 2/10    Objective:     Communicated with: Nurse and epic chart review prior to session.  Patient found HOB elevated with peripheral IV, oxygen, PureWick, SCD, telemetry upon OT entry to room.    General Precautions: Standard, fall, airborne, contact, droplet  Orthopedic Precautions: N/A  Braces: N/A  Respiratory Status: Nasal cannula, flow 1 L/min    Occupational Performance:    Bed Mobility:    Patient completed Rolling/Turning to Left with  stand by assistance  Patient completed Supine to Sit with stand by assistance  Patient completed Sit to Supine with stand by assistance  Forward scoot to EOB with SBA.  Supine scoot to HOB with Mod A and bed in trend.     Activities of Daily Living:  Grooming: setup A. Comb hair in bed.  Lower Body Dressing: total assistance doff/je SCDs    Cognitive/Visual Perceptual:  Cognitive/Psychosocial Skills:     -       Oriented to: Person, Place, Time, and Situation   -       Follows Commands/attention:Follows multistep  commands  -       Communication: clear/fluent  -       Memory: No Deficits noted  -       Safety awareness/insight to disability: impaired   Visual/Perceptual:      -pt wears glasses .    Physical Exam:  Edema:  BLE  Sensation:    -       Intact  Upper Extremity Range of Motion:     -       Right Upper Extremity: WNL  -       Left Upper Extremity: WNL  Upper Extremity Strength:    -       Right Upper Extremity: 4+/5 grossly  -       Left Upper Extremity: 4+/5 grossly   Strength:    -       Right Upper Extremity: WFL  -       Left Upper Extremity: WFL    AMPAC 6 Click ADL:  AMPAC Total Score: 15    Treatment &  Education:  Patient educated on role of OT in acute setting and benefits of participation. Educated on importance of OOB activity and calling for A to transfer and meet needs. Encouraged completion of B UE AROM therex throughout the day to tolerance to increase functional strength and activity tolerance. Educated patient on importance of increased tolerance to upright position and direct impact on CV endurance and strength. Patient encouraged to sit up EOB/ bed in chair position for a minimum of 2 consecutive hours per day and for meals. Patient stated understanding and in agreement with POC.     Patient left HOB elevated with all lines intact, call button in reach, and nursing notified    GOALS:   Multidisciplinary Problems       Occupational Therapy Goals          Problem: Occupational Therapy    Goal Priority Disciplines Outcome Interventions   Occupational Therapy Goal     OT, PT/OT     Description: Goals to be met by: 24     Patient will increase functional independence with ADLs by performing:    UE Dressing with Stand-by Assistance.  Grooming while EOB with Set-up Assistance.  Toileting from bedside commode with Minimal Assistance for hygiene and clothing management.   Toilet transfer to bedside commode with Minimal Assistance.  Upper extremity exercise program x15 reps per handout, with independence.                         History:     Past Medical History:   Diagnosis Date    Hypertension          Past Surgical History:   Procedure Laterality Date     SECTION      COLONOSCOPY N/A 2019    Procedure: COLONOSCOPY;  Surgeon: Janeth Leroy MD;  Location: [facility];  Service: Endoscopy;  Laterality: N/A;    HYSTERECTOMY         Time Tracking:     OT Date of Treatment: 24  OT Start Time: 1435  OT Stop Time: 1500  OT Total Time (min): 25 min    Billable Minutes:Evaluation 15  Therapeutic Activity 10    3/18/2024  Radha Angel OT

## 2024-03-18 NOTE — SUBJECTIVE & OBJECTIVE
Review of Systems   Constitutional:  Positive for activity change and fatigue. Negative for appetite change, chills, diaphoresis, fever and unexpected weight change.   HENT:  Negative for congestion, nosebleeds, sinus pressure and sore throat.    Eyes:  Negative for pain, discharge and visual disturbance.   Respiratory:  Positive for shortness of breath (improved). Negative for cough, chest tightness, wheezing and stridor.    Cardiovascular:  Negative for chest pain, palpitations and leg swelling.   Gastrointestinal:  Negative for abdominal distention, abdominal pain, blood in stool, constipation, diarrhea, nausea and vomiting.   Endocrine: Negative for cold intolerance and heat intolerance.   Genitourinary:  Negative for difficulty urinating, dysuria, flank pain, frequency and urgency.   Musculoskeletal:  Positive for gait problem (non-ambulatory,. Transfers to chair). Negative for arthralgias, back pain, joint swelling, myalgias, neck pain and neck stiffness.   Skin:  Negative for rash and wound.   Allergic/Immunologic: Negative for food allergies and immunocompromised state.   Neurological:  Negative for dizziness, seizures, syncope, facial asymmetry, speech difficulty, weakness, light-headedness, numbness and headaches.   Hematological:  Negative for adenopathy.   Psychiatric/Behavioral:  Negative for agitation, confusion and hallucinations.    All other systems reviewed and are negative.    Objective:     Vital Signs (Most Recent):  Temp: 97.7 °F (36.5 °C) (03/18/24 0734)  Pulse: 81 (03/18/24 1105)  Resp: 18 (03/18/24 0734)  BP: (!) 171/91 (03/18/24 0734)  SpO2: (!) 93 % (03/18/24 1038) Vital Signs (24h Range):  Temp:  [97.4 °F (36.3 °C)-97.8 °F (36.6 °C)] 97.7 °F (36.5 °C)  Pulse:  [72-94] 81  Resp:  [16-20] 18  SpO2:  [93 %-99 %] 93 %  BP: (130-171)/(58-91) 171/91     Weight: (!) 195.6 kg (431 lb 3.5 oz)  Body mass index is 65.57 kg/m².    Intake/Output Summary (Last 24 hours) at 3/18/2024 1201  Last data  filed at 3/18/2024 1105  Gross per 24 hour   Intake 429.27 ml   Output 4000 ml   Net -3570.73 ml           Physical Exam  Vitals and nursing note reviewed.   Constitutional:       General: She is not in acute distress.     Appearance: Normal appearance. She is obese. She is ill-appearing.   Cardiovascular:      Rate and Rhythm: Normal rate and regular rhythm.      Heart sounds: No murmur heard.  Pulmonary:      Effort: No respiratory distress.      Breath sounds: Wheezing (scant, intermittent) present.      Comments: 5 L NC  Musculoskeletal:      Right lower leg: No edema.   Neurological:      General: No focal deficit present.      Mental Status: She is alert and oriented to person, place, and time.   Psychiatric:         Mood and Affect: Mood normal.         Behavior: Behavior normal.             Significant Labs: All pertinent labs within the past 24 hours have been reviewed.  Recent Lab Results         03/18/24  0825        Anion Gap 8       Baso # 0.02       Basophil % 0.3         Comment: Values of less than 100 pg/ml are consistent with non-CHF populations.       BUN 20       Calcium 8.0       Chloride 97       CO2 37       Creatinine 0.7       Differential Method Automated       eGFR >60       Eos # 0.2       Eos % 3.1       Glucose 82       Gran # (ANC) 4.0       Gran % 61.9       Hematocrit 41.1       Hemoglobin 12.4       Immature Grans (Abs) 0.03  Comment: Mild elevation in immature granulocytes is non specific and   can be seen in a variety of conditions including stress response,   acute inflammation, trauma and pregnancy. Correlation with other   laboratory and clinical findings is essential.         Immature Granulocytes 0.5       Lymph # 1.4       Lymph % 21.8       Magnesium  1.9       MCH 26.3       MCHC 30.2       MCV 87       Mono # 0.8       Mono % 12.4       MPV 10.3       nRBC 0       Platelet Count 191       Potassium 4.0       RBC 4.72       RDW 14.4       Sodium 142       WBC 6.38                Significant Imaging: I have reviewed all pertinent imaging results/findings within the past 24 hours.    X-Ray Chest AP Portable   Final Result      Bibasilar opacities favoring edema and atelectasis.         Electronically signed by: Sam Capellan   Date:    03/14/2024   Time:    19:21

## 2024-03-18 NOTE — ASSESSMENT & PLAN NOTE
-Presents with new onset afib in setting of hypoxia/COVID-19  -HR in 's range  -Toprol XL increased to 50 mg daily, up-titrate as needed  -Continue Lovenox for AC, recommend Eliquis upon d/c  -TTE pending    3/18/24  -Stable, HR controlled  -Continue Toprol XL  -Continue Lovenox for now, Eliquis upon d/c  -Can f/u in clinic

## 2024-03-18 NOTE — SUBJECTIVE & OBJECTIVE
"  ROS  Objective:     Vital Signs (Most Recent):  Temp: 97.7 °F (36.5 °C) (03/18/24 0734)  Pulse: 84 (03/18/24 1038)  Resp: 18 (03/18/24 0734)  BP: (!) 171/91 (03/18/24 0734)  SpO2: (!) 93 % (03/18/24 1038) Vital Signs (24h Range):  Temp:  [97.4 °F (36.3 °C)-97.8 °F (36.6 °C)] 97.7 °F (36.5 °C)  Pulse:  [72-94] 84  Resp:  [16-20] 18  SpO2:  [93 %-99 %] 93 %  BP: (130-171)/(58-91) 171/91     Weight: (!) 195.6 kg (431 lb 3.5 oz)  Body mass index is 65.57 kg/m².     SpO2: (!) 93 %         Intake/Output Summary (Last 24 hours) at 3/18/2024 1132  Last data filed at 3/18/2024 0541  Gross per 24 hour   Intake 429.27 ml   Output 2700 ml   Net -2270.73 ml       Lines/Drains/Airways       Peripheral Intravenous Line  Duration                  Peripheral IV - Single Lumen 03/15/24 1720 22 G Posterior;Right Forearm 2 days                       Physical Exam  Vitals and nursing note reviewed.            Significant Labs: CMP   Recent Labs   Lab 03/17/24  0809 03/18/24  0825    142   K 3.9 4.0    97   CO2 36* 37*   GLU 88 82   BUN 22 20   CREATININE 0.8 0.7   CALCIUM 8.1* 8.0*   ANIONGAP 6* 8   , CBC   Recent Labs   Lab 03/18/24  0825   WBC 6.38   HGB 12.4   HCT 41.1      , Troponin No results for input(s): "TROPONINI" in the last 48 hours., and All pertinent lab results from the last 24 hours have been reviewed.    Significant Imaging: Echocardiogram: Transthoracic echo (TTE) complete (Cupid Only):   Results for orders placed or performed during the hospital encounter of 03/14/24   Echo   Result Value Ref Range    BSA 3.06 m2    LVOT stroke volume 56.93 cm3    LVIDd 4.07 3.5 - 6.0 cm    LV Systolic Volume 29.19 mL    LV Systolic Volume Index 10.3 mL/m2    LVIDs 2.79 2.1 - 4.0 cm    LV Diastolic Volume 72.93 mL    LV Diastolic Volume Index 25.77 mL/m2    IVS 1.50 (A) 0.6 - 1.1 cm    LVOT diameter 2.03 cm    LVOT area 3.2 cm2    FS 31 28 - 44 %    Left Ventricle Relative Wall Thickness 0.79 cm    Posterior Wall " 1.60 (A) 0.6 - 1.1 cm    LV mass 251.18 g    LV Mass Index 89 g/m2    MV Peak E Mendez 1.47 m/s    TDI LATERAL 0.17 m/s    TDI SEPTAL 0.10 m/s    E/E' ratio 10.89 m/s    MV Peak A Mendez 0.26 m/s    TR Max Mendez 3.23 m/s    E/A ratio 5.65     IVRT 74.22 msec    E wave deceleration time 264.25 msec    LV SEPTAL E/E' RATIO 14.70 m/s    LV LATERAL E/E' RATIO 8.65 m/s    LVOT peak mendez 0.83 m/s    Left Ventricular Outflow Tract Mean Velocity 0.68 cm/s    Left Ventricular Outflow Tract Mean Gradient 1.98 mmHg    RVOT peak VTI 20.4 cm    TAPSE 2.05 cm    LA size 5.01 cm    Left Atrium Minor Axis 6.99 cm    Left Atrium Major Axis 6.71 cm    RA Major Axis 5.19 cm    AV mean gradient 12 mmHg    AV peak gradient 17 mmHg    Ao peak mendez 2.05 m/s    Ao VTI 42.10 cm    LVOT peak VTI 17.60 cm    AV valve area 1.35 cm²    AV Velocity Ratio 0.40     AV index (prosthetic) 0.42     ROSITA by Velocity Ratio 1.31 cm²    Mr max mendez 5.15 m/s    MV stenosis pressure 1/2 time 76.63 ms    MV valve area p 1/2 method 2.87 cm2    Triscuspid Valve Regurgitation Peak Gradient 42 mmHg    PV mean gradient 3 mmHg    RVOT peak mendez 0.92 m/s    Ao root annulus 3.04 cm    STJ 3.70 cm    Ascending aorta 3.96 cm    IVC diameter 3.06 cm    Mean e' 0.14 m/s    ZLVIDS -16.98     ZLVIDD -23.33     LA Volume Index 51.5 mL/m2    LA volume 145.79 cm3    LA WIDTH 5.0 cm    RA Width 4.1 cm    TV resting pulmonary artery pressure 45 mmHg    RV TB RVSP 6 mmHg    Est. RA pres 3 mmHg    Narrative      Left Ventricle: The left ventricle is normal in size. Normal wall   thickness. There is concentric remodeling. There is low normal systolic   function with a visually estimated ejection fraction of 50 - 55%. There is   normal diastolic function.    Right Ventricle: Normal right ventricular cavity size. Wall thickness   is normal. Right ventricle wall motion  is normal. Systolic function is   normal.    Left Atrium: Left atrium is moderately dilated.    Pulmonary Artery: The  estimated pulmonary artery systolic pressure is   45 mmHg.    IVC/SVC: Normal venous pressure at 3 mmHg.      and EKG: Reviewed

## 2024-03-18 NOTE — ASSESSMENT & PLAN NOTE
Chronic, controlled. Latest blood pressure and vitals reviewed-     Temp:  [97.4 °F (36.3 °C)-97.8 °F (36.6 °C)]   Pulse:  [72-94]   Resp:  [16-20]   BP: (130-171)/(58-91)   SpO2:  [93 %-99 %] .   Home meds for hypertension were reviewed and noted below.   Hypertension Medications               amLODIPine (NORVASC) 5 MG tablet Take 1 tablet (5 mg total) by mouth once daily.    hydroCHLOROthiazide (HYDRODIURIL) 25 MG tablet TAKE 1 TABLET(25 MG) BY MOUTH EVERY DAY            While in the hospital, will manage blood pressure as follows; Continue home antihypertensive regimen    Will utilize p.r.n. blood pressure medication only if patient's blood pressure greater than 180/110 and she develops symptoms such as worsening chest pain or shortness of breath.

## 2024-03-18 NOTE — PROGRESS NOTES
[facility] - Telemetry ([facility])  Cardiology  Progress Note    Patient Name: Niru Reyes  MRN: [id number]  Admission Date: 3/14/2024  Hospital Length of Stay: 4 days  Code Status: Full Code   Attending Physician: Waldo Mathis MD   Primary Care Physician: Jordana Shirley MD  Expected Discharge Date:   Principal Problem:Acute hypoxemic respiratory failure    Subjective:   HPI:  HPI obtained from chart as patient is currently on isolation precautions due to COVID-19        Ms. Reyes is a 78 year old female patient whose current medical conditions include OA, anemia, PAD, pulmonary HTN, morbid obesity, and HTN who presented to [facility] ED via EMS due to worsening SOB. She was noted to be hypoxic upon EMS arrival with O2 sat of 83%. Associated symptoms included non-productive cough, LE edema, and fatigue. She denied any associated fever, chills, nica chest pain, palpitations, near syncope, or syncope. She converted to afib with RVR after receiving a duoneb. Initial labs revealed BNP of 464. CXR showed bilateral opacities favoring edema, and patient was subsequently admitted for further evaluation and treatment. Cardiology consulted to assist with management. Chart reviewed. Troponin x 1 negative. Patient with no apparent CV history-no afib/CHF. Remains in afib with HR in  range, BB up-titrated. Being AC with full dose Lovenox, recommend Eliquis upon d/c. TTE pending.    Hospital Course:   3/18/24-Chart reviewed. HR controlled. Diuresing well. Brief run of NSVT overnight, Mg 1.9. K 4.0. EF normal.       ROS  Objective:     Vital Signs (Most Recent):  Temp: 97.7 °F (36.5 °C) (03/18/24 0734)  Pulse: 84 (03/18/24 1038)  Resp: 18 (03/18/24 0734)  BP: (!) 171/91 (03/18/24 0734)  SpO2: (!) 93 % (03/18/24 1038) Vital Signs (24h Range):  Temp:  [97.4 °F (36.3 °C)-97.8 °F (36.6 °C)] 97.7 °F (36.5 °C)  Pulse:  [72-94] 84  Resp:  [16-20] 18  SpO2:  [93 %-99 %] 93 %  BP: (130-171)/(58-91) 171/91     Weight: (!) 195.6  "kg (431 lb 3.5 oz)  Body mass index is 65.57 kg/m².     SpO2: (!) 93 %         Intake/Output Summary (Last 24 hours) at 3/18/2024 1132  Last data filed at 3/18/2024 0541  Gross per 24 hour   Intake 429.27 ml   Output 2700 ml   Net -2270.73 ml       Lines/Drains/Airways       Peripheral Intravenous Line  Duration                  Peripheral IV - Single Lumen 03/15/24 1720 22 G Posterior;Right Forearm 2 days                       Physical Exam  Vitals and nursing note reviewed.            Significant Labs: CMP   Recent Labs   Lab 03/17/24  0809 03/18/24  0825    142   K 3.9 4.0    97   CO2 36* 37*   GLU 88 82   BUN 22 20   CREATININE 0.8 0.7   CALCIUM 8.1* 8.0*   ANIONGAP 6* 8   , CBC   Recent Labs   Lab 03/18/24  0825   WBC 6.38   HGB 12.4   HCT 41.1      , Troponin No results for input(s): "TROPONINI" in the last 48 hours., and All pertinent lab results from the last 24 hours have been reviewed.    Significant Imaging: Echocardiogram: Transthoracic echo (TTE) complete (Cupid Only):   Results for orders placed or performed during the hospital encounter of 03/14/24   Echo   Result Value Ref Range    BSA 3.06 m2    LVOT stroke volume 56.93 cm3    LVIDd 4.07 3.5 - 6.0 cm    LV Systolic Volume 29.19 mL    LV Systolic Volume Index 10.3 mL/m2    LVIDs 2.79 2.1 - 4.0 cm    LV Diastolic Volume 72.93 mL    LV Diastolic Volume Index 25.77 mL/m2    IVS 1.50 (A) 0.6 - 1.1 cm    LVOT diameter 2.03 cm    LVOT area 3.2 cm2    FS 31 28 - 44 %    Left Ventricle Relative Wall Thickness 0.79 cm    Posterior Wall 1.60 (A) 0.6 - 1.1 cm    LV mass 251.18 g    LV Mass Index 89 g/m2    MV Peak E Mendez 1.47 m/s    TDI LATERAL 0.17 m/s    TDI SEPTAL 0.10 m/s    E/E' ratio 10.89 m/s    MV Peak A Mendez 0.26 m/s    TR Max Mendez 3.23 m/s    E/A ratio 5.65     IVRT 74.22 msec    E wave deceleration time 264.25 msec    LV SEPTAL E/E' RATIO 14.70 m/s    LV LATERAL E/E' RATIO 8.65 m/s    LVOT peak mendez 0.83 m/s    Left Ventricular Outflow " Tract Mean Velocity 0.68 cm/s    Left Ventricular Outflow Tract Mean Gradient 1.98 mmHg    RVOT peak VTI 20.4 cm    TAPSE 2.05 cm    LA size 5.01 cm    Left Atrium Minor Axis 6.99 cm    Left Atrium Major Axis 6.71 cm    RA Major Axis 5.19 cm    AV mean gradient 12 mmHg    AV peak gradient 17 mmHg    Ao peak tiffany 2.05 m/s    Ao VTI 42.10 cm    LVOT peak VTI 17.60 cm    AV valve area 1.35 cm²    AV Velocity Ratio 0.40     AV index (prosthetic) 0.42     ROSITA by Velocity Ratio 1.31 cm²    Mr max tiffany 5.15 m/s    MV stenosis pressure 1/2 time 76.63 ms    MV valve area p 1/2 method 2.87 cm2    Triscuspid Valve Regurgitation Peak Gradient 42 mmHg    PV mean gradient 3 mmHg    RVOT peak tiffany 0.92 m/s    Ao root annulus 3.04 cm    STJ 3.70 cm    Ascending aorta 3.96 cm    IVC diameter 3.06 cm    Mean e' 0.14 m/s    ZLVIDS -16.98     ZLVIDD -23.33     LA Volume Index 51.5 mL/m2    LA volume 145.79 cm3    LA WIDTH 5.0 cm    RA Width 4.1 cm    TV resting pulmonary artery pressure 45 mmHg    RV TB RVSP 6 mmHg    Est. RA pres 3 mmHg    Narrative      Left Ventricle: The left ventricle is normal in size. Normal wall   thickness. There is concentric remodeling. There is low normal systolic   function with a visually estimated ejection fraction of 50 - 55%. There is   normal diastolic function.    Right Ventricle: Normal right ventricular cavity size. Wall thickness   is normal. Right ventricle wall motion  is normal. Systolic function is   normal.    Left Atrium: Left atrium is moderately dilated.    Pulmonary Artery: The estimated pulmonary artery systolic pressure is   45 mmHg.    IVC/SVC: Normal venous pressure at 3 mmHg.      and EKG: Reviewed  Assessment and Plan:   Patient who presents with COVID-19/PAF/CHF. HR controlled. Continue BB. Diurese prn. Eliquis upon d/c.   * Acute hypoxemic respiratory failure  -In setting of CHF/COVID  -Continue IV diuresis, nebs/steroids    Morbid obesity  -Weight loss    Atrial fibrillation with  RVR  -Presents with new onset afib in setting of hypoxia/COVID-19  -HR in 's range  -Toprol XL increased to 50 mg daily, up-titrate as needed  -Continue Lovenox for AC, recommend Eliquis upon d/c  -TTE pending    3/18/24  -Stable, HR controlled  -Continue Toprol XL  -Continue Lovenox for now, Eliquis upon d/c  -Can f/u in clinic    Acute diastolic CHF (congestive heart failure)  -BNP > 400  -Diurese prn  -Check TTE  -Continue BB  -Strict I's/O's    3/18/24  -Diuresing well  -TTE with EF of 50-55%    Pneumonia due to COVID-19 virus  -Mgmt as per primary team    HTN (hypertension)  -Continue BB  -Monitor BP trend  -Titrate meds as per hospital medicine-BP elevated        VTE Risk Mitigation (From admission, onward)           Ordered     enoxaparin injection 150 mg  Every 12 hours         03/14/24 2226     IP VTE HIGH RISK PATIENT  Once         03/14/24 2222     Place sequential compression device  Until discontinued         03/14/24 2222                    Sue Fischer PA-C  Cardiology  [facility] - Telemetry ([facility])

## 2024-03-18 NOTE — ASSESSMENT & PLAN NOTE
-BNP > 400  -Diurese prn  -Check TTE  -Continue BB  -Strict I's/O's    3/18/24  -Diuresing well  -TTE with EF of 50-55%

## 2024-03-18 NOTE — ASSESSMENT & PLAN NOTE
Patient with Paroxysmal (<7 days) atrial fibrillation which is uncontrolled currently with Beta Blocker. Patient is currently in sinus rhythm.[id number] Score: 4.  Anticoagulation indicated. Anticoagulation done with treatment dose Lovenox for now .  Cardiology consult.  Telemetry monitoring.

## 2024-03-18 NOTE — PROGRESS NOTES
[facility] Medicine  Progress Note    Patient Name: Niru Reyes  MRN: [id number]  Patient Class: IP- Inpatient   Admission Date: 3/14/2024  Length of Stay: 4 days  Attending Physician: Waldo Mathis MD  Primary Care Provider: Jordana Shirley MD        Subjective:     Principal Problem:Acute hypoxemic respiratory failure        HPI:  78-year-old white woman with history of hypertension, major depression, osteoarthritis, anemia, peripheral arterial disease, bilateral lower extremity edema, cellulitis, pulmonary hypertension, and morbid obesity who presented to the emergency department via EMS with complaint of shortness a breath.  On arrival by the fire department, patient was 83% on room air and placed on 8 L nasal cannula and saturating 94%.  Patient did receive a DuoNeb and went into AFib with RVR.  Patient denies any previous history of arrhythmias, CHF, or COPD.  Shortness for breath has been present over the last several days, associated with nonproductive cough and lower extremity edema, no associated chest pain, moderate to severe in nature, constant, no aggravating or alleviating factor identified.  Patient denies any nausea, vomiting, fevers, chills.  Patient denies any recent sick contacts or hospitalizations.    Overview/Hospital Course:  3/15/24  Admitted for COVID PNA and CHF exacerbation  NAEON, on 5 L HFNC, not on home O2  Reports fatigue, coughing    3/16/24  Down to 4L NC today  Anasarca improving, continue IV lasix  Continue remdisivir, dexamethasone, broad spectrum IV antibiotics    3/17/24  NAEON, down to 3L NC  Pt in bed, reports fatigue  Continue remedsivir, dexamethasone, IV diuretics  De-escalate IV antibiotics to ceftriaxone    3/18/24: SOB improving, weaned down to 1 liter NC. Will order PT//OT. Home O2 eval. Plan for d/c in am.         Review of Systems   Constitutional:  Positive for activity change and fatigue. Negative for appetite change, chills,  diaphoresis, fever and unexpected weight change.   HENT:  Negative for congestion, nosebleeds, sinus pressure and sore throat.    Eyes:  Negative for pain, discharge and visual disturbance.   Respiratory:  Positive for shortness of breath (improved). Negative for cough, chest tightness, wheezing and stridor.    Cardiovascular:  Negative for chest pain, palpitations and leg swelling.   Gastrointestinal:  Negative for abdominal distention, abdominal pain, blood in stool, constipation, diarrhea, nausea and vomiting.   Endocrine: Negative for cold intolerance and heat intolerance.   Genitourinary:  Negative for difficulty urinating, dysuria, flank pain, frequency and urgency.   Musculoskeletal:  Positive for gait problem (non-ambulatory,. Transfers to chair). Negative for arthralgias, back pain, joint swelling, myalgias, neck pain and neck stiffness.   Skin:  Negative for rash and wound.   Allergic/Immunologic: Negative for food allergies and immunocompromised state.   Neurological:  Negative for dizziness, seizures, syncope, facial asymmetry, speech difficulty, weakness, light-headedness, numbness and headaches.   Hematological:  Negative for adenopathy.   Psychiatric/Behavioral:  Negative for agitation, confusion and hallucinations.    All other systems reviewed and are negative.    Objective:     Vital Signs (Most Recent):  Temp: 97.7 °F (36.5 °C) (03/18/24 0734)  Pulse: 81 (03/18/24 1105)  Resp: 18 (03/18/24 0734)  BP: (!) 171/91 (03/18/24 0734)  SpO2: (!) 93 % (03/18/24 1038) Vital Signs (24h Range):  Temp:  [97.4 °F (36.3 °C)-97.8 °F (36.6 °C)] 97.7 °F (36.5 °C)  Pulse:  [72-94] 81  Resp:  [16-20] 18  SpO2:  [93 %-99 %] 93 %  BP: (130-171)/(58-91) 171/91     Weight: (!) 195.6 kg (431 lb 3.5 oz)  Body mass index is 65.57 kg/m².    Intake/Output Summary (Last 24 hours) at 3/18/2024 1201  Last data filed at 3/18/2024 1105  Gross per 24 hour   Intake 429.27 ml   Output 4000 ml   Net -3570.73 ml           Physical  Exam  Vitals and nursing note reviewed.   Constitutional:       General: She is not in acute distress.     Appearance: Normal appearance. She is obese. She is ill-appearing.   Cardiovascular:      Rate and Rhythm: Normal rate and regular rhythm.      Heart sounds: No murmur heard.  Pulmonary:      Effort: No respiratory distress.      Breath sounds: Wheezing (scant, intermittent) present.      Comments: 5 L NC  Musculoskeletal:      Right lower leg: No edema.   Neurological:      General: No focal deficit present.      Mental Status: She is alert and oriented to person, place, and time.   Psychiatric:         Mood and Affect: Mood normal.         Behavior: Behavior normal.             Significant Labs: All pertinent labs within the past 24 hours have been reviewed.  Recent Lab Results         03/18/24  0825        Anion Gap 8       Baso # 0.02       Basophil % 0.3         Comment: Values of less than 100 pg/ml are consistent with non-CHF populations.       BUN 20       Calcium 8.0       Chloride 97       CO2 37       Creatinine 0.7       Differential Method Automated       eGFR >60       Eos # 0.2       Eos % 3.1       Glucose 82       Gran # (ANC) 4.0       Gran % 61.9       Hematocrit 41.1       Hemoglobin 12.4       Immature Grans (Abs) 0.03  Comment: Mild elevation in immature granulocytes is non specific and   can be seen in a variety of conditions including stress response,   acute inflammation, trauma and pregnancy. Correlation with other   laboratory and clinical findings is essential.         Immature Granulocytes 0.5       Lymph # 1.4       Lymph % 21.8       Magnesium  1.9       MCH 26.3       MCHC 30.2       MCV 87       Mono # 0.8       Mono % 12.4       MPV 10.3       nRBC 0       Platelet Count 191       Potassium 4.0       RBC 4.72       RDW 14.4       Sodium 142       WBC 6.38               Significant Imaging: I have reviewed all pertinent imaging results/findings within the past 24  hours.    X-Ray Chest AP Portable   Final Result      Bibasilar opacities favoring edema and atelectasis.         Electronically signed by: Sam Capellan   Date:    03/14/2024   Time:    19:21            Assessment/Plan:      * Acute hypoxemic respiratory failure  Patient with Hypoxic Respiratory failure which is Acute.  she is not on home oxygen. Supplemental oxygen was provided and noted-      Signs/symptoms of respiratory failure include- tachypnea, increased work of breathing, and respiratory distress. Contributing diagnoses includes - CHF, Pneumonia, and COVID  Labs and images were reviewed. Patient Has recent ABG, which has been reviewed. Will treat underlying causes and adjust management of respiratory failure as follows- continue high-flow O2 via nasal cannula, continuous pulse oximetry monitoring.  See discussion for acute diastolic CHF exacerbation and COVID-19 pneumonia for additional evaluation and treatment plan.    Pneumonia due to COVID-19 virus  Patient is identified as Severe COVID-19 based on hypoxemia with O2 saturations <94% on room air or on ambulation and High risk for severe complications of COVID 19 based on COVID risk score of 5   Initiate standard COVID protocols; COVID-19 testing ,Infection Control notification  and isolation- respiratory, contact and droplet per protocol    Diagnostics: CBC, CMP, Ferritin, CRP, and Portable CXR  White blood cell count 7.22, lactic acid level 1.0, LFTs unremarkable, ferritin pending, CRP pending, , influenza a/B negative, COVID-19 positive, HIV negative  Chest x-ray with bibasilar opacities favoring edema and atelectasis    Management: Initiate targeted therapy with Remdesivir, 200mg IV x1, followed by 100mg IV daily x5 days total and Dexamethasone PO/IV 6mg daily x10 days, Maintain oxygen saturations 92-96% via High Flow Nasal Cannula  % FiO2 and monitor with continuous/intermittent pulse oximetry. , Inhaled bronchodilators as needed for shortness  of breath., Continuous cardiac monitoring., and Manage respiratory failure (O2 requirement >10LPM or needing NIPPV/Mechanical ventilation) and/or Pneumonia (active chest infiltrates) separately as described below.  -encourage frequent incentive spirometry, scheduled albuterol inhaler, p.r.n. Xopenex nebs  -empiric IV antibiotics with cefepime and vancomycin  -check procalcitonin level, MRSA culture,, and sputum culture    3/17/24  -de-escalate IV antibiotics to ceftriaxone    Advance Care Planning Current advance care plan has been discussed with patient/family/POA and patient currently wishes Full Code.     Acute diastolic CHF (congestive heart failure)  Patient is identified as having Diastolic (HFpEF) heart failure that is Acute. CHF is currently uncontrolled due to Continued edema of extremities. Latest ECHO performed and demonstrates- Results for orders placed during the hospital encounter of 03/07/23    Echo Saline Bubble? No    Interpretation Summary  · The left ventricle is normal in size with concentric hypertrophy and normal systolic function.  · The estimated ejection fraction is 60%.  · Normal left ventricular diastolic function.  · Normal right ventricular size with normal right ventricular systolic function.  · Elevated central venous pressure (15 mmHg).  · There is pulmonary hypertension.  · The estimated PA systolic pressure is 51 mmHg.  . Continue Beta Blocker and Furosemide and monitor clinical status closely. Monitor on telemetry. Patient is on CHF pathway.  Monitor strict Is&Os and daily weights.  Place on fluid restriction of 1.5 L. Cardiology has been consulted. Continue to stress to patient importance of self efficacy and  on diet for CHF. Last BNP reviewed- and noted below   Recent Labs   Lab 03/18/24  0825   *     Continue IV diuretics  Cardiology following    Morbid obesity  Body mass index is 65.57 kg/m². Morbid obesity complicates all aspects of disease management from  diagnostic modalities to treatment. Weight loss encouraged and health benefits explained to patient.  Patient is high risk of decompensation in the setting of COVID-19 pneumonia with acute hypoxic respiratory failure complicated by acute diastolic CHF exacerbation.         Atrial fibrillation with RVR  Patient with Paroxysmal (<7 days) atrial fibrillation which is uncontrolled currently with Beta Blocker. Patient is currently in sinus rhythm.[id number] Score: 4.  Anticoagulation indicated. Anticoagulation done with treatment dose Lovenox for now .  Cardiology consult.  Telemetry monitoring.     HTN (hypertension)  Chronic, controlled. Latest blood pressure and vitals reviewed-     Temp:  [97.4 °F (36.3 °C)-97.8 °F (36.6 °C)]   Pulse:  [72-94]   Resp:  [16-20]   BP: (130-171)/(58-91)   SpO2:  [93 %-99 %] .   Home meds for hypertension were reviewed and noted below.   Hypertension Medications               amLODIPine (NORVASC) 5 MG tablet Take 1 tablet (5 mg total) by mouth once daily.    hydroCHLOROthiazide (HYDRODIURIL) 25 MG tablet TAKE 1 TABLET(25 MG) BY MOUTH EVERY DAY            While in the hospital, will manage blood pressure as follows; Continue home antihypertensive regimen    Will utilize p.r.n. blood pressure medication only if patient's blood pressure greater than 180/110 and she develops symptoms such as worsening chest pain or shortness of breath.        VTE Risk Mitigation (From admission, onward)           Ordered     enoxaparin injection 150 mg  Every 12 hours         03/14/24 2226     IP VTE HIGH RISK PATIENT  Once         03/14/24 2222     Place sequential compression device  Until discontinued         03/14/24 2222                    Discharge Planning   JERONIMO:      Code Status: Full Code   Is the patient medically ready for discharge?:     Reason for patient still in hospital (select all that apply): Patient trending condition  Discharge Plan A: Home Health                  Sol Whatley  NP  Department of Hospital Medicine   [facility] - Telemetry ([facility])

## 2024-03-18 NOTE — PT/OT/SLP EVAL
Physical Therapy Evaluation and Treatment    Patient Name: Niru Reyes   MRN: [id number]  Recent Surgery: * No surgery found *      Recommendations:     Discharge Recommendations: Moderate Intensity Therapy (vs low intensity therapy pending progress)   Discharge Equipment Recommendations: to be determined by next level of care   Barriers to discharge: Decreased caregiver support    Assessment:     Niru Reyes is a 78 y.o. female admitted with a medical diagnosis of Acute hypoxemic respiratory failure. She presents with the following impairments/functional limitations: weakness, impaired endurance, impaired functional mobility, gait instability, impaired balance, pain, decreased safety awareness, decreased lower extremity function, decreased ROM, edema, impaired cardiopulmonary response to activity.    Patient currently demonstrates a need for moderate intensity therapy on a daily basis secondary to an acute decline from prior level of function.    Rehab Prognosis: Good; patient would benefit from acute PT services to address these deficits and reach maximum level of function.    Plan:     During this hospitalization, patient to be seen 3 x/week to address the above listed problems via gait training, therapeutic activities, therapeutic exercises    Plan of Care Expires: 04/01/24    Subjective     Chief Complaint: Pt is motivated to participate  Patient Comments/Goals: none stated  Pain/Comfort:  Pain Rating 1: 2/10  Location - Side 1: Left  Location - Orientation 1: generalized  Location 1: knee  Pain Addressed 1: Reposition, Distraction  Pain Rating Post-Intervention 1: 2/10    Social History:  Living Environment: Patient lives alone in a mobile home with ramped. Has family and neighbors that check in. Receiving  wound care, was receiving HHPT but was discharged.  Prior Level of Function: Prior to admission, patient was modified independent, not driving and retired, and completed transfers via squat pivot with  "modified independence using rolling walker  Equipment Used at Home: wheelchair, walker, rolling  DME owned (not currently used): none  Assistance Upon Discharge: family    Objective:     Communicated with nurse Stephenson and [facility] chart review prior to session. Patient found supine with peripheral IV, oxygen, SCD, telemetry, PureWick upon PT entry to room.    General Precautions: Standard, fall, airborne, contact, droplet   Orthopedic Precautions: N/A   Braces: N/A    Respiratory Status: Nasal cannula, flow 2 L/min    Exams:  Cognition: Patient is oriented to Person, Place, Time, Situation  RLE ROM: WFL  RLE Strength:  Grossly 4/5  LLE ROM: WFL  LLE Strength:  Grossly 4-/5  Sensation:    -       Intact  Skin Integrity/Edema:     -       Skin integrity: Visible skin intact  -       Edema: B LE    Functional Mobility:  Gait belt applied - N/A  Bed Mobility  Rolling Left: stand by assistance  Rolling Right: stand by assistance  Scooting: maximal assistance  Supine to Sit: contact guard assistance  Sit to Supine: contact guard assistance  Transfers  Will progress as appropriate  Balance  Sitting: contact guard assistance    Therapeutic Activities and Exercises:   Pt educated on role of PT in acute care and POC. Educated on importance of OOB activities, activity pacing, and HEP (marching/hip flex, hip abd, heel slides/LAQ, quad sets, ankle pumps) in order to maintain/regain strength. Encouraged to sit up for all meals. Educated on "call don't fall" policy and increased risk of falling due to weakness, instructed to utilize call bell for assistance with all transfers. Pt agreeable to all requests.    AM-PAC 6 CLICK MOBILITY  Total Score:10    Patient left HOB elevated with all lines intact, call button in reach, and RN notified.    GOALS:   Multidisciplinary Problems       Physical Therapy Goals          Problem: Physical Therapy    Goal Priority Disciplines Outcome Goal Variances Interventions   Physical Therapy Goal     PT, " PT/OT      Description: Goals to be met by 24.  1. Pt will complete bed mobility MOD I.  2. Pt will complete sit to stand CGA.  3. Pt will transfer bed to chair CGA using RW.  4. Pt will increase AMPAC score by 2 points to progress functional mobility.                       History:     Past Medical History:   Diagnosis Date    Hypertension        Past Surgical History:   Procedure Laterality Date     SECTION      COLONOSCOPY N/A 2019    Procedure: COLONOSCOPY;  Surgeon: Janeth Leroy MD;  Location: [facility];  Service: Endoscopy;  Laterality: N/A;    HYSTERECTOMY         Time Tracking:     PT Received On: 24  PT Start Time: 1436  PT Stop Time: 1501  PT Total Time (min): 25 min     Billable Minutes: Evaluation 15min and Therapeutic Activity 10min    3/18/2024

## 2024-03-19 ENCOUNTER — TELEPHONE (OUTPATIENT)
Dept: FAMILY MEDICINE | Facility: CLINIC | Age: 79
End: 2024-03-19
Payer: MEDICARE

## 2024-03-19 VITALS
WEIGHT: 293 LBS | SYSTOLIC BLOOD PRESSURE: 162 MMHG | RESPIRATION RATE: 20 BRPM | TEMPERATURE: 98 F | HEIGHT: 68 IN | HEART RATE: 90 BPM | DIASTOLIC BLOOD PRESSURE: 82 MMHG | BODY MASS INDEX: 44.41 KG/M2 | OXYGEN SATURATION: 97 %

## 2024-03-19 LAB
ALBUMIN SERPL BCP-MCNC: 3.2 G/DL (ref 3.5–5.2)
ALP SERPL-CCNC: 66 U/L (ref 55–135)
ALT SERPL W/O P-5'-P-CCNC: 31 U/L (ref 10–44)
ANION GAP SERPL CALC-SCNC: 11 MMOL/L (ref 8–16)
AST SERPL-CCNC: 30 U/L (ref 10–40)
BASOPHILS # BLD AUTO: 0.01 K/UL (ref 0–0.2)
BASOPHILS NFR BLD: 0.1 % (ref 0–1.9)
BILIRUB SERPL-MCNC: 0.7 MG/DL (ref 0.1–1)
BUN SERPL-MCNC: 22 MG/DL (ref 8–23)
CALCIUM SERPL-MCNC: 8.2 MG/DL (ref 8.7–10.5)
CHLORIDE SERPL-SCNC: 96 MMOL/L (ref 95–110)
CO2 SERPL-SCNC: 34 MMOL/L (ref 23–29)
CREAT SERPL-MCNC: 0.7 MG/DL (ref 0.5–1.4)
DIFFERENTIAL METHOD BLD: ABNORMAL
EOSINOPHIL # BLD AUTO: 0 K/UL (ref 0–0.5)
EOSINOPHIL NFR BLD: 0.1 % (ref 0–8)
ERYTHROCYTE [DISTWIDTH] IN BLOOD BY AUTOMATED COUNT: 14.5 % (ref 11.5–14.5)
EST. GFR  (NO RACE VARIABLE): >60 ML/MIN/1.73 M^2
GLUCOSE SERPL-MCNC: 98 MG/DL (ref 70–110)
HCT VFR BLD AUTO: 40.9 % (ref 37–48.5)
HGB BLD-MCNC: 12.8 G/DL (ref 12–16)
IMM GRANULOCYTES # BLD AUTO: 0.03 K/UL (ref 0–0.04)
IMM GRANULOCYTES NFR BLD AUTO: 0.4 % (ref 0–0.5)
LYMPHOCYTES # BLD AUTO: 1.6 K/UL (ref 1–4.8)
LYMPHOCYTES NFR BLD: 21.3 % (ref 18–48)
MCH RBC QN AUTO: 26.4 PG (ref 27–31)
MCHC RBC AUTO-ENTMCNC: 31.3 G/DL (ref 32–36)
MCV RBC AUTO: 84 FL (ref 82–98)
MONOCYTES # BLD AUTO: 0.8 K/UL (ref 0.3–1)
MONOCYTES NFR BLD: 10.3 % (ref 4–15)
NEUTROPHILS # BLD AUTO: 5 K/UL (ref 1.8–7.7)
NEUTROPHILS NFR BLD: 67.8 % (ref 38–73)
NRBC BLD-RTO: 0 /100 WBC
PLATELET # BLD AUTO: 211 K/UL (ref 150–450)
PMV BLD AUTO: 10.5 FL (ref 9.2–12.9)
POTASSIUM SERPL-SCNC: 4 MMOL/L (ref 3.5–5.1)
PROT SERPL-MCNC: 6.3 G/DL (ref 6–8.4)
RBC # BLD AUTO: 4.85 M/UL (ref 4–5.4)
SODIUM SERPL-SCNC: 141 MMOL/L (ref 136–145)
WBC # BLD AUTO: 7.31 K/UL (ref 3.9–12.7)

## 2024-03-19 PROCEDURE — 25000003 PHARM REV CODE 250: Performed by: PHYSICIAN ASSISTANT

## 2024-03-19 PROCEDURE — 94761 N-INVAS EAR/PLS OXIMETRY MLT: CPT

## 2024-03-19 PROCEDURE — 25000003 PHARM REV CODE 250: Performed by: NURSE PRACTITIONER

## 2024-03-19 PROCEDURE — 80053 COMPREHEN METABOLIC PANEL: CPT | Performed by: NURSE PRACTITIONER

## 2024-03-19 PROCEDURE — 85025 COMPLETE CBC W/AUTO DIFF WBC: CPT | Performed by: NURSE PRACTITIONER

## 2024-03-19 PROCEDURE — 36415 COLL VENOUS BLD VENIPUNCTURE: CPT | Performed by: NURSE PRACTITIONER

## 2024-03-19 PROCEDURE — 25000003 PHARM REV CODE 250: Performed by: INTERNAL MEDICINE

## 2024-03-19 PROCEDURE — 63600175 PHARM REV CODE 636 W HCPCS: Performed by: INTERNAL MEDICINE

## 2024-03-19 PROCEDURE — 99900035 HC TECH TIME PER 15 MIN (STAT)

## 2024-03-19 PROCEDURE — 94640 AIRWAY INHALATION TREATMENT: CPT

## 2024-03-19 PROCEDURE — 27000221 HC OXYGEN, UP TO 24 HOURS

## 2024-03-19 RX ORDER — METOPROLOL SUCCINATE 50 MG/1
50 TABLET, EXTENDED RELEASE ORAL DAILY
Qty: 30 TABLET | Refills: 0 | Status: SHIPPED | OUTPATIENT
Start: 2024-03-19 | End: 2024-05-15 | Stop reason: SDUPTHER

## 2024-03-19 RX ORDER — ALBUTEROL SULFATE 90 UG/1
2 AEROSOL, METERED RESPIRATORY (INHALATION) EVERY 6 HOURS
Qty: 18 G | Refills: 0 | Status: SHIPPED | OUTPATIENT
Start: 2024-03-19 | End: 2024-05-15 | Stop reason: SDUPTHER

## 2024-03-19 RX ORDER — DEXAMETHASONE 6 MG/1
6 TABLET ORAL
Qty: 5 TABLET | Refills: 0 | Status: SHIPPED | OUTPATIENT
Start: 2024-03-19 | End: 2024-03-24

## 2024-03-19 RX ORDER — FUROSEMIDE 40 MG/1
40 TABLET ORAL DAILY
Qty: 30 TABLET | Refills: 0 | Status: SHIPPED | OUTPATIENT
Start: 2024-03-19 | End: 2024-05-15 | Stop reason: SDUPTHER

## 2024-03-19 RX ORDER — AMLODIPINE BESYLATE 5 MG/1
5 TABLET ORAL DAILY
Status: DISCONTINUED | OUTPATIENT
Start: 2024-03-19 | End: 2024-03-19 | Stop reason: HOSPADM

## 2024-03-19 RX ORDER — BENZONATATE 100 MG/1
100 CAPSULE ORAL 3 TIMES DAILY PRN
Qty: 30 CAPSULE | Refills: 0 | Status: SHIPPED | OUTPATIENT
Start: 2024-03-19 | End: 2024-03-29

## 2024-03-19 RX ADMIN — DEXAMETHASONE SODIUM PHOSPHATE 6 MG: 4 INJECTION INTRA-ARTICULAR; INTRALESIONAL; INTRAMUSCULAR; INTRAVENOUS; SOFT TISSUE at 09:03

## 2024-03-19 RX ADMIN — ALBUTEROL SULFATE 2 PUFF: 90 AEROSOL, METERED RESPIRATORY (INHALATION) at 01:03

## 2024-03-19 RX ADMIN — MUPIROCIN: 20 OINTMENT TOPICAL at 09:03

## 2024-03-19 RX ADMIN — METOPROLOL SUCCINATE 50 MG: 50 TABLET, EXTENDED RELEASE ORAL at 09:03

## 2024-03-19 RX ADMIN — FUROSEMIDE 40 MG: 10 INJECTION, SOLUTION INTRAMUSCULAR; INTRAVENOUS at 09:03

## 2024-03-19 RX ADMIN — ALBUTEROL SULFATE 2 PUFF: 90 AEROSOL, METERED RESPIRATORY (INHALATION) at 12:03

## 2024-03-19 RX ADMIN — AMLODIPINE BESYLATE 5 MG: 5 TABLET ORAL at 09:03

## 2024-03-19 RX ADMIN — ENOXAPARIN SODIUM 150 MG: 150 INJECTION SUBCUTANEOUS at 09:03

## 2024-03-19 RX ADMIN — ALBUTEROL SULFATE 2 PUFF: 90 AEROSOL, METERED RESPIRATORY (INHALATION) at 07:03

## 2024-03-19 NOTE — PLAN OF CARE
Daughter wants a new home health agency.  Preference obtained for [facility].  Referral and orders sent via [employer].    Needs home 02.  Referral sent to [facility].  Approved. Portable tank delivered to patient's room.       03/19/24 1400   Post-Acute Status   Post-Acute Authorization HME;Home Health   HME Status Set-up Complete/Auth obtained   Home Health Status Set-up Complete/Auth obtained   Discharge Plan   Discharge Plan A Home Health

## 2024-03-19 NOTE — DISCHARGE SUMMARY
[facility] Medicine  Discharge Summary      Patient Name: Niru Reyes  MRN: [id number]  FRANCIE: [id number]  Patient Class: IP- Inpatient  Admission Date: 3/14/2024  Hospital Length of Stay: 5 days  Discharge Date and Time:  03/19/2024 1:31 PM  Attending Physician: Waldo Mathis MD   Discharging Provider: Sol Whatley NP  Primary Care Provider: Jordana Shirley MD    Primary Care Team: Networked reference to record PCT     HPI:   78-year-old white woman with history of hypertension, major depression, osteoarthritis, anemia, peripheral arterial disease, bilateral lower extremity edema, cellulitis, pulmonary hypertension, and morbid obesity who presented to the emergency department via EMS with complaint of shortness a breath.  On arrival by the fire department, patient was 83% on room air and placed on 8 L nasal cannula and saturating 94%.  Patient did receive a DuoNeb and went into AFib with RVR.  Patient denies any previous history of arrhythmias, CHF, or COPD.  Shortness for breath has been present over the last several days, associated with nonproductive cough and lower extremity edema, no associated chest pain, moderate to severe in nature, constant, no aggravating or alleviating factor identified.  Patient denies any nausea, vomiting, fevers, chills.  Patient denies any recent sick contacts or hospitalizations.        Hospital Course:   The patient was admitted 3/15/24 for new onset Afib with RVR, COVID PNA, and CHF exacerbation on Remdisivir, IV dexamethasone, IV Lasix, therapeutic Lovenox, broad spectrum IV antibiotics. Cardiology consulted for Afib with RVR and recommended Toprol XL and Eliquis on discharge. HR controlled. Cardiology recommended EP referral   Slowly, SOB improved. Swelling improved. Pt was weaned down to 1 liters NC. She will be discharged on oral Lasix 40mg daily, po Dexamethasone, and Albuterol inhaler. She was provided instructions on 2gm sodium  fluid restriction diet. SNF offered to pt to provide moderate intensity therapy as recommended by therapy. Pt declined. Home health arranged. [facility] at home ordered. Discharge instructions reviewed with pt and daughter. The patient was seen and examined today and determined to be stable for discharge.           Goals of Care Treatment Preferences:  Code Status: Full Code      Consults:   Consults (From admission, onward)          Status Ordering Provider     Inpatient consult to Social Work  Once        Provider:  (Not yet assigned)    Acknowledged DERIC DELGADO     Inpatient consult to Cardiology  Once        Provider:  Simone Yepez MD    Completed CHENCHO CALERO            Final Active Diagnoses:    Diagnosis Date Noted POA    PRINCIPAL PROBLEM:  Acute hypoxemic respiratory failure [J96.01] 03/14/2024 Yes    Pneumonia due to COVID-19 virus [U07.1, J12.82] 03/14/2024 Yes    Acute diastolic CHF (congestive heart failure) [I50.31] 03/14/2024 Yes    Atrial fibrillation with RVR [I48.91] 03/14/2024 Yes    Morbid obesity [E66.01] 03/14/2024 Yes    HTN (hypertension) [I10] 12/04/2014 Yes      Problems Resolved During this Admission:    Diagnosis Date Noted Date Resolved POA    Hyperglycemia [R73.9] 03/14/2024 03/17/2024 Yes       Discharged Condition: stable    [place] [facility]    Follow Up:   Follow-up Information       Jordana Shirley MD Follow up in 3 day(s).    Specialty: Family Medicine  Contact information:  [address] [place] [address] [phone]               [facility] Follow up in 1 week(s).    Specialty: Cardiology  Contact information:  [address] [place] [address] [phone]             [facility] Follow up in 2 week(s).    Specialty: Pulmonology  Contact information:  [address] [place] [address] [phone]                         Patient Instructions:     "  OXYGEN FOR HOME USE     Order Specific Question Answer Comments   Liter Flow 1    Duration Continuous    Qualifying Test Performed at: Rest    Oxygen saturation: 87    Portable mode: continuous    Route nasal cannula    Device: home concentrator with portable tanks    Length of need (in months): 99 mos    Patient condition with qualifying saturation CHF    Additional information: depended edema    Height: 5' 8" (1.727 m)    Weight: 195.6 kg (431 lb 3.5 oz)    Alternative treatment measures have been tried or considered and deemed clinically ineffective. Yes      ACCEPT - Ambulatory referral/consult to Cardiac Electrophysiology   Standing Status: Future   Referral Priority: Routine Referral Type: Consultation   Referral Reason: Specialty Services Required   Requested Specialty: Cardiology   Number of Visits Requested: 1     Ambulatory referral/consult to Cardiology   Standing Status: Future   Referral Priority: Routine Referral Type: Consultation   Referral Reason: Specialty Services Required   Requested Specialty: Cardiology   Number of Visits Requested: 1     Ambulatory referral/consult to Congestive Heart Failure Clinic   Standing Status: Future   Referral Priority: Routine Referral Type: Consultation   Referral Reason: Specialty Services Required   Requested Specialty: Cardiology   Number of Visits Requested: 1     Ambulatory referral/consult to Pulmonology   Standing Status: Future   Referral Priority: Routine Referral Type: Consultation   Referral Reason: Specialty Services Required   Requested Specialty: Pulmonary Disease   Number of Visits Requested: 1     Diet Cardiac   Order Comments: 2gm sodium  1500 ml fluid restriction     SUBSEQUENT HOME HEALTH ORDERS   Order Comments: Resume [facility].     Order Specific Question Answer Comments   What Home Health Agency is the patient currently using? Other/External [facility].     Activity as tolerated "       Significant Diagnostic Studies:   Imaging Results              X-Ray Chest AP Portable (Final result)  Result time 03/14/24 19:21:26      Final result by Sam Capellan MD (03/14/24 19:21:26)                   Impression:      Bibasilar opacities favoring edema and atelectasis.      Electronically signed by: Sam Capellan  Date:    03/14/2024  Time:    19:21               Narrative:    EXAMINATION:  XR CHEST AP PORTABLE    CLINICAL HISTORY:  CHF;    TECHNIQUE:  Single frontal view of the chest was performed.    COMPARISON:  None    FINDINGS:  Bibasilar pulmonary opacities favoring edema and atelectasis.  No definite pleural effusions.  No pneumothorax.    The cardiac silhouette is enlarged.  The hilar and mediastinal contours are unremarkable.    Bones are intact.                                       Pending Diagnostic Studies:       None           Medications:  Reconciled Home Medications:      Medication List        START taking these medications      albuterol 90 mcg/actuation inhaler  Commonly known as: PROVENTIL/VENTOLIN HFA  Inhale 2 puffs into the lungs every 6 (six) hours. Rescue     benzonatate 100 MG capsule  Commonly known as: TESSALON  Take 1 capsule (100 mg total) by mouth 3 (three) times daily as needed for Cough.     dexAMETHasone 6 MG tablet  Commonly known as: DECADRON  Take 1 tablet (6 mg total) by mouth daily with breakfast. for 5 days     ELIQUIS 5 mg Tab  Generic drug: apixaban  Take 1 tablet (5 mg total) by mouth 2 (two) times daily.     furosemide 40 MG tablet  Commonly known as: LASIX  Take 1 tablet (40 mg total) by mouth once daily.     metoprolol succinate 50 MG 24 hr tablet  Commonly known as: TOPROL-XL  Take 1 tablet (50 mg total) by mouth once daily.     pulse oximeter device  Commonly known as: pulse oximeter  by Apply Externally route 2 (two) times a day. Use twice daily at 8 AM and 3 PM and [facility] as directed.            CONTINUE taking these  medications      amLODIPine 5 MG tablet  Commonly known as: NORVASC  Take 1 tablet (5 mg total) by mouth once daily.     celecoxib 100 MG capsule  Commonly known as: CeleBREX  Take 1 capsule (100 mg total) by mouth 2 (two) times daily.     docusate sodium 100 MG capsule  Commonly known as: COLACE  Take 1 capsule (100 mg total) by mouth 2 (two) times daily as needed for Constipation.     triamcinolone acetonide 0.1% 0.1 % cream  Commonly known as: KENALOG  Apply topically 2 (two) times daily. Under occlusion for 14 days            STOP taking these medications      hydroCHLOROthiazide 25 MG tablet  Commonly known as: HYDRODIURIL              Indwelling Lines/Drains at time of discharge:   Lines/Drains/Airways       None                   Time spent on the discharge of patient: 40 minutes         Sol Whatley NP  Department of Hospital Medicine  '[place] - Telemetry ([facility])

## 2024-03-19 NOTE — NURSING
Discharge instructions received and reviewed with pt and family at bedside.  Pt voiced understanding and all questions answered to satisfaction.  Stressed importance to making and keeping all follow up appointments.  Medications received from [facility] and reviewed with pt.  Tele monitor removed and brought to monitor tech.  IV d/c'd with tip intact, pressure dressing applied.  Pt transported to front of hospital via w/c by PCT to be discharged home. Pt remained free from any falls the entire shift

## 2024-03-19 NOTE — PLAN OF CARE
[facility] - Telemetry (Hospital)  Discharge Final Note    Primary Care Provider: Jordana Shirley MD    Expected Discharge Date: 3/19/2024    Final Discharge Note (most recent)       Final Note - 03/19/24 1406          Final Note    Assessment Type Final Discharge Note (P)      Anticipated Discharge Disposition Home-Health Care Svc (P)         Post-Acute Status    Post-Acute Authorization HME;Home Health (P)      HME Status Set-up Complete/Auth obtained (P)    [employer] for home 02    Home Health Status Set-up Complete/Auth obtained (P)    [facility]    Discharge Delays None known at this time (P)                      Important Message from Medicare  Important Message from Medicare regarding Discharge Appeal Rights: Given to patient/caregiver, Explained to patient/caregiver, Signed/date by patient/caregiver     Date IMM was signed: 03/19/24  Time IMM was signed: 0900    Contact Info       Jordana Shirley MD   Specialty: Family Medicine   Relationship: PCP - General    [address] [place] [address]   Phone: [phone]       Next Steps: Follow up in 3 day(s)    [facility]   Specialty: Cardiology    [id number] [place] [address]   Phone: [phone]       Next Steps: Follow up in 1 week(s)    [facility]   Specialty: Pulmonology    [address] [place] [address]   Phone: [phone]       Next Steps: Follow up in 2 week(s)    [facility]   Specialty: Home Health Services, Home Therapy Services, Home Living Aide Services    [address]  [facility] [address]   Phone: [phone]       Next Steps: Follow up    Instructions: Home Health: Agency will call and schedule an appointment to visit          Patient unable to leave home. [facility] at home will follow up.

## 2024-03-19 NOTE — PLAN OF CARE
[facility] - Telemetry (Hospital)  Discharge Reassessment    Primary Care Provider: Jordana Shirley MD    Expected Discharge Date: 3/19/2024    Reassessment (most recent)       Discharge Reassessment - 03/18/24 0951          Discharge Reassessment    Assessment Type Discharge Planning Reassessment (P)      Did the patient's condition or plan change since previous assessment? No (P)      Discharge Plan discussed with: Patient;Adult children (P)      Communicated JERONIMO with patient/caregiver Yes (P)      Discharge Plan A Home Health (P)      DME Needed Upon Discharge  oxygen (P)      Transition of Care Barriers Mobility;Transportation (P)      Why the patient remains in the hospital Requires continued medical care (P)         Post-Acute Status    Post-Acute Authorization Home Health (P)      Home Health Status Referrals Sent (P)      Discharge Delays None known at this time (P)                    Patient continues to be follow for respiratory status.  Discharge plan is home with [facility], [facility] and [facility] at home.  Qualifies for home 02

## 2024-03-19 NOTE — TELEPHONE ENCOUNTER
----- Message from Aysha Guerrero sent at 3/19/2024  4:13 PM CDT -----  Regarding: pt adivce  Contact: [phone]  Jessica calling from [facility] in regards to needing to know if provider would follow pt for Homehealth, juan pablo call

## 2024-03-19 NOTE — TELEPHONE ENCOUNTER
Spoke to Ms. Lopez at [facility] and informed her that Dr. Myers has not seen pt in office since 2019. Last did an audio visit with her last year but she has seen NP, Scout Diez through [facility]. She verbalized understanding.

## 2024-03-19 NOTE — PLAN OF CARE
Problem: Adult Inpatient Plan of Care  Goal: Plan of Care Review  Outcome: Met  Goal: Patient-Specific Goal (Individualized)  Outcome: Met  Goal: Absence of Hospital-Acquired Illness or Injury  Outcome: Met  Goal: Optimal Comfort and Wellbeing  Outcome: Met  Goal: Readiness for Transition of Care  Outcome: Met     Problem: Bariatric Environmental Safety  Goal: Safety Maintained with Care  Outcome: Met     Problem: Impaired Wound Healing  Goal: Optimal Wound Healing  Outcome: Met     Problem: Skin Injury Risk Increased  Goal: Skin Health and Integrity  Outcome: Met     Problem: Fall Injury Risk  Goal: Absence of Fall and Fall-Related Injury  Outcome: Met     Problem: Heart Failure Comorbidity  Goal: Maintenance of Heart Failure Symptom Control  Outcome: Met     Problem: Self-Care Deficit  Goal: Improved Ability to Complete Activities of Daily Living  Outcome: Met     Problem: Gas Exchange Impaired  Goal: Optimal Gas Exchange  Outcome: Met

## 2024-03-20 ENCOUNTER — TELEPHONE (OUTPATIENT)
Dept: CARDIOLOGY | Facility: HOSPITAL | Age: 79
End: 2024-03-20
Payer: MEDICARE

## 2024-03-20 LAB
BACTERIA BLD CULT: NORMAL
BACTERIA BLD CULT: NORMAL

## 2024-03-21 ENCOUNTER — TELEPHONE (OUTPATIENT)
Dept: FAMILY MEDICINE | Facility: CLINIC | Age: 79
End: 2024-03-21
Payer: MEDICARE

## 2024-03-21 ENCOUNTER — TELEPHONE (OUTPATIENT)
Dept: CARDIOLOGY | Facility: CLINIC | Age: 79
End: 2024-03-21
Payer: MEDICARE

## 2024-03-21 ENCOUNTER — PATIENT OUTREACH (OUTPATIENT)
Dept: ADMINISTRATIVE | Facility: CLINIC | Age: 79
End: 2024-03-21
Payer: MEDICARE

## 2024-03-21 ENCOUNTER — TELEPHONE (OUTPATIENT)
Dept: PULMONOLOGY | Facility: CLINIC | Age: 79
End: 2024-03-21
Payer: MEDICARE

## 2024-03-21 PROCEDURE — G0180 MD CERTIFICATION HHA PATIENT: HCPCS | Mod: ,,, | Performed by: FAMILY MEDICINE

## 2024-03-21 NOTE — TELEPHONE ENCOUNTER
Spoke to pt- offered appt with Dr Bah in DSS - pt stated she is homebound and cannot get out of her house right now   Pt not set up for virtual either            ----- Message from Mickey Gallego sent at 3/21/2024 10:22 AM CDT -----  Contact: Home Health  Patient is calling in regards to scheduling an referral appt. Pt is home bound and would like to know if she can have a home visit for the appt. Please call her back at [phone]. Thanks KB

## 2024-03-21 NOTE — TELEPHONE ENCOUNTER
I told them that you have not seen patient in several years and Scout Diez with [facility] has been seeing patient. But now they're calling today.   Please advise?

## 2024-03-21 NOTE — TELEPHONE ENCOUNTER
----- Message from Nadine Baker sent at 3/21/2024 10:39 AM CDT -----  Regarding: concerns  Name of who is calling:   [facility] / [facility]       What is the request in detail: Requesting a call back in ref to getting an accurate medication list       Can the clinic reply by [facility]:no      What number to call back if not [facility]: [phone] fax # [phone]     open to air

## 2024-03-21 NOTE — TELEPHONE ENCOUNTER
Returned call to patient. Informed about the program she is already being inrolled in for at home visits. She stated understanding  ----- Message from Mickey Gallego sent at 3/21/2024 10:25 AM CDT -----  Contact: [phone]  Patient is calling in regards to scheduling an referral appt. Pt is home bound and would like to know if she can have a home visit for the appt. Please call her back at [phone]. Thanks KB

## 2024-03-21 NOTE — PROGRESS NOTES
C3 nurse attempted to contact Niru Reyes  for a TCC post hospital discharge follow up call. No answer. Left voicemail with callback information. The patient has a scheduled HOSFU appointment with Jordana Shirley MD  on 03/27/2024 @ 1400.     Message sent to PCP staff.

## 2024-03-21 NOTE — TELEPHONE ENCOUNTER
----- Message from Leonard Mathew sent at 3/21/2024 11:32 AM CDT -----  Regarding: pt callback  Name of Who is Calling:Home Health Nurse         What is the request in detail: Requesting approval for home health aid visits. Pt has a skin tear to right arm. Pt has blood/mucus, Pt had to go to ER for abdominal pain.   Pt has been prescribe blood thinners               Can the clinic reply by [facility]: no         What Number to Call Back [facility][phone] [facility]

## 2024-03-22 DIAGNOSIS — J96.01 ACUTE HYPOXEMIC RESPIRATORY FAILURE: Primary | ICD-10-CM

## 2024-03-22 NOTE — PROGRESS NOTES
C3 nurse attempted to contact patient. The following occurred:   C3 nurse attempted to contact Niru Reyes  for a TCC post hospital discharge follow up call. The patient is unable to conduct the call @ this time. The patient requested a callback.    The patient has a scheduled HOSFU appointment with Jordana Shirley MD  on 03/27/2024 @ 1400. Message sent to Physician staff.

## 2024-03-25 ENCOUNTER — TELEPHONE (OUTPATIENT)
Dept: FAMILY MEDICINE | Facility: CLINIC | Age: 79
End: 2024-03-25
Payer: MEDICARE

## 2024-03-25 NOTE — TELEPHONE ENCOUNTER
HH nurse saw pt Wed or Thursday last week.  Pt waspreviously hospitalized for CHF, rapid visit scheduled today.  Ankle circumferences was noted have increased by 2 cm.  Pt is on 2L O2 continuous, no SOB or Dyspnea noted, pt is wheelchair bound/bed bound.  Pt maxes out limit of HH nurse scale so unable to assess if retaining fluid r/t to CHF.  HH nurse thinks increased ankle circumference is due to sitting w/o elevating ankles for an extended time.  Abd pain was discovered to be hematoma.  Originally 8 cm across now is 4 cm across.  Pain has reduced significantly.  Extensive bruising to belly.  HH nurse believes hematoma resolution is causing this.  Pt is on blood thinners.  Pt is exp anxiety r/t bruising and blood thinner therapy.  Pt has been coughing up blood with mucus, it has improved and is just streaks now.  Coughing frequency has also reduced.  Pt is having trouble with getting out of home to attend visits.  Pt is considering changing pcp to Rosemarie Bravo due to [facility] doing home visits buut is undecided.  Next HH visit is Wednesday.  Will reassess ankle circumference then.

## 2024-03-25 NOTE — TELEPHONE ENCOUNTER
----- Message from Kathya Montoya sent at 3/25/2024 10:27 AM CDT -----  Contact: [facility]ana/ Home Health Nurse  Ileana  is calling in regards to pt having a hematoma to her abdomen but smaller and still have bruising. Ankle swollen have changed, pt haven't elevated legs and pt is congestive heart failure pt. Home Health Nurse stated that pt vitals are normal and pt is taking all of her medication. Please call back at  [phone]                              Thanks  KT

## 2024-03-25 NOTE — PROGRESS NOTES
C3 nurse spoke with Niru Reyes  for a TCC post hospital discharge follow up call. The patient has a scheduled HOSFU appointment with Jordana Shirley MD on 03/27/2024 @ 1400.  Message sent to PCP staff

## 2024-03-31 ENCOUNTER — HOSPITAL ENCOUNTER (OUTPATIENT)
Facility: HOSPITAL | Age: 79
Discharge: NURSING, REHAB OR LONG-TERM CARE | End: 2024-04-03
Attending: EMERGENCY MEDICINE | Admitting: FAMILY MEDICINE
Payer: MEDICARE

## 2024-03-31 DIAGNOSIS — S30.1XXA ABDOMINAL WALL HEMATOMA, INITIAL ENCOUNTER: ICD-10-CM
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
## 2024-03-31 DIAGNOSIS — R53.81 PHYSICAL DECONDITIONING: ICD-10-CM

## 2024-03-31 DIAGNOSIS — R07.9 CHEST PAIN: ICD-10-CM

## 2024-03-31 DIAGNOSIS — R53.1 GENERALIZED WEAKNESS: ICD-10-CM

## 2024-03-31 DIAGNOSIS — D64.9 ANEMIA, UNSPECIFIED TYPE: Primary | ICD-10-CM

## 2024-03-31 PROBLEM — T14.8XXA HEMATOMA: Status: ACTIVE | Noted: 2024-03-31

## 2024-03-31 PROBLEM — I50.32 CHRONIC DIASTOLIC HEART FAILURE: Status: ACTIVE | Noted: 2024-03-31

## 2024-03-31 LAB
ALBUMIN SERPL BCP-MCNC: 2.8 G/DL (ref 3.5–5.2)
ALP SERPL-CCNC: 62 U/L (ref 55–135)
ALT SERPL W/O P-5'-P-CCNC: 10 U/L (ref 10–44)
ANION GAP SERPL CALC-SCNC: 9 MMOL/L (ref 8–16)
AST SERPL-CCNC: 14 U/L (ref 10–40)
BACTERIA #/AREA URNS HPF: ABNORMAL /HPF
BASOPHILS # BLD AUTO: 0.02 K/UL (ref 0–0.2)
BASOPHILS NFR BLD: 0.3 % (ref 0–1.9)
BILIRUB SERPL-MCNC: 1.6 MG/DL (ref 0.1–1)
BILIRUB UR QL STRIP: NEGATIVE
BNP SERPL-MCNC: 209 PG/ML (ref 0–99)
BUN SERPL-MCNC: 15 MG/DL (ref 8–23)
CALCIUM SERPL-MCNC: 8.3 MG/DL (ref 8.7–10.5)
CHLORIDE SERPL-SCNC: 107 MMOL/L (ref 95–110)
CLARITY UR: ABNORMAL
CO2 SERPL-SCNC: 27 MMOL/L (ref 23–29)
COLOR UR: YELLOW
CREAT SERPL-MCNC: 0.7 MG/DL (ref 0.5–1.4)
DIFFERENTIAL METHOD BLD: ABNORMAL
EOSINOPHIL # BLD AUTO: 0.1 K/UL (ref 0–0.5)
EOSINOPHIL NFR BLD: 1.1 % (ref 0–8)
ERYTHROCYTE [DISTWIDTH] IN BLOOD BY AUTOMATED COUNT: 15.1 % (ref 11.5–14.5)
EST. GFR  (NO RACE VARIABLE): >60 ML/MIN/1.73 M^2
GLUCOSE SERPL-MCNC: 110 MG/DL (ref 70–110)
GLUCOSE UR QL STRIP: NEGATIVE
HCT VFR BLD AUTO: 33.6 % (ref 37–48.5)
HGB BLD-MCNC: 10.4 G/DL (ref 12–16)
HGB UR QL STRIP: ABNORMAL
HYALINE CASTS #/AREA URNS LPF: 0 /LPF
IMM GRANULOCYTES # BLD AUTO: 0.04 K/UL (ref 0–0.04)
IMM GRANULOCYTES NFR BLD AUTO: 0.5 % (ref 0–0.5)
KETONES UR QL STRIP: NEGATIVE
LACTATE SERPL-SCNC: 1.2 MMOL/L (ref 0.5–2.2)
LEUKOCYTE ESTERASE UR QL STRIP: ABNORMAL
LYMPHOCYTES # BLD AUTO: 0.9 K/UL (ref 1–4.8)
LYMPHOCYTES NFR BLD: 11.8 % (ref 18–48)
MAGNESIUM SERPL-MCNC: 1.9 MG/DL (ref 1.6–2.6)
MCH RBC QN AUTO: 26.5 PG (ref 27–31)
MCHC RBC AUTO-ENTMCNC: 31 G/DL (ref 32–36)
MCV RBC AUTO: 86 FL (ref 82–98)
MICROSCOPIC COMMENT: ABNORMAL
MONOCYTES # BLD AUTO: 0.5 K/UL (ref 0.3–1)
MONOCYTES NFR BLD: 6.3 % (ref 4–15)
NEUTROPHILS # BLD AUTO: 6.1 K/UL (ref 1.8–7.7)
NEUTROPHILS NFR BLD: 80 % (ref 38–73)
NITRITE UR QL STRIP: NEGATIVE
NRBC BLD-RTO: 0 /100 WBC
PH UR STRIP: 6 [PH] (ref 5–8)
PLATELET # BLD AUTO: 155 K/UL (ref 150–450)
PMV BLD AUTO: 11.4 FL (ref 9.2–12.9)
POTASSIUM SERPL-SCNC: 4.2 MMOL/L (ref 3.5–5.1)
PROCALCITONIN SERPL IA-MCNC: 0.03 NG/ML
PROT SERPL-MCNC: 5.9 G/DL (ref 6–8.4)
PROT UR QL STRIP: ABNORMAL
RBC # BLD AUTO: 3.93 M/UL (ref 4–5.4)
RBC #/AREA URNS HPF: 10 /HPF (ref 0–4)
SODIUM SERPL-SCNC: 143 MMOL/L (ref 136–145)
SP GR UR STRIP: 1.03 (ref 1–1.03)
SQUAMOUS #/AREA URNS HPF: 8 /HPF
URN SPEC COLLECT METH UR: ABNORMAL
UROBILINOGEN UR STRIP-ACNC: >=8 EU/DL
WBC # BLD AUTO: 7.6 K/UL (ref 3.9–12.7)
WBC #/AREA URNS HPF: 6 /HPF (ref 0–5)

## 2024-03-31 PROCEDURE — 96361 HYDRATE IV INFUSION ADD-ON: CPT

## 2024-03-31 PROCEDURE — 25000003 PHARM REV CODE 250: Performed by: EMERGENCY MEDICINE

## 2024-03-31 PROCEDURE — G0378 HOSPITAL OBSERVATION PER HR: HCPCS

## 2024-03-31 PROCEDURE — 83880 ASSAY OF NATRIURETIC PEPTIDE: CPT | Performed by: EMERGENCY MEDICINE

## 2024-03-31 PROCEDURE — 80053 COMPREHEN METABOLIC PANEL: CPT | Performed by: EMERGENCY MEDICINE

## 2024-03-31 PROCEDURE — 87040 BLOOD CULTURE FOR BACTERIA: CPT | Mod: 59 | Performed by: EMERGENCY MEDICINE

## 2024-03-31 PROCEDURE — 93005 ELECTROCARDIOGRAM TRACING: CPT

## 2024-03-31 PROCEDURE — 93010 ELECTROCARDIOGRAM REPORT: CPT | Mod: ,,, | Performed by: INTERNAL MEDICINE

## 2024-03-31 PROCEDURE — 83605 ASSAY OF LACTIC ACID: CPT | Performed by: EMERGENCY MEDICINE

## 2024-03-31 PROCEDURE — 81000 URINALYSIS NONAUTO W/SCOPE: CPT | Performed by: EMERGENCY MEDICINE

## 2024-03-31 PROCEDURE — 83735 ASSAY OF MAGNESIUM: CPT | Performed by: EMERGENCY MEDICINE

## 2024-03-31 PROCEDURE — 85025 COMPLETE CBC W/AUTO DIFF WBC: CPT | Performed by: EMERGENCY MEDICINE

## 2024-03-31 PROCEDURE — 63600175 PHARM REV CODE 636 W HCPCS: Performed by: NURSE PRACTITIONER

## 2024-03-31 PROCEDURE — 25000003 PHARM REV CODE 250: Performed by: NURSE PRACTITIONER

## 2024-03-31 PROCEDURE — 99285 EMERGENCY DEPT VISIT HI MDM: CPT | Mod: 25

## 2024-03-31 PROCEDURE — 96374 THER/PROPH/DIAG INJ IV PUSH: CPT

## 2024-03-31 PROCEDURE — 84145 PROCALCITONIN (PCT): CPT | Performed by: NURSE PRACTITIONER

## 2024-03-31 RX ORDER — ONDANSETRON HYDROCHLORIDE 2 MG/ML
4 INJECTION, SOLUTION INTRAVENOUS
Status: DISCONTINUED | OUTPATIENT
Start: 2024-03-31 | End: 2024-03-31

## 2024-03-31 RX ORDER — FUROSEMIDE 40 MG/1
40 TABLET ORAL DAILY
Status: DISCONTINUED | OUTPATIENT
Start: 2024-04-01 | End: 2024-04-03 | Stop reason: HOSPADM

## 2024-03-31 RX ORDER — TALC
6 POWDER (GRAM) TOPICAL NIGHTLY PRN
Status: DISCONTINUED | OUTPATIENT
Start: 2024-03-31 | End: 2024-04-03 | Stop reason: HOSPADM

## 2024-03-31 RX ORDER — PROCHLORPERAZINE EDISYLATE 5 MG/ML
5 INJECTION INTRAMUSCULAR; INTRAVENOUS EVERY 6 HOURS PRN
Status: DISCONTINUED | OUTPATIENT
Start: 2024-03-31 | End: 2024-04-03 | Stop reason: HOSPADM

## 2024-03-31 RX ORDER — SODIUM CHLORIDE 0.9 % (FLUSH) 0.9 %
10 SYRINGE (ML) INJECTION
Status: DISCONTINUED | OUTPATIENT
Start: 2024-03-31 | End: 2024-03-31

## 2024-03-31 RX ORDER — SODIUM CHLORIDE 0.9 % (FLUSH) 0.9 %
10 SYRINGE (ML) INJECTION EVERY 8 HOURS
Status: DISCONTINUED | OUTPATIENT
Start: 2024-03-31 | End: 2024-03-31

## 2024-03-31 RX ORDER — AMOXICILLIN 250 MG
1 CAPSULE ORAL 2 TIMES DAILY PRN
Status: DISCONTINUED | OUTPATIENT
Start: 2024-03-31 | End: 2024-04-03 | Stop reason: HOSPADM

## 2024-03-31 RX ORDER — NALOXONE HCL 0.4 MG/ML
0.02 VIAL (ML) INJECTION
Status: DISCONTINUED | OUTPATIENT
Start: 2024-03-31 | End: 2024-04-03 | Stop reason: HOSPADM

## 2024-03-31 RX ORDER — METOPROLOL SUCCINATE 50 MG/1
50 TABLET, EXTENDED RELEASE ORAL DAILY
Status: DISCONTINUED | OUTPATIENT
Start: 2024-03-31 | End: 2024-04-03 | Stop reason: HOSPADM

## 2024-03-31 RX ORDER — ALBUTEROL SULFATE 90 UG/1
2 AEROSOL, METERED RESPIRATORY (INHALATION) EVERY 6 HOURS PRN
Status: DISCONTINUED | OUTPATIENT
Start: 2024-03-31 | End: 2024-04-03 | Stop reason: HOSPADM

## 2024-03-31 RX ORDER — CELECOXIB 100 MG/1
100 CAPSULE ORAL 2 TIMES DAILY
Status: DISCONTINUED | OUTPATIENT
Start: 2024-03-31 | End: 2024-04-03 | Stop reason: HOSPADM

## 2024-03-31 RX ORDER — IBUPROFEN 200 MG
24 TABLET ORAL
Status: DISCONTINUED | OUTPATIENT
Start: 2024-03-31 | End: 2024-04-03 | Stop reason: HOSPADM

## 2024-03-31 RX ORDER — ONDANSETRON HYDROCHLORIDE 2 MG/ML
4 INJECTION, SOLUTION INTRAVENOUS EVERY 8 HOURS PRN
Status: DISCONTINUED | OUTPATIENT
Start: 2024-03-31 | End: 2024-04-03 | Stop reason: HOSPADM

## 2024-03-31 RX ORDER — IBUPROFEN 200 MG
16 TABLET ORAL
Status: DISCONTINUED | OUTPATIENT
Start: 2024-03-31 | End: 2024-04-03 | Stop reason: HOSPADM

## 2024-03-31 RX ORDER — AMLODIPINE BESYLATE 5 MG/1
5 TABLET ORAL DAILY
Status: DISCONTINUED | OUTPATIENT
Start: 2024-04-01 | End: 2024-04-03 | Stop reason: HOSPADM

## 2024-03-31 RX ORDER — GLUCAGON 1 MG
1 KIT INJECTION
Status: DISCONTINUED | OUTPATIENT
Start: 2024-03-31 | End: 2024-04-03 | Stop reason: HOSPADM

## 2024-03-31 RX ORDER — ACETAMINOPHEN 325 MG/1
650 TABLET ORAL EVERY 8 HOURS PRN
Status: DISCONTINUED | OUTPATIENT
Start: 2024-03-31 | End: 2024-04-03 | Stop reason: HOSPADM

## 2024-03-31 RX ADMIN — SODIUM CHLORIDE 500 ML: 9 INJECTION, SOLUTION INTRAVENOUS at 03:03

## 2024-03-31 RX ADMIN — APIXABAN 5 MG: 2.5 TABLET, FILM COATED ORAL at 10:03

## 2024-03-31 RX ADMIN — ONDANSETRON 4 MG: 2 INJECTION INTRAMUSCULAR; INTRAVENOUS at 05:03

## 2024-03-31 RX ADMIN — CELECOXIB 100 MG: 100 CAPSULE ORAL at 10:03

## 2024-03-31 NOTE — ASSESSMENT & PLAN NOTE
Patient is identified as having Diastolic (HFpEF) heart failure that is Chronic. CHF is currently controlled. Latest ECHO performed and demonstrates- Results for orders placed during the hospital encounter of 03/14/24    Echo    Interpretation Summary    Left Ventricle: The left ventricle is normal in size. Normal wall thickness. There is concentric remodeling. There is low normal systolic function with a visually estimated ejection fraction of 50 - 55%. There is normal diastolic function.    Right Ventricle: Normal right ventricular cavity size. Wall thickness is normal. Right ventricle wall motion  is normal. Systolic function is normal.    Left Atrium: Left atrium is moderately dilated.    Pulmonary Artery: The estimated pulmonary artery systolic pressure is 45 mmHg.    IVC/SVC: Normal venous pressure at 3 mmHg.  . Continue Beta Blocker and Furosemide and monitor clinical status closely. Monitor on telemetry. Patient is on CHF pathway.  Monitor strict Is&Os and daily weights.  Place on fluid restriction of 1.5 L. Cardiology has not been consulted. Continue to stress to patient importance of self efficacy and  on diet for CHF. Last BNP reviewed- and noted below   Recent Labs   Lab 03/31/24  1425   *     CHF pathway  Echo on 3/15/24 showed EF 50-55%  Strict I&Os  Daily weights  1500ml fluid restriction  2gm Na diet

## 2024-03-31 NOTE — ASSESSMENT & PLAN NOTE
Chronic, controlled. Latest blood pressure and vitals reviewed-     Temp:  [97.7 °F (36.5 °C)]   Pulse:  []   Resp:  [20-24]   BP: ()/(54-63)   SpO2:  [98 %-100 %] .   Home meds for hypertension were reviewed and noted below.   Hypertension Medications               amLODIPine (NORVASC) 5 MG tablet Take 1 tablet (5 mg total) by mouth once daily.    furosemide (LASIX) 40 MG tablet Take 1 tablet (40 mg total) by mouth once daily.    metoprolol succinate (TOPROL-XL) 50 MG 24 hr tablet Take 1 tablet (50 mg total) by mouth once daily.            While in the hospital, will manage blood pressure as follows; Continue home antihypertensive regimen    Will utilize p.r.n. blood pressure medication only if patient's blood pressure greater than 180/110 and she develops symptoms such as worsening chest pain or shortness of breath.

## 2024-03-31 NOTE — H&P
[facility] - Emergency Dept.  [facility] Medicine  History & Physical    Patient Name: Niru Reyes  MRN: [id number]  Patient Class: OP- Observation  Admission Date: 3/31/2024  Attending Physician: Magdiel Johnson MD   Primary Care Provider: Jordana Shirley MD         Patient information was obtained from patient, past medical records, and ER records.     Subjective:     Principal Problem:Debility    Chief Complaint:   Chief Complaint   Patient presents with    Fall     Fell and landed on buttocks. Also complains of pain to her abdomen . She has ecchymosis and a hardened area on her abdomen . Recent hospitalization for covid and states she has just not felt much better.         HPI: 78-year-old white woman with history of newly diagnosed afib (initiated on Eliquis during previous hospitalization) hypertension, major depression, osteoarthritis, anemia, peripheral arterial disease, bilateral lower extremity edema, cellulitis, pulmonary hypertension, and morbid obesity who presented to the ED for evaluation of a fall which onset earlier today. Pt reports her legs gave out while heading from her bed to wheelchair, and she was able to ease her self to the ground. Pt also notes a large area of hematoma to her abd which onset following treatment with Lovenox shots from previous hospitalization from 3/14-3/19 for new diagnosis of Afib with RVR.   Associated sxs include generalized weakness (several days), chills, leg swelling, decreased appetite, R shoulder pain, nausea, and diaphoresis. Patient denies any LOC, head trauma, fever, vomiting, CP, and all other sxs at this time. Pt reports she has not taken her medications yet today. Pt lives alone and uses a wheelchair or walker, while pt's daughter and neighbor takes care of her. Pt was dx with Covid on 3/14/23.   In the ED: Hgb noted to be marginally low measuring 10.4. , recent Echo on 3/15 showed: EF 50-55%.   Given decompensation since discharge patient  would like to seek SNF placement. Will be admitted to OBS.   Full Code.       Past Medical History:   Diagnosis Date    Hypertension     Paroxysmal atrial fibrillation        Past Surgical History:   Procedure Laterality Date     SECTION      COLONOSCOPY N/A 2019    Procedure: COLONOSCOPY;  Surgeon: Janeth Leroy MD;  Location: [facility];  Service: Endoscopy;  Laterality: N/A;    HYSTERECTOMY         Review of patient's allergies indicates:  No Known Allergies    No current facility-administered medications on file prior to encounter.     Current Outpatient Medications on File Prior to Encounter   Medication Sig    albuterol (PROVENTIL/VENTOLIN HFA) 90 mcg/actuation inhaler Inhale 2 puffs into the lungs every 6 (six) hours. Rescue    amLODIPine (NORVASC) 5 MG tablet Take 1 tablet (5 mg total) by mouth once daily.    apixaban (ELIQUIS) 5 mg Tab Take 1 tablet (5 mg total) by mouth 2 (two) times daily.    celecoxib (CELEBREX) 100 MG capsule Take 1 capsule (100 mg total) by mouth 2 (two) times daily.    docusate sodium (COLACE) 100 MG capsule Take 1 capsule (100 mg total) by mouth 2 (two) times daily as needed for Constipation.    furosemide (LASIX) 40 MG tablet Take 1 tablet (40 mg total) by mouth once daily.    metoprolol succinate (TOPROL-XL) 50 MG 24 hr tablet Take 1 tablet (50 mg total) by mouth once daily.    pulse oximeter (PULSE OXIMETER) device by Apply Externally route 2 (two) times a day. Use twice daily at 8 AM and 3 PM and record the value in MyChart as directed.    triamcinolone acetonide 0.1% (KENALOG) 0.1 % cream Apply topically 2 (two) times daily. Under occlusion for 14 days     Family History       Problem Relation (Age of Onset)    Birth defects Daughter    Heart disease Mother, Sister    Muscular dystrophy Father          Tobacco Use    Smoking status: Never    Smokeless tobacco: Not on file   Substance and Sexual Activity    Alcohol use: No    Drug use: No    Sexual activity:  Never     Review of Systems   Constitutional:  Positive for activity change. Negative for appetite change, chills and unexpected weight change.   HENT:  Negative for congestion, mouth sores, sinus pressure, sore throat and trouble swallowing.    Eyes:  Negative for photophobia and visual disturbance.   Respiratory:  Negative for cough, shortness of breath and wheezing.    Cardiovascular:  Positive for leg swelling. Negative for chest pain and palpitations.   Gastrointestinal:  Negative for abdominal distention, abdominal pain, blood in stool, diarrhea, nausea and rectal pain.   Endocrine: Negative for polydipsia, polyphagia and polyuria.   Genitourinary:  Negative for flank pain, frequency and hematuria.   Musculoskeletal:  Negative for back pain and neck stiffness.   Skin:  Negative for pallor and rash.   Allergic/Immunologic: Negative for immunocompromised state.   Neurological:  Negative for dizziness, tremors, seizures, syncope, speech difficulty, weakness and headaches.   Hematological:  Negative for adenopathy. Does not bruise/bleed easily.   Psychiatric/Behavioral:  Negative for agitation, confusion and suicidal ideas.      Objective:     Vital Signs (Most Recent):  Temp: 97.7 °F (36.5 °C) (03/31/24 1245)  Pulse: 90 (03/31/24 1700)  Resp: 20 (03/31/24 1700)  BP: 129/60 (03/31/24 1700)  SpO2: 98 % (03/31/24 1700) Vital Signs (24h Range):  Temp:  [97.7 °F (36.5 °C)] 97.7 °F (36.5 °C)  Pulse:  [] 90  Resp:  [20-24] 20  SpO2:  [98 %-100 %] 98 %  BP: ()/(54-63) 129/60     Weight: (!) 195.5 kg (431 lb)  Body mass index is 65.53 kg/m².     Physical Exam  Constitutional:       Appearance: She is obese.   HENT:      Head: Normocephalic and atraumatic.      Nose: No congestion or rhinorrhea.      Mouth/Throat:      Pharynx: No posterior oropharyngeal erythema.   Eyes:      General: No scleral icterus.     Extraocular Movements: Extraocular movements intact.      Pupils: Pupils are equal, round, and reactive  to light.   Neck:      Vascular: No carotid bruit.   Cardiovascular:      Rate and Rhythm: Normal rate. Rhythm irregularly irregular.   Pulmonary:      Effort: Pulmonary effort is normal. No respiratory distress.      Breath sounds: Normal breath sounds. No stridor. No wheezing.   Abdominal:      General: There is no distension.      Palpations: Abdomen is soft.      Tenderness: There is no abdominal tenderness. There is no guarding.   Musculoskeletal:         General: No swelling or deformity. Normal range of motion.      Cervical back: Normal range of motion and neck supple. No rigidity.      Right lower leg: Edema present.      Left lower leg: Edema present.   Skin:     General: Skin is warm and dry.      Capillary Refill: Capillary refill takes less than 2 seconds.      Coloration: Skin is not jaundiced.      Findings: Bruising present. No erythema or rash.      Comments: Left epigastric region hematoma   Neurological:      Mental Status: She is alert and oriented to person, place, and time.      Motor: No weakness.   Psychiatric:         Mood and Affect: Mood normal.         Behavior: Behavior normal.         Thought Content: Thought content normal.         Judgment: Judgment normal.              CRANIAL NERVES     CN III, IV, VI   Pupils are equal, round, and reactive to light.       Significant Labs: All pertinent labs within the past 24 hours have been reviewed.  Recent Lab Results         03/31/24  1432   03/31/24  1425        Albumin 2.8         ALP 62         ALT 10         Anion Gap 9         AST 14         Baso #   0.02       Basophil %   0.3       BILIRUBIN TOTAL 1.6  Comment: For infants and newborns, interpretation of results should be based  on gestational age, weight and in agreement with clinical  observations.    Premature Infant recommended reference ranges:  Up to 24 hours.............<8.0 mg/dL  Up to 48 hours............<12.0 mg/dL  3-5 days..................<15.0 mg/dL  6-29  days.................<15.0 mg/dL           BNP   209  Comment: Values of less than 100 pg/ml are consistent with non-CHF populations.       BUN 15         Calcium 8.3         Chloride 107         CO2 27         Creatinine 0.7         Differential Method   Automated       eGFR >60         Eos #   0.1       Eos %   1.1       Glucose 110         Gran # (ANC)   6.1       Gran %   80.0       Hematocrit   33.6       Hemoglobin   10.4       Immature Grans (Abs)   0.04  Comment: Mild elevation in immature granulocytes is non specific and   can be seen in a variety of conditions including stress response,   acute inflammation, trauma and pregnancy. Correlation with other   laboratory and clinical findings is essential.         Immature Granulocytes   0.5       Lactic Acid Level   1.2  Comment: Falsely low lactic acid results can be found in samples   containing >=13.0 mg/dL total bilirubin and/or >=3.5 mg/dL   direct bilirubin.         Lymph #   0.9       Lymph %   11.8       Magnesium  1.9         MCH   26.5       MCHC   31.0       MCV   86       Mono #   0.5       Mono %   6.3       MPV   11.4       nRBC   0       Platelet Count   155       Potassium 4.2         PROTEIN TOTAL 5.9         RBC   3.93       RDW   15.1       Sodium 143         WBC   7.60               Significant Imaging: I have reviewed all pertinent imaging results/findings within the past 24 hours.  Assessment/Plan:     * Debility  Patient with Acute on chronic debility due to age and other co-morbidities  PT OT eval and treat  CT head negative for acute process    Important to note, that during previous hospitalization patient was offered SNF placement but she declined but would now like to seek placement due to ongoing declining functional status.     Hematoma  Abdominal hematoma from Lovenox SQ injections  CT abd showed 12x 6 cm hematoma, follow up with CT abd at a later time to ascertain resolution   Supportive care      Chronic diastolic heart  failure  Patient is identified as having Diastolic (HFpEF) heart failure that is Chronic. CHF is currently controlled. Latest ECHO performed and demonstrates- Results for orders placed during the hospital encounter of 03/14/24    Echo    Interpretation Summary    Left Ventricle: The left ventricle is normal in size. Normal wall thickness. There is concentric remodeling. There is low normal systolic function with a visually estimated ejection fraction of 50 - 55%. There is normal diastolic function.    Right Ventricle: Normal right ventricular cavity size. Wall thickness is normal. Right ventricle wall motion  is normal. Systolic function is normal.    Left Atrium: Left atrium is moderately dilated.    Pulmonary Artery: The estimated pulmonary artery systolic pressure is 45 mmHg.    IVC/SVC: Normal venous pressure at 3 mmHg.  . Continue Beta Blocker and Furosemide and monitor clinical status closely. Monitor on telemetry. Patient is on CHF pathway.  Monitor strict Is&Os and daily weights.  Place on fluid restriction of 1.5 L. Cardiology has not been consulted. Continue to stress to patient importance of self efficacy and  on diet for CHF. Last BNP reviewed- and noted below   Recent Labs   Lab 03/31/24  1425   *     CHF pathway  Echo on 3/15/24 showed EF 50-55%  Strict I&Os  Daily weights  1500ml fluid restriction  2gm Na diet        Morbid obesity  Body mass index is 65.53 kg/m². Morbid obesity complicates all aspects of disease management from diagnostic modalities to treatment. Weight loss encouraged and health benefits explained to patient.         Atrial fibrillation with RVR  Patient with Paroxysmal (<7 days) atrial fibrillation which is controlled currently with Beta Blocker. Patient is currently in sinus rhythm.[id number] Score: 4. HASBLED Score: 3. Anticoagulation indicated. Anticoagulation done with Eliquis    Metop resumed   Tele monitoring .    Pneumonia due to COVID-19 virus  Previously  treated with Remdesevir  On Home o2 2L NC  Albuterol PRN    HTN (hypertension)  Chronic, controlled. Latest blood pressure and vitals reviewed-     Temp:  [97.7 °F (36.5 °C)]   Pulse:  []   Resp:  [20-24]   BP: ()/(54-63)   SpO2:  [98 %-100 %] .   Home meds for hypertension were reviewed and noted below.   Hypertension Medications               amLODIPine (NORVASC) 5 MG tablet Take 1 tablet (5 mg total) by mouth once daily.    furosemide (LASIX) 40 MG tablet Take 1 tablet (40 mg total) by mouth once daily.    metoprolol succinate (TOPROL-XL) 50 MG 24 hr tablet Take 1 tablet (50 mg total) by mouth once daily.            While in the hospital, will manage blood pressure as follows; Continue home antihypertensive regimen    Will utilize p.r.n. blood pressure medication only if patient's blood pressure greater than 180/110 and she develops symptoms such as worsening chest pain or shortness of breath.      VTE Risk Mitigation (From admission, onward)           Ordered     apixaban tablet 5 mg  2 times daily         03/31/24 1706     IP VTE HIGH RISK PATIENT  Once         03/31/24 1647     Place sequential compression device  Until discontinued         03/31/24 1647                         On 03/31/2024, patient should be placed in hospital observation services under my care in collaboration with Magdiel Johnson MD.           Juan M Johnson NP  Department of Hospital Medicine  '[place] - Emergency Dept.

## 2024-03-31 NOTE — PHARMACY MED REC
"Admission Medication History     The home medication history was taken by Genaro Green.    You may go to "Admission" then "Reconcile Home Medications" tabs to review and/or act upon these items.     The home medication list has been updated by the Pharmacy department.   Please read ALL comments highlighted in yellow.   Please address this information as you see fit.    Feel free to contact us if you have any questions or require assistance.      Medications listed below were obtained from: Patient/family, Medications brought from home, and Analytic software- [employer]  (Not in a hospital admission)        Genaro Green  [id number]      Current Outpatient Medications on File Prior to Encounter   Medication Sig Dispense Refill Last Dose    albuterol (PROVENTIL/VENTOLIN HFA) 90 mcg/actuation inhaler Inhale 2 puffs into the lungs every 6 (six) hours. Rescue 18 g 0 3/30/2024    amLODIPine (NORVASC) 5 MG tablet Take 1 tablet (5 mg total) by mouth once daily. 30 tablet 11 3/30/2024    apixaban (ELIQUIS) 5 mg Tab Take 1 tablet (5 mg total) by mouth 2 (two) times daily. 60 tablet 0 3/30/2024    celecoxib (CELEBREX) 100 MG capsule Take 1 capsule (100 mg total) by mouth 2 (two) times daily. 60 capsule 1 3/30/2024    docusate sodium (COLACE) 100 MG capsule Take 1 capsule (100 mg total) by mouth 2 (two) times daily as needed for Constipation. 60 capsule 1 3/30/2024    furosemide (LASIX) 40 MG tablet Take 1 tablet (40 mg total) by mouth once daily. 30 tablet 0 3/30/2024    metoprolol succinate (TOPROL-XL) 50 MG 24 hr tablet Take 1 tablet (50 mg total) by mouth once daily. 30 tablet 0 3/30/2024    pulse oximeter (PULSE OXIMETER) device by Apply Externally route 2 (two) times a day. Use twice daily at 8 AM and 3 PM and record the value in [employer] as directed. 1 each 0     triamcinolone acetonide 0.1% (KENALOG) 0.1 % cream Apply topically 2 (two) times daily. Under occlusion for 14 days 28.4 g 0  "                   .

## 2024-03-31 NOTE — ASSESSMENT & PLAN NOTE
Abdominal hematoma from Lovenox SQ injections  CT abd showed 12x 6 cm hematoma, follow up with CT abd at a later time to ascertain resolution   Supportive care

## 2024-03-31 NOTE — ED PROVIDER NOTES
Emergency Medicine Provider Note - 3/31/2024    SCRIBE NOTE: I, Nabeel Kauffman, am scribing for, and in the presence of, Nadine Arteaga DO.     History     Chief Complaint   Patient presents with    Fall     Fell and landed on buttocks. Also complains of pain to her abdomen . She has ecchymosis and a hardened area on her abdomen . Recent hospitalization for covid and states she has just not felt much better.        Allergies:  Review of patient's allergies indicates:  No Known Allergies     History of Present Illness   HPI    3/31/2024, 12:45 PM  The history is provided by the patient    Niru Reyes is a 78 y.o. female presenting to the ED for evaluation of a fall which onset earlier today. Pt reports her legs gave out while heading from her bed to wheelchair, and she was able to ease her self to the ground. Pt also notes a large area of hematoma to her abd which onset following treatment with Lovenox shots. Symptoms are constant and moderate in severity. No mitigating or exacerbating factors reported. Associated sxs include generalized weakness (several days), chills, leg swelling, decreased appetite, R shoulder pain, nausea, and diaphoresis. Patient denies any LOC, head trauma, fever, vomiting, CP, and all other sxs at this time. Pt reports she has not taken her medications yet today. Pt lives alone and uses a wheelchair or walker, while pt's daughter and neighbor takes care of her. Pt was dx with Covid on 3/14/23. No further complaints or concerns at this time.         Arrival [facility]     PCP: Jordana Shirley MD     Past Medical History:  Past Medical History:   Diagnosis Date    Hypertension     Paroxysmal atrial fibrillation        Past Surgical History:  Past Surgical History:   Procedure Laterality Date     SECTION      COLONOSCOPY N/A 2019    Procedure: COLONOSCOPY;  Surgeon: Janeth Leroy MD;  Location: [facility];  Service: Endoscopy;   Laterality: N/A;    HYSTERECTOMY           Family History:  Family History   Problem Relation Age of Onset    Heart disease Mother         valvulopathy    Muscular dystrophy Father     Heart disease Sister         CAD     Birth defects Daughter        Social History:  Social History     Tobacco Use    Smoking status: Never    Smokeless tobacco: Not on file   Substance and Sexual Activity    Alcohol use: No    Drug use: No    Sexual activity: Never        Review of Systems   Review of Systems   Constitutional:  Positive for appetite change (eating and drinking less), chills and diaphoresis. Negative for fever.   HENT:  Negative for sore throat.    Respiratory:  Negative for shortness of breath.    Cardiovascular:  Positive for leg swelling. Negative for chest pain.   Gastrointestinal:  Positive for abdominal pain. Negative for anal bleeding, nausea and vomiting.   Genitourinary:  Negative for dysuria.   Musculoskeletal:  Positive for arthralgias (R shoulder). Negative for back pain.   Skin:  Positive for color change (hematoma to abd). Negative for rash.   Neurological:  Positive for weakness (generalized). Negative for syncope.   Hematological:  Does not bruise/bleed easily.      Physical Exam     Initial Vitals [03/31/24 1245]   BP Pulse Resp Temp SpO2   93/60 92 20 97.7 °F (36.5 °C) 98 %      MAP       --          Physical Exam    Nursing Notes and Vital Signs Reviewed.  Constitutional: Patient is in mild distress.Elevated BMI. Pt is unkempt and has stool on her clothes.   Head: Atraumatic. Normocephalic.  Eyes: PERRL. EOM intact. Conjunctivae are not pale. No scleral icterus.  ENT: Mucous membranes are moist. Oropharynx is clear and symmetric.    Neck: Supple. Full ROM. No lymphadenopathy.  Cardiovascular: Regular rate. Regular rhythm. No murmurs, rubs, or gallops. Distal pulses are 2+ and symmetric.  Pulmonary/Chest: No respiratory distress. Clear to auscultation bilaterally. No wheezing or rales.  Abdominal:  Soft and non-distended. No rebound, guarding, or rigidity. Good bowel sounds. Hematoma to abd.   Genitourinary: No CVA tenderness  Musculoskeletal: Moves all extremities. No obvious deformities. No calf tenderness. No midline, lumbar, or thoracic spinal tenderness. Lymphedema to BLE.  Skin: Warm and dry. 12 x 6 cm area of hematoma to abd. See pic below.  Neurological:  Alert, awake, and appropriate.  Normal speech.  No acute focal neurological deficits are appreciated.  Psychiatric: Normal affect. Good eye contact. Appropriate in content.         ED Course   ED Procedures:  Procedures    ED Vital Signs:  Vitals:    03/31/24 1245 03/31/24 1334 03/31/24 1420 03/31/24 1500   BP: 93/60  (!) 149/63 (!) 129/58   Pulse: 92 (S) 92 92 106   Resp: 20  20 (!) 21   Temp: 97.7 °F (36.5 °C)      TempSrc: Oral      SpO2: 98%  100% 100%   Weight: (!) 195.5 kg (431 lb)       03/31/24 1600 03/31/24 1700 03/31/24 1850   BP: (!) 123/54 129/60 (!) 144/79   Pulse: 89 90 94   Resp: (!) 24 20 20   Temp:      TempSrc:      SpO2: 99% 98% 96%   Weight:          Abnormal Lab Results:  Labs Reviewed   CBC W/ AUTO DIFFERENTIAL - Abnormal; Notable for the following components:       Result Value    RBC 3.93 (*)     Hemoglobin 10.4 (*)     Hematocrit 33.6 (*)     MCH 26.5 (*)     MCHC 31.0 (*)     RDW 15.1 (*)     Lymph # 0.9 (*)     Gran % 80.0 (*)     Lymph % 11.8 (*)     All other components within normal limits   COMPREHENSIVE METABOLIC PANEL - Abnormal; Notable for the following components:    Calcium 8.3 (*)     Total Protein 5.9 (*)     Albumin 2.8 (*)     Total Bilirubin 1.6 (*)     All other components within normal limits   URINALYSIS, REFLEX TO URINE CULTURE - Abnormal; Notable for the following components:    Appearance, UA Hazy (*)     Protein, UA 1+ (*)     Occult Blood UA 3+ (*)     Urobilinogen, UA >=8.0 (*)     Leukocytes, UA 1+ (*)     All other components within normal limits    Narrative:     Specimen Source->Urine   B-TYPE  NATRIURETIC PEPTIDE - Abnormal; Notable for the following components:     (*)     All other components within normal limits   URINALYSIS MICROSCOPIC - Abnormal; Notable for the following components:    RBC, UA 10 (*)     WBC, UA 6 (*)     All other components within normal limits    Narrative:     Specimen Source->Urine   CULTURE, BLOOD   CULTURE, BLOOD   LACTIC ACID, PLASMA   MAGNESIUM   PROCALCITONIN        All Lab Results:  Results for orders placed or performed during the hospital encounter of 03/31/24   CBC auto differential   Result Value Ref Range    WBC 7.60 3.90 - 12.70 K/uL    RBC 3.93 (L) 4.00 - 5.40 M/uL    Hemoglobin 10.4 (L) 12.0 - 16.0 g/dL    Hematocrit 33.6 (L) 37.0 - 48.5 %    MCV 86 82 - 98 fL    MCH 26.5 (L) 27.0 - 31.0 pg    MCHC 31.0 (L) 32.0 - 36.0 g/dL    RDW 15.1 (H) 11.5 - 14.5 %    Platelets 155 150 - 450 K/uL    MPV 11.4 9.2 - 12.9 fL    Immature Granulocytes 0.5 0.0 - 0.5 %    Gran # (ANC) 6.1 1.8 - 7.7 K/uL    Immature Grans (Abs) 0.04 0.00 - 0.04 K/uL    Lymph # 0.9 (L) 1.0 - 4.8 K/uL    Mono # 0.5 0.3 - 1.0 K/uL    Eos # 0.1 0.0 - 0.5 K/uL    Baso # 0.02 0.00 - 0.20 K/uL    nRBC 0 0 /100 WBC    Gran % 80.0 (H) 38.0 - 73.0 %    Lymph % 11.8 (L) 18.0 - 48.0 %    Mono % 6.3 4.0 - 15.0 %    Eosinophil % 1.1 0.0 - 8.0 %    Basophil % 0.3 0.0 - 1.9 %    Differential Method Automated    Comprehensive metabolic panel   Result Value Ref Range    Sodium 143 136 - 145 mmol/L    Potassium 4.2 3.5 - 5.1 mmol/L    Chloride 107 95 - 110 mmol/L    CO2 27 23 - 29 mmol/L    Glucose 110 70 - 110 mg/dL    BUN 15 8 - 23 mg/dL    Creatinine 0.7 0.5 - 1.4 mg/dL    Calcium 8.3 (L) 8.7 - 10.5 mg/dL    Total Protein 5.9 (L) 6.0 - 8.4 g/dL    Albumin 2.8 (L) 3.5 - 5.2 g/dL    Total Bilirubin 1.6 (H) 0.1 - 1.0 mg/dL    Alkaline Phosphatase 62 55 - 135 U/L    AST 14 10 - 40 U/L    ALT 10 10 - 44 U/L    eGFR >60 >60 mL/min/1.73 m^2    Anion Gap 9 8 - 16 mmol/L   Lactic acid, plasma #1   Result Value Ref  Range    Lactate (Lactic Acid) 1.2 0.5 - 2.2 mmol/L   Urinalysis, Reflex to Urine Culture Urine, Clean Catch    Specimen: Urine   Result Value Ref Range    Specimen UA Urine, Clean Catch     Color, UA Yellow Yellow, Straw, Anne    Appearance, UA Hazy (A) Clear    pH, UA 6.0 5.0 - 8.0    Specific Gravity, UA 1.030 1.005 - 1.030    Protein, UA 1+ (A) Negative    Glucose, UA Negative Negative    Ketones, UA Negative Negative    Bilirubin (UA) Negative Negative    Occult Blood UA 3+ (A) Negative    Nitrite, UA Negative Negative    Urobilinogen, UA >=8.0 (A) <2.0 EU/dL    Leukocytes, UA 1+ (A) Negative   Brain natriuretic peptide   Result Value Ref Range     (H) 0 - 99 pg/mL   Magnesium   Result Value Ref Range    Magnesium 1.9 1.6 - 2.6 mg/dL   Procalcitonin   Result Value Ref Range    Procalcitonin 0.03 <0.25 ng/mL   Urinalysis Microscopic   Result Value Ref Range    RBC, UA 10 (H) 0 - 4 /hpf    WBC, UA 6 (H) 0 - 5 /hpf    Bacteria Rare None-Occ /hpf    Squam Epithel, UA 8 /hpf    Hyaline Casts, UA 0 0-1/lpf /lpf    Microscopic Comment SEE COMMENT          Reviewed Prior Labs:   Latest Reference Range & Units 03/19/24 05:01 03/31/24 14:25   Hemoglobin 12.0 - 16.0 g/dL 12.8 10.4 (L)   Hematocrit 37.0 - 48.5 % 40.9 33.6 (L)   (L): Data is abnormally low    The EKG was ordered, reviewed, and independently interpreted by the ED provider:    ECG Results              EKG 12-lead (Preliminary result)  Result time 03/31/24 13:59:02      Wet Read by Nadine Arteaga DO (03/31/24 13:59:02, [facility] Dept[facility] Emergency Medicine)    Atrial fibrillation.  Rate 120 beats per minute.  Normal axis.  No ST segment elevation.  No STEMI                                    Imaging Results:  Imaging Results              CT Abdomen Pelvis  Without Contrast (Final result)  Result time 03/31/24 15:44:02      Final result by Sam Capellan MD (03/31/24 15:44:02)                   Impression:      12 x 6 cm suspected  hematoma in the anterior abdominal wall.  Follow-up to resolution to exclude other etiologies.    Trace bilateral pleural effusions.  Subsolid opacities throughout the lungs concerning for infection.    Complete findings as above.    All CT scans at this facility are performed  using dose modulation techniques as appropriate to performed exam including the following:  automated exposure control; adjustment of mA and/or kV according to the patients size (this includes techniques or standardized protocols for targeted exams where dose is matched to indication/reason for exam: i.e. extremities or head);  iterative reconstruction technique.      Electronically signed by: Sam Capellan  Date:    03/31/2024  Time:    15:44               Narrative:    EXAMINATION:  CT ABDOMEN PELVIS WITHOUT CONTRAST    CLINICAL HISTORY:  Abdominal pain, post-op;    TECHNIQUE:  Low dose axial images, sagittal and coronal reformations were obtained from the lung bases to the pubic symphysis.  Contrast was not administered.    COMPARISON:  None    FINDINGS:  Heart: Normal in size. No pericardial effusion.    Lung Bases: Trace bilateral pleural effusions.  Subsolid opacities throughout the lungs concerning for infection.    Liver: Normal in size and attenuation, with no focal hepatic lesions.    Gallbladder: No calcified gallstones.    Bile Ducts: No evidence of dilated ducts.    Pancreas: No mass or peripancreatic fat stranding.    Spleen: Unremarkable.    Adrenals: Unremarkable.    Kidneys/ Ureters: No hydronephrosis.  No obstructive uropathy.  No nonobstructive nephrolithiasis.    Bladder: No evidence of wall thickening.    Reproductive organs: Uterus is surgically absent.    GI Tract/Mesentery: No evidence of bowel obstruction or inflammation.  Diverticulosis without diverticulitis.  No evidence of appendicitis.    Peritoneal Space: No ascites. No free air.    Retroperitoneum: No significant adenopathy.    Abdominal wall: There is a 12 x 6  cm suspected hematoma in the anterior abdominal wall.  Follow-up to resolution would be recommended to exclude other etiologies.    Vasculature: Mild aortoiliac atherosclerosis.    Bones: No acute fracture.                                       CT Head Without Contrast (Final result)  Result time 03/31/24 15:50:53      Final result by Sam Capellan MD (03/31/24 15:50:53)                   Impression:      No acute intracranial CT abnormality.    All CT scans at this facility are performed  using dose modulation techniques as appropriate to performed exam including the following:  automated exposure control; adjustment of mA and/or kV according to the patients size (this includes techniques or standardized protocols for targeted exams where dose is matched to indication/reason for exam: i.e. extremities or head);  iterative reconstruction technique.      Electronically signed by: Sam Capellan  Date:    03/31/2024  Time:    15:50               Narrative:    EXAMINATION:  CT HEAD WITHOUT CONTRAST    CLINICAL HISTORY:  Syncope, recurrent;Patient on Eliquis.  Patient fell.; Unspecified fall, initial encounter    TECHNIQUE:  Low dose axial CT images obtained throughout the head without intravenous contrast. Sagittal and coronal reconstructions were performed.    COMPARISON:  None.    FINDINGS:  Intracranial compartment:    Ventricles and sulci are normal in size for age without evidence of hydrocephalus. No extra-axial blood or fluid collections.    The brain parenchyma appears normal. No parenchymal mass, hemorrhage, edema or major vascular distribution infarct.    Skull/extracranial contents (limited evaluation): No fracture. Mastoid air cells and paranasal sinuses are essentially clear.                                       X-Ray Chest AP Portable (Final result)  Result time 03/31/24 14:04:23      Final result by Michele Hawk MD (03/31/24 14:04:23)                   Impression:      Negative chest  x-ray.      Electronically signed by: Michele Hawk MD  Date:    03/31/2024  Time:    14:04               Narrative:    EXAMINATION:  XR CHEST AP PORTABLE    CLINICAL HISTORY:  Generalized weakness;    FINDINGS:  Comparison study 03/14/2024.  Normal size heart.  Aortic arch calcification.  Lungs are clear                                            The Emergency Provider reviewed the vital signs and test results, which are outlined above.     ED Discussion   ED Medication(s):  Medications   sodium chloride 0.9% flush 10 mL (has no administration in time range)   melatonin tablet 6 mg (has no administration in time range)   senna-docusate 8.6-50 mg per tablet 1 tablet (has no administration in time range)   acetaminophen tablet 650 mg (has no administration in time range)   naloxone 0.4 mg/mL injection 0.02 mg (has no administration in time range)   glucose chewable tablet 16 g (has no administration in time range)   glucose chewable tablet 24 g (has no administration in time range)   glucagon (human recombinant) injection 1 mg (has no administration in time range)   ondansetron injection 4 mg (4 mg Intravenous Given 3/31/24 1726)   prochlorperazine injection Soln 5 mg (has no administration in time range)   dextrose 10% bolus 125 mL 125 mL (has no administration in time range)   dextrose 10% bolus 250 mL 250 mL (has no administration in time range)   apixaban tablet 5 mg (has no administration in time range)   celecoxib capsule 100 mg (has no administration in time range)   furosemide tablet 40 mg (has no administration in time range)   metoprolol succinate (TOPROL-XL) 24 hr tablet 50 mg (50 mg Oral Not Given 3/31/24 1715)   amLODIPine tablet 5 mg (has no administration in time range)   albuterol inhaler 2 puff (has no administration in time range)   sodium chloride 0.9% flush 10 mL (has no administration in time range)   sodium chloride 0.9% bolus 500 mL 500 mL (500 mLs Intravenous New Bag 3/31/24 1555)       ED  Course as of 03/31/24 1938   Sun Mar 31, 2024   1440 Hemoglobin(!): 10.4 [LB]   1440 Hematocrit(!): 33.6 [LB]   1602 12 x 6 cm suspected hematoma in the anterior abdominal wall. [LB]   1611 Chest x-ray independent evaluation:  No acute abnormality.  No infiltrate [LB]   1635 Spoke with daughter and patient.  They are interested in placement for possible transfer to skilled nursing center [LB]   1636 Alex Schrader, Med/surg   [LB]      ED Course User Index  [LB] Nadine Arteaga, DO       4:18 PM: Discussed pt's case with Dr. Johnson ([facility]) who recommends that pt needs placement in a skilled facility. Since pt is a social admit, need to confirm that pt is willing to go to a skilled nursing facility before we admit her.    4:30 PM: Talked with pt's daughter and pt, who agree that pt will go to a skilled nursing facility.     4:36 PM: Discussed case with Dr. Johnson ([facility]). Dr. Johnson agrees with current care and management of pt and accepts admission.   Admitting Service: [facility]  Admitting Physician: Dr. Johnson  Admit to: Obs Med/Surg    4:36 PM: Re-evaluated pt. I have discussed test results, shared treatment plan, and the need for admission with patient and family at bedside. Pt and family express understanding at this time and agree with all information. All questions answered. Pt and family have no further questions or concerns at this time. Pt is ready for admit.       MIPS Measures     Smoker? No     Hypertension: History of Hypertension: The patient has elevated blood pressure (higher than 120/80) while being treated in the ED but has a history of hypertension.    MIPS Measure #415:  Emergency Department Utilization of CT for Minor Blunt Head Trauma for Patients age 18 Years  and  Older.    Measure exclusions:  The patient has a ventricular shunt?   No  The patient has a brain tumor? No  The patient has multi-system trauma?  No  The patient is pregnant? N/A  The patient is  "taking anti platelet medication excluding aspirin?  Yes  Age 65 years and older?  Yes  Patient is 78 y.o.    A head CT was ordered? Yes:   The CT was ordered by the emergency provider?  Yes         Medical Decision Making                 Medical Decision Making  Differential diagnosis:  Hematoma, pneumonia, acute kidney injury, dehydration, electrolyte abnormality    Labs:  WBC 7.60.  H/H 10.4/33.6 ( 12.8/40.9 12 days prior) .  Lactic 1.2.  Mag 1.9.  BUN 15/creatinine 0.7.  Imaging:  Head CT:  No acute intracranial pathology.  CT abdomen pelvis 12 cm by 6 cm hematoma anterior abdominal wall.  Trace bilateral pleural effusions.  Solid opacities throughout the lungs concerning for infection.  This most likely is related to COVID.  Chest x-ray no acute abnormality  EKG:  Atrial fibrillation with rapid ventricular rate.    Patient presents to the emergency department unkempt.  She has a daughter who "checks on her".  Patient tested positive for COVID on March 14th.  Patient received Lovenox shots.  Patient has a very large hematoma 12 cm by 6 cm abdominal wall hematoma.  Patient had a 2 g drop in her hemoglobin from 12.8-10.4.  Patient had fallen.  Recommend observation for deconditioning and social determinants of health      Patient was diagnosed with COVID on March 14th.    Amount and/or Complexity of Data Reviewed  External Data Reviewed: labs.     Details: Pt tested + for Covid on the 14th  Labs: ordered. Decision-making details documented in ED Course.  Radiology: ordered and independent interpretation performed. Decision-making details documented in ED Course.  ECG/medicine tests: ordered and independent interpretation performed. Decision-making details documented in ED Course.    Risk  Decision regarding hospitalization.        Coding    Prescription Management: I performed a review of the patient's current Rx medication list as input by nursing staff.    Patient's Medications   New Prescriptions    No " "medications on file   Previous Medications    ALBUTEROL (PROVENTIL/VENTOLIN HFA) 90 MCG/ACTUATION INHALER    Inhale 2 puffs into the lungs every 6 (six) hours. Rescue    AMLODIPINE (NORVASC) 5 MG TABLET    Take 1 tablet (5 mg total) by mouth once daily.    APIXABAN (ELIQUIS) 5 MG TAB    Take 1 tablet (5 mg total) by mouth 2 (two) times daily.    CELECOXIB (CELEBREX) 100 MG CAPSULE    Take 1 capsule (100 mg total) by mouth 2 (two) times daily.    DOCUSATE SODIUM (COLACE) 100 MG CAPSULE    Take 1 capsule (100 mg total) by mouth 2 (two) times daily as needed for Constipation.    FUROSEMIDE (LASIX) 40 MG TABLET    Take 1 tablet (40 mg total) by mouth once daily.    METOPROLOL SUCCINATE (TOPROL-XL) 50 MG 24 HR TABLET    Take 1 tablet (50 mg total) by mouth once daily.    PULSE OXIMETER (PULSE OXIMETER) DEVICE    by Apply Externally route 2 (two) times a day. Use twice daily at 8 AM and 3 PM and record the value in MyChart as directed.    TRIAMCINOLONE ACETONIDE 0.1% (KENALOG) 0.1 % CREAM    Apply topically 2 (two) times daily. Under occlusion for 14 days   Modified Medications    No medications on file   Discontinued Medications    No medications on file        Discussed case with:Hospital Medicine      Portions of this note may have been created with voice recognition software. Occasional "wrong-word" or "sound-a-like" substitutions may have occurred due to the inherent limitations of voice recognition software. Please, read the note carefully and recognize, using context, where substitutions have occurred.          Clinical Impression       ICD-10-CM ICD-9-CM   1. Anemia, unspecified type  D64.9 285.9   2. Fall  W19.XXXA E888.9   3. Generalized weakness  R53.1 780.79   4. Abdominal wall hematoma, initial encounter  S30.1XXA 922.2   5. Chest pain  R07.9 786.50   6. Physical deconditioning  R53.81 799.3        Disposition        Disposition: Admit to med/surg floor  Patient condition: Fair      ED Follow-up       "     Scribe Attestation:   Scribe #1: I performed the above scribed service and the documentation accurately describes the services I performed. I attest to the accuracy of the note.     Attending:   Physician Attestation Statement for Scribe #1: I, Nadine Arteaga, , personally performed the services described in this documentation, as scribed by Nabeel Kauffman, in my presence, and it is both accurate and complete.        Nadine Arteaga DO  03/31/24 1938

## 2024-03-31 NOTE — ASSESSMENT & PLAN NOTE
Patient with Paroxysmal (<7 days) atrial fibrillation which is controlled currently with Beta Blocker. Patient is currently in sinus rhythm.[id number] Score: 4. HASBLED Score: 3. Anticoagulation indicated. Anticoagulation done with Eliquis    Metop resumed   Tele monitoring .

## 2024-03-31 NOTE — SUBJECTIVE & OBJECTIVE
Past Medical History:   Diagnosis Date    Hypertension     Paroxysmal atrial fibrillation        Past Surgical History:   Procedure Laterality Date     SECTION      COLONOSCOPY N/A 2019    Procedure: COLONOSCOPY;  Surgeon: Janeth Leroy MD;  Location: [facility];  Service: Endoscopy;  Laterality: N/A;    HYSTERECTOMY         Review of patient's allergies indicates:  No Known Allergies    No current facility-administered medications on file prior to encounter.     Current Outpatient Medications on File Prior to Encounter   Medication Sig    albuterol (PROVENTIL/VENTOLIN HFA) 90 mcg/actuation inhaler Inhale 2 puffs into the lungs every 6 (six) hours. Rescue    amLODIPine (NORVASC) 5 MG tablet Take 1 tablet (5 mg total) by mouth once daily.    apixaban (ELIQUIS) 5 mg Tab Take 1 tablet (5 mg total) by mouth 2 (two) times daily.    celecoxib (CELEBREX) 100 MG capsule Take 1 capsule (100 mg total) by mouth 2 (two) times daily.    docusate sodium (COLACE) 100 MG capsule Take 1 capsule (100 mg total) by mouth 2 (two) times daily as needed for Constipation.    furosemide (LASIX) 40 MG tablet Take 1 tablet (40 mg total) by mouth once daily.    metoprolol succinate (TOPROL-XL) 50 MG 24 hr tablet Take 1 tablet (50 mg total) by mouth once daily.    pulse oximeter (PULSE OXIMETER) device by Apply Externally route 2 (two) times a day. Use twice daily at 8 AM and 3 PM and record the value in MyChart as directed.    triamcinolone acetonide 0.1% (KENALOG) 0.1 % cream Apply topically 2 (two) times daily. Under occlusion for 14 days     Family History       Problem Relation (Age of Onset)    Birth defects Daughter    Heart disease Mother, Sister    Muscular dystrophy Father          Tobacco Use    Smoking status: Never    Smokeless tobacco: Not on file   Substance and Sexual Activity    Alcohol use: No    Drug use: No    Sexual activity: Never     Review of Systems   Constitutional:  Positive for activity change.  Negative for appetite change, chills and unexpected weight change.   HENT:  Negative for congestion, mouth sores, sinus pressure, sore throat and trouble swallowing.    Eyes:  Negative for photophobia and visual disturbance.   Respiratory:  Negative for cough, shortness of breath and wheezing.    Cardiovascular:  Positive for leg swelling. Negative for chest pain and palpitations.   Gastrointestinal:  Negative for abdominal distention, abdominal pain, blood in stool, diarrhea, nausea and rectal pain.   Endocrine: Negative for polydipsia, polyphagia and polyuria.   Genitourinary:  Negative for flank pain, frequency and hematuria.   Musculoskeletal:  Negative for back pain and neck stiffness.   Skin:  Negative for pallor and rash.   Allergic/Immunologic: Negative for immunocompromised state.   Neurological:  Negative for dizziness, tremors, seizures, syncope, speech difficulty, weakness and headaches.   Hematological:  Negative for adenopathy. Does not bruise/bleed easily.   Psychiatric/Behavioral:  Negative for agitation, confusion and suicidal ideas.      Objective:     Vital Signs (Most Recent):  Temp: 97.7 °F (36.5 °C) (03/31/24 1245)  Pulse: 90 (03/31/24 1700)  Resp: 20 (03/31/24 1700)  BP: 129/60 (03/31/24 1700)  SpO2: 98 % (03/31/24 1700) Vital Signs (24h Range):  Temp:  [97.7 °F (36.5 °C)] 97.7 °F (36.5 °C)  Pulse:  [] 90  Resp:  [20-24] 20  SpO2:  [98 %-100 %] 98 %  BP: ()/(54-63) 129/60     Weight: (!) 195.5 kg (431 lb)  Body mass index is 65.53 kg/m².     Physical Exam  Constitutional:       Appearance: She is obese.   HENT:      Head: Normocephalic and atraumatic.      Nose: No congestion or rhinorrhea.      Mouth/Throat:      Pharynx: No posterior oropharyngeal erythema.   Eyes:      General: No scleral icterus.     Extraocular Movements: Extraocular movements intact.      Pupils: Pupils are equal, round, and reactive to light.   Neck:      Vascular: No carotid bruit.   Cardiovascular:       Rate and Rhythm: Normal rate. Rhythm irregularly irregular.   Pulmonary:      Effort: Pulmonary effort is normal. No respiratory distress.      Breath sounds: Normal breath sounds. No stridor. No wheezing.   Abdominal:      General: There is no distension.      Palpations: Abdomen is soft.      Tenderness: There is no abdominal tenderness. There is no guarding.   Musculoskeletal:         General: No swelling or deformity. Normal range of motion.      Cervical back: Normal range of motion and neck supple. No rigidity.      Right lower leg: Edema present.      Left lower leg: Edema present.   Skin:     General: Skin is warm and dry.      Capillary Refill: Capillary refill takes less than 2 seconds.      Coloration: Skin is not jaundiced.      Findings: Bruising present. No erythema or rash.      Comments: Left epigastric region hematoma   Neurological:      Mental Status: She is alert and oriented to person, place, and time.      Motor: No weakness.   Psychiatric:         Mood and Affect: Mood normal.         Behavior: Behavior normal.         Thought Content: Thought content normal.         Judgment: Judgment normal.              CRANIAL NERVES     CN III, IV, VI   Pupils are equal, round, and reactive to light.       Significant Labs: All pertinent labs within the past 24 hours have been reviewed.  Recent Lab Results         03/31/24  1432   03/31/24  1425        Albumin 2.8         ALP 62         ALT 10         Anion Gap 9         AST 14         Baso #   0.02       Basophil %   0.3       BILIRUBIN TOTAL 1.6  Comment: For infants and newborns, interpretation of results should be based  on gestational age, weight and in agreement with clinical  observations.    Premature Infant recommended reference ranges:  Up to 24 hours.............<8.0 mg/dL  Up to 48 hours............<12.0 mg/dL  3-5 days..................<15.0 mg/dL  6-29 days.................<15.0 mg/dL           BNP   209  Comment: Values of less than 100  pg/ml are consistent with non-CHF populations.       BUN 15         Calcium 8.3         Chloride 107         CO2 27         Creatinine 0.7         Differential Method   Automated       eGFR >60         Eos #   0.1       Eos %   1.1       Glucose 110         Gran # (ANC)   6.1       Gran %   80.0       Hematocrit   33.6       Hemoglobin   10.4       Immature Grans (Abs)   0.04  Comment: Mild elevation in immature granulocytes is non specific and   can be seen in a variety of conditions including stress response,   acute inflammation, trauma and pregnancy. Correlation with other   laboratory and clinical findings is essential.         Immature Granulocytes   0.5       Lactic Acid Level   1.2  Comment: Falsely low lactic acid results can be found in samples   containing >=13.0 mg/dL total bilirubin and/or >=3.5 mg/dL   direct bilirubin.         Lymph #   0.9       Lymph %   11.8       Magnesium  1.9         MCH   26.5       MCHC   31.0       MCV   86       Mono #   0.5       Mono %   6.3       MPV   11.4       nRBC   0       Platelet Count   155       Potassium 4.2         PROTEIN TOTAL 5.9         RBC   3.93       RDW   15.1       Sodium 143         WBC   7.60               Significant Imaging: I have reviewed all pertinent imaging results/findings within the past 24 hours.

## 2024-03-31 NOTE — ASSESSMENT & PLAN NOTE
Body mass index is 65.53 kg/m². Morbid obesity complicates all aspects of disease management from diagnostic modalities to treatment. Weight loss encouraged and health benefits explained to patient.

## 2024-03-31 NOTE — ED NOTES
Attempted cath urine x3 nurses. Unsuccessful. Pt cleaned well and purwick placed. Dr Arteaga aware. Pt cleaned and dressed in hospital gown as well. Daughter at bedside. Pt states she is feeling more comfortable now

## 2024-03-31 NOTE — HPI
78-year-old white woman with history of newly diagnosed afib (initiated on Eliquis during previous hospitalization) hypertension, major depression, osteoarthritis, anemia, peripheral arterial disease, bilateral lower extremity edema, cellulitis, pulmonary hypertension, and morbid obesity who presented to the ED for evaluation of a fall which onset earlier today. Pt reports her legs gave out while heading from her bed to wheelchair, and she was able to ease her self to the ground. Pt also notes a large area of hematoma to her abd which onset following treatment with Lovenox shots from previous hospitalization from 3/14-3/19 for new diagnosis of Afib with RVR.   Associated sxs include generalized weakness (several days), chills, leg swelling, decreased appetite, R shoulder pain, nausea, and diaphoresis. Patient denies any LOC, head trauma, fever, vomiting, CP, and all other sxs at this time. Pt reports she has not taken her medications yet today. Pt lives alone and uses a wheelchair or walker, while pt's daughter and neighbor takes care of her. Pt was dx with Covid on 3/14/23.   In the ED: Hgb noted to be marginally low measuring 10.4. , recent Echo on 3/15 showed: EF 50-55%.   Given decompensation since discharge patient would like to seek SNF placement. Will be admitted to OBS.   Full Code.

## 2024-03-31 NOTE — ASSESSMENT & PLAN NOTE
Patient with Acute on chronic debility due to age and other co-morbidities  PT OT eval and treat  CT head negative for acute process    Important to note, that during previous hospitalization patient was offered SNF placement but she declined but would now like to seek placement due to ongoing declining functional status.

## 2024-04-01 LAB
ALBUMIN SERPL BCP-MCNC: 2.5 G/DL (ref 3.5–5.2)
ALP SERPL-CCNC: 58 U/L (ref 55–135)
ALT SERPL W/O P-5'-P-CCNC: 10 U/L (ref 10–44)
ANION GAP SERPL CALC-SCNC: 7 MMOL/L (ref 8–16)
AST SERPL-CCNC: 11 U/L (ref 10–40)
BASOPHILS # BLD AUTO: 0.02 K/UL (ref 0–0.2)
BASOPHILS NFR BLD: 0.4 % (ref 0–1.9)
BILIRUB SERPL-MCNC: 1.3 MG/DL (ref 0.1–1)
BUN SERPL-MCNC: 14 MG/DL (ref 8–23)
CALCIUM SERPL-MCNC: 7.8 MG/DL (ref 8.7–10.5)
CHLORIDE SERPL-SCNC: 109 MMOL/L (ref 95–110)
CO2 SERPL-SCNC: 26 MMOL/L (ref 23–29)
CREAT SERPL-MCNC: 0.7 MG/DL (ref 0.5–1.4)
DIFFERENTIAL METHOD BLD: ABNORMAL
EOSINOPHIL # BLD AUTO: 0.2 K/UL (ref 0–0.5)
EOSINOPHIL NFR BLD: 2.7 % (ref 0–8)
ERYTHROCYTE [DISTWIDTH] IN BLOOD BY AUTOMATED COUNT: 15.1 % (ref 11.5–14.5)
EST. GFR  (NO RACE VARIABLE): >60 ML/MIN/1.73 M^2
ESTIMATED AVG GLUCOSE: 111 MG/DL (ref 68–131)
GLUCOSE SERPL-MCNC: 98 MG/DL (ref 70–110)
HBA1C MFR BLD: 5.5 % (ref 4–5.6)
HCT VFR BLD AUTO: 31.5 % (ref 37–48.5)
HGB BLD-MCNC: 9.3 G/DL (ref 12–16)
IMM GRANULOCYTES # BLD AUTO: 0.02 K/UL (ref 0–0.04)
IMM GRANULOCYTES NFR BLD AUTO: 0.4 % (ref 0–0.5)
LACTATE SERPL-SCNC: 0.5 MMOL/L (ref 0.5–2.2)
LYMPHOCYTES # BLD AUTO: 1.2 K/UL (ref 1–4.8)
LYMPHOCYTES NFR BLD: 21.9 % (ref 18–48)
MCH RBC QN AUTO: 26 PG (ref 27–31)
MCHC RBC AUTO-ENTMCNC: 29.5 G/DL (ref 32–36)
MCV RBC AUTO: 88 FL (ref 82–98)
MONOCYTES # BLD AUTO: 0.4 K/UL (ref 0.3–1)
MONOCYTES NFR BLD: 7.4 % (ref 4–15)
NEUTROPHILS # BLD AUTO: 3.8 K/UL (ref 1.8–7.7)
NEUTROPHILS NFR BLD: 67.2 % (ref 38–73)
NRBC BLD-RTO: 0 /100 WBC
OHS QRS DURATION: 104 MS
OHS QTC CALCULATION: 424 MS
PLATELET # BLD AUTO: 158 K/UL (ref 150–450)
PMV BLD AUTO: 11.5 FL (ref 9.2–12.9)
POTASSIUM SERPL-SCNC: 4.1 MMOL/L (ref 3.5–5.1)
PROT SERPL-MCNC: 5.1 G/DL (ref 6–8.4)
RBC # BLD AUTO: 3.58 M/UL (ref 4–5.4)
SODIUM SERPL-SCNC: 142 MMOL/L (ref 136–145)
WBC # BLD AUTO: 5.57 K/UL (ref 3.9–12.7)

## 2024-04-01 PROCEDURE — 97162 PT EVAL MOD COMPLEX 30 MIN: CPT

## 2024-04-01 PROCEDURE — 83605 ASSAY OF LACTIC ACID: CPT | Performed by: FAMILY MEDICINE

## 2024-04-01 PROCEDURE — 85025 COMPLETE CBC W/AUTO DIFF WBC: CPT | Performed by: NURSE PRACTITIONER

## 2024-04-01 PROCEDURE — 36415 COLL VENOUS BLD VENIPUNCTURE: CPT | Mod: XB | Performed by: FAMILY MEDICINE

## 2024-04-01 PROCEDURE — 94761 N-INVAS EAR/PLS OXIMETRY MLT: CPT

## 2024-04-01 PROCEDURE — 97166 OT EVAL MOD COMPLEX 45 MIN: CPT

## 2024-04-01 PROCEDURE — G0378 HOSPITAL OBSERVATION PER HR: HCPCS

## 2024-04-01 PROCEDURE — 83036 HEMOGLOBIN GLYCOSYLATED A1C: CPT | Performed by: NURSE PRACTITIONER

## 2024-04-01 PROCEDURE — 97530 THERAPEUTIC ACTIVITIES: CPT

## 2024-04-01 PROCEDURE — 80053 COMPREHEN METABOLIC PANEL: CPT | Performed by: NURSE PRACTITIONER

## 2024-04-01 PROCEDURE — 25000003 PHARM REV CODE 250: Performed by: NURSE PRACTITIONER

## 2024-04-01 PROCEDURE — 36415 COLL VENOUS BLD VENIPUNCTURE: CPT | Performed by: NURSE PRACTITIONER

## 2024-04-01 RX ADMIN — APIXABAN 5 MG: 2.5 TABLET, FILM COATED ORAL at 08:04

## 2024-04-01 RX ADMIN — FUROSEMIDE 40 MG: 40 TABLET ORAL at 08:04

## 2024-04-01 RX ADMIN — METOPROLOL SUCCINATE 50 MG: 50 TABLET, EXTENDED RELEASE ORAL at 08:04

## 2024-04-01 RX ADMIN — CELECOXIB 100 MG: 100 CAPSULE ORAL at 08:04

## 2024-04-01 RX ADMIN — AMLODIPINE BESYLATE 5 MG: 5 TABLET ORAL at 08:04

## 2024-04-01 NOTE — PLAN OF CARE
See eval for details. Pt displayed deficits with functional mobility/ transfers, deficits with adl's skills also decrease b ue strength/endurance. Recommendation: MOD INTENSITY

## 2024-04-01 NOTE — PT/OT/SLP EVAL
Occupational Therapy Evaluation and Treatment    Name: Niru Reyes  MRN: [id number]  Admitting Diagnosis: Debility  Recent Surgery: * No surgery found *      Recommendations:     Discharge Recommendations: Moderate Intensity Therapy  Level of Assistance Recommended: 24 hours significant assistance  Discharge Equipment Recommendations: to be determined by next level of care  Barriers to discharge:      Assessment:     Niru Reyes is a 78 y.o. female with a medical diagnosis of Debility. She presents with performance deficits affecting function including weakness, impaired functional mobility, decreased safety awareness, gait instability, impaired endurance, impaired balance, decreased upper extremity function, impaired self care skills, decreased lower extremity function.     Rehab Prognosis: Good; patient would benefit from acute OT services to address these deficits and reach maximum level of function.    Plan:     Patient to be seen 2 x/week to address the above listed problems via self-care/home management, therapeutic activities, therapeutic exercises  Plan of Care Expires:    Plan of Care Reviewed with:      Subjective     Chief Complaint: DEBILITY AND GENERALIZED WEAKNESS  Patient Comments/Goals: GET STRONGER  Pain/Comfort:  Pain Rating 1: 0/10    Patients cultural, spiritual, Confucianism conflicts given the current situation:      Social History:  Living Environment: Patient lives with their FAMILY  in a single story home with number of outside stair(s): 0  Prior Level of Function: Prior to admission, patient was independent  Roles and Routines: Patient was not driving and not working prior to admission.  Equipment Used at Home: wheelchair, walker, rolling  DME owned (not currently used): none  Assistance Upon Discharge: facility staff    Objective:     Communicated with NURSE AND EPIC CHART REVIEW prior to session. Patient found HOB elevated with peripheral IV, telemetry, PureWick, oxygen upon OT entry to  room.    General Precautions: Standard, fall   Orthopedic Precautions: N/A   Braces: N/A    Respiratory Status: Room air    Occupational Performance  Bed Mobility:   Rolling/Turning to Left with contact guard assistance  Rolling/Turning to Right with contact guard assistance  Scooting to HOB in supine: contact guard assistance  Scooting anteriorly to EOB to have both feet planted on floor: contact guard assistance  Supine to sit from right side of bed with contact guard assistance  Supine to sit from left side of bed with contact guard assistance      Activities of Daily Living:  Upper Body Dressing: contact guard assistance  Lower Body Dressing: MOD A    Cognitive/Visual Perceptual:  Cognitive/Psychosocial Skills:    -     Oriented to: Person, Place, Time, Situation  -     Follows Commands/attention: Follows multistep  commands  -     Communication: clear/fluent  -     Memory: No Deficits noted  -     Safety awareness/insight to disability: impaired    Physical Exam:  Upper Extremity Range of Motion:     -       Right Upper Extremity: WFL  -       Left Upper Extremity: WFL  Upper Extremity Strength:    -       Right Upper Extremity: MMT: 3/5 GROSSLY  -       Left Upper Extremity: MMT: 3/5 GROSSLY   Strength:    -       Right Upper Extremity: MMT:3/5 GROSSLY  -       Left Upper Extremity: MMT: 3/5 GROSSLY    AMPAC 6 Click ADL:  AMPAC Total Score: 16    Treatment & Education:  Therapist provided facilitation and instruction of proper body mechanics, energy conservation, and fall prevention strategies during tasks listed above  Patient educated on role of OT, POC, and goals for therapy  Patient educated on importance of OOB activities with staff member assistance and sitting OOB majority of the day      Patient not clear to transfer with RN/PCT.    Patient left HOB elevated with all lines intact, call button in reach, and RN notified.    GOALS:   Multidisciplinary Problems       Occupational Therapy Goals           Problem: Occupational Therapy    Goal Priority Disciplines Outcome Interventions   Occupational Therapy Goal     OT, PT/OT     Description: O.T. GOALS TO BE MET FROM 4--24  MIN A WITH LE DRESSING  PT WILL TOLERATE 1 SET X 15 REPS B UE ROM EXERCISE  MIN A WITH BSC TRANSFERS                       History:     Past Medical History:   Diagnosis Date    Hypertension     Paroxysmal atrial fibrillation          Past Surgical History:   Procedure Laterality Date     SECTION      COLONOSCOPY N/A 2019    Procedure: COLONOSCOPY;  Surgeon: Janeth Leroy MD;  Location: [facility];  Service: Endoscopy;  Laterality: N/A;    HYSTERECTOMY         Time Tracking:     OT Date of Treatment: 24  OT Start Time: 1020  OT Stop Time: 1045  OT Total Time (min): 25 min    Billable Minutes: Evaluation 10 MINUTES and Therapeutic Activity 15 MINUTES    2024

## 2024-04-01 NOTE — ASSESSMENT & PLAN NOTE
Chronic, controlled. Latest blood pressure and vitals reviewed-     Temp:  [97.3 °F (36.3 °C)-98.1 °F (36.7 °C)]   Pulse:  []   Resp:  [16-24]   BP: ()/(54-79)   SpO2:  [96 %-100 %] .   Home meds for hypertension were reviewed and noted below.   Hypertension Medications               amLODIPine (NORVASC) 5 MG tablet Take 1 tablet (5 mg total) by mouth once daily.    furosemide (LASIX) 40 MG tablet Take 1 tablet (40 mg total) by mouth once daily.    metoprolol succinate (TOPROL-XL) 50 MG 24 hr tablet Take 1 tablet (50 mg total) by mouth once daily.            While in the hospital, will manage blood pressure as follows; Continue home antihypertensive regimen    Will utilize p.r.n. blood pressure medication only if patient's blood pressure greater than 180/110 and she develops symptoms such as worsening chest pain or shortness of breath.

## 2024-04-01 NOTE — PLAN OF CARE
[facility] - Telemetry (Hospital)  Initial Discharge Assessment       Primary Care Provider: Jordana Shirley MD    Admission Diagnosis: Fall [W19.XXXA]  Physical deconditioning [R53.81]  Generalized weakness [R53.1]  Chest pain [R07.9]  Abdominal wall hematoma, initial encounter [S30.1XXA]  Anemia, unspecified type [D64.9]    Admission Date: 3/31/2024  Expected Discharge Date:     Transition of Care Barriers: None    Payor: MEDICARE / Plan: MEDICARE PART A & B / Product Type: Government /     Extended Emergency Contact Information  Primary Emergency Contact: Mary Medeiros   [facility]  Work Phone: [phone]  Mobile Phone: [phone]  Relation: Daughter    Discharge Plan A: Skilled Nursing Facility  Discharge Plan B: [id number] [place], [place] - [address] [facility] [address] [facility] [address] & [place] [address] [id number] [address] [address]  Phone: [phone] Fax: [phone]      Initial Assessment (most recent)       Adult Discharge Assessment - 04/01/24 1105          Discharge Assessment    Assessment Type Discharge Planning Assessment     Confirmed/corrected address, phone number and insurance Yes     Confirmed Demographics Correct on Facesheet     Source of Information patient     Communicated JERONIMO with patient/caregiver Date not available/Unable to determine     Reason For Admission Debility     People in Home alone     Facility Arrived From: home     Do you expect to return to your current living situation? Other (see comments)   Pending therapy recommendations    Do you have help at home or someone to help you manage your care at home? Yes     Who are your caregiver(s) and their phone number(s)? Mary eMdeiros - daughter and neighbor     Prior to hospitilization cognitive status: Alert/Oriented     Current cognitive status: Alert/Oriented     Walking or Climbing Stairs Difficulty yes     Walking or Climbing Stairs ambulation difficulty,  dependent     Mobility Management wheelchair and rolling walker     Dressing/Bathing Difficulty no     Home Accessibility wheelchair accessible   Patient lives in trailer, with ramp located at the door.    Equipment Currently Used at Home wheelchair;walker, rolling     Readmission within 30 days? Yes     Patient currently being followed by outpatient case management? No     Do you currently have service(s) that help you manage your care at home? Yes     Name and Contact number of agency [facility]     Is the pt/caregiver preference to resume services with current agency Yes     Do you take prescription medications? Yes     Do you have prescription coverage? Yes     Coverage Medicare A & B and BCBS     Do you have any problems affording any of your prescribed medications? No     Is the patient taking medications as prescribed? yes     Who is going to help you get home at discharge? Mary Medeiros - daughter and neighbor     How do you get to doctors appointments? family or friend will provide     Are you on dialysis? No     Do you take coumadin? No     Discharge Plan A Skilled Nursing Facility     Discharge Plan B Home Health     DME Needed Upon Discharge  none     Discharge Plan discussed with: Patient     Transition of Care Barriers None        Housing Stability    In the last 12 months, was there a time when you were not able to pay the mortgage or rent on time? No     In the last 12 months, was there a time when you did not have a steady place to sleep or slept in a shelter (including now)? No        Food Insecurity    Within the past 12 months, you worried that your food would run out before you got the money to buy more. Never true     Within the past 12 months, the food you bought just didn't last and you didn't have money to get more. Never true                     Readmission Assessment (most recent)       Readmission Assessment - 04/01/24 1109          Readmission    Why were you  "hospitalized in the last 30 days? COVID and A-jonathon     Why were you readmitted? New medical problem     When you left the hospital how did you feel? Patient states "Okay, thought she was doing well"     When you left the hospital where did you go? Home with Home Health     Did patient/caregiver refused recommended DC plan? Yes     Did you try to manage your symptoms your self? Yes     What did you do? Discussed symptoms with Home Health     Did you call anyone? Yes     Who did you call? Home Health Nurse     Did you try to see or did see a doctor or nurse before you came? No     Was this a planned readmission? No                    Anticipated DC dispo: SNF  vs. Home health   Prior Level of Function: needs assistance with ADLs  People in home: alone    Comments:  SW met with patient at bedside to introduce role and discuss discharge planning. Patient's daughter and neighbor will be help at home and can provide transport at time of discharge. Confirmed demographics, insurance, and emergency contacts. Patient is current with [facility]. SW updated Patient's whiteboard with contact information and anticipated DC plan. CM discharge needs depends on hospital progress. SW will continue following to assist with other needs.           "

## 2024-04-01 NOTE — PT/OT/SLP EVAL
Physical Therapy Evaluation    Patient Name:  Niru Reyes   MRN:  [id number]    Recommendations:     Discharge Recommendations: Moderate Intensity Therapy   Discharge Equipment Recommendations: to be determined by next level of care   Barriers to discharge: Decreased caregiver support    Assessment:     Niru Reyes is a 78 y.o. female admitted with a medical diagnosis of Debility.  She presents with the following impairments/functional limitations: weakness, impaired endurance, impaired self care skills, impaired functional mobility, gait instability, impaired balance, impaired cardiopulmonary response to activity, decreased lower extremity function, edema, decreased ROM, decreased coordination .    Rehab Prognosis: Good; patient would benefit from acute skilled PT services to address these deficits and reach maximum level of function.    Recent Surgery: * No surgery found *      Plan:     During this hospitalization, patient to be seen 3 x/week to address the identified rehab impairments via therapeutic activities, therapeutic exercises and progress toward the following goals:    Plan of Care Expires:  04/15/24    Subjective     Chief Complaint: WEAKNESS  Patient/Family Comments/goals: INC STRENGTH TO BE ABLE TO RETURN HOME   Pain/Comfort:  Pain Rating 1: 0/10  Pain Rating Post-Intervention 1: 0/10    Patients cultural, spiritual, Mandaen conflicts given the current situation:      Living Environment:  PT LIVES AT HOME ALONE IN A MOBILE HOME WITH A RAMP TO ENTER   Prior to admission, patients level of function was SHOBHA  WITH T/F TO WC AND IS WC BOUND AND HAS DAUGHTER GET GROCERIES FOR HER AND ASSIST COMES 3X A WEEK TO HELP.  Equipment used at home: wheelchair, walker, rolling (BARIATRIC RW).  DME owned (not currently used): none.  Upon discharge, patient will have assistance from SITTER/ DAUGHTER HOWEVER LIMITED. .    Objective:     Communicated with NURSE RIZZO AND [facility] CHART REVIEW  prior to session.  Patient  found supine with peripheral IV, telemetry, PureWick, oxygen  upon PT entry to room.    General Precautions: Standard, fall  Orthopedic Precautions:N/A   Braces: N/A  Respiratory Status: Nasal cannula, flow 2 L/min    Exams:  RLE ROM: LIMITED  RLE Strength: LIMITED  LLE ROM: LIMITED  LLE Strength: LIMITED    Functional Mobility:  Bed Mobility:     Rolling Right: stand by assistance  Supine to Sit: stand by assistance  Transfers:     Sit to Stand:  minimum assistance with rolling walker      AM-PAC 6 CLICK MOBILITY  Total Score:14       Treatment & Education:  GT. BELT AND  SOCKS DONNED PRIOR TO OOB MOBILITY.  PT STOOD X 1 HOWEVER REPORTED INC WEAKNESS AND UNABLE TO SIDE STEP TO RIGHT OR LEFT OF BED. PT RETURNED SEATED EOB WITH DEC ECCENTRIC CONTROL. PT SEATED EOB AND COMPLETED B LE TE X 10 RPS OF AP, TKE AND MIP FOR LE ROM AND STRENGTHENING.     Patient left sitting edge of bed with call button in reach, bed alarm on, and ONE ON ONE present.    GOALS:   Multidisciplinary Problems       Physical Therapy Goals          Problem: Physical Therapy    Goal Priority Disciplines Outcome Goal Variances Interventions   Physical Therapy Goal     PT, PT/OT      Description: LT/15/24  1. PT WILL COMPLETE BED MOBILITY IND  2. PT WILL STAND T/F TO CHAIR WITH RW AND CGA FOR OOB TOLERANCE.  3. PT WILL COMPLETE B LE TE X 20 REPS FOR LE STRENGTHENING.   4. PT WILL INC AMPAC SCORE BY 2 POINTS TO PROGRESS GROSS FUNC MOBILITY.                          History:     Past Medical History:   Diagnosis Date    Hypertension     Paroxysmal atrial fibrillation        Past Surgical History:   Procedure Laterality Date     SECTION      COLONOSCOPY N/A 2019    Procedure: COLONOSCOPY;  Surgeon: Janeth Leroy MD;  Location: [facility];  Service: Endoscopy;  Laterality: N/A;    HYSTERECTOMY         Time Tracking:     PT Received On: 24  PT Start Time: 0810     PT Stop Time: 0835  PT Total Time (min): 25 min      Billable Minutes: Evaluation 15 and Therapeutic Activity 10      04/01/2024

## 2024-04-01 NOTE — PLAN OF CARE
A227/A227 ENOC Reyes is a 78 y.o.female admitted on 3/31/2024 for Debility   Code Status: Full Code MRN: [id number]   Review of patient's allergies indicates:  No Known Allergies  Past Medical History:   Diagnosis Date    Hypertension     Paroxysmal atrial fibrillation       PRN meds    acetaminophen, 650 mg, Q8H PRN  albuterol, 2 puff, Q6H PRN  dextrose 10%, 12.5 g, PRN  dextrose 10%, 25 g, PRN  glucagon (human recombinant), 1 mg, PRN  glucose, 16 g, PRN  glucose, 24 g, PRN  melatonin, 6 mg, Nightly PRN  naloxone, 0.02 mg, PRN  ondansetron, 4 mg, Q8H PRN  prochlorperazine, 5 mg, Q6H PRN  senna-docusate 8.6-50 mg, 1 tablet, BID PRN      Patient expressed no complaints thoughtout duration of shift. No falls noted. Chart check completed. Cardiac monitoring continues,  Bed alarms in place, bed in lowest position, call light within reach. Will continue plan of care.      Orientation: oriented x 4  Nicole Coma Scale Score: 15     Lead Monitored: Lead II Rhythm: atrial rhythm    Cardiac/Telemetry Box Number: [id number]  VTE Required Core Measure: (SCDs) Sequential compression device initiated/maintained Last Bowel Movement: 03/30/24  Diet Low Sodium, 2gm Fluid - 1500mL  Voiding Characteristics: external catheter  Doroteo Score: 14  Fall Risk Score: 14  Accucheck []   Freq?      Lines/Drains/Airways       Peripheral Intravenous Line  Duration                  Peripheral IV - Single Lumen 03/31/24 1426 20 G Anterior;Left Hand 1 day

## 2024-04-01 NOTE — HOSPITAL COURSE
4/1 admitted for debility and fall. Awaiting physical/occupational therapy recommendations. Patient requesting skilled nursing facility placement    4/2/24: plans to discharge in AM to [facility].    4/3/24: Patient has been approved to go [facility] for continued therapy. Transportation and oxygen supplementation will be provided. She is stable for discharge.

## 2024-04-01 NOTE — ASSESSMENT & PLAN NOTE
Patient with Paroxysmal (<7 days) atrial fibrillation which is controlled currently with Beta Blocker. Patient is currently in sinus rhythm.[id number] Score: 4. HASBLED Score: 3. Anticoagulation indicated. Anticoagulation done with Eliquis    Metop resumed   Tele monitoring .

## 2024-04-01 NOTE — PROGRESS NOTES
[facility] Medicine  Progress Note    Patient Name: Niru Reyes  MRN: [id number]  Patient Class: OP- Observation   Admission Date: 3/31/2024  Length of Stay: 0 days  Attending Physician: Waldo Mathis MD  Primary Care Provider: Jordana Shirley MD        Subjective:     Principal Problem:Debility        HPI:  78-year-old white woman with history of newly diagnosed afib (initiated on Eliquis during previous hospitalization) hypertension, major depression, osteoarthritis, anemia, peripheral arterial disease, bilateral lower extremity edema, cellulitis, pulmonary hypertension, and morbid obesity who presented to the ED for evaluation of a fall which onset earlier today. Pt reports her legs gave out while heading from her bed to wheelchair, and she was able to ease her self to the ground. Pt also notes a large area of hematoma to her abd which onset following treatment with Lovenox shots from previous hospitalization from 3/14-3/19 for new diagnosis of Afib with RVR.   Associated sxs include generalized weakness (several days), chills, leg swelling, decreased appetite, R shoulder pain, nausea, and diaphoresis. Patient denies any LOC, head trauma, fever, vomiting, CP, and all other sxs at this time. Pt reports she has not taken her medications yet today. Pt lives alone and uses a wheelchair or walker, while pt's daughter and neighbor takes care of her. Pt was dx with Covid on 3/14/23.   In the ED: Hgb noted to be marginally low measuring 10.4. , recent Echo on 3/15 showed: EF 50-55%.   Given decompensation since discharge patient would like to seek SNF placement. Will be admitted to OBS.   Full Code.       Overview/Hospital Course:  4/1 admitted for debility and fall. Awaiting physical/occupational therapy recommendations. Patient requesting skilled nursing facility placement    Interval History: See hospital course for today      Review of Systems   Constitutional:   Positive for activity change (improving) and fatigue. Negative for appetite change.   Respiratory:  Positive for shortness of breath.    Gastrointestinal:  Negative for abdominal pain, nausea and vomiting.   Neurological:  Positive for weakness.   Psychiatric/Behavioral:  Negative for agitation, behavioral problems, confusion, decreased concentration and dysphoric mood. The patient is not nervous/anxious.      Objective:     Vital Signs (Most Recent):  Temp: 98 °F (36.7 °C) (04/01/24 0716)  Pulse: 94 (04/01/24 0826)  Resp: 16 (04/01/24 0826)  BP: 130/75 (04/01/24 0716)  SpO2: 98 % (04/01/24 0826) Vital Signs (24h Range):  Temp:  [97.3 °F (36.3 °C)-98.1 °F (36.7 °C)] 98 °F (36.7 °C)  Pulse:  [] 94  Resp:  [16-24] 16  SpO2:  [96 %-100 %] 98 %  BP: ()/(54-79) 130/75     Weight: (!) 195.5 kg (431 lb)  Body mass index is 65.53 kg/m².    Intake/Output Summary (Last 24 hours) at 4/1/2024 0930  Last data filed at 4/1/2024 0701  Gross per 24 hour   Intake 500 ml   Output --   Net 500 ml         Physical Exam  Vitals and nursing note reviewed.   Constitutional:       General: She is not in acute distress.     Appearance: She is morbidly obese. She is ill-appearing. She is not toxic-appearing.      Interventions: Nasal cannula in place.   HENT:      Head: Normocephalic and atraumatic.   Cardiovascular:      Rate and Rhythm: Normal rate.   Pulmonary:      Effort: Pulmonary effort is normal. No respiratory distress.   Abdominal:      Palpations: Abdomen is soft.      Tenderness: There is no abdominal tenderness.   Genitourinary:     Comments: External lockhart  Musculoskeletal:      Right lower leg: Edema present.      Left lower leg: Edema present.   Skin:     General: Skin is warm.   Neurological:      Mental Status: She is alert and oriented to person, place, and time.      Motor: Weakness present.   Psychiatric:         Mood and Affect: Mood normal.         Behavior: Behavior normal. Behavior is cooperative.              Significant Labs: All pertinent labs within the past 24 hours have been reviewed.  Blood Culture:   Recent Labs   Lab 03/31/24  1414 03/31/24  1433   LABBLOO No Growth to date No Growth to date     CBC:   Recent Labs   Lab 03/31/24  1425 04/01/24  0452   WBC 7.60 5.57   HGB 10.4* 9.3*   HCT 33.6* 31.5*    158     CMP:   Recent Labs   Lab 03/31/24  1432 04/01/24  0452    142   K 4.2 4.1    109   CO2 27 26    98   BUN 15 14   CREATININE 0.7 0.7   CALCIUM 8.3* 7.8*   PROT 5.9* 5.1*   ALBUMIN 2.8* 2.5*   BILITOT 1.6* 1.3*   ALKPHOS 62 58   AST 14 11   ALT 10 10   ANIONGAP 9 7*     Cardiac Markers:   Recent Labs   Lab 03/31/24  1425   *     Lactic Acid:   Recent Labs   Lab 03/31/24  1425   LACTATE 1.2       TSH:   Recent Labs   Lab 03/14/24  2309   TSH 0.089*       Significant Imaging: I have reviewed all pertinent imaging results/findings within the past 24 hours.  CT: I have reviewed all pertinent results/findings within the past 24 hours and my personal findings are:  head-no acute abnormality; ct abdomen-trace pleural effusions, hematoma    Assessment/Plan:      * Debility  Patient with Acute on chronic debility due to age and other co-morbidities  PT OT eval and treat  CT head negative for acute process    Important to note, that during previous hospitalization patient was offered SNF placement but she declined but would now like to seek placement due to ongoing declining functional status.     Hematoma  Abdominal hematoma from Lovenox SQ injections  CT abd showed 12x 6 cm hematoma, follow up with CT abd at a later time to ascertain resolution   Supportive care      Chronic diastolic heart failure  Patient is identified as having Diastolic (HFpEF) heart failure that is Chronic. CHF is currently controlled. Latest ECHO performed and demonstrates- Results for orders placed during the hospital encounter of 03/14/24    Echo    Interpretation Summary    Left Ventricle: The left  ventricle is normal in size. Normal wall thickness. There is concentric remodeling. There is low normal systolic function with a visually estimated ejection fraction of 50 - 55%. There is normal diastolic function.    Right Ventricle: Normal right ventricular cavity size. Wall thickness is normal. Right ventricle wall motion  is normal. Systolic function is normal.    Left Atrium: Left atrium is moderately dilated.    Pulmonary Artery: The estimated pulmonary artery systolic pressure is 45 mmHg.    IVC/SVC: Normal venous pressure at 3 mmHg.  . Continue Beta Blocker and Furosemide and monitor clinical status closely. Monitor on telemetry. Patient is on CHF pathway.  Monitor strict Is&Os and daily weights.  Place on fluid restriction of 1.5 L. Cardiology has not been consulted. Continue to stress to patient importance of self efficacy and  on diet for CHF. Last BNP reviewed- and noted below   Recent Labs   Lab 03/31/24  1425   *       CHF pathway  Echo on 3/15/24 showed EF 50-55%  Strict I&Os  Daily weights  1500ml fluid restriction  2gm Na diet  Home lasix        Morbid obesity  Body mass index is 65.53 kg/m². Morbid obesity complicates all aspects of disease management from diagnostic modalities to treatment. Weight loss encouraged and health benefits explained to patient.         Atrial fibrillation with RVR  Patient with Paroxysmal (<7 days) atrial fibrillation which is controlled currently with Beta Blocker. Patient is currently in sinus rhythm.[id number] Score: 4. HASBLED Score: 3. Anticoagulation indicated. Anticoagulation done with Eliquis    Metop resumed   Tele monitoring .    Pneumonia due to COVID-19 virus  Previously treated with Remdesevir  On Home o2 2L NC  Albuterol PRN    HTN (hypertension)  Chronic, controlled. Latest blood pressure and vitals reviewed-     Temp:  [97.3 °F (36.3 °C)-98.1 °F (36.7 °C)]   Pulse:  []   Resp:  [16-24]   BP: ()/(54-79)   SpO2:  [96 %-100 %] .    Home meds for hypertension were reviewed and noted below.   Hypertension Medications               amLODIPine (NORVASC) 5 MG tablet Take 1 tablet (5 mg total) by mouth once daily.    furosemide (LASIX) 40 MG tablet Take 1 tablet (40 mg total) by mouth once daily.    metoprolol succinate (TOPROL-XL) 50 MG 24 hr tablet Take 1 tablet (50 mg total) by mouth once daily.            While in the hospital, will manage blood pressure as follows; Continue home antihypertensive regimen    Will utilize p.r.n. blood pressure medication only if patient's blood pressure greater than 180/110 and she develops symptoms such as worsening chest pain or shortness of breath.      VTE Risk Mitigation (From admission, onward)           Ordered     apixaban tablet 5 mg  2 times daily         03/31/24 1706     IP VTE HIGH RISK PATIENT  Once         03/31/24 1647     Place sequential compression device  Until discontinued         03/31/24 1647                    Discharge Planning   JERNOIMO:      Code Status: Full Code   Is the patient medically ready for discharge?: Yes    Reason for patient still in hospital (select all that apply): PT / OT recommendations and Pending disposition                     Waldo Mathis MD  Department of Hospital Medicine   [facility] - Telemetry ([facility])

## 2024-04-01 NOTE — SUBJECTIVE & OBJECTIVE
Interval History: See hospital course for today      Review of Systems   Constitutional:  Positive for activity change (improving) and fatigue. Negative for appetite change.   Respiratory:  Positive for shortness of breath.    Gastrointestinal:  Negative for abdominal pain, nausea and vomiting.   Neurological:  Positive for weakness.   Psychiatric/Behavioral:  Negative for agitation, behavioral problems, confusion, decreased concentration and dysphoric mood. The patient is not nervous/anxious.      Objective:     Vital Signs (Most Recent):  Temp: 98 °F (36.7 °C) (04/01/24 0716)  Pulse: 94 (04/01/24 0826)  Resp: 16 (04/01/24 0826)  BP: 130/75 (04/01/24 0716)  SpO2: 98 % (04/01/24 0826) Vital Signs (24h Range):  Temp:  [97.3 °F (36.3 °C)-98.1 °F (36.7 °C)] 98 °F (36.7 °C)  Pulse:  [] 94  Resp:  [16-24] 16  SpO2:  [96 %-100 %] 98 %  BP: ()/(54-79) 130/75     Weight: (!) 195.5 kg (431 lb)  Body mass index is 65.53 kg/m².    Intake/Output Summary (Last 24 hours) at 4/1/2024 0930  Last data filed at 4/1/2024 0701  Gross per 24 hour   Intake 500 ml   Output --   Net 500 ml         Physical Exam  Vitals and nursing note reviewed.   Constitutional:       General: She is not in acute distress.     Appearance: She is morbidly obese. She is ill-appearing. She is not toxic-appearing.      Interventions: Nasal cannula in place.   HENT:      Head: Normocephalic and atraumatic.   Cardiovascular:      Rate and Rhythm: Normal rate.   Pulmonary:      Effort: Pulmonary effort is normal. No respiratory distress.   Abdominal:      Palpations: Abdomen is soft.      Tenderness: There is no abdominal tenderness.   Genitourinary:     Comments: External lockhart  Musculoskeletal:      Right lower leg: Edema present.      Left lower leg: Edema present.   Skin:     General: Skin is warm.   Neurological:      Mental Status: She is alert and oriented to person, place, and time.      Motor: Weakness present.   Psychiatric:         Mood  and Affect: Mood normal.         Behavior: Behavior normal. Behavior is cooperative.             Significant Labs: All pertinent labs within the past 24 hours have been reviewed.  Blood Culture:   Recent Labs   Lab 03/31/24  1414 03/31/24  1433   LABBLOO No Growth to date No Growth to date     CBC:   Recent Labs   Lab 03/31/24  1425 04/01/24  0452   WBC 7.60 5.57   HGB 10.4* 9.3*   HCT 33.6* 31.5*    158     CMP:   Recent Labs   Lab 03/31/24  1432 04/01/24  0452    142   K 4.2 4.1    109   CO2 27 26    98   BUN 15 14   CREATININE 0.7 0.7   CALCIUM 8.3* 7.8*   PROT 5.9* 5.1*   ALBUMIN 2.8* 2.5*   BILITOT 1.6* 1.3*   ALKPHOS 62 58   AST 14 11   ALT 10 10   ANIONGAP 9 7*     Cardiac Markers:   Recent Labs   Lab 03/31/24  1425   *     Lactic Acid:   Recent Labs   Lab 03/31/24  1425   LACTATE 1.2       TSH:   Recent Labs   Lab 03/14/24  2309   TSH 0.089*       Significant Imaging: I have reviewed all pertinent imaging results/findings within the past 24 hours.  CT: I have reviewed all pertinent results/findings within the past 24 hours and my personal findings are:  head-no acute abnormality; ct abdomen-trace pleural effusions, hematoma

## 2024-04-01 NOTE — CONSULTS
[facility] - Tele[facility] ([facility])  Wound Care    Patient Name:  Niru Reyes   MRN:  [id number]  Date: 4/1/2024  Diagnosis: Debility    History:     Past Medical History:   Diagnosis Date    Hypertension     Paroxysmal atrial fibrillation        Social History     Socioeconomic History    Marital status:    Tobacco Use    Smoking status: Never   Substance and Sexual Activity    Alcohol use: No    Drug use: No    Sexual activity: Never     Social Determinants of Health     Financial Resource Strain: Low Risk  (3/15/2024)    Overall Financial Resource Strain (CARDIA)     Difficulty of Paying Living Expenses: Not hard at all   Food Insecurity: No Food Insecurity (4/1/2024)    Hunger Vital Sign     Worried About Running Out of Food in the Last Year: Never true     Ran Out of Food in the Last Year: Never true   Transportation Needs: Unmet Transportation Needs (3/15/2024)    PRAPARE - Transportation     Lack of Transportation (Medical): Yes     Lack of Transportation (Non-Medical): Yes   Housing Stability: Unknown (4/1/2024)    Housing Stability Vital Sign     Unable to Pay for Housing in the Last Year: No     Unstable Housing in the Last Year: No       Precautions:     Allergies as of 03/31/2024    (No Known Allergies)       WO Assessment Details/Treatment     Consulted on this 77 y/o F patient due to hematoma and ecchymosis to abdomen. Large bruise with hematoma is noted. Right lower abdomen/pubis red discoloration noted - suction wound from suction tubing that had become loose from external urine wick.foam dressing maintainted. Wick no longer in use.  Mild MASD noted to buttocks/posterior thigh/perineum with full thickness ulceration also noted to right posterior proximal thigh region. Cleansed all with wipes and moisture barrier paste applied. Recommend turn q2 hours.  BLE with chronic lymphedema changes, recommend apply moisturizer daily, resume lymphedema wraps on discharge.       04/01/24 1010   WO Assessment    WOCN Total Time (mins) 45   Visit Date 04/01/24   Visit Time 1010   Consult Type New   WOCN Speciality Wound   Intervention assessed;applied;chart review;coordination of care;orders;team conference   Teaching on-going;complication;discharge        Altered Skin Integrity 04/01/24 1344 Right anterior Pelvis Other (comment)   Date First Assessed/Time First Assessed: 04/01/24 1344   Altered Skin Integrity Present on Admission - Did Patient arrive to the hospital with altered skin?: suspected hospital acquired  Side: Right  Orientation: anterior  Location: Pelvis  Is this in...   Wound Image    Dressing Appearance Intact   Drainage Amount None   Appearance Red   Tissue loss description Not applicable   Red (%), Wound Tissue Color 100 %   Periwound Area Intact   Wound Edges Defined   Wound Length (cm) 1 cm   Wound Width (cm) 1 cm   Wound Surface Area (cm^2) 1 cm^2   Care Cleansed with:;Sterile normal saline;Applied:;Skin Barrier   Dressing Foam   Dressing Change Due 04/08/24        Altered Skin Integrity 03/15/24 Buttocks Incontinence associated dermatitis   Date First Assessed: 03/15/24   Altered Skin Integrity Present on Admission - Did Patient arrive to the hospital with altered skin?: yes  Location: Buttocks  Primary Wound Type: Incontinence associated dermatitis   Wound Image    Appearance Red;Pink   Tissue loss description Full thickness   Periwound Area Intact   Care Cleansed with:;Soap and water;Applied:;Skin Barrier       Recommendations made to primary team for wound care, pressure injury prevention, moisture management . Orders placed.     04/01/2024

## 2024-04-01 NOTE — ASSESSMENT & PLAN NOTE
Patient is identified as having Diastolic (HFpEF) heart failure that is Chronic. CHF is currently controlled. Latest ECHO performed and demonstrates- Results for orders placed during the hospital encounter of 03/14/24    Echo    Interpretation Summary    Left Ventricle: The left ventricle is normal in size. Normal wall thickness. There is concentric remodeling. There is low normal systolic function with a visually estimated ejection fraction of 50 - 55%. There is normal diastolic function.    Right Ventricle: Normal right ventricular cavity size. Wall thickness is normal. Right ventricle wall motion  is normal. Systolic function is normal.    Left Atrium: Left atrium is moderately dilated.    Pulmonary Artery: The estimated pulmonary artery systolic pressure is 45 mmHg.    IVC/SVC: Normal venous pressure at 3 mmHg.  . Continue Beta Blocker and Furosemide and monitor clinical status closely. Monitor on telemetry. Patient is on CHF pathway.  Monitor strict Is&Os and daily weights.  Place on fluid restriction of 1.5 L. Cardiology has not been consulted. Continue to stress to patient importance of self efficacy and  on diet for CHF. Last BNP reviewed- and noted below   Recent Labs   Lab 03/31/24  1425   *       CHF pathway  Echo on 3/15/24 showed EF 50-55%  Strict I&Os  Daily weights  1500ml fluid restriction  2gm Na diet  Home lasix

## 2024-04-02 LAB
ALBUMIN SERPL BCP-MCNC: 2.5 G/DL (ref 3.5–5.2)
ALP SERPL-CCNC: 59 U/L (ref 55–135)
ALT SERPL W/O P-5'-P-CCNC: 9 U/L (ref 10–44)
ANION GAP SERPL CALC-SCNC: 8 MMOL/L (ref 8–16)
AST SERPL-CCNC: 9 U/L (ref 10–40)
BASOPHILS # BLD AUTO: 0.04 K/UL (ref 0–0.2)
BASOPHILS NFR BLD: 0.7 % (ref 0–1.9)
BILIRUB SERPL-MCNC: 1.3 MG/DL (ref 0.1–1)
BUN SERPL-MCNC: 14 MG/DL (ref 8–23)
CALCIUM SERPL-MCNC: 7.9 MG/DL (ref 8.7–10.5)
CHLORIDE SERPL-SCNC: 105 MMOL/L (ref 95–110)
CO2 SERPL-SCNC: 29 MMOL/L (ref 23–29)
CREAT SERPL-MCNC: 0.7 MG/DL (ref 0.5–1.4)
DIFFERENTIAL METHOD BLD: ABNORMAL
EOSINOPHIL # BLD AUTO: 0.2 K/UL (ref 0–0.5)
EOSINOPHIL NFR BLD: 3.3 % (ref 0–8)
ERYTHROCYTE [DISTWIDTH] IN BLOOD BY AUTOMATED COUNT: 15 % (ref 11.5–14.5)
EST. GFR  (NO RACE VARIABLE): >60 ML/MIN/1.73 M^2
GLUCOSE SERPL-MCNC: 102 MG/DL (ref 70–110)
HCT VFR BLD AUTO: 31.8 % (ref 37–48.5)
HGB BLD-MCNC: 9.3 G/DL (ref 12–16)
IMM GRANULOCYTES # BLD AUTO: 0.02 K/UL (ref 0–0.04)
IMM GRANULOCYTES NFR BLD AUTO: 0.4 % (ref 0–0.5)
LYMPHOCYTES # BLD AUTO: 1.2 K/UL (ref 1–4.8)
LYMPHOCYTES NFR BLD: 22.2 % (ref 18–48)
MCH RBC QN AUTO: 25.8 PG (ref 27–31)
MCHC RBC AUTO-ENTMCNC: 29.2 G/DL (ref 32–36)
MCV RBC AUTO: 88 FL (ref 82–98)
MONOCYTES # BLD AUTO: 0.4 K/UL (ref 0.3–1)
MONOCYTES NFR BLD: 6.4 % (ref 4–15)
NEUTROPHILS # BLD AUTO: 3.6 K/UL (ref 1.8–7.7)
NEUTROPHILS NFR BLD: 67 % (ref 38–73)
NRBC BLD-RTO: 0 /100 WBC
PLATELET # BLD AUTO: 157 K/UL (ref 150–450)
PMV BLD AUTO: 10.9 FL (ref 9.2–12.9)
POTASSIUM SERPL-SCNC: 4 MMOL/L (ref 3.5–5.1)
PROT SERPL-MCNC: 5 G/DL (ref 6–8.4)
RBC # BLD AUTO: 3.61 M/UL (ref 4–5.4)
SODIUM SERPL-SCNC: 142 MMOL/L (ref 136–145)
WBC # BLD AUTO: 5.44 K/UL (ref 3.9–12.7)

## 2024-04-02 PROCEDURE — 27000221 HC OXYGEN, UP TO 24 HOURS

## 2024-04-02 PROCEDURE — 94761 N-INVAS EAR/PLS OXIMETRY MLT: CPT

## 2024-04-02 PROCEDURE — 80053 COMPREHEN METABOLIC PANEL: CPT | Performed by: NURSE PRACTITIONER

## 2024-04-02 PROCEDURE — 85025 COMPLETE CBC W/AUTO DIFF WBC: CPT | Performed by: NURSE PRACTITIONER

## 2024-04-02 PROCEDURE — 99900035 HC TECH TIME PER 15 MIN (STAT)

## 2024-04-02 PROCEDURE — G0378 HOSPITAL OBSERVATION PER HR: HCPCS

## 2024-04-02 PROCEDURE — 36415 COLL VENOUS BLD VENIPUNCTURE: CPT | Performed by: NURSE PRACTITIONER

## 2024-04-02 PROCEDURE — 25000003 PHARM REV CODE 250: Performed by: NURSE PRACTITIONER

## 2024-04-02 PROCEDURE — 97530 THERAPEUTIC ACTIVITIES: CPT | Mod: 59

## 2024-04-02 PROCEDURE — 97530 THERAPEUTIC ACTIVITIES: CPT | Mod: CQ

## 2024-04-02 RX ADMIN — APIXABAN 5 MG: 2.5 TABLET, FILM COATED ORAL at 08:04

## 2024-04-02 RX ADMIN — METOPROLOL SUCCINATE 50 MG: 50 TABLET, EXTENDED RELEASE ORAL at 09:04

## 2024-04-02 RX ADMIN — AMLODIPINE BESYLATE 5 MG: 5 TABLET ORAL at 09:04

## 2024-04-02 RX ADMIN — CELECOXIB 100 MG: 100 CAPSULE ORAL at 09:04

## 2024-04-02 RX ADMIN — CELECOXIB 100 MG: 100 CAPSULE ORAL at 08:04

## 2024-04-02 RX ADMIN — APIXABAN 5 MG: 2.5 TABLET, FILM COATED ORAL at 09:04

## 2024-04-02 RX ADMIN — FUROSEMIDE 40 MG: 40 TABLET ORAL at 09:04

## 2024-04-02 NOTE — CONSULTS
Food & Nutrition Education      Diet Education: Fluid and salt restriction      Learners: Pt      Nutrition Education provided with handouts:   Low-Sodium Nutrition Therapy and Fluid-Restricted Nutrition Therapy ([email or web address])      Comments:   PMH: HTN, Pneumonia d/t Covid, A Fib w/ RVR, Morbid obesity, CHF, Hematoma     78 y.o. Female admitted for Debility. Pt currently on Low sodium 2 gm, 1500 mL fluid restriction diet. Visited pt at bedside, pt sitting up in chair. Pt reported that she had a decreased appetite x 3-4 days PTA d/t fatigue from Covid, pt confirmed that she now has a good appetite, consumes 3 meals/day and that she has access to adequate and healthy foods d/t  her daughter does her grocery shopping. Pt stated her UBW is 350-375 lbs, pt currently weighs 431 lbs. Provided pt with a menu to help with pt preferred food choices.    RD educated patient on low sodium diet and fluid restriction related to hospital diagnosis. Discussed the importance of limiting sodium to 2,000 mg per day and reading food labels to avoid further complications of CHF. Discussed using salt free seasonings and other herbs and spices in meals to enhance flavor without additional sodium. Discussed 1500 ml fluid restriction per MD and dietary sources of fluid. RD recommended using a cup with measurements for fluids and to try to consume small sips spread throughout the day rather than a lot at one time.     Pt expressed understanding and appreciation of nutrition education provided. Pt reported that she does not use extra salt on foods and has trying to eat healthier but that she does like to drink a lot of water. Encouraged pt to use RD contact information with any questions/concerns she may have and to speak with her MD for a referral to an outpatient RD to assist with recipes and meal planning if warranted, pt very receptive to that idea.      Nutrition Related Social Determinants of Health: SDOH: Adequate food in  home environment and None Identified       NFPE not performed, pt appears well nourished.      All questions and concerns answered.      Provided handout with dietitian's contact information.      *Please re-consult as needed.      Thank you,     Deborah Guerrero, Registration Eligible, Provisional LDN

## 2024-04-02 NOTE — CONSULTS
04/02/24 1006   Post-Acute Status   Post-Acute Authorization Placement   Post-Acute Placement Status Referrals Sent   Coverage Medicare A & B   Discharge Delays None known at this time   Discharge Plan   Discharge Plan A Skilled Nursing Facility     JINA reached out Elizabeth, with [facility], to please review Patient for possible IP Rehab admissions. Per Elizabeth, Patient is showing inability to tolerate Rehab level of therapy at this time. SW reached out to SNF liaison to determine, if previous admissions, within the last 30 days would qualify Patient for SNF placement. Per SNF liaison, if the Patient was inpatient within the last 60 days, she can still qualify for SNF placement.     SW sent referrals to Skilled Nursing Facilities seeking accepting facilities. Patient is currently in observation status, however from a previous admission Patient was Inpatient status and could potentially be used for SNF placement. SW placed SNF referral in Careport with previous Inpatient order for Patient.     SW will complete PASRR/ 142 for patient for SNF placement.     11 05 SW spoke to Patient's daughter, Mary, over the phone, to discuss SNF vs. Rehab placement. SW explained that right now, Patient is not meeting criteria and not able to tolerate the level of therapy, at IP Rehab. SW explained that DC plan was shifting to Skilled Nursing Facility and patient's information was sent out checking for bed availability. Patient's daughter verbalized understanding and was in agreement with DC plan.   JINA explained that 2 facilities ([facility] and [facility]) were accepting Patient now and that once paperwork came back from the state, SW would give Patient's daughter a call back regarding SNF placement. SW stated that this would give facilities time to review referral and make a decision. Patient's daughter verbalized understanding and inquired about [facility]. SW stated she would reach out to [facility] and give  daughter a call back in a bit. Patient's daughter verbalized understanding.     11 15 SW received a call from Patient's daughter, Mary, over the phone to discuss SNF placement. Patient's daughter was inquiring about private beds for Patient at SNF placements. SW stated that each facility was different and was unsure about each facility. SW stated that more facilities had accepted Patient. Patient is now accepted by [facility], [facility], [facility], [facility], and [facility]. Patient's daughter was inquiring about private rooms at [facility] and [facility]. SW reached out to [facility] and [facility] who both have private rooms. Patient's daughter stated she would wish for [facility], but was leaving decision up to Patient. SW meet with Patient at bedside to discuss SNF placement. Patient was agreeable to SNF placement upon DC and SW provided list of facilities accepting. Patient was agreeable to placement at [facility] for therapy. SW stated she would reach out to liaison and see if they could accept today vs tomorrow. Patient verbalized understanding.  JINA reached out to Odessa, with [facility], to inquire about if Patient could be taken today. Per Odessa, she would work with their office to see if they could get Patient today, pending completion of paperwork via family. SW verbalized understanding and stated Patient's PASRR/ 142 is still pending also. JINA stated she would upload to Careport, once received and requested updates regarding family. [place] verbalized understanding.     13 24 JINA uploaded PASRR/ 142 to [facility] for [facility]. JINA messaged Odessa, with [facility] to check on progress with family. Per Odessa, they have spoken to Patient's daughter and can accept first thing tomorrow, 4/3/24. JINA updated Attending and NP.     JINA will continue to follow and assist as needed.

## 2024-04-02 NOTE — SUBJECTIVE & OBJECTIVE
Interval History: Sitting in chair after PT/TO. Patient will discharge in AM to [facility].     Review of Systems   Constitutional:  Positive for activity change (improving) and fatigue. Negative for appetite change.   Respiratory:  Negative for shortness of breath.    Gastrointestinal:  Negative for abdominal pain, nausea and vomiting.   Neurological:  Positive for weakness.   Psychiatric/Behavioral:  Negative for agitation, behavioral problems, confusion, decreased concentration and dysphoric mood. The patient is not nervous/anxious.      Objective:     Vital Signs (Most Recent):  Temp: 98.9 °F (37.2 °C) (04/02/24 1206)  Pulse: 97 (04/02/24 1523)  Resp: 18 (04/02/24 1206)  BP: 131/83 (04/02/24 1206)  SpO2: 98 % (04/02/24 1206) Vital Signs (24h Range):  Temp:  [97.4 °F (36.3 °C)-98.9 °F (37.2 °C)] 98.9 °F (37.2 °C)  Pulse:  [] 97  Resp:  [17-18] 18  SpO2:  [94 %-100 %] 98 %  BP: (119-136)/(58-83) 131/83     Weight: (!) 195.5 kg (431 lb)  Body mass index is 65.53 kg/m².  No intake or output data in the 24 hours ending 04/02/24 1618      Physical Exam  Vitals and nursing note reviewed.   Constitutional:       General: She is not in acute distress.     Appearance: She is morbidly obese. She is ill-appearing. She is not toxic-appearing.      Interventions: Nasal cannula in place.   HENT:      Head: Normocephalic and atraumatic.   Cardiovascular:      Rate and Rhythm: Normal rate.   Pulmonary:      Effort: Pulmonary effort is normal. No respiratory distress.   Abdominal:      Palpations: Abdomen is soft.      Tenderness: There is no abdominal tenderness.      Comments: Generalized Ecchymosis noted to abdomen from previous lovenox injections. Soft without induration.    Genitourinary:     Comments: External lockhart  Musculoskeletal:      Right lower leg: Edema present.      Left lower leg: Edema present.   Skin:     General: Skin is warm.   Neurological:      Mental Status: She is alert and oriented to person,  place, and time.      Motor: Weakness present.   Psychiatric:         Mood and Affect: Mood normal.         Behavior: Behavior normal. Behavior is cooperative.             Significant Labs: All pertinent labs within the past 24 hours have been reviewed.  CBC:   Recent Labs   Lab 04/01/24 0452 04/02/24  0532   WBC 5.57 5.44   HGB 9.3* 9.3*   HCT 31.5* 31.8*    157     CMP:   Recent Labs   Lab 04/01/24 0452 04/02/24  0532    142   K 4.1 4.0    105   CO2 26 29   GLU 98 102   BUN 14 14   CREATININE 0.7 0.7   CALCIUM 7.8* 7.9*   PROT 5.1* 5.0*   ALBUMIN 2.5* 2.5*   BILITOT 1.3* 1.3*   ALKPHOS 58 59   AST 11 9*   ALT 10 9*   ANIONGAP 7* 8       Significant Imaging: I have reviewed all pertinent imaging results/findings within the past 24 hours.

## 2024-04-02 NOTE — ASSESSMENT & PLAN NOTE
Chronic, controlled. Latest blood pressure and vitals reviewed-     Temp:  [97.4 °F (36.3 °C)-98.9 °F (37.2 °C)]   Pulse:  []   Resp:  [17-18]   BP: (114-136)/(58-83)   SpO2:  [94 %-100 %] .   Home meds for hypertension were reviewed and noted below.   Hypertension Medications               amLODIPine (NORVASC) 5 MG tablet Take 1 tablet (5 mg total) by mouth once daily.    furosemide (LASIX) 40 MG tablet Take 1 tablet (40 mg total) by mouth once daily.    metoprolol succinate (TOPROL-XL) 50 MG 24 hr tablet Take 1 tablet (50 mg total) by mouth once daily.            While in the hospital, will manage blood pressure as follows; Continue home antihypertensive regimen    Will utilize p.r.n. blood pressure medication only if patient's blood pressure greater than 180/110 and she develops symptoms such as worsening chest pain or shortness of breath.

## 2024-04-02 NOTE — PT/OT/SLP PROGRESS
Physical Therapy  Treatment    Niru Reyes   MRN: [id number]   Admitting Diagnosis: Debility    PT Received On: 04/02/24  PT Start Time: 0815     PT Stop Time: 0855    PT Total Time (min): 40 min       Billable Minutes:  Therapeutic Activity 40    Treatment Type: Treatment  PT/PTA: PTA     Number of PTA visits since last PT visit: 1       General Precautions: Standard, fall  Orthopedic Precautions: N/A  Braces: N/A  Respiratory Status: Nasal cannula, flow 2 L/min         Subjective:  Communicated with patient's nurse, Bert, and completed Epic chart review prior to session.  Patient agreed to PT session.   Expresses increased fear and appears anxious with movement especially standing.     Pain/Comfort  Pain Rating 1: 0/10  Pain Rating Post-Intervention 1: 0/10    Objective:   Patient found with: telemetry, peripheral IV, oxygen    Patient found in bed heavily soiled with urine. Patient admits to continence. Educated patient on the importance of calling for staff assist immediately when she soils herself in order to prevent skin breakdown.    Supine > sit EOB: CGA    Forward scoot towards EOB: CGA    Seated EOB x 10 min total focusing on increased tolerance to upright position, core stability, trunk control and CV endurance.   Maintained self supported sitting balance Independent   Maintained unsupported sitting balance Independent    STS from elevated EOB > jose ramon RW: Min A of 2    Stand pivot T/F from EOB > chair w/ jose ramon RW: Min A of 2 (increased time to complete)    STS from chair > jose ramon RW: Mod A of 2    Stand to adjust padding under patient but only able to sustain for less than 10 sec    Completed x10 reps AROM TE to BLE: LAQ, Hip Flex, AP   Intermittent cues given as needed to maintain correct form during repetitions    Educated patient on importance of increased tolerance to upright position and direct impact on CV endurance and strength. Patient encouraged to sit up in chair/ EOB, for a minimum of 2 consecutive  hours, 3x per day. Encouraged patient to perform AROM TE to BLE throughout the day within all available planes of motion. Re enforced importance of utilizing call light to meet needs in room and not attempt to get up without staff assistance. Patient verbalized understanding and agreed to comply.       AM-PAC 6 CLICK MOBILITY  How much help from another person does this patient currently need?   1 = Unable, Total/Dependent Assistance  2 = A lot, Maximum/Moderate Assistance  3 = A little, Minimum/Contact Guard/Supervision  4 = None, Modified [place]/Independent    Turning over in bed (including adjusting bedclothes, sheets and blankets)?: 3  Sitting down on and standing up from a chair with arms (e.g., wheelchair, bedside commode, etc.): 2  Moving from lying on back to sitting on the side of the bed?: 3  Moving to and from a bed to a chair (including a wheelchair)?: 2  Need to walk in hospital room?: 1  Climbing 3-5 steps with a railing?: 1 (NT)  Basic Mobility Total Score: 12    AM-PAC Raw Score CMS G-Code Modifier Level of Impairment Assistance   6 % Total / Unable   7 - 9 CM 80 - 100% Maximal Assist   10 - 14 CL 60 - 80% Moderate Assist   15 - 19 CK 40 - 60% Moderate Assist   20 - 22 CJ 20 - 40% Minimal Assist   23 CI 1-20% SBA / CGA   24 CH 0% Independent/ Mod I     Patient left up in chair with call button in reach and chair alarm on.    Assessment:  Niru Reyes is a 78 y.o. female with a medical diagnosis of Debility and presents with overall decline in functional mobility. Patient would continue to benefit from skilled PT to address functional limitations listed below in order to return to PLOF/decrease caregiver burden.     Rehab identified problem list/impairments: weakness, impaired endurance, impaired self care skills, impaired functional mobility, gait instability, impaired balance, decreased coordination, decreased upper extremity function, decreased lower extremity function, decreased  safety awareness, decreased ROM, impaired coordination, impaired cardiopulmonary response to activity    Rehab potential is fair.    Activity tolerance: Fair    Discharge recommendations: Moderate Intensity Therapy      Barriers to discharge:      Equipment recommendations: to be determined by next level of care     GOALS:   Multidisciplinary Problems       Physical Therapy Goals          Problem: Physical Therapy    Goal Priority Disciplines Outcome Goal Variances Interventions   Physical Therapy Goal     PT, PT/OT      Description: LT/15/24  1. PT WILL COMPLETE BED MOBILITY IND  2. PT WILL STAND T/F TO CHAIR WITH RW AND CGA FOR OOB TOLERANCE.  3. PT WILL COMPLETE B LE TE X 20 REPS FOR LE STRENGTHENING.   4. PT WILL INC AMPAC SCORE BY 2 POINTS TO PROGRESS GROSS FUNC MOBILITY.                          PLAN:    Patient to be seen 3 x/week to address the above listed problems via therapeutic activities, therapeutic exercises  Plan of Care expires: 04/15/24  Plan of Care reviewed with: patient         2024

## 2024-04-02 NOTE — PROGRESS NOTES
[facility] Medicine  Progress Note    Patient Name: Niru Reyes  MRN: [id number]  Patient Class: OP- Observation   Admission Date: 3/31/2024  Length of Stay: 0 days  Attending Physician: Waldo Mathis MD  Primary Care Provider: Jordana Shirley MD    Subjective:     Principal Problem:Debility    HPI:  78-year-old white woman with history of newly diagnosed afib (initiated on Eliquis during previous hospitalization) hypertension, major depression, osteoarthritis, anemia, peripheral arterial disease, bilateral lower extremity edema, cellulitis, pulmonary hypertension, and morbid obesity who presented to the ED for evaluation of a fall which onset earlier today. Pt reports her legs gave out while heading from her bed to wheelchair, and she was able to ease her self to the ground. Pt also notes a large area of hematoma to her abd which onset following treatment with Lovenox shots from previous hospitalization from 3/14-3/19 for new diagnosis of Afib with RVR.   Associated sxs include generalized weakness (several days), chills, leg swelling, decreased appetite, R shoulder pain, nausea, and diaphoresis. Patient denies any LOC, head trauma, fever, vomiting, CP, and all other sxs at this time. Pt reports she has not taken her medications yet today. Pt lives alone and uses a wheelchair or walker, while pt's daughter and neighbor takes care of her. Pt was dx with Covid on 3/14/23.   In the ED: Hgb noted to be marginally low measuring 10.4. , recent Echo on 3/15 showed: EF 50-55%.   Given decompensation since discharge patient would like to seek SNF placement. Will be admitted to OBS.   Full Code.       Overview/Hospital Course:  4/1 admitted for debility and fall. Awaiting physical/occupational therapy recommendations. Patient requesting skilled nursing facility placement    4/2/24: plans to discharge in AM to [facility].    Interval History: Sitting in chair after PT/TO.  Patient will discharge in AM to [facility].     Review of Systems   Constitutional:  Positive for activity change (improving) and fatigue. Negative for appetite change.   Respiratory:  Negative for shortness of breath.    Gastrointestinal:  Negative for abdominal pain, nausea and vomiting.   Neurological:  Positive for weakness.   Psychiatric/Behavioral:  Negative for agitation, behavioral problems, confusion, decreased concentration and dysphoric mood. The patient is not nervous/anxious.      Objective:     Vital Signs (Most Recent):  Temp: 98.9 °F (37.2 °C) (04/02/24 1206)  Pulse: 97 (04/02/24 1523)  Resp: 18 (04/02/24 1206)  BP: 131/83 (04/02/24 1206)  SpO2: 98 % (04/02/24 1206) Vital Signs (24h Range):  Temp:  [97.4 °F (36.3 °C)-98.9 °F (37.2 °C)] 98.9 °F (37.2 °C)  Pulse:  [] 97  Resp:  [17-18] 18  SpO2:  [94 %-100 %] 98 %  BP: (119-136)/(58-83) 131/83     Weight: (!) 195.5 kg (431 lb)  Body mass index is 65.53 kg/m².  No intake or output data in the 24 hours ending 04/02/24 1618      Physical Exam  Vitals and nursing note reviewed.   Constitutional:       General: She is not in acute distress.     Appearance: She is morbidly obese. She is ill-appearing. She is not toxic-appearing.      Interventions: Nasal cannula in place.   HENT:      Head: Normocephalic and atraumatic.   Cardiovascular:      Rate and Rhythm: Normal rate.   Pulmonary:      Effort: Pulmonary effort is normal. No respiratory distress.   Abdominal:      Palpations: Abdomen is soft.      Tenderness: There is no abdominal tenderness.      Comments: Generalized Ecchymosis noted to abdomen from previous lovenox injections. Soft without induration.    Genitourinary:     Comments: External lockhart  Musculoskeletal:      Right lower leg: Edema present.      Left lower leg: Edema present.   Skin:     General: Skin is warm.   Neurological:      Mental Status: She is alert and oriented to person, place, and time.      Motor: Weakness present.    Psychiatric:         Mood and Affect: Mood normal.         Behavior: Behavior normal. Behavior is cooperative.             Significant Labs: All pertinent labs within the past 24 hours have been reviewed.  CBC:   Recent Labs   Lab 04/01/24  0452 04/02/24  0532   WBC 5.57 5.44   HGB 9.3* 9.3*   HCT 31.5* 31.8*    157     CMP:   Recent Labs   Lab 04/01/24  0452 04/02/24  0532    142   K 4.1 4.0    105   CO2 26 29   GLU 98 102   BUN 14 14   CREATININE 0.7 0.7   CALCIUM 7.8* 7.9*   PROT 5.1* 5.0*   ALBUMIN 2.5* 2.5*   BILITOT 1.3* 1.3*   ALKPHOS 58 59   AST 11 9*   ALT 10 9*   ANIONGAP 7* 8       Significant Imaging: I have reviewed all pertinent imaging results/findings within the past 24 hours.    Assessment/Plan:      * Debility  Patient with Acute on chronic debility due to age and other co-morbidities  PT OT eval and treat  CT head negative for acute process    Important to note, that during previous hospitalization patient was offered SNF placement but she declined but would now like to seek placement due to ongoing declining functional status.     4/2/24  Plans to discharge in AM to [facility].     Hematoma  Abdominal hematoma from Lovenox SQ injections  CT abd showed 12x 6 cm hematoma, follow up with CT abd at a later time to ascertain resolution   Supportive care      Chronic diastolic heart failure  Patient is identified as having Diastolic (HFpEF) heart failure that is Chronic. CHF is currently controlled. Latest ECHO performed and demonstrates- Results for orders placed during the hospital encounter of 03/14/24    Echo    Interpretation Summary    Left Ventricle: The left ventricle is normal in size. Normal wall thickness. There is concentric remodeling. There is low normal systolic function with a visually estimated ejection fraction of 50 - 55%. There is normal diastolic function.    Right Ventricle: Normal right ventricular cavity size. Wall thickness is normal. Right ventricle  wall motion  is normal. Systolic function is normal.    Left Atrium: Left atrium is moderately dilated.    Pulmonary Artery: The estimated pulmonary artery systolic pressure is 45 mmHg.    IVC/SVC: Normal venous pressure at 3 mmHg.  . Continue Beta Blocker and Furosemide and monitor clinical status closely. Monitor on telemetry. Patient is on CHF pathway.  Monitor strict Is&Os and daily weights.  Place on fluid restriction of 1.5 L. Cardiology has not been consulted. Continue to stress to patient importance of self efficacy and  on diet for CHF. Last BNP reviewed- and noted below   Recent Labs   Lab 03/31/24  1425   *       CHF pathway  Echo on 3/15/24 showed EF 50-55%  Strict I&Os  Daily weights  1500ml fluid restriction  2gm Na diet  Home lasix        Morbid obesity  Body mass index is 65.53 kg/m². Morbid obesity complicates all aspects of disease management from diagnostic modalities to treatment. Weight loss encouraged and health benefits explained to patient.         Atrial fibrillation with RVR  Patient with Paroxysmal (<7 days) atrial fibrillation which is controlled currently with Beta Blocker. Patient is currently in sinus rhythm.[id number] Score: 4. HASBLED Score: 3. Anticoagulation indicated. Anticoagulation done with Eliquis    Metop resumed   Tele monitoring .    Pneumonia due to COVID-19 virus  Previously treated with Remdesevir  On Home o2 2L NC  Albuterol PRN    HTN (hypertension)  Chronic, controlled. Latest blood pressure and vitals reviewed-     Temp:  [97.4 °F (36.3 °C)-98.9 °F (37.2 °C)]   Pulse:  []   Resp:  [17-18]   BP: (114-136)/(58-83)   SpO2:  [94 %-100 %] .   Home meds for hypertension were reviewed and noted below.   Hypertension Medications               amLODIPine (NORVASC) 5 MG tablet Take 1 tablet (5 mg total) by mouth once daily.    furosemide (LASIX) 40 MG tablet Take 1 tablet (40 mg total) by mouth once daily.    metoprolol succinate (TOPROL-XL) 50 MG 24 hr  tablet Take 1 tablet (50 mg total) by mouth once daily.            While in the hospital, will manage blood pressure as follows; Continue home antihypertensive regimen    Will utilize p.r.n. blood pressure medication only if patient's blood pressure greater than 180/110 and she develops symptoms such as worsening chest pain or shortness of breath.      VTE Risk Mitigation (From admission, onward)           Ordered     apixaban tablet 5 mg  2 times daily         03/31/24 1706     IP VTE HIGH RISK PATIENT  Once         03/31/24 1647     Place sequential compression device  Until discontinued         03/31/24 1647                    Discharge Planning   JERONIMO:      Code Status: Full Code   Is the patient medically ready for discharge?: Yes    Reason for patient still in hospital (select all that apply): Pending disposition  Discharge Plan A: Skilled Nursing Facility   Discharge Delays: None known at this time      Ila Wetzel NP  Department of Hospital Medicine   [facility] - Telemetry ([facility])

## 2024-04-02 NOTE — ASSESSMENT & PLAN NOTE
Patient with Paroxysmal (<7 days) atrial fibrillation which is controlled currently with Beta Blocker. Patient is currently in sinus rhythm.[id number] Score: 4. HASBLED Score: 3. Anticoagulation indicated. Anticoagulation done with Eliquis    Metop resumed   Tele monitoring .

## 2024-04-02 NOTE — PT/OT/SLP PROGRESS
Physical Therapy  Treatment    Niru Reyes   MRN: [id number]   Admitting Diagnosis: Debility    PT Received On: 04/02/24  PT Start Time: 1210     PT Stop Time: 1235    PT Total Time (min): 25 min       Billable Minutes:  Therapeutic Activity 25    Treatment Type: Treatment  PT/PTA: PTA     Number of PTA visits since last PT visit: 1       General Precautions: Standard, fall  Orthopedic Precautions: N/A  Braces: N/A  Respiratory Status: Nasal cannula, flow 2 L/min         Subjective:  Communicated with patient's nurse, Bert, and completed Epic chart review prior to session.  Patient agreed to be assisted back to bed after sitting up in chair for a prolonged period.     Pain/Comfort  Pain Rating 1: 0/10  Pain Rating Post-Intervention 1: 0/10    Objective:   Patient found with: telemetry, peripheral IV, oxygen    STS from chair > jose ramon RW x4 attempts all unsuccessful due to increased patient fear.   Required extended rest breaks between attempts due to SOB. Encouraged PLB to address. Patient with significant increase in fear and anxiety with each attempt which hindered her ability to perform the transfer.     Squat pivot T/F from chair > EOB (towards patient's L side) with patient pulling on bed rail: Total A of 3    Posterior scoot towards center of bed: SBA    Sit > supine: Max-Total A of 2    Supine scoot towards HOB: Total A of 2 (bed in trend)    Educated patient on importance of increased tolerance to upright position and direct impact on CV endurance and strength. Patient encouraged to sit up in chair/ EOB, for a minimum of 2 consecutive hours, 3x per day. Encouraged patient to perform AROM TE to BLE throughout the day within all available planes of motion. Re enforced importance of utilizing call light to meet needs in room and not attempt to get up without staff assistance. Patient verbalized understanding and agreed to comply.       AM-PAC 6 CLICK MOBILITY  How much help from another person does this patient  currently need?   1 = Unable, Total/Dependent Assistance  2 = A lot, Maximum/Moderate Assistance  3 = A little, Minimum/Contact Guard/Supervision  4 = None, Modified [place]/Independent    Turning over in bed (including adjusting bedclothes, sheets and blankets)?: 3  Sitting down on and standing up from a chair with arms (e.g., wheelchair, bedside commode, etc.): 2  Moving from lying on back to sitting on the side of the bed?: 3  Moving to and from a bed to a chair (including a wheelchair)?: 2  Need to walk in hospital room?: 1  Climbing 3-5 steps with a railing?: 1 (NT)  Basic Mobility Total Score: 12    AM-PAC Raw Score CMS G-Code Modifier Level of Impairment Assistance   6 % Total / Unable   7 - 9 CM 80 - 100% Maximal Assist   10 - 14 CL 60 - 80% Moderate Assist   15 - 19 CK 40 - 60% Moderate Assist   20 - 22 CJ 20 - 40% Minimal Assist   23 CI 1-20% SBA / CGA   24 CH 0% Independent/ Mod I     Patient left with bed in chair position with call button in reach.    Assessment:  Niru Reyes is a 78 y.o. female with a medical diagnosis of Debility and presents with overall decline in functional mobility. Patient would continue to benefit from skilled PT to address functional limitations listed below in order to return to PLOF/decrease caregiver burden.     Rehab identified problem list/impairments: weakness, impaired endurance, impaired self care skills, impaired functional mobility, gait instability, impaired balance, decreased coordination, decreased upper extremity function, decreased lower extremity function, decreased safety awareness, decreased ROM, impaired coordination, impaired cardiopulmonary response to activity    Rehab potential is fair.    Activity tolerance: Fair    Discharge recommendations: Moderate Intensity Therapy      Barriers to discharge:      Equipment recommendations: to be determined by next level of care     GOALS:   Multidisciplinary Problems       Physical Therapy Goals           Problem: Physical Therapy    Goal Priority Disciplines Outcome Goal Variances Interventions   Physical Therapy Goal     PT, PT/OT      Description: LT/15/24  1. PT WILL COMPLETE BED MOBILITY IND  2. PT WILL STAND T/F TO CHAIR WITH RW AND CGA FOR OOB TOLERANCE.  3. PT WILL COMPLETE B LE TE X 20 REPS FOR LE STRENGTHENING.   4. PT WILL INC AMPAC SCORE BY 2 POINTS TO PROGRESS GROSS FUNC MOBILITY.                          PLAN:    Patient to be seen 3 x/week to address the above listed problems via therapeutic activities, therapeutic exercises  Plan of Care expires: 04/15/24  Plan of Care reviewed with: patient         2024

## 2024-04-02 NOTE — ASSESSMENT & PLAN NOTE
Patient with Acute on chronic debility due to age and other co-morbidities  PT OT eval and treat  CT head negative for acute process    Important to note, that during previous hospitalization patient was offered SNF placement but she declined but would now like to seek placement due to ongoing declining functional status.     4/2/24  Plans to [facility] in [facility].

## 2024-04-03 VITALS
BODY MASS INDEX: 44.41 KG/M2 | HEART RATE: 65 BPM | SYSTOLIC BLOOD PRESSURE: 129 MMHG | WEIGHT: 293 LBS | DIASTOLIC BLOOD PRESSURE: 61 MMHG | TEMPERATURE: 98 F | OXYGEN SATURATION: 98 % | HEIGHT: 68 IN | RESPIRATION RATE: 19 BRPM

## 2024-04-03 LAB
ALBUMIN SERPL BCP-MCNC: 2.5 G/DL (ref 3.5–5.2)
ALP SERPL-CCNC: 61 U/L (ref 55–135)
ALT SERPL W/O P-5'-P-CCNC: 6 U/L (ref 10–44)
ANION GAP SERPL CALC-SCNC: 10 MMOL/L (ref 8–16)
AST SERPL-CCNC: 11 U/L (ref 10–40)
BASOPHILS # BLD AUTO: 0.04 K/UL (ref 0–0.2)
BASOPHILS NFR BLD: 0.8 % (ref 0–1.9)
BILIRUB SERPL-MCNC: 1.2 MG/DL (ref 0.1–1)
BUN SERPL-MCNC: 12 MG/DL (ref 8–23)
CALCIUM SERPL-MCNC: 7.9 MG/DL (ref 8.7–10.5)
CHLORIDE SERPL-SCNC: 105 MMOL/L (ref 95–110)
CO2 SERPL-SCNC: 26 MMOL/L (ref 23–29)
CREAT SERPL-MCNC: 0.8 MG/DL (ref 0.5–1.4)
DIFFERENTIAL METHOD BLD: ABNORMAL
EOSINOPHIL # BLD AUTO: 0.2 K/UL (ref 0–0.5)
EOSINOPHIL NFR BLD: 3.5 % (ref 0–8)
ERYTHROCYTE [DISTWIDTH] IN BLOOD BY AUTOMATED COUNT: 14.8 % (ref 11.5–14.5)
EST. GFR  (NO RACE VARIABLE): >60 ML/MIN/1.73 M^2
GLUCOSE SERPL-MCNC: 91 MG/DL (ref 70–110)
HCT VFR BLD AUTO: 32.2 % (ref 37–48.5)
HGB BLD-MCNC: 9.4 G/DL (ref 12–16)
IMM GRANULOCYTES # BLD AUTO: 0.01 K/UL (ref 0–0.04)
IMM GRANULOCYTES NFR BLD AUTO: 0.2 % (ref 0–0.5)
LYMPHOCYTES # BLD AUTO: 1 K/UL (ref 1–4.8)
LYMPHOCYTES NFR BLD: 19.8 % (ref 18–48)
MCH RBC QN AUTO: 25.9 PG (ref 27–31)
MCHC RBC AUTO-ENTMCNC: 29.2 G/DL (ref 32–36)
MCV RBC AUTO: 89 FL (ref 82–98)
MONOCYTES # BLD AUTO: 0.3 K/UL (ref 0.3–1)
MONOCYTES NFR BLD: 5.8 % (ref 4–15)
NEUTROPHILS # BLD AUTO: 3.4 K/UL (ref 1.8–7.7)
NEUTROPHILS NFR BLD: 69.9 % (ref 38–73)
NRBC BLD-RTO: 0 /100 WBC
PLATELET # BLD AUTO: 129 K/UL (ref 150–450)
PMV BLD AUTO: 12.2 FL (ref 9.2–12.9)
POTASSIUM SERPL-SCNC: 4.2 MMOL/L (ref 3.5–5.1)
PROT SERPL-MCNC: 5 G/DL (ref 6–8.4)
RBC # BLD AUTO: 3.63 M/UL (ref 4–5.4)
SODIUM SERPL-SCNC: 141 MMOL/L (ref 136–145)
WBC # BLD AUTO: 4.85 K/UL (ref 3.9–12.7)

## 2024-04-03 PROCEDURE — 25000003 PHARM REV CODE 250: Performed by: NURSE PRACTITIONER

## 2024-04-03 PROCEDURE — 36415 COLL VENOUS BLD VENIPUNCTURE: CPT | Performed by: NURSE PRACTITIONER

## 2024-04-03 PROCEDURE — 94761 N-INVAS EAR/PLS OXIMETRY MLT: CPT

## 2024-04-03 PROCEDURE — 97530 THERAPEUTIC ACTIVITIES: CPT

## 2024-04-03 PROCEDURE — 97530 THERAPEUTIC ACTIVITIES: CPT | Mod: CQ

## 2024-04-03 PROCEDURE — 80053 COMPREHEN METABOLIC PANEL: CPT | Performed by: NURSE PRACTITIONER

## 2024-04-03 PROCEDURE — 27000221 HC OXYGEN, UP TO 24 HOURS

## 2024-04-03 PROCEDURE — 85025 COMPLETE CBC W/AUTO DIFF WBC: CPT | Performed by: NURSE PRACTITIONER

## 2024-04-03 PROCEDURE — G0378 HOSPITAL OBSERVATION PER HR: HCPCS

## 2024-04-03 RX ADMIN — AMLODIPINE BESYLATE 5 MG: 5 TABLET ORAL at 09:04

## 2024-04-03 RX ADMIN — APIXABAN 5 MG: 2.5 TABLET, FILM COATED ORAL at 09:04

## 2024-04-03 RX ADMIN — CELECOXIB 100 MG: 100 CAPSULE ORAL at 09:04

## 2024-04-03 RX ADMIN — FUROSEMIDE 40 MG: 40 TABLET ORAL at 09:04

## 2024-04-03 RX ADMIN — METOPROLOL SUCCINATE 50 MG: 50 TABLET, EXTENDED RELEASE ORAL at 09:04

## 2024-04-03 NOTE — PT/OT/SLP PROGRESS
"Occupational Therapy   Treatment    Name: Niru Reyes  MRN: [id number]  Admitting Diagnosis:  Debility       Recommendations:     Discharge Recommendations: Moderate Intensity Therapy  Discharge Equipment Recommendations:  to be determined by next level of care  Barriers to discharge:  Decreased caregiver support    Assessment:     Niru Reyes is a 78 y.o. female with a medical diagnosis of Debility.  She presents with the following performance deficits affecting function are weakness, impaired endurance, impaired self care skills, impaired functional mobility, impaired balance, decreased upper extremity function, decreased lower extremity function, decreased safety awareness, impaired cardiopulmonary response to activity.     Rehab Prognosis:  Fair; patient would benefit from acute skilled OT services to address these deficits and reach maximum level of function.       Plan:     Patient to be seen 2 x/week to address the above listed problems via self-care/home management, therapeutic activities, therapeutic exercises  Plan of Care Expires: 04/15/24  Plan of Care Reviewed with: patient    Subjective     Chief Complaint: Reported "I am just scared to fall."  Patient/Family Comments/goals: increase independence  Pain/Comfort:  Pain Rating 1: 0/10    Objective:     Communicated with: NurseBert, prior to session.  Patient found supine with oxygen, peripheral IV, telemetry upon OT entry to room.    General Precautions: Standard, fall    Orthopedic Precautions:N/A  Braces: N/A  Respiratory Status: Room air     Occupational Performance:     Bed Mobility:    Patient completed Supine to Sit with contact guard assistance  Patient completed Sit to Supine with maximal assistance   Seated anterior scooting with CGA  Unable to lateral scoot after sit>stand trials  EOB x20 minutes with CGA for static sitting balance  Requires significant increase in time for completion of sup>sit    Functional Mobility/Transfers:  Sit>stand " completed x5 trials  x3 trials, patient accomplished 3/4 stand  X2 trials, patient with full upright stand  All trials <10 seconds each  Required max A of 2 and continuous encouragement to complete    AMPAC 6 Click ADL: 14    Treatment & Education:  Patient tolerated session fair. Fear of falling grossly debilitating and limiting intervention. Increased time for recovery between standing trials. Encouraged completion of B UE AROM therex throughout the day to increase functional strength and activity tolerance needed for ADL completion. Will continue to progress as physically able.    Patient left HOB elevated with all lines intact and call button in reach    GOALS:   Multidisciplinary Problems       Occupational Therapy Goals          Problem: Occupational Therapy    Goal Priority Disciplines Outcome Interventions   Occupational Therapy Goal     OT, PT/OT Ongoing, Progressing    Description: O.T. GOALS TO BE MET by 4/15/24  MIN A WITH LE DRESSING  PT WILL TOLERATE 1 SET X 15 REPS B UE ROM EXERCISE  MIN A WITH BSC TRANSFERS                       Time Tracking:     OT Date of Treatment: 04/03/24  OT Start Time: 0755  OT Stop Time: 0825  OT Total Time (min): 30 min    Billable Minutes:Therapeutic Activity 30    Juana Fuentes OT  4/3/2024

## 2024-04-03 NOTE — DISCHARGE SUMMARY
[facility] Medicine  Discharge Summary      Patient Name: Niru Reyes  MRN: [id number]  FRANCIE: [id number]  Patient Class: OP- Observation  Admission Date: 3/31/2024  Hospital Length of Stay: 0 days  Discharge Date and Time: 4/3/2024  1:13 PM  Attending Physician: No att. providers found   Discharging Provider: Ila Wetzel NP  Primary Care Provider: Jordana Shirley MD    Primary Care Team: Networked reference to record PCT     HPI:   78-year-old white woman with history of newly diagnosed afib (initiated on Eliquis during previous hospitalization) hypertension, major depression, osteoarthritis, anemia, peripheral arterial disease, bilateral lower extremity edema, cellulitis, pulmonary hypertension, and morbid obesity who presented to the ED for evaluation of a fall which onset earlier today. Pt reports her legs gave out while heading from her bed to wheelchair, and she was able to ease her self to the ground. Pt also notes a large area of hematoma to her abd which onset following treatment with Lovenox shots from previous hospitalization from 3/14-3/19 for new diagnosis of Afib with RVR.   Associated sxs include generalized weakness (several days), chills, leg swelling, decreased appetite, R shoulder pain, nausea, and diaphoresis. Patient denies any LOC, head trauma, fever, vomiting, CP, and all other sxs at this time. Pt reports she has not taken her medications yet today. Pt lives alone and uses a wheelchair or walker, while pt's daughter and neighbor takes care of her. Pt was dx with Covid on 3/14/23.   In the ED: Hgb noted to be marginally low measuring 10.4. , recent Echo on 3/15 showed: EF 50-55%.   Given decompensation since discharge patient would like to seek SNF placement. Will be admitted to OBS.   Full Code.       * No surgery found *      Hospital Course:   4/1 admitted for debility and fall. Awaiting physical/occupational therapy recommendations.  Patient requesting skilled nursing facility placement    4/2/24: plans to discharge in AM to [facility].    4/3/24: Patient has been approved to go [facility] for continued therapy. Transportation and oxygen supplementation will be provided. She is stable for discharge.     Goals of Care Treatment Preferences:  Code Status: Full Code      Consults:   Consults (From admission, onward)          Status Ordering Provider     Inpatient consult to Social Work/Case Management  Once        Provider:  (Not yet assigned)    Completed JUAN RAMON GONZALEZ     Inpatient consult to Social Work/Case Management  Once        Provider:  (Not yet assigned)    Completed RUBI BOYER     Inpatient consult to Registered Dietitian/Nutritionist  Once        Provider:  (Not yet assigned)    Completed RUBI BOYER            No new Assessment & Plan notes have been filed under this hospital service since the last note was generated.  Service: Hospital Medicine    Final Active Diagnoses:    Diagnosis Date Noted POA    PRINCIPAL PROBLEM:  Debility [R53.81] 03/31/2024 Yes    Chronic diastolic heart failure [I50.32] 03/31/2024 Yes    Hematoma [T14.8XXA] 03/31/2024 Yes    Atrial fibrillation with RVR [I48.91] 03/14/2024 Yes    Morbid obesity [E66.01] 03/14/2024 Yes    Pneumonia due to COVID-19 virus [U07.1, J12.82] 03/14/2024 Yes    HTN (hypertension) [I10] 12/04/2014 Yes      Problems Resolved During this Admission:       Discharged Condition: stable    Disposition: Skilled Nursing Facility    Follow Up:    Patient Instructions:   No discharge procedures on file.    Significant Diagnostic Studies: Labs: CMP   Recent Labs   Lab 04/02/24  0532 04/03/24  0527    141   K 4.0 4.2    105   CO2 29 26    91   BUN 14 12   CREATININE 0.7 0.8   CALCIUM 7.9* 7.9*   PROT 5.0* 5.0*   ALBUMIN 2.5* 2.5*   BILITOT 1.3* 1.2*   ALKPHOS 59 61   AST 9* 11   ALT 9* 6*   ANIONGAP 8 10    and CBC   Recent Labs   Lab 04/02/24  0532  04/03/24  0527   WBC 5.44 4.85   HGB 9.3* 9.4*   HCT 31.8* 32.2*    129*       Pending Diagnostic Studies:       None           Medications:  Reconciled Home Medications:      Medication List        CONTINUE taking these medications      albuterol 90 mcg/actuation inhaler  Commonly known as: PROVENTIL/VENTOLIN HFA  Inhale 2 puffs into the lungs every 6 (six) hours. Rescue     amLODIPine 5 MG tablet  Commonly known as: NORVASC  Take 1 tablet (5 mg total) by mouth once daily.     celecoxib 100 MG capsule  Commonly known as: CeleBREX  Take 1 capsule (100 mg total) by mouth 2 (two) times daily.     docusate sodium 100 MG capsule  Commonly known as: COLACE  Take 1 capsule (100 mg total) by mouth 2 (two) times daily as needed for Constipation.     ELIQUIS 5 mg Tab  Generic drug: apixaban  Take 1 tablet (5 mg total) by mouth 2 (two) times daily.     furosemide 40 MG tablet  Commonly known as: LASIX  Take 1 tablet (40 mg total) by mouth once daily.     metoprolol succinate 50 MG 24 hr tablet  Commonly known as: TOPROL-XL  Take 1 tablet (50 mg total) by mouth once daily.     pulse oximeter device  Commonly known as: pulse oximeter  by Apply Externally route 2 (two) times a day. Use twice daily at 8 AM and 3 PM and [facility] as directed.     triamcinolone acetonide 0.1% 0.1 % cream  Commonly known as: KENALOG  Apply topically 2 (two) times daily. Under occlusion for 14 days              Indwelling Lines/Drains at time of discharge:   Lines/Drains/Airways       None                   Time spent on the discharge of patient: >35 minutes         Ila Wetzel NP  Department of Hospital Medicine  '[facility] - Telemetry ([facility])

## 2024-04-03 NOTE — PT/OT/SLP PROGRESS
Physical Therapy  Treatment    Niru Reyes   MRN: [id number]   Admitting Diagnosis: Debility    PT Received On: 04/03/24  PT Start Time: 0805     PT Stop Time: 0835    PT Total Time (min): 30 min       Billable Minutes:  Therapeutic Activity 30    Treatment Type: Treatment  PT/PTA: PTA     Number of PTA visits since last PT visit: 2       General Precautions: Standard, fall  Orthopedic Precautions: N/A  Braces: N/A  Respiratory Status: Nasal cannula, flow 2 L/min         Subjective:  Communicated with patient's nurse, Bert, and completed Epic chart review prior to session.  Patient agreed to PT session.     Pain/Comfort  Pain Rating 1: 0/10  Pain Rating Post-Intervention 1: 0/10    Objective:   Patient found with: oxygen, peripheral IV, telemetry    Supine > sit EOB: CGA    Forward scoot towards EOB: CGA    Seated EOB x 15 min total focusing on increased tolerance to upright position, core stability, trunk control and CV endurance.   Maintained self supported sitting balance Independent  Maintained unsupported sitting balance Independent     STS from EOB > RW x6 trials: Max A of 2   x2 successful stands   x4 3/4 stands   Poor coordination of task and transition of hand placement with R > L   Each sustained for less than 10 sec    Lateral scoot towards HOB: CGA (Very small distance gained)    Sit > supine: CGA    Supine scoot towards HOB: Total A of 2 (bed in trend)    Reviewed AROM TE to BLE including: hip flex/ext, knee flex/ext, ankle PF/DF  To be completed a minimum of 10 reps for each LE in order to promote return of function, strength and ROM.     Educated patient on importance of increased tolerance to upright position and direct impact on CV endurance and strength. Patient encouraged to utilize elevated HOB to simulate chair position until able to safely complete chair T/F. Patient given a minimum goal of majority of the day to be spent in upright, especially with all meals. Encouraged patient to perform AROM  TE to BLE throughout the day within all available planes of motion. Re enforced importance of utilizing call light to meet needs in room and not attempt to get up without staff assistance. Patient verbalized understanding and agreed to comply.    AM-PAC 6 CLICK MOBILITY  How much help from another person does this patient currently need?   1 = Unable, Total/Dependent Assistance  2 = A lot, Maximum/Moderate Assistance  3 = A little, Minimum/Contact Guard/Supervision  4 = None, Modified [place]/Independent    Turning over in bed (including adjusting bedclothes, sheets and blankets)?: 3  Sitting down on and standing up from a chair with arms (e.g., wheelchair, bedside commode, etc.): 2  Moving from lying on back to sitting on the side of the bed?: 3  Moving to and from a bed to a chair (including a wheelchair)?: 1  Need to walk in hospital room?: 1  Climbing 3-5 steps with a railing?: 1 (NT)  Basic Mobility Total Score: 11    AM-PAC Raw Score CMS G-Code Modifier Level of Impairment Assistance   6 % Total / Unable   7 - 9 CM 80 - 100% Maximal Assist   10 - 14 CL 60 - 80% Moderate Assist   15 - 19 CK 40 - 60% Moderate Assist   20 - 22 CJ 20 - 40% Minimal Assist   23 CI 1-20% SBA / CGA   24 CH 0% Independent/ Mod I     Patient left with bed in chair position with call button in reach and nurse notified.    Assessment:  Niru Reyes is a 78 y.o. female with a medical diagnosis of Debility and presents with overall decline in functional mobility. Patient would continue to benefit from skilled PT to address functional limitations listed below in order to return to PLOF/decrease caregiver burden.      Rehab identified problem list/impairments: weakness, impaired endurance, impaired self care skills, impaired functional mobility, impaired balance, decreased coordination, decreased upper extremity function, decreased lower extremity function, decreased safety awareness, decreased ROM, impaired coordination,  impaired cardiopulmonary response to activity    Rehab potential is fair.    Activity tolerance: Fair    Discharge recommendations: Moderate Intensity Therapy      Barriers to discharge:      Equipment recommendations: to be determined by next level of care     GOALS:   Multidisciplinary Problems       Physical Therapy Goals          Problem: Physical Therapy    Goal Priority Disciplines Outcome Goal Variances Interventions   Physical Therapy Goal     PT, PT/OT      Description: LT/15/24  1. PT WILL COMPLETE BED MOBILITY IND  2. PT WILL STAND T/F TO CHAIR WITH RW AND CGA FOR OOB TOLERANCE.  3. PT WILL COMPLETE B LE TE X 20 REPS FOR LE STRENGTHENING.   4. PT WILL INC AMPAC SCORE BY 2 POINTS TO PROGRESS GROSS FUNC MOBILITY.                          PLAN:    Patient to be seen 3 x/week to address the above listed problems via therapeutic activities, therapeutic exercises  Plan of Care expires: 04/15/24  Plan of Care reviewed with: patient         2024

## 2024-04-03 NOTE — PLAN OF CARE
[facility] - Telemetry (Hospital)  Discharge Final Note    Primary Care Provider: Jordana Shirley MD    Expected Discharge Date: 4/3/2024    Final Discharge Note (most recent)       Final Note - 04/03/24 1135          Final Note    Assessment Type Final Discharge Note     Anticipated Discharge Disposition Skilled Nursing Facility        Post-Acute Status    Post-Acute Authorization Placement     Post-Acute Placement Status Set-up Complete/Auth obtained     Coverage Medicare A & B     Discharge Delays None known at this time                     Important Message from Medicare             DC Disposition: [facility]  Family Notified: Patient at bedside and daughter over the phone  Transportation: [facility] - transportation    Patient needed SNF placement upon DC. Patient and family decided on [facility] for placement. JINA sent referral for review and insurance approval. [place], with [facility], received approval and gave SW number for nurse report. JINA messages nurse with number for report. Per [place]essa, with [facility], transportation will be at bedside shortly to collect Patient for DC.

## 2024-04-03 NOTE — PLAN OF CARE
04/03/24 1055   Post-Acute Status   Post-Acute Authorization Placement   Post-Acute Placement Status Set-up Complete/Auth obtained   Coverage Medicare A & B   Discharge Plan   Discharge Plan A Skilled Nursing Facility       JINA uploaded AVS and DC orders to [facility] for [facility].   JINA let [facility] liaison, Odessa, know of information in [facility] and pending number for nurse report and  time.   JINA received a call from Patient's daughter, Mary, who inquired about clarification of Patient being DC today and  time. JINA stated that Patient was going to be discharged today and  time was not set up via [facility]. JINA stated she would be working with [facility] to get  time established and DC patient today. Patient's daughter verbalized understanding.    11 08 SW meet with Patient at bedside, letting her know DC today, to [facility]. Patient was agreeable to DC and inquired about transportation time. JINA stated she would let Patient's bedside nurse know of  time once it was arranged.   Odessa with [facility], provided number for nurse report and stated she is working on transportation.   JINA sent number for nurse report to bedside nurse.     JINA will continue to follow and assist as needed.

## 2024-04-03 NOTE — PT/OT/SLP PROGRESS
"Occupational Therapy   Treatment    Name: Niru Reyes  MRN: [id number]  Admitting Diagnosis:  Debility       Recommendations:     Discharge Recommendations: Moderate Intensity Therapy  Discharge Equipment Recommendations:  to be determined by next level of care  Barriers to discharge:  Decreased caregiver support    Assessment:     Niru Reyes is a 78 y.o. female with a medical diagnosis of Debility.  She presents with the following performance deficits affecting function are weakness, impaired endurance, impaired self care skills, impaired functional mobility, impaired balance, decreased safety awareness, decreased upper extremity function, decreased lower extremity function, impaired fine motor, impaired cardiopulmonary response to activity, edema.     Rehab Prognosis:  Fair; patient would benefit from acute skilled OT services to address these deficits and reach maximum level of function.       Plan:     Patient to be seen 2 x/week to address the above listed problems via self-care/home management, therapeutic activities, therapeutic exercises  Plan of Care Expires: 04/15/24  Plan of Care Reviewed with: patient    Subjective     Chief Complaint: Reported "I fell the other day and now I am scared."  Patient/Family Comments/goals: increase independence with mobility  Pain/Comfort:  Pain Rating 1: 0/10    Objective:     Communicated with: NurseBert, prior to session.  Patient found supine with oxygen, telemetry, peripheral IV upon OT entry to room.    General Precautions: Standard, fall    Orthopedic Precautions:N/A  Braces: N/A  Respiratory Status: Nasal cannula, flow 2 L/min     Occupational Performance:     Bed Mobility:    Patient completed Supine to Sit with contact guard assistance     Functional Mobility/Transfers:  Patient completed Sit <> Stand Transfer with minimum assistance and of 2 persons  with  rolling walker (from significantly elevated hospital bed.)  Patient completed Bed <> Chair Transfer using " Step Transfer technique with minimum assistance and of 2 persons with rolling walker  Sit>stand from bedside chair with mod A of 2 to adjust brief, able to sustain >10 seconds. Rapid fatigue  Provided with significant education and cueing for technique with transfers to increase safety and independence with completion  Encouragement provided throughout as patient with significant fear re: falling  Fatigues quickly    Activities of Daily Living:  Grooming: minimum assistance for washing hair with shower cap and combing hair  Upper Body Dressing: minimum assistance donning new gown while seated EOB  Lower Body Dressing: total assistance donning pull tab brief  Toileting: noted to be soiled at therapist entry. Transitioned to EOB and then engaged in LB and josé hygiene with wet disposable cloths (setup for completion). Required additional therapist A for josé hygiene while in standing due to poor quality. Gross toileting max A    [facility] 6 Click ADL: 14    Treatment & Education: (2029-3055)  Patient tolerated session fair overall. Rapid fatigue with activity. Encouraged completion of B UE AROM therex throughout the day to increase functional strength and activity tolerance needed for ADL completion. Educated on benefits of OOB activity and importance of calling for A to transfer back to bed. Will progress as safely able.    Patient left up in chair with all lines intact, call button in reach, and chair alarm on    Treatment & Education:  Therapist returned to room to obtain bariatric walker and patient remained OOB in chair. She requested transfer back to bed. Attempted sit>stand with max A of 2 and max v/c for technique, and patient unable to complete. Positioned patient to allow her to pull up from bed rail at end of bed. Able to clear bottom from chair with max A of 2, however, highly resistive due to fear of falling when therapists attempted squat>swing back to bed. Therapy tech presented to room to decrease patient  anxiety with increased personnel presence. Completed squat>swing to EOB with total A of 3. Sit>sup with max A of 2. Supine scooting with max A of 2 with bed in trend. Will continue to progress as physically able.    Patient left HOB elevated with all lines intact and call button in reach    GOALS:   Multidisciplinary Problems       Occupational Therapy Goals          Problem: Occupational Therapy    Goal Priority Disciplines Outcome Interventions   Occupational Therapy Goal     OT, PT/OT Ongoing, Progressing    Description: O.T. GOALS TO BE MET by 4/15/24  MIN A WITH LE DRESSING  PT WILL TOLERATE 1 SET X 15 REPS B UE ROM EXERCISE  MIN A WITH BSC TRANSFERS                       Time Tracking:     OT Date of Treatment: 04/02/24  OT Start Time: 0810 (1210)  OT Stop Time: 0850 (1235)  OT Total Time (min): 40 min (25 min)    Billable Minutes:Therapeutic Activity 40  @ 0810  Therapeutic Activity 25 @ 1210    Juana Fuentes, OT  4/2/2024

## 2024-04-05 LAB
BACTERIA BLD CULT: NORMAL
BACTERIA BLD CULT: NORMAL

## 2024-04-15 ENCOUNTER — EXTERNAL HOME HEALTH (OUTPATIENT)
Dept: HOME HEALTH SERVICES | Facility: HOSPITAL | Age: 79
End: 2024-04-15
Payer: MEDICARE

## 2024-04-16 ENCOUNTER — DOCUMENT SCAN (OUTPATIENT)
Dept: HOME HEALTH SERVICES | Facility: HOSPITAL | Age: 79
End: 2024-04-16
Payer: MEDICARE

## 2024-04-22 ENCOUNTER — DOCUMENT SCAN (OUTPATIENT)
Dept: HOME HEALTH SERVICES | Facility: HOSPITAL | Age: 79
End: 2024-04-22
Payer: MEDICARE

## 2024-05-05 PROCEDURE — G0180 MD CERTIFICATION HHA PATIENT: HCPCS | Mod: ,,, | Performed by: FAMILY MEDICINE

## 2024-05-14 ENCOUNTER — TELEPHONE (OUTPATIENT)
Dept: FAMILY MEDICINE | Facility: CLINIC | Age: 79
End: 2024-05-14
Payer: MEDICARE

## 2024-05-14 NOTE — TELEPHONE ENCOUNTER
Problem: Depressed Mood (Adult/Pediatric)  Goal: *STG: Remains safe in hospital  Outcome: Progressing Towards Goal     Problem: Depressed Mood (Adult/Pediatric)  Goal: *STG: Complies with medication therapy  Outcome: Progressing Towards Goal     Patient has been safe this shift. Patient was medication compliant. Patient remains on close observation. Patient has been alert, oriented, cooperative and pleasant. Patient has been up on the unit on occasion. Patient has not voiced any SI or HI. Continue to assess. Medication compliant. Since going to bed, patient has been laying down quietly with his eyes closed. Spoke to pt's daughter, she states pt is currently receiving [facility] and she has been recently diagnosed with CHF and Afib all since March. She states pt is homebound and it is very hard to get her out and to appointments. She is willing to schedule virtual visit. Appt scheduled tomorrow at 4:20 pm. She says that [facility] mentioned a hospital bed for pt but we have not received anything in regards to this. Pt's daughter has some concerns and will mention on virtual appt.

## 2024-05-14 NOTE — TELEPHONE ENCOUNTER
----- Message from Osmel Mathur sent at 5/14/2024 10:03 AM CDT -----  Contact: [phone]  Calling requesting medication refill , furosemide (LASIX) 40 MG tablet also is calling regarding oxygen dropping 89% and sometimes ranges from 80-91, vitals are normal, pt is also requesting something to help her sleep at night/ f/u on hospital bed orders. Please call back at [phone] . Thanksdj

## 2024-05-14 NOTE — TELEPHONE ENCOUNTER
----- Message from Ivette Ledesma sent at 5/14/2024 10:56 AM CDT -----  Contact: Mary- patient daughter  Mary would like a call back at [phone], in regards to consulting with the nurse about the pt needs due to her being homebound.

## 2024-05-15 ENCOUNTER — OFFICE VISIT (OUTPATIENT)
Dept: FAMILY MEDICINE | Facility: CLINIC | Age: 79
End: 2024-05-15
Attending: FAMILY MEDICINE
Payer: MEDICARE

## 2024-05-15 ENCOUNTER — TELEPHONE (OUTPATIENT)
Dept: FAMILY MEDICINE | Facility: CLINIC | Age: 79
End: 2024-05-15
Payer: MEDICARE

## 2024-05-15 DIAGNOSIS — I50.30 DIASTOLIC CONGESTIVE HEART FAILURE, UNSPECIFIED HF CHRONICITY: Primary | ICD-10-CM

## 2024-05-15 DIAGNOSIS — M17.0 PRIMARY OSTEOARTHRITIS OF BOTH KNEES: ICD-10-CM

## 2024-05-15 DIAGNOSIS — I48.91 ATRIAL FIBRILLATION WITH RVR: ICD-10-CM

## 2024-05-15 DIAGNOSIS — R09.02 HYPOXIA: ICD-10-CM

## 2024-05-15 DIAGNOSIS — E66.01 MORBID OBESITY: ICD-10-CM

## 2024-05-15 PROBLEM — J96.01 ACUTE HYPOXEMIC RESPIRATORY FAILURE: Status: RESOLVED | Noted: 2024-03-14 | Resolved: 2024-05-15

## 2024-05-15 PROBLEM — R60.0 BILATERAL LOWER EXTREMITY EDEMA: Status: RESOLVED | Noted: 2023-03-10 | Resolved: 2024-05-15

## 2024-05-15 PROBLEM — J12.82 PNEUMONIA DUE TO COVID-19 VIRUS: Status: RESOLVED | Noted: 2024-03-14 | Resolved: 2024-05-15

## 2024-05-15 PROBLEM — U07.1 PNEUMONIA DUE TO COVID-19 VIRUS: Status: RESOLVED | Noted: 2024-03-14 | Resolved: 2024-05-15

## 2024-05-15 PROBLEM — R53.81 DEBILITY: Status: RESOLVED | Noted: 2024-03-31 | Resolved: 2024-05-15

## 2024-05-15 PROBLEM — D62 ACUTE BLOOD LOSS ANEMIA: Status: RESOLVED | Noted: 2023-03-07 | Resolved: 2024-05-15

## 2024-05-15 PROBLEM — L03.90 CELLULITIS: Status: RESOLVED | Noted: 2023-03-09 | Resolved: 2024-05-15

## 2024-05-15 PROBLEM — Z75.8 DISCHARGE PLANNING ISSUES: Status: RESOLVED | Noted: 2023-03-13 | Resolved: 2024-05-15

## 2024-05-15 PROBLEM — Z12.11 ENCOUNTER FOR SCREENING COLONOSCOPY: Status: RESOLVED | Noted: 2019-11-25 | Resolved: 2024-05-15

## 2024-05-15 PROCEDURE — 99215 OFFICE O/P EST HI 40 MIN: CPT | Mod: 95,,, | Performed by: FAMILY MEDICINE

## 2024-05-15 PROCEDURE — 99417 PROLNG OP E/M EACH 15 MIN: CPT | Mod: 95,,, | Performed by: FAMILY MEDICINE

## 2024-05-15 RX ORDER — AMLODIPINE BESYLATE 5 MG/1
5 TABLET ORAL DAILY
Qty: 30 TABLET | Refills: 11 | Status: SHIPPED | OUTPATIENT
Start: 2024-05-15 | End: 2025-05-15

## 2024-05-15 RX ORDER — FUROSEMIDE 40 MG/1
40 TABLET ORAL DAILY
Qty: 30 TABLET | Refills: 0 | Status: ON HOLD | OUTPATIENT
Start: 2024-05-15 | End: 2024-05-22 | Stop reason: HOSPADM

## 2024-05-15 RX ORDER — ALBUTEROL SULFATE 90 UG/1
2 AEROSOL, METERED RESPIRATORY (INHALATION) EVERY 6 HOURS
Qty: 18 G | Refills: 0 | Status: SHIPPED | OUTPATIENT
Start: 2024-05-15

## 2024-05-15 RX ORDER — METOPROLOL SUCCINATE 50 MG/1
TABLET, EXTENDED RELEASE ORAL
Qty: 90 TABLET | OUTPATIENT
Start: 2024-05-15

## 2024-05-15 RX ORDER — METOPROLOL SUCCINATE 50 MG/1
50 TABLET, EXTENDED RELEASE ORAL DAILY
Qty: 30 TABLET | Refills: 0 | Status: SHIPPED | OUTPATIENT
Start: 2024-05-15 | End: 2025-05-15

## 2024-05-15 NOTE — TELEPHONE ENCOUNTER
Spoke to Davina at [facility], she was just calling to confirm if Dr. Myers was still pt's PCP and if she will follow her. I advised her that she is and she has a video visit appt with Dr. Myers this afternoon. She verbalized understanding.   She also gave fax number so we can send them the order for hospital bed. [phone].

## 2024-05-15 NOTE — TELEPHONE ENCOUNTER
----- Message from Johana Green sent at 5/15/2024  7:39 AM CDT -----  Contact: daughter  [phone]  Patients daughter called in this morning to make sure that the patients appointment this afternoon is going to be virtual. [facility]  [phone]. Thanks mason

## 2024-05-15 NOTE — TELEPHONE ENCOUNTER
----- Message from Lottie Garland sent at 5/15/2024 10:55 AM CDT -----  .Type:  Needs Medical Advice    Who Called: Davina with [facility]    Would the patient rather a call back or a response via [facility]? Call back  Best Call Back Number:   Additional Information:     Davina would like a call back b/c she wants to know if the pt follow up with the doctor the past few months please call back before pt appt today at 4:20

## 2024-05-15 NOTE — TELEPHONE ENCOUNTER
No care due was identified.  [facility] Embedded Care Due Messages. Reference number: [id number].   5/15/2024 5:31:15 PM CDT

## 2024-05-15 NOTE — PROGRESS NOTES
"Subjective:       Patient ID: Niru Reyes is a 78 y.o. female.    Chief Complaint: No chief complaint on file.    Transitional Care Note    Family and/or Caretaker present at visit?  Yes.  Diagnostic tests reviewed/disposition: I have reviewed all completed as well as pending diagnostic tests at the time of discharge.  Disease/illness education: yes   Home health/community services discussion/referrals: Patient has home health established at  [facility] .   Establishment or re-establishment of referral orders for community resources: No other necessary community resources.   Discussion with other health care providers: No discussion with other health care providers necessary.         78 y old female with D CHF , morbid obesity , Knee oa, HTN , newly diagnosed a fib following  via Video visit on hospitalization  on  3/14  due to sob . She was transported by EMS . O2 at er was 83% . She was started on 8 L lts of oxygen which improved oxygen level . She did received a Duo neb treatment and shortly after developed A fib . She was also found to have COVID complicated with pneumonia and CHF exacerbation  . Started on remdesevir , dexamethasone , IV lasix , lovenox and abx . Cardiology was consulted who  started Toprol and Eliquis and Recommended Electrophysiology consult as outpatient .  She was recommended SNF admission but she  declined . She opted  for home health . She unfortunately was hospitalized  on 3/31 after sustaining a fall .  "In the ED: Hgb noted to be marginally low measuring 10.4. , recent Echo on 3/15 showed: EF 50-55%.   Given decompensation since discharge patient would like to seek SNF placement. Will be admitted to OBS" She was eventually discharged on 4/3 to [facility] . She was provided oxygen . She was just discharged from [facility] on 5/1 . Doing fair . She has been walking up sob and her o2 level has been on the low 80's . She is requesting a hospital bed and oxygen . Her current home " health agency is [facility] however they are not pleased with their care since they were not informed that they needed to be seen by provider so she could have the hospital bed . She continues to be homebound .Her Daughter  Mary  who lives in [place] goes to her house every morning  and afternoon to help get  off her bed and get back in bed . She has also gained 20 lbs since being discharged from Hospital                         Shortness of Breath  This is a recurrent problem. The current episode started more than 1 month ago. The problem occurs daily. The problem has been waxing and waning. The average episode lasts 5 hours. Associated symptoms include claudication, leg pain, leg swelling, orthopnea, PND and wheezing. Pertinent negatives include no abdominal pain, chest pain, coryza, ear pain, fever, headaches, hemoptysis, neck pain, rash, rhinorrhea, sore throat, sputum production, swollen glands, syncope or vomiting. Risk factors include prolonged immobilization. She has tried cool air and steroid inhalers for the symptoms. The treatment provided moderate relief. Her past medical history is significant for a heart failure. There is no history of allergies, aspirin allergies, asthma, bronchiolitis, CAD, chronic lung disease, COPD, DVT, PE, pneumonia or a recent surgery.     Review of Systems   Constitutional:  Negative for fever.   HENT:  Negative for ear pain, rhinorrhea and sore throat.    Respiratory:  Positive for shortness of breath and wheezing. Negative for hemoptysis and sputum production.    Cardiovascular:  Positive for orthopnea, claudication, leg swelling and PND. Negative for chest pain and syncope.   Gastrointestinal:  Negative for abdominal pain and vomiting.   Musculoskeletal:  Negative for neck pain.   Skin:  Negative for rash.   Neurological:  Negative for headaches.       Objective:      Physical Exam  Constitutional:       Appearance: She is obese.   HENT:      Head: Normocephalic and atraumatic.    Eyes:      Extraocular Movements: Extraocular movements intact.   Pulmonary:      Effort: Pulmonary effort is normal.   Skin:     Coloration: Skin is not jaundiced or pale.   Neurological:      General: No focal deficit present.      Mental Status: She is oriented to person, place, and time.   Psychiatric:         Mood and Affect: Mood normal.         Behavior: Behavior normal.         Thought Content: Thought content normal.         Judgment: Judgment normal.         Assessment:       Diagnoses and all orders for this visit:    Diastolic congestive heart failure, unspecified HF chronicity  -     Ambulatory referral/consult to Home Health; Future  -     CBC Auto Differential; Future  -     Comprehensive Metabolic Panel; Future  -     BNP; Future  -     HOSPITAL BED FOR HOME USE    Atrial fibrillation with RVR  -     Ambulatory referral/consult to Home Health; Future  -     Ambulatory referral/consult to Cardiology; Future  -     HOSPITAL BED FOR HOME USE    Morbid obesity  -     Ambulatory referral/consult to Home Health; Future  -     HOSPITAL BED FOR HOME USE    Hypoxia  -     CBC Auto Differential; Future  -     Ambulatory referral/consult to Pulmonology; Future  -     HOSPITAL BED FOR HOME USE    Primary osteoarthritis of both knees    Other orders  -     metoprolol succinate (TOPROL-XL) 50 MG 24 hr tablet; Take 1 tablet (50 mg total) by mouth once daily.  -     amLODIPine (NORVASC) 5 MG tablet; Take 1 tablet (5 mg total) by mouth once daily.  -     apixaban (ELIQUIS) 5 mg Tab; Take 1 tablet (5 mg total) by mouth 2 (two) times daily.  -     furosemide (LASIX) 40 MG tablet; Take 1 tablet (40 mg total) by mouth once daily.  -     albuterol (PROVENTIL/VENTOLIN HFA) 90 mcg/actuation inhaler; Inhale 2 puffs into the lungs every 6 (six) hours. Rescue       Plan:   We will place  consult to assist with community resources : transportation to appointments and possibly a sitter   Labs ASAP   WS discussed   Time  spent: 60  minutes in face to face discussion concerning diagnosis, prognosis, review of lab and test results, benefits of treatment as well as management of disease, counseling of patient and coordination of care between various health care providers . Greater than half the time spent was used for coordination of care and counseling of patient.       The patient location is: Home   The chief complaint leading to consultation is: Hospital / skill nursing f.u     Visit type: audiovisual    Face to Face time with patient: 60   minutes of total time spent on the encounter, which includes face to face time and non-face to face time preparing to see the patient (eg, review of tests), Obtaining and/or reviewing separately obtained history, Documenting clinical information in the electronic or other health record, Independently interpreting results (not separately reported) and communicating results to the patient/family/caregiver, or Care coordination (not separately reported).         Each patient to whom he or she provides medical services by telemedicine is:  (1) informed of the relationship between the physician and patient and the respective role of any other health care provider with respect to management of the patient; and (2) notified that he or she may decline to receive medical services by telemedicine and may withdraw from such care at any time.    Notes:

## 2024-05-15 NOTE — TELEPHONE ENCOUNTER
Spoke to pt's daughter, informed her that appointment is virtual.  She also mentioned that home health nurse told her to ask us about oxygen. Says her oxygen has been low. She was sent home with oxygen but returned it because she was doing better. Informed her that she would need to see Pulmonology but this can be discussed at appointment. She verbalized understanding.

## 2024-05-16 NOTE — TELEPHONE ENCOUNTER
Refill Decision Note   Niru Reyes  is requesting a refill authorization.  Brief Assessment and Rationale for Refill:  Quick Discontinue     Medication Therapy Plan:  Receipt confirmed by pharmacy (5/15/2024  5:07 PM CDT)      Comments:     Note composed:7:02 PM 05/15/2024

## 2024-05-17 ENCOUNTER — TELEPHONE (OUTPATIENT)
Dept: FAMILY MEDICINE | Facility: CLINIC | Age: 79
End: 2024-05-17
Payer: MEDICARE

## 2024-05-17 NOTE — TELEPHONE ENCOUNTER
----- Message from Leia Wilson sent at 5/17/2024  1:11 PM CDT -----  Contact: Mary/ Daughter  .Type:  Patient Returning Call    Who Called: Niru  Who Left Message for Patient: Mary   Does the patient know what this is regarding?: yes   Would the patient rather a call back or a response via [facility]?  Call back   Best Call Back Number: [phone]  Additional Information:      Thanks

## 2024-05-17 NOTE — TELEPHONE ENCOUNTER
Called pt back I did not get an answer, I left a voicemail on Mary's phone to give the office a call back regarding some Question  has regarding pt.

## 2024-05-17 NOTE — TELEPHONE ENCOUNTER
Spoke with patient daughter/Mary regarding patient oxygen tank was not received. Patient still waiting on [facility] to contact her. Patient 02sat 90%. Sending message to Dr. Myers. Awaiting response.

## 2024-05-17 NOTE — TELEPHONE ENCOUNTER
Please contact daughtre/ patient and find out what happened to o2 tank prescribed when she was discharged from [facility] . Also , has she been contact by home health, lastly , this low oxygen levels have been obtained by home health nurse or she has oximeter  ?

## 2024-05-17 NOTE — TELEPHONE ENCOUNTER
----- Message from Deo Enrique sent at 5/17/2024  2:07 PM CDT -----  Contact: Mary/daughter  Type:  Patient Returning Call    Who Called:Mary   Who Left Message for Patient:Nurse  Does the patient know what this is regarding?:missed call,scheduling appt  Would the patient rather a call back or a response via [facility]? call  Best Call Back Number:[phone] or [phone]  Additional Information:

## 2024-05-18 ENCOUNTER — NURSE TRIAGE (OUTPATIENT)
Dept: ADMINISTRATIVE | Facility: CLINIC | Age: 79
End: 2024-05-18
Payer: MEDICARE

## 2024-05-18 ENCOUNTER — HOSPITAL ENCOUNTER (INPATIENT)
Facility: HOSPITAL | Age: 79
LOS: 5 days | Discharge: HOSPICE | DRG: 291 | End: 2024-05-23
Attending: EMERGENCY MEDICINE | Admitting: INTERNAL MEDICINE
Payer: MEDICARE

## 2024-05-18 DIAGNOSIS — R07.9 CHEST PAIN: ICD-10-CM

## 2024-05-18 DIAGNOSIS — R09.02 HYPOXIA: ICD-10-CM

## 2024-05-18 DIAGNOSIS — J90 PLEURAL EFFUSION: ICD-10-CM

## 2024-05-18 DIAGNOSIS — I50.9 ACUTE ON CHRONIC CONGESTIVE HEART FAILURE, UNSPECIFIED HEART FAILURE TYPE: Primary | ICD-10-CM

## 2024-05-18 DIAGNOSIS — R06.00 DYSPNEA: ICD-10-CM

## 2024-05-18 DIAGNOSIS — J96.01 ACUTE HYPOXEMIC RESPIRATORY FAILURE: ICD-10-CM

## 2024-05-18 DIAGNOSIS — J18.9 PNEUMONIA DUE TO INFECTIOUS ORGANISM, UNSPECIFIED LATERALITY, UNSPECIFIED PART OF LUNG: ICD-10-CM

## 2024-05-18 PROBLEM — Z78.9 DIFFICULTY WITH ACTIVITIES OF DAILY LIVING: Status: ACTIVE | Noted: 2024-05-18

## 2024-05-18 LAB
ALBUMIN SERPL BCP-MCNC: 3.3 G/DL (ref 3.5–5.2)
ALLENS TEST: ABNORMAL
ALP SERPL-CCNC: 76 U/L (ref 55–135)
ALT SERPL W/O P-5'-P-CCNC: 7 U/L (ref 10–44)
ANION GAP SERPL CALC-SCNC: 11 MMOL/L (ref 8–16)
AST SERPL-CCNC: 17 U/L (ref 10–40)
BACTERIA #/AREA URNS HPF: NORMAL /HPF
BASOPHILS # BLD AUTO: 0.06 K/UL (ref 0–0.2)
BASOPHILS NFR BLD: 0.6 % (ref 0–1.9)
BILIRUB SERPL-MCNC: 1.2 MG/DL (ref 0.1–1)
BILIRUB UR QL STRIP: NEGATIVE
BNP SERPL-MCNC: 476 PG/ML (ref 0–99)
BUN SERPL-MCNC: 15 MG/DL (ref 8–23)
CALCIUM SERPL-MCNC: 8.8 MG/DL (ref 8.7–10.5)
CHLORIDE SERPL-SCNC: 103 MMOL/L (ref 95–110)
CLARITY UR: CLEAR
CO2 SERPL-SCNC: 26 MMOL/L (ref 23–29)
COLOR UR: YELLOW
CREAT SERPL-MCNC: 0.7 MG/DL (ref 0.5–1.4)
DELSYS: ABNORMAL
DIFFERENTIAL METHOD BLD: ABNORMAL
EOSINOPHIL # BLD AUTO: 0.1 K/UL (ref 0–0.5)
EOSINOPHIL NFR BLD: 1.4 % (ref 0–8)
ERYTHROCYTE [DISTWIDTH] IN BLOOD BY AUTOMATED COUNT: 16.1 % (ref 11.5–14.5)
EST. GFR  (NO RACE VARIABLE): >60 ML/MIN/1.73 M^2
GLUCOSE SERPL-MCNC: 105 MG/DL (ref 70–110)
GLUCOSE UR QL STRIP: NEGATIVE
HCO3 UR-SCNC: 29.6 MMOL/L (ref 24–28)
HCT VFR BLD AUTO: 38.4 % (ref 37–48.5)
HGB BLD-MCNC: 11.6 G/DL (ref 12–16)
HGB UR QL STRIP: NEGATIVE
IMM GRANULOCYTES # BLD AUTO: 0.05 K/UL (ref 0–0.04)
IMM GRANULOCYTES NFR BLD AUTO: 0.5 % (ref 0–0.5)
KETONES UR QL STRIP: NEGATIVE
LEUKOCYTE ESTERASE UR QL STRIP: ABNORMAL
LYMPHOCYTES # BLD AUTO: 1 K/UL (ref 1–4.8)
LYMPHOCYTES NFR BLD: 10.9 % (ref 18–48)
MCH RBC QN AUTO: 25.4 PG (ref 27–31)
MCHC RBC AUTO-ENTMCNC: 30.2 G/DL (ref 32–36)
MCV RBC AUTO: 84 FL (ref 82–98)
MICROSCOPIC COMMENT: NORMAL
MODE: ABNORMAL
MONOCYTES # BLD AUTO: 0.7 K/UL (ref 0.3–1)
MONOCYTES NFR BLD: 7.7 % (ref 4–15)
NEUTROPHILS # BLD AUTO: 7.5 K/UL (ref 1.8–7.7)
NEUTROPHILS NFR BLD: 78.9 % (ref 38–73)
NITRITE UR QL STRIP: NEGATIVE
NRBC BLD-RTO: 0 /100 WBC
PCO2 BLDA: 53.8 MMHG (ref 35–45)
PH SMN: 7.35 [PH] (ref 7.35–7.45)
PH UR STRIP: 7 [PH] (ref 5–8)
PLATELET # BLD AUTO: 185 K/UL (ref 150–450)
PMV BLD AUTO: 10 FL (ref 9.2–12.9)
PO2 BLDA: 60 MMHG (ref 80–100)
POC BE: 4 MMOL/L
POC SATURATED O2: 89 % (ref 95–100)
POTASSIUM SERPL-SCNC: 4.6 MMOL/L (ref 3.5–5.1)
PROT SERPL-MCNC: 6.5 G/DL (ref 6–8.4)
PROT UR QL STRIP: ABNORMAL
RBC # BLD AUTO: 4.56 M/UL (ref 4–5.4)
SAMPLE: ABNORMAL
SITE: ABNORMAL
SODIUM SERPL-SCNC: 140 MMOL/L (ref 136–145)
SP GR UR STRIP: 1.01 (ref 1–1.03)
SQUAMOUS #/AREA URNS HPF: 1 /HPF
TROPONIN I SERPL DL<=0.01 NG/ML-MCNC: <0.006 NG/ML (ref 0–0.03)
TROPONIN I SERPL DL<=0.01 NG/ML-MCNC: <0.006 NG/ML (ref 0–0.03)
URN SPEC COLLECT METH UR: ABNORMAL
UROBILINOGEN UR STRIP-ACNC: NEGATIVE EU/DL
WBC # BLD AUTO: 9.49 K/UL (ref 3.9–12.7)
WBC #/AREA URNS HPF: 1 /HPF (ref 0–5)

## 2024-05-18 PROCEDURE — 82803 BLOOD GASES ANY COMBINATION: CPT

## 2024-05-18 PROCEDURE — 21400001 HC TELEMETRY ROOM

## 2024-05-18 PROCEDURE — 80053 COMPREHEN METABOLIC PANEL: CPT | Performed by: EMERGENCY MEDICINE

## 2024-05-18 PROCEDURE — 99291 CRITICAL CARE FIRST HOUR: CPT

## 2024-05-18 PROCEDURE — 93010 ELECTROCARDIOGRAM REPORT: CPT | Mod: ,,, | Performed by: INTERNAL MEDICINE

## 2024-05-18 PROCEDURE — 99900035 HC TECH TIME PER 15 MIN (STAT)

## 2024-05-18 PROCEDURE — 36600 WITHDRAWAL OF ARTERIAL BLOOD: CPT

## 2024-05-18 PROCEDURE — 87040 BLOOD CULTURE FOR BACTERIA: CPT | Mod: 59 | Performed by: EMERGENCY MEDICINE

## 2024-05-18 PROCEDURE — 83880 ASSAY OF NATRIURETIC PEPTIDE: CPT | Performed by: EMERGENCY MEDICINE

## 2024-05-18 PROCEDURE — 25000003 PHARM REV CODE 250: Performed by: EMERGENCY MEDICINE

## 2024-05-18 PROCEDURE — 85025 COMPLETE CBC W/AUTO DIFF WBC: CPT | Performed by: EMERGENCY MEDICINE

## 2024-05-18 PROCEDURE — 63600175 PHARM REV CODE 636 W HCPCS: Performed by: NURSE PRACTITIONER

## 2024-05-18 PROCEDURE — 84484 ASSAY OF TROPONIN QUANT: CPT | Mod: 91 | Performed by: NURSE PRACTITIONER

## 2024-05-18 PROCEDURE — 36415 COLL VENOUS BLD VENIPUNCTURE: CPT | Performed by: NURSE PRACTITIONER

## 2024-05-18 PROCEDURE — 27000221 HC OXYGEN, UP TO 24 HOURS

## 2024-05-18 PROCEDURE — 84484 ASSAY OF TROPONIN QUANT: CPT | Performed by: EMERGENCY MEDICINE

## 2024-05-18 PROCEDURE — 81000 URINALYSIS NONAUTO W/SCOPE: CPT | Performed by: EMERGENCY MEDICINE

## 2024-05-18 PROCEDURE — 93005 ELECTROCARDIOGRAM TRACING: CPT

## 2024-05-18 PROCEDURE — 63600175 PHARM REV CODE 636 W HCPCS: Performed by: EMERGENCY MEDICINE

## 2024-05-18 RX ORDER — IBUPROFEN 200 MG
16 TABLET ORAL
Status: DISCONTINUED | OUTPATIENT
Start: 2024-05-18 | End: 2024-05-23 | Stop reason: HOSPADM

## 2024-05-18 RX ORDER — SODIUM CHLORIDE 0.9 % (FLUSH) 0.9 %
10 SYRINGE (ML) INJECTION
Status: DISCONTINUED | OUTPATIENT
Start: 2024-05-18 | End: 2024-05-23 | Stop reason: HOSPADM

## 2024-05-18 RX ORDER — BISACODYL 10 MG/1
10 SUPPOSITORY RECTAL DAILY PRN
Status: DISCONTINUED | OUTPATIENT
Start: 2024-05-18 | End: 2024-05-23 | Stop reason: HOSPADM

## 2024-05-18 RX ORDER — GLUCAGON 1 MG
1 KIT INJECTION
Status: DISCONTINUED | OUTPATIENT
Start: 2024-05-18 | End: 2024-05-23 | Stop reason: HOSPADM

## 2024-05-18 RX ORDER — FUROSEMIDE 10 MG/ML
80 INJECTION INTRAMUSCULAR; INTRAVENOUS EVERY 12 HOURS
Status: DISCONTINUED | OUTPATIENT
Start: 2024-05-18 | End: 2024-05-21

## 2024-05-18 RX ORDER — NALOXONE HCL 0.4 MG/ML
0.02 VIAL (ML) INJECTION
Status: DISCONTINUED | OUTPATIENT
Start: 2024-05-18 | End: 2024-05-23 | Stop reason: HOSPADM

## 2024-05-18 RX ORDER — IBUPROFEN 200 MG
24 TABLET ORAL
Status: DISCONTINUED | OUTPATIENT
Start: 2024-05-18 | End: 2024-05-23 | Stop reason: HOSPADM

## 2024-05-18 RX ORDER — SODIUM CHLORIDE 0.9 % (FLUSH) 0.9 %
10 SYRINGE (ML) INJECTION EVERY 12 HOURS PRN
Status: DISCONTINUED | OUTPATIENT
Start: 2024-05-18 | End: 2024-05-23 | Stop reason: HOSPADM

## 2024-05-18 RX ORDER — FUROSEMIDE 10 MG/ML
80 INJECTION INTRAMUSCULAR; INTRAVENOUS
Status: COMPLETED | OUTPATIENT
Start: 2024-05-18 | End: 2024-05-18

## 2024-05-18 RX ORDER — MORPHINE SULFATE 4 MG/ML
2 INJECTION, SOLUTION INTRAMUSCULAR; INTRAVENOUS EVERY 4 HOURS PRN
Status: DISCONTINUED | OUTPATIENT
Start: 2024-05-18 | End: 2024-05-22

## 2024-05-18 RX ORDER — ONDANSETRON HYDROCHLORIDE 2 MG/ML
4 INJECTION, SOLUTION INTRAVENOUS EVERY 8 HOURS PRN
Status: DISCONTINUED | OUTPATIENT
Start: 2024-05-18 | End: 2024-05-23 | Stop reason: HOSPADM

## 2024-05-18 RX ORDER — ALUMINUM HYDROXIDE, MAGNESIUM HYDROXIDE, AND SIMETHICONE 1200; 120; 1200 MG/30ML; MG/30ML; MG/30ML
30 SUSPENSION ORAL 4 TIMES DAILY PRN
Status: DISCONTINUED | OUTPATIENT
Start: 2024-05-18 | End: 2024-05-23 | Stop reason: HOSPADM

## 2024-05-18 RX ORDER — TALC
6 POWDER (GRAM) TOPICAL NIGHTLY PRN
Status: DISCONTINUED | OUTPATIENT
Start: 2024-05-18 | End: 2024-05-23 | Stop reason: HOSPADM

## 2024-05-18 RX ORDER — ACETAMINOPHEN 325 MG/1
650 TABLET ORAL EVERY 4 HOURS PRN
Status: DISCONTINUED | OUTPATIENT
Start: 2024-05-18 | End: 2024-05-23 | Stop reason: HOSPADM

## 2024-05-18 RX ADMIN — FUROSEMIDE 80 MG: 10 INJECTION, SOLUTION INTRAMUSCULAR; INTRAVENOUS at 04:05

## 2024-05-18 RX ADMIN — AZITHROMYCIN MONOHYDRATE 500 MG: 500 INJECTION, POWDER, LYOPHILIZED, FOR SOLUTION INTRAVENOUS at 05:05

## 2024-05-18 RX ADMIN — CEFTRIAXONE 1 G: 1 INJECTION, POWDER, FOR SOLUTION INTRAMUSCULAR; INTRAVENOUS at 04:05

## 2024-05-18 RX ADMIN — FUROSEMIDE 80 MG: 10 INJECTION, SOLUTION INTRAMUSCULAR; INTRAVENOUS at 08:05

## 2024-05-18 NOTE — H&P
[facility] - Emergency Dept.  [facility]  History & Physical    Patient Name: Niru Reyes  MRN: [id number]  Patient Class: IP- Inpatient  Admission Date: 5/18/2024  Attending Physician: Nilson Gore MD   Primary Care Provider: Jordana Shirley MD         Patient information was obtained from patient, relative(s), past medical records, and ER records.     Subjective:     Principal Problem:Acute diastolic CHF (congestive heart failure)    Chief Complaint:   Chief Complaint   Patient presents with    Shortness of Breath     Pt woke up at 0300 with SOB, no longer had O2 pump at home, nursing home told her she no longer needed neb rx or inhaler so they discontinued 2 weeks ago.        HPI: 79 y.o. female patient, PMHx of HTN, CHF, morbid obesity and paroxysmal atrial fibrillation. Presented to the Emergency Department for evaluation of SOB which onset this morning at around 3 AM on day of arrival. When pt woke up at 3 AM, she states that her oxygen was at 68%, patient stated she did not want to awaken her family and waited to call her daughter until around 7-8 a.m.. SpO2 was in the 80s when her daughter arrived, called nurse triage line who instructed them to come to the ED. Symptoms are constant and moderate in severity. SOB worsens when laying flat. Associated sxs include wheezing and generalized weakness. Patient denies any fever, cough, CP, and all other sxs at this time. Pt is on LASIX. On 4/3/2024, pt was discharged from the hospital to [facility] for skilled therapy. They tried to wean her off oxygen and on 5/1/2024, she was taken off of oxygen. Pt stopped using Neb and inhalers 2 weeks ago. Pt also has UTI and was placed on antibiotics.  On discharge SNF patient weighed 421 lb, in the ED patient weighed 456 lb, 35# weight gain in 16 days.  In the ED, vitals: 143/43, 97, 20, 98 F, 96% on 4 L NC.  Significant labs:  Bili: 1.2, BNP: 476, ABG: pH:  7.349, PO2: 60.  CXR:moderate amount of  alveolar consolidation scattered throughout the inferior 2/3 of the right lung.  Right pleural effusion.  Treated with IV diuretic, IV antibiotics.  Patient is a full code.  Admitted to hospital medicine for management of acute on chronic CHF, acute respiratory failure.    Past Medical History:   Diagnosis Date    Hypertension     Paroxysmal atrial fibrillation        Past Surgical History:   Procedure Laterality Date     SECTION      COLONOSCOPY N/A 2019    Procedure: COLONOSCOPY;  Surgeon: Janeth Leroy MD;  Location: [facility];  Service: Endoscopy;  Laterality: N/A;    HYSTERECTOMY         Review of patient's allergies indicates:  No Known Allergies    No current facility-administered medications on file prior to encounter.     Current Outpatient Medications on File Prior to Encounter   Medication Sig    amLODIPine (NORVASC) 5 MG tablet Take 1 tablet (5 mg total) by mouth once daily.    apixaban (ELIQUIS) 5 mg Tab Take 1 tablet (5 mg total) by mouth 2 (two) times daily.    furosemide (LASIX) 40 MG tablet Take 1 tablet (40 mg total) by mouth once daily.    metoprolol succinate (TOPROL-XL) 50 MG 24 hr tablet Take 1 tablet (50 mg total) by mouth once daily.    albuterol (PROVENTIL/VENTOLIN HFA) 90 mcg/actuation inhaler Inhale 2 puffs into the lungs every 6 (six) hours. Rescue    celecoxib (CELEBREX) 100 MG capsule Take 1 capsule (100 mg total) by mouth 2 (two) times daily. (Patient not taking: Reported on 2024)    pulse oximeter (PULSE OXIMETER) device by Apply Externally route 2 (two) times a day. Use twice daily at 8 AM and 3 PM and [facility].    triamcinolone acetonide 0.1% (KENALOG) 0.1 % cream Apply topically 2 (two) times daily. Under occlusion for 14 days     Family History       Problem Relation (Age of Onset)    Birth defects Daughter    Heart disease Mother, Sister    Muscular dystrophy Father          Tobacco Use    Smoking status: Never    Smokeless  tobacco: Not on file   Substance and Sexual Activity    Alcohol use: No    Drug use: No    Sexual activity: Never     Review of Systems   Constitutional:  Positive for activity change, appetite change, fatigue and unexpected weight change.   Respiratory:  Positive for cough, chest tightness, shortness of breath and wheezing.    Cardiovascular:  Positive for chest pain and leg swelling.   Gastrointestinal:  Positive for abdominal distention.   Musculoskeletal:  Positive for arthralgias, gait problem and joint swelling.   Neurological:  Positive for weakness.   Psychiatric/Behavioral:  The patient is nervous/anxious.      Objective:     Vital Signs (Most Recent):  Temp: 98 °F (36.7 °C) (05/18/24 1151)  Pulse: 87 (05/18/24 1517)  Resp: 19 (05/18/24 1517)  BP: (!) 150/72 (05/18/24 1517)  SpO2: (!) 92 % (05/18/24 1517) Vital Signs (24h Range):  Temp:  [98 °F (36.7 °C)] 98 °F (36.7 °C)  Pulse:  [85-97] 87  Resp:  [18-22] 19  SpO2:  [91 %-98 %] 92 %  BP: (142-177)/(43-88) 150/72     Weight: (!) 206.8 kg (456 lb)  Body mass index is 69.33 kg/m².     Physical Exam  Vitals and nursing note reviewed.   Constitutional:       Appearance: She is morbidly obese. She is ill-appearing.      Interventions: Nasal cannula in place.   HENT:      Head: Normocephalic and atraumatic.      Right Ear: Hearing and external ear normal.      Left Ear: Hearing and external ear normal.      Nose: No rhinorrhea.      Right Sinus: No maxillary sinus tenderness or frontal sinus tenderness.      Left Sinus: No maxillary sinus tenderness or frontal sinus tenderness.      Mouth/Throat:      Mouth: No oral lesions.      Pharynx: Uvula midline.   Eyes:      General:         Right eye: No discharge.         Left eye: No discharge.      Conjunctiva/sclera: Conjunctivae normal.      Pupils: Pupils are equal, round, and reactive to light.   Neck:      Thyroid: No thyromegaly.      Vascular: No carotid bruit.      Trachea: No tracheal deviation.    Cardiovascular:      Rate and Rhythm: Normal rate and regular rhythm.      Heart sounds: Normal heart sounds, S1 normal and S2 normal. No murmur heard.  Pulmonary:      Effort: Respiratory distress present.      Breath sounds: Decreased breath sounds present.      Comments: Dyspnea with conversation, O2 sats decrease to upper 70s on room air.  Abdominal:      General: There is distension.      Palpations: Abdomen is soft.      Comments: Limited exam secondary to body habitus   Musculoskeletal:         General: Normal range of motion.      Cervical back: Normal range of motion.      Right lower leg: Edema present.      Left lower leg: Edema present.   Feet:      Comments: Toes warm/pink, unable to palpate pulses secondary to edema  Lymphadenopathy:      Cervical: No cervical adenopathy.      Upper Body:      Right upper body: No supraclavicular adenopathy.      Left upper body: No supraclavicular adenopathy.   Skin:     General: Skin is warm and dry.      Capillary Refill: Capillary refill takes less than 2 seconds.      Comments: Bilateral lower extremity:  Chronic skin changes  Abdominal skin folds: Erythema   Neurological:      Mental Status: She is alert and oriented to person, place, and time.   Psychiatric:         Mood and Affect: Mood is not anxious or depressed.         Speech: Speech normal.         Behavior: Behavior normal.         Thought Content: Thought content normal.         Judgment: Judgment normal.              CRANIAL NERVES     CN III, IV, VI   Pupils are equal, round, and reactive to light.       Significant Labs: All pertinent labs within the past 24 hours have been reviewed.  CBC:   Recent Labs   Lab 05/18/24  1313   WBC 9.49   HGB 11.6*   HCT 38.4        CMP:   Recent Labs   Lab 05/18/24  1313      K 4.6      CO2 26      BUN 15   CREATININE 0.7   CALCIUM 8.8   PROT 6.5   ALBUMIN 3.3*   BILITOT 1.2*   ALKPHOS 76   AST 17   ALT 7*   ANIONGAP 11     Cardiac Markers:    Recent Labs   Lab 05/18/24  1313   *     Troponin:   Recent Labs   Lab 05/18/24  1313   TROPONINI <0.006       Significant Imaging: I have reviewed all pertinent imaging results/findings within the past 24 hours.  Assessment/Plan:     * Acute diastolic CHF (congestive heart failure)  Patient is identified as having Diastolic (HFpEF) heart failure that is Acute on chronic. CHF is currently uncontrolled due to >3 pillow orthopnea, Rales/crackles on pulmonary exam, and Pulmonary edema/pleural effusion on CXR. Latest ECHO performed and demonstrates- Results for orders placed during the hospital encounter of 03/14/24    Echo    Interpretation Summary    Left Ventricle: The left ventricle is normal in size. Normal wall thickness. There is concentric remodeling. There is low normal systolic function with a visually estimated ejection fraction of 50 - 55%. There is normal diastolic function.    Right Ventricle: Normal right ventricular cavity size. Wall thickness is normal. Right ventricle wall motion  is normal. Systolic function is normal.    Left Atrium: Left atrium is moderately dilated.    Pulmonary Artery: The estimated pulmonary artery systolic pressure is 45 mmHg.    IVC/SVC: Normal venous pressure at 3 mmHg.  . Continue Beta Blocker and Furosemide and monitor clinical status closely. Monitor on telemetry. Patient is on CHF pathway.  Monitor strict Is&Os and daily weights.  Place on fluid restriction of 1.5 L. Cardiology has been consulted. Continue to stress to patient importance of self efficacy and  on diet for CHF. Last BNP reviewed- and noted below   Recent Labs   Lab 05/18/24  1313   *       Difficulty with activities of daily living  Per patient and daughter, daughter goes to patient apartment at approximately 5:00 a.m. every day to assist patient getting out of bed to her chair, patient remains in her chair until the evening when daughter returns to assist her back to the bed.  Patient  urinates and defecates in a diaper, reports typically unable to clean herself independently and will remain in the diaper until the daughter is able to return to assist her.  Patient is unable to independently walk, obtain meals or complete any ADLs independently.    --consult social Work  --PT/OT eval        Pleural effusion  Patient found to have moderate pleural effusion on imaging. I have not personally reviewed and interpreted the following imaging: Xray. A thoracentesis was deferred. Most likely etiology includes Congestive Heart Failure. Management to include Diuresis      Debility  Patient with Acute on chronic debility due to bed confinement status and chronic unspecified fatigue. Latest AMPAC and GEMS scores have not been reviewed. Evaluation for etiology is underway. Plan includes progressive mobility protocol initated and PT/OT consulted.    Morbid obesity  Body mass index is 69.33 kg/m². Morbid obesity complicates all aspects of disease management from diagnostic modalities to treatment. Weight loss encouraged and health benefits explained to patient.         Acute hypoxemic respiratory failure  Patient with Hypoxic Respiratory failure which is Acute.  she is not on home oxygen. Supplemental oxygen was provided and noted-      .   Signs/symptoms of respiratory failure include- tachypnea, increased work of breathing, wheezing, and lethargy. Contributing diagnoses includes - CHF and Pneumonia Labs and images were reviewed. Patient Has recent ABG, which has been reviewed. Will treat underlying causes and adjust management of respiratory failure as follows-     --IV diuresis  --supplemental O2 to maintain SpO2> 90%- currently on 2 L    Pneumonia  Patient has a diagnosis of pneumonia. The cause of the pneumonia is suspected to be bacterial in etiology but organism is not known. The pneumonia is new onset. The patient has the following signs/symptoms of pneumonia: persistent hypoxia , sputum production, and  shortness of breath. The patient does have a current oxygen requirement and the patient does not have a home oxygen requirement. I have reviewed the pertinent imaging. The following cultures have been collected: Blood cultures The culture results are listed below.     Current antimicrobial regimen consists of the antibiotics listed below. Will monitor patient closely and continue current treatment plan unchanged.  Antibiotics (From admission, onward)      Start     Stop Route Frequency Ordered    05/18/24 1600  azithromycin (ZITHROMAX) 500 mg in dextrose 5 % (D5W) 250 mL IVPB (Vial-Mate)         05/19/24 0359 IV ED 1 Time 05/18/24 1517    05/18/24 1530  cefTRIAXone (Rocephin) 1 g in dextrose 5 % in water (D5W) 100 mL IVPB (MB+)         05/19/24 0329 IV ED 1 Time 05/18/24 1517          Microbiology Results (last 7 days)       Procedure Component Value Units Date/Time    Blood culture #1 **CANNOT BE ORDERED STAT** [[id number]] Collected: 05/18/24 1633    Order Status: Sent Specimen: Blood from Peripheral, Upper Arm, Left     Blood culture #2 **CANNOT BE ORDERED STAT** [[id number]] Collected: 05/18/24 1633    Order Status: Sent Specimen: Blood from Peripheral, Hand, Left           VTE Risk Mitigation (From admission, onward)           Ordered     IP VTE HIGH RISK PATIENT  Once         05/18/24 1636     Place sequential compression device  Until discontinued         05/18/24 1636     Reason for No Pharmacological VTE Prophylaxis  Once        Question:  Reasons:  Answer:  Already adequately anticoagulated on oral Anticoagulants    05/18/24 1636                               AdmissionCare    Guideline: Heart Failure - INPT, Inpatient    Based on the indications selected for the patient, the bed status of Inpatient was determined to be MET    The following indications were selected as present at the time of evaluation of the patient:      - Anasarca or severe peripheral edema (eg, impairs ability to void or  ambulate)    AdmissionCare documentation entered by: Antoinette Ahumada    [facility], 27th edition, Copyright © 2023 [employer] [facility] [employer], [facility] All Rights Reserved.  [id number]:34:21-05:00    Antoinette Ahumada NP  Department of Hospital Medicine  [facility] - Emergency Dept.

## 2024-05-18 NOTE — ASSESSMENT & PLAN NOTE
Patient with Hypoxic Respiratory failure which is Acute.  she is not on home oxygen. Supplemental oxygen was provided and noted-      .   Signs/symptoms of respiratory failure include- tachypnea, increased work of breathing, wheezing, and lethargy. Contributing diagnoses includes - CHF and Pneumonia Labs and images were reviewed. Patient Has recent ABG, which has been reviewed. Will treat underlying causes and adjust management of respiratory failure as follows-     --IV diuresis  --supplemental O2 to maintain SpO2> 90%- currently on 2 L

## 2024-05-18 NOTE — ASSESSMENT & PLAN NOTE
Patient found to have moderate pleural effusion on imaging. I have not personally reviewed and interpreted the following imaging: Xray. A thoracentesis was deferred. Most likely etiology includes Congestive Heart Failure. Management to include Diuresis

## 2024-05-18 NOTE — ASSESSMENT & PLAN NOTE
Patient has a diagnosis of pneumonia. The cause of the pneumonia is suspected to be bacterial in etiology but organism is not known. The pneumonia is  new onset . The patient has the following signs/symptoms of pneumonia: persistent hypoxia , sputum production, and shortness of breath. The patient does have a current oxygen requirement and the patient does not have a home oxygen requirement. I have reviewed the pertinent imaging. The following cultures have been collected: Blood cultures The culture results are listed below.     Current antimicrobial regimen consists of the antibiotics listed below. Will monitor patient closely and continue current treatment plan unchanged.    Antibiotics (From admission, onward)      Start     Stop Route Frequency Ordered    05/18/24 1600  azithromycin (ZITHROMAX) 500 mg in dextrose 5 % (D5W) 250 mL IVPB (Vial-Mate)         05/19/24 0359 IV ED 1 Time 05/18/24 1517    05/18/24 1530  cefTRIAXone (Rocephin) 1 g in dextrose 5 % in water (D5W) 100 mL IVPB (MB+)         05/19/24 0329 IV ED 1 Time 05/18/24 1517            Microbiology Results (last 7 days)       Procedure Component Value Units Date/Time    Blood culture #1 **CANNOT BE ORDERED STAT** [[id number]] Collected: 05/18/24 1633    Order Status: Sent Specimen: Blood from Peripheral, Upper Arm, Left     Blood culture #2 **CANNOT BE ORDERED STAT** [[id number]] Collected: 05/18/24 1633    Order Status: Sent Specimen: Blood from Peripheral, Hand, Left

## 2024-05-18 NOTE — ASSESSMENT & PLAN NOTE
Patient is identified as having Diastolic (HFpEF) heart failure that is Acute on chronic. CHF is currently uncontrolled due to >3 pillow orthopnea, Rales/crackles on pulmonary exam, and Pulmonary edema/pleural effusion on CXR. Latest ECHO performed and demonstrates- Results for orders placed during the hospital encounter of 03/14/24    Echo    Interpretation Summary    Left Ventricle: The left ventricle is normal in size. Normal wall thickness. There is concentric remodeling. There is low normal systolic function with a visually estimated ejection fraction of 50 - 55%. There is normal diastolic function.    Right Ventricle: Normal right ventricular cavity size. Wall thickness is normal. Right ventricle wall motion  is normal. Systolic function is normal.    Left Atrium: Left atrium is moderately dilated.    Pulmonary Artery: The estimated pulmonary artery systolic pressure is 45 mmHg.    IVC/SVC: Normal venous pressure at 3 mmHg.  . Continue Beta Blocker and Furosemide and monitor clinical status closely. Monitor on telemetry. Patient is on CHF pathway.  Monitor strict Is&Os and daily weights.  Place on fluid restriction of 1.5 L. Cardiology has been consulted. Continue to stress to patient importance of self efficacy and  on diet for CHF. Last BNP reviewed- and noted below   Recent Labs   Lab 05/18/24  1313   *

## 2024-05-18 NOTE — NURSING TRANSFER
Nursing Transfer Note      5/18/2024   5:12 PM    Nurse giving handoff:   Nurse receiving handoff:VANESSA Dan    Reason patient is being transferred: observation    Transfer From:  ED     Transfer via stretcher    Transfer with cardiac monitoring    Transported by transportation     Transfer Vital Signs:  Blood Pressure:157/90  Heart Rate:104  O2:92  Temperature:97.9  Respirations:22    Telemetry: Box Number [id number], Rate 102, and Rhythm a fib  Order for Tele Monitor? Yes    Any special needs or follow-up needed: orient to room     Patient belongings transferred with patient: Yes    Chart send with patient: Yes    Notified: DaughterMary    Patient reassessed at: 05/18/2024, 1622(date, time)  1  Upon arrival to floor: cardiac monitor applied, patient oriented to room, call bell in reach, and bed in lowest position

## 2024-05-18 NOTE — ASSESSMENT & PLAN NOTE
Body mass index is 69.33 kg/m². Morbid obesity complicates all aspects of disease management from diagnostic modalities to treatment. Weight loss encouraged and health benefits explained to patient.

## 2024-05-18 NOTE — ASSESSMENT & PLAN NOTE
Per patient and daughter, daughter goes to patient apartment at approximately 5:00 a.m. every day to assist patient getting out of bed to her chair, patient remains in her chair until the evening when daughter returns to assist her back to the bed.  Patient urinates and defecates in a diaper, reports typically unable to clean herself independently and will remain in the diaper until the daughter is able to return to assist her.  Patient is unable to independently walk, obtain meals or complete any ADLs independently.    --consult social Work  --PT/OT jose

## 2024-05-18 NOTE — SUBJECTIVE & OBJECTIVE
Past Medical History:   Diagnosis Date    Hypertension     Paroxysmal atrial fibrillation        Past Surgical History:   Procedure Laterality Date     SECTION      COLONOSCOPY N/A 2019    Procedure: COLONOSCOPY;  Surgeon: Janeth Leroy MD;  Location: [facility];  Service: Endoscopy;  Laterality: N/A;    HYSTERECTOMY         Review of patient's allergies indicates:  No Known Allergies    No current facility-administered medications on file prior to encounter.     Current Outpatient Medications on File Prior to Encounter   Medication Sig    amLODIPine (NORVASC) 5 MG tablet Take 1 tablet (5 mg total) by mouth once daily.    apixaban (ELIQUIS) 5 mg Tab Take 1 tablet (5 mg total) by mouth 2 (two) times daily.    furosemide (LASIX) 40 MG tablet Take 1 tablet (40 mg total) by mouth once daily.    metoprolol succinate (TOPROL-XL) 50 MG 24 hr tablet Take 1 tablet (50 mg total) by mouth once daily.    albuterol (PROVENTIL/VENTOLIN HFA) 90 mcg/actuation inhaler Inhale 2 puffs into the lungs every 6 (six) hours. Rescue    celecoxib (CELEBREX) 100 MG capsule Take 1 capsule (100 mg total) by mouth 2 (two) times daily. (Patient not taking: Reported on 2024)    pulse oximeter (PULSE OXIMETER) device by Apply Externally route 2 (two) times a day. Use twice daily at 8 AM and 3 PM and record the value in MyChart as directed.    triamcinolone acetonide 0.1% (KENALOG) 0.1 % cream Apply topically 2 (two) times daily. Under occlusion for 14 days     Family History       Problem Relation (Age of Onset)    Birth defects Daughter    Heart disease Mother, Sister    Muscular dystrophy Father          Tobacco Use    Smoking status: Never    Smokeless tobacco: Not on file   Substance and Sexual Activity    Alcohol use: No    Drug use: No    Sexual activity: Never     Review of Systems   Constitutional:  Positive for activity change, appetite change, fatigue and unexpected weight change.   Respiratory:  Positive for  cough, chest tightness, shortness of breath and wheezing.    Cardiovascular:  Positive for chest pain and leg swelling.   Gastrointestinal:  Positive for abdominal distention.   Musculoskeletal:  Positive for arthralgias, gait problem and joint swelling.   Neurological:  Positive for weakness.   Psychiatric/Behavioral:  The patient is nervous/anxious.      Objective:     Vital Signs (Most Recent):  Temp: 98 °F (36.7 °C) (05/18/24 1151)  Pulse: 87 (05/18/24 1517)  Resp: 19 (05/18/24 1517)  BP: (!) 150/72 (05/18/24 1517)  SpO2: (!) 92 % (05/18/24 1517) Vital Signs (24h Range):  Temp:  [98 °F (36.7 °C)] 98 °F (36.7 °C)  Pulse:  [85-97] 87  Resp:  [18-22] 19  SpO2:  [91 %-98 %] 92 %  BP: (142-177)/(43-88) 150/72     Weight: (!) 206.8 kg (456 lb)  Body mass index is 69.33 kg/m².     Physical Exam  Vitals and nursing note reviewed.   Constitutional:       Appearance: She is morbidly obese. She is ill-appearing.      Interventions: Nasal cannula in place.   HENT:      Head: Normocephalic and atraumatic.      Right Ear: Hearing and external ear normal.      Left Ear: Hearing and external ear normal.      Nose: No rhinorrhea.      Right Sinus: No maxillary sinus tenderness or frontal sinus tenderness.      Left Sinus: No maxillary sinus tenderness or frontal sinus tenderness.      Mouth/Throat:      Mouth: No oral lesions.      Pharynx: Uvula midline.   Eyes:      General:         Right eye: No discharge.         Left eye: No discharge.      Conjunctiva/sclera: Conjunctivae normal.      Pupils: Pupils are equal, round, and reactive to light.   Neck:      Thyroid: No thyromegaly.      Vascular: No carotid bruit.      Trachea: No tracheal deviation.   Cardiovascular:      Rate and Rhythm: Normal rate and regular rhythm.      Heart sounds: Normal heart sounds, S1 normal and S2 normal. No murmur heard.  Pulmonary:      Effort: Respiratory distress present.      Breath sounds: Decreased breath sounds present.      Comments:  Dyspnea with conversation, O2 sats decrease to upper 70s on room air.  Abdominal:      General: There is distension.      Palpations: Abdomen is soft.      Comments: Limited exam secondary to body habitus   Musculoskeletal:         General: Normal range of motion.      Cervical back: Normal range of motion.      Right lower leg: Edema present.      Left lower leg: Edema present.   Feet:      Comments: Toes warm/pink, unable to palpate pulses secondary to edema  Lymphadenopathy:      Cervical: No cervical adenopathy.      Upper Body:      Right upper body: No supraclavicular adenopathy.      Left upper body: No supraclavicular adenopathy.   Skin:     General: Skin is warm and dry.      Capillary Refill: Capillary refill takes less than 2 seconds.      Comments: Bilateral lower extremity:  Chronic skin changes  Abdominal skin folds: Erythema   Neurological:      Mental Status: She is alert and oriented to person, place, and time.   Psychiatric:         Mood and Affect: Mood is not anxious or depressed.         Speech: Speech normal.         Behavior: Behavior normal.         Thought Content: Thought content normal.         Judgment: Judgment normal.              CRANIAL NERVES     CN III, IV, VI   Pupils are equal, round, and reactive to light.       Significant Labs: All pertinent labs within the past 24 hours have been reviewed.  CBC:   Recent Labs   Lab 05/18/24  1313   WBC 9.49   HGB 11.6*   HCT 38.4        CMP:   Recent Labs   Lab 05/18/24  1313      K 4.6      CO2 26      BUN 15   CREATININE 0.7   CALCIUM 8.8   PROT 6.5   ALBUMIN 3.3*   BILITOT 1.2*   ALKPHOS 76   AST 17   ALT 7*   ANIONGAP 11     Cardiac Markers:   Recent Labs   Lab 05/18/24  1313   *     Troponin:   Recent Labs   Lab 05/18/24  1313   TROPONINI <0.006       Significant Imaging: I have reviewed all pertinent imaging results/findings within the past 24 hours.

## 2024-05-18 NOTE — ED PROVIDER NOTES
SCRIBE #1 NOTE: IWashington, am scribing for, and in the presence of, Edwin Major MD. I have scribed the entire note.       History     Chief Complaint   Patient presents with    Shortness of Breath     Pt woke up at 0300 with SOB, no longer had O2 pump at home, nursing home told her she no longer needed neb rx or inhaler so they discontinued 2 weeks ago.     Review of patient's allergies indicates:  No Known Allergies      History of Present Illness     HPI    2024, 1:08 PM  History obtained from the patient and patient's daughter      History of Present Illness: Niru Reyes is a 79 y.o. female patient with a PMHx of HTN and paroxysmal atrial fibrillation who presents to the Emergency Department for evaluation of SOB which onset this morning at around 3 AM. When pt woke up at 3 AM, she states that her oxygen was at 68%. It was in the 80s this morning so daughter called pt's PCP who instructed them to come to the ED. Symptoms are constant and moderate in severity. SOB worsens when laying flat. Associated sxs include wheezing and generalized weakness. Patient denies any fever, cough, CP, and all other sxs at this time. Pt is on LASIX. On 4/3/2024, pt was discharged from the hospital to [facility] for skilled therapy. They tried to wean her off oxygen and on 2024, she was taken off of oxygen. Pt stopped using Neb and inhalers 2 weeks ago. Pt also has UTI and was placed on antibiotics. No further complaints or concerns at this time.       Arrival mode: EMS    PCP: Jordana Shirley MD        Past Medical History:  Past Medical History:   Diagnosis Date    Hypertension     Paroxysmal atrial fibrillation        Past Surgical History:  Past Surgical History:   Procedure Laterality Date     SECTION      COLONOSCOPY N/A 2019    Procedure: COLONOSCOPY;  Surgeon: Janeth Leroy MD;  Location: [facility];  Service: Endoscopy;  Laterality: N/A;    HYSTERECTOMY           Family  History:  Family History   Problem Relation Name Age of Onset    Heart disease Mother          valvulopathy    Muscular dystrophy Father      Heart disease Sister          CAD     Birth defects Daughter         Social History:  Social History     Tobacco Use    Smoking status: Never    Smokeless tobacco: Not on file   Substance and Sexual Activity    Alcohol use: No    Drug use: No    Sexual activity: Never        Review of Systems     Review of Systems   Constitutional:  Negative for fever.   Respiratory:  Positive for shortness of breath and wheezing. Negative for cough.    Cardiovascular:  Negative for chest pain.   Neurological:  Positive for weakness (generalized).   All other systems reviewed and are negative.     Physical Exam     Initial Vitals [05/18/24 1151]   BP Pulse Resp Temp SpO2   (!) 143/43 97 20 98 °F (36.7 °C) 96 %      MAP       --          Physical Exam  Nursing Notes and Vital Signs Reviewed.  Constitutional: Patient is in no acute distress. Increased BMI.  Head: Atraumatic. Normocephalic.  Eyes: PERRL. EOM intact. Conjunctivae are not pale. No scleral icterus.  ENT: Mucous membranes are moist.   Neck: Supple. Full ROM.   Cardiovascular: Regular rate. Regular rhythm. No murmurs, rubs, or gallops. Distal pulses are 2+ and symmetric.  Pulmonary/Chest: No respiratory distress. Clear to auscultation bilaterally. No wheezing or rales.  Abdominal: Soft and non-distended.  There is no tenderness.  No rebound, guarding, or rigidity.   Genitourinary: No CVA tenderness  Musculoskeletal: Moves all extremities. BLE edema.  Skin: Warm and dry.  Neurological:  Alert, awake, and appropriate.  Normal speech.  No acute focal neurological deficits are appreciated.  Psychiatric: Normal affect. Good eye contact. Appropriate in content.     ED Course   Critical Care    Date/Time: 5/18/2024 3:22 PM    Performed by: Edwin Major MD  Authorized by: Edwin Major MD  Direct patient critical care time: 11  "minutes  Additional history critical care time: 5 minutes  Ordering / reviewing critical care time: 7 minutes  Documentation critical care time: 5 minutes  Consulting other physicians critical care time: 4 minutes  Consult with family critical care time: 3 minutes  Total critical care time (exclusive of procedural time) : 35 minutes  Critical care time was exclusive of separately billable procedures and treating other patients and teaching time.  Critical care was necessary to treat or prevent imminent or life-threatening deterioration of the following conditions: respiratory failure and cardiac failure.  Critical care was time spent personally by me on the following activities: blood draw for specimens, development of treatment plan with patient or surrogate, discussions with consultants, interpretation of cardiac output measurements, evaluation of patient's response to treatment, obtaining history from patient or surrogate, examination of patient, re-evaluation of patient's condition, review of old charts, pulse oximetry, ordering and review of radiographic studies, ordering and review of laboratory studies and ordering and performing treatments and interventions.        ED Vital Signs:  Vitals:    05/18/24 1151 05/18/24 1153 05/18/24 1247 05/18/24 1301   BP: (!) 143/43  (!) 142/88    Pulse: 97  95 94   Resp: 20  (!) 22    Temp: 98 °F (36.7 °C)      TempSrc: Oral      SpO2: 96%  96%    Weight:  (!) 154.2 kg (340 lb)     Height: 5' 8" (1.727 m)       05/18/24 1347 05/18/24 1418 05/18/24 1425 05/18/24 1445   BP: (!) 146/71 (!) 177/72     Pulse: 87 85     Resp: (!) 22 18     Temp:       TempSrc:       SpO2: (!) 91% 98% 96% (!) 92%   Weight:       Height:        05/18/24 1451 05/18/24 1517 05/18/24 1635   BP:  (!) 150/72    Pulse:  87    Resp:  19    Temp:      TempSrc:      SpO2: (!) 91% (!) 92%    Weight:   (!) 206.8 kg (456 lb)   Height:          Abnormal Lab Results:  Labs Reviewed   CBC W/ AUTO DIFFERENTIAL - " Abnormal; Notable for the following components:       Result Value    Hemoglobin 11.6 (*)     MCH 25.4 (*)     MCHC 30.2 (*)     RDW 16.1 (*)     Immature Grans (Abs) 0.05 (*)     Gran % 78.9 (*)     Lymph % 10.9 (*)     All other components within normal limits   COMPREHENSIVE METABOLIC PANEL - Abnormal; Notable for the following components:    Albumin 3.3 (*)     Total Bilirubin 1.2 (*)     ALT 7 (*)     All other components within normal limits   B-TYPE NATRIURETIC PEPTIDE - Abnormal; Notable for the following components:     (*)     All other components within normal limits   URINALYSIS, REFLEX TO URINE CULTURE - Abnormal; Notable for the following components:    Protein, UA Trace (*)     Leukocytes, UA 2+ (*)     All other components within normal limits    Narrative:     Specimen Source->Urine   ISTAT PROCEDURE - Abnormal; Notable for the following components:    POC PH 7.349 (*)     POC PCO2 53.8 (*)     POC PO2 60 (*)     POC HCO3 29.6 (*)     POC BE 4 (*)     All other components within normal limits   CULTURE, BLOOD   CULTURE, BLOOD   TROPONIN I   URINALYSIS MICROSCOPIC    Narrative:     Specimen Source->Urine   TROPONIN I        All Lab Results:  Results for orders placed or performed during the hospital encounter of 05/18/24   CBC auto differential   Result Value Ref Range    WBC 9.49 3.90 - 12.70 K/uL    RBC 4.56 4.00 - 5.40 M/uL    Hemoglobin 11.6 (L) 12.0 - 16.0 g/dL    Hematocrit 38.4 37.0 - 48.5 %    MCV 84 82 - 98 fL    MCH 25.4 (L) 27.0 - 31.0 pg    MCHC 30.2 (L) 32.0 - 36.0 g/dL    RDW 16.1 (H) 11.5 - 14.5 %    Platelets 185 150 - 450 K/uL    MPV 10.0 9.2 - 12.9 fL    Immature Granulocytes 0.5 0.0 - 0.5 %    Gran # (ANC) 7.5 1.8 - 7.7 K/uL    Immature Grans (Abs) 0.05 (H) 0.00 - 0.04 K/uL    Lymph # 1.0 1.0 - 4.8 K/uL    Mono # 0.7 0.3 - 1.0 K/uL    Eos # 0.1 0.0 - 0.5 K/uL    Baso # 0.06 0.00 - 0.20 K/uL    nRBC 0 0 /100 WBC    Gran % 78.9 (H) 38.0 - 73.0 %    Lymph % 10.9 (L) 18.0 - 48.0  %    Mono % 7.7 4.0 - 15.0 %    Eosinophil % 1.4 0.0 - 8.0 %    Basophil % 0.6 0.0 - 1.9 %    Differential Method Automated    Comprehensive metabolic panel   Result Value Ref Range    Sodium 140 136 - 145 mmol/L    Potassium 4.6 3.5 - 5.1 mmol/L    Chloride 103 95 - 110 mmol/L    CO2 26 23 - 29 mmol/L    Glucose 105 70 - 110 mg/dL    BUN 15 8 - 23 mg/dL    Creatinine 0.7 0.5 - 1.4 mg/dL    Calcium 8.8 8.7 - 10.5 mg/dL    Total Protein 6.5 6.0 - 8.4 g/dL    Albumin 3.3 (L) 3.5 - 5.2 g/dL    Total Bilirubin 1.2 (H) 0.1 - 1.0 mg/dL    Alkaline Phosphatase 76 55 - 135 U/L    AST 17 10 - 40 U/L    ALT 7 (L) 10 - 44 U/L    eGFR >60 >60 mL/min/1.73 m^2    Anion Gap 11 8 - 16 mmol/L   Brain natriuretic peptide   Result Value Ref Range     (H) 0 - 99 pg/mL   Troponin I   Result Value Ref Range    Troponin I <0.006 0.000 - 0.026 ng/mL   Urinalysis, Reflex to Urine Culture Urine, Clean Catch    Specimen: Urine, Clean Catch   Result Value Ref Range    Specimen UA Urine, Clean Catch     Color, UA Yellow Yellow, Straw, Anne    Appearance, UA Clear Clear    pH, UA 7.0 5.0 - 8.0    Specific Gravity, UA 1.015 1.005 - 1.030    Protein, UA Trace (A) Negative    Glucose, UA Negative Negative    Ketones, UA Negative Negative    Bilirubin (UA) Negative Negative    Occult Blood UA Negative Negative    Nitrite, UA Negative Negative    Urobilinogen, UA Negative <2.0 EU/dL    Leukocytes, UA 2+ (A) Negative   Urinalysis Microscopic   Result Value Ref Range    WBC, UA 1 0 - 5 /hpf    Bacteria None None-Occ /hpf    Squam Epithel, UA 1 /hpf    Microscopic Comment SEE COMMENT    ISTAT PROCEDURE   Result Value Ref Range    POC PH 7.349 (L) 7.35 - 7.45    POC PCO2 53.8 (H) 35 - 45 mmHg    POC PO2 60 (L) 80 - 100 mmHg    POC HCO3 29.6 (H) 24 - 28 mmol/L    POC BE 4 (H) -2 to 2 mmol/L    POC SATURATED O2 89 95 - 100 %    Sample ARTERIAL     Site RR     Allens Test Pass     DelSys Room Air     Mode SPONT          Imaging Results:  Imaging  Results              X-Ray Chest AP Portable (Final result)  Result time 05/18/24 13:32:57      Final result by KARISHMA Winchester Sr., MD (05/18/24 13:32:57)                   Impression:      1. There has been interval development of a moderate amount of alveolar consolidation scattered throughout the inferior 2/3 of the right lung.  This is characteristic of pneumonia.  2. Small right pleural effusion  .      Electronically signed by: Benito Winchester MD  Date:    05/18/2024  Time:    13:32               Narrative:    EXAMINATION:  XR CHEST AP PORTABLE    CLINICAL HISTORY:  dyspnea;    COMPARISON:  03/31/2024    FINDINGS:  This is a limited examination secondary to lack of inclusion of the entire thorax on the film.  The left costophrenic angle is not included on the film.  The size of the heart is normal.  There has been interval development of a moderate amount of alveolar consolidation scattered throughout the inferior 2/3 of the right lung.  There is opacification of the right costophrenic angle.  There is no pneumothorax.                                       The EKG was ordered, reviewed, and independently interpreted by the ED provider.  Interpretation time: 12:26  Rate: 93 BPM  Rhythm: atrial fibrillation  Interpretation: Low voltage QRS. Cannot rule out Anterior infarct (cited on or before 14-MAR-2024). No STEMI.           The Emergency Provider reviewed the vital signs and test results, which are outlined above.     ED Discussion       4:35 PM: Discussed case with Antoinette Ahumada NP (Hospital Medicine). Dr. Nilson Gore MD agrees with current care and management of pt and accepts admission.   Admitting Service: Hospital Medicine  Admitting Physician: Dr. Gore  Admit to: Inpatient Med Tele    4:42 PM: Re-evaluated pt. I have discussed test results, shared treatment plan, and the need for admission with patient and family at bedside. Pt and family express understanding at this time and agree with  all information. All questions answered. Pt and family have no further questions or concerns at this time. Pt is ready for admit.           Medical Decision Making  79 y.o. F with past medical history of obesity, hypertension, AFib, CHF, pulmonary hypertension here with complaints of shortness of breath and leg swelling.  She said her O2 sat last night was in the 60s.  This morning it was in the 80s and her PCP directed her to the ED.  She was on oxygen at 1 time.  She was discharged from here to [facility] on 4/3/24 for skilled therapy.  They weaned her off of oxygen there.  She says she has been progressively getting more and more short of breath.  Chest x-ray shows an effusion and pneumonia.  Lasix and IV antibiotics given.  O2 sat 89 on room air ABG. Admitted to medicine    Amount and/or Complexity of Data Reviewed  Independent Historian:      Details: Family at bedside helps provide some of the history.  Labored breathing and wheezing on minimal exertion  External Data Reviewed: notes.     Details: Past medical history, medications, and allergies reviewed  Labs: ordered. Decision-making details documented in ED Course.  Radiology: ordered. Decision-making details documented in ED Course.  ECG/medicine tests: ordered and independent interpretation performed. Decision-making details documented in ED Course.    Risk  Prescription drug management.  Decision regarding hospitalization.                ED Medication(s):  Medications   cefTRIAXone (Rocephin) 1 g in dextrose 5 % in water (D5W) 100 mL IVPB (MB+) (1 g Intravenous New Bag 5/18/24 1635)   azithromycin (ZITHROMAX) 500 mg in dextrose 5 % (D5W) 250 mL IVPB (Vial-Mate) (has no administration in time range)   sodium chloride 0.9% flush 10 mL (has no administration in time range)   melatonin tablet 6 mg (has no administration in time range)   ondansetron injection 4 mg (has no administration in time range)   bisacodyL suppository 10 mg (has no administration in  time range)   aluminum-magnesium hydroxide-simethicone 200-200-20 mg/5 mL suspension 30 mL (has no administration in time range)   acetaminophen tablet 650 mg (has no administration in time range)   morphine injection 2 mg (has no administration in time range)   naloxone 0.4 mg/mL injection 0.02 mg (has no administration in time range)   glucose chewable tablet 16 g (has no administration in time range)   glucose chewable tablet 24 g (has no administration in time range)   glucagon (human recombinant) injection 1 mg (has no administration in time range)   dextrose 10% bolus 125 mL 125 mL (has no administration in time range)   dextrose 10% bolus 250 mL 250 mL (has no administration in time range)   sodium chloride 0.9% flush 10 mL (has no administration in time range)   furosemide injection 80 mg (has no administration in time range)   furosemide injection 80 mg (80 mg Intravenous Given 5/18/24 1635)       New Prescriptions    No medications on file               Scribe Attestation:   Scribe #1: I performed the above scribed service and the documentation accurately describes the services I performed. I attest to the accuracy of the note.     Attending:   Physician Attestation Statement for Scribe #1: I, Edwin Major MD, personally performed the services described in this documentation, as scribed by Washington Burns, in my presence, and it is both accurate and complete.           Clinical Impression       ICD-10-CM ICD-9-CM   1. Acute on chronic congestive heart failure, unspecified heart failure type  I50.9 428.0   2. Dyspnea  R06.00 786.09   3. Hypoxia  R09.02 799.02   4. Pneumonia due to infectious organism, unspecified laterality, unspecified part of lung  J18.9 486   5. Chest pain  R07.9 786.50       Disposition:   Disposition: Admitted  Condition: Serious         Edwin Major MD  05/18/24 0838

## 2024-05-18 NOTE — ADMISSIONCARE
AdmissionCare    Guideline: Heart Failure - INPT, Inpatient    Based on the indications selected for the patient, the bed status of Inpatient was determined to be MET    The following indications were selected as present at the time of evaluation of the patient:      - Anasarca or severe peripheral edema (eg, impairs ability to void or ambulate)    AdmissionCare documentation entered by: Antoinette Ahumada    [facility], 27th edition, Copyright © 2023 [facility], [facility] All Rights Reserved.  [id number]:34:21-05:00

## 2024-05-18 NOTE — PHARMACY MED REC
"Admission Medication History     The home medication history was taken by Genaro Green.    You may go to "Admission" then "Reconcile Home Medications" tabs to review and/or act upon these items.     The home medication list has been updated by the Pharmacy department.   Please read ALL comments highlighted in yellow.   Please address this information as you see fit.    Feel free to contact us if you have any questions or require assistance.      Medications listed below were obtained from: Patient/family, Medications brought from home, and Analytic software- [employer]  (Not in a hospital admission)      Genaro Green  [id number]    Current Outpatient Medications on File Prior to Encounter   Medication Sig Dispense Refill Last Dose    amLODIPine (NORVASC) 5 MG tablet Take 1 tablet (5 mg total) by mouth once daily. 30 tablet 11 5/18/2024    apixaban (ELIQUIS) 5 mg Tab Take 1 tablet (5 mg total) by mouth 2 (two) times daily. 60 tablet 0 5/18/2024    furosemide (LASIX) 40 MG tablet Take 1 tablet (40 mg total) by mouth once daily. 30 tablet 0 5/18/2024    metoprolol succinate (TOPROL-XL) 50 MG 24 hr tablet Take 1 tablet (50 mg total) by mouth once daily. 30 tablet 0 5/18/2024    albuterol (PROVENTIL/VENTOLIN HFA) 90 mcg/actuation inhaler Inhale 2 puffs into the lungs every 6 (six) hours. Rescue 18 g 0     celecoxib (CELEBREX) 100 MG capsule Take 1 capsule (100 mg total) by mouth 2 (two) times daily. (Patient not taking: Reported on 5/18/2024) 60 capsule 1 Not Taking    pulse oximeter (PULSE OXIMETER) device by Apply Externally route 2 (two) times a day. Use twice daily at 8 AM and 3 PM and record the value in [facility] as directed. 1 each 0     triamcinolone acetonide 0.1% (KENALOG) 0.1 % cream Apply topically 2 (two) times daily. Under occlusion for 14 days 28.4 g 0                  .          "

## 2024-05-18 NOTE — ASSESSMENT & PLAN NOTE
Patient with Acute on chronic debility due to bed confinement status and chronic unspecified fatigue. Latest AMPAC and GEMS scores have not been reviewed. Evaluation for etiology is underway. Plan includes progressive mobility protocol initated and PT/OT consulted.

## 2024-05-18 NOTE — TELEPHONE ENCOUNTER
Reason for Disposition   Oxygen level (e.g., pulse oximetry) 90 percent or lower    Additional Information   Negative: SEVERE difficulty breathing (e.g., struggling for each breath, speaks in single words)   Negative: [1] Breathing stopped AND [2] hasn't returned   Negative: Choking on something   Negative: Bluish (or gray) lips or face now   Negative: Difficult to awaken or acting confused (e.g., disoriented, slurred speech)   Negative: Passed out (i.e., lost consciousness, collapsed and was not responding)   Negative: Wheezing started suddenly after medicine, an allergic food or bee sting   Negative: Stridor (harsh sound while breathing in)   Negative: Slow, shallow and weak breathing     unsure   Negative: Sounds like a life-threatening emergency to the triager   Negative: [1] MODERATE difficulty breathing (e.g., speaks in phrases, SOB even at rest, pulse 100-120) AND [2] NEW-onset or WORSE than normal    Protocols used: Breathing Difficulty-A-  Patients daughter Mary called to report that the patient is having low oxygen levels. She's asking if the patient can  oxygen from somewhere. Advised to call patient for a conference call. Spoke with patient and she stated that she used to be on 2 liters of oxygen daily and that she has a history of CHF. States she was in rehab for a month and was told she no longer needed the oxygen. Patient states for the last week she's been having shortness of breath and low oxygen levels. States at one point her oxygen level dropped to 64. Patient states her oxygen level now is between 84 and 88. She reports having difficulty breathing last night and shortness of breath today. Advised patient to have someone bring her to the nearest ED now. Patient and her daughter verbalized understanding and her daughter will bring her to the ED.

## 2024-05-18 NOTE — HPI
79 y.o. female patient, PMHx of HTN, CHF, morbid obesity and paroxysmal atrial fibrillation. Presented to the Emergency Department for evaluation of SOB which onset this morning at around 3 AM on day of arrival. When pt woke up at 3 AM, she states that her oxygen was at 68%, patient stated she did not want to awaken her family and waited to call her daughter until around 7-8 a.m.. SpO2 was in the 80s when her daughter arrived, called nurse triage line who instructed them to come to the ED. Symptoms are constant and moderate in severity. SOB worsens when laying flat. Associated sxs include wheezing and generalized weakness. Patient denies any fever, cough, CP, and all other sxs at this time. Pt is on LASIX. On 4/3/2024, pt was discharged from the hospital to [facility] for skilled therapy. They tried to wean her off oxygen and on 5/1/2024, she was taken off of oxygen. Pt stopped using Neb and inhalers 2 weeks ago. Pt also has UTI and was placed on antibiotics.  On discharge SNF patient weighed 421 lb, in the ED patient weighed 456 lb, 35# weight gain in 16 days.  In the ED, vitals: 143/43, 97, 20, 98 F, 96% on 4 L NC.  Significant labs:  Bili: 1.2, BNP: 476, ABG: pH:  7.349, PO2: 60.  CXR:moderate amount of alveolar consolidation scattered throughout the inferior 2/3 of the right lung.  Right pleural effusion.  Treated with IV diuretic, IV antibiotics.  Patient is a full code.  Admitted to hospital medicine for management of acute on chronic CHF, acute respiratory failure.

## 2024-05-19 LAB
ALBUMIN SERPL BCP-MCNC: 3.3 G/DL (ref 3.5–5.2)
ALP SERPL-CCNC: 80 U/L (ref 55–135)
ALT SERPL W/O P-5'-P-CCNC: 6 U/L (ref 10–44)
ANION GAP SERPL CALC-SCNC: 11 MMOL/L (ref 8–16)
AST SERPL-CCNC: 15 U/L (ref 10–40)
BASOPHILS # BLD AUTO: 0.05 K/UL (ref 0–0.2)
BASOPHILS NFR BLD: 0.6 % (ref 0–1.9)
BILIRUB SERPL-MCNC: 1.1 MG/DL (ref 0.1–1)
BSA FOR ECHO PROCEDURE: 3.1 M2
BUN SERPL-MCNC: 12 MG/DL (ref 8–23)
CALCIUM SERPL-MCNC: 8.6 MG/DL (ref 8.7–10.5)
CHLORIDE SERPL-SCNC: 101 MMOL/L (ref 95–110)
CO2 SERPL-SCNC: 27 MMOL/L (ref 23–29)
CREAT SERPL-MCNC: 0.7 MG/DL (ref 0.5–1.4)
CV ECHO LV RWT: 0.62 CM
DIFFERENTIAL METHOD BLD: ABNORMAL
ECHO LV POSTERIOR WALL: 1.36 CM (ref 0.6–1.1)
EOSINOPHIL # BLD AUTO: 0.2 K/UL (ref 0–0.5)
EOSINOPHIL NFR BLD: 2.4 % (ref 0–8)
ERYTHROCYTE [DISTWIDTH] IN BLOOD BY AUTOMATED COUNT: 16 % (ref 11.5–14.5)
EST. GFR  (NO RACE VARIABLE): >60 ML/MIN/1.73 M^2
FRACTIONAL SHORTENING: 26 % (ref 28–44)
GLUCOSE SERPL-MCNC: 102 MG/DL (ref 70–110)
HCT VFR BLD AUTO: 41.1 % (ref 37–48.5)
HGB BLD-MCNC: 12.2 G/DL (ref 12–16)
IMM GRANULOCYTES # BLD AUTO: 0.12 K/UL (ref 0–0.04)
IMM GRANULOCYTES NFR BLD AUTO: 1.4 % (ref 0–0.5)
INTERVENTRICULAR SEPTUM: 1.06 CM (ref 0.6–1.1)
IVC DIAMETER: 3.45 CM
LEFT ATRIUM SIZE: 5.7 CM
LEFT INTERNAL DIMENSION IN SYSTOLE: 3.25 CM (ref 2.1–4)
LEFT VENTRICLE DIASTOLIC VOLUME INDEX: 30.6 ML/M2
LEFT VENTRICLE DIASTOLIC VOLUME: 87.52 ML
LEFT VENTRICLE MASS INDEX: 68 G/M2
LEFT VENTRICLE SYSTOLIC VOLUME INDEX: 14.8 ML/M2
LEFT VENTRICLE SYSTOLIC VOLUME: 42.4 ML
LEFT VENTRICULAR INTERNAL DIMENSION IN DIASTOLE: 4.4 CM (ref 3.5–6)
LEFT VENTRICULAR MASS: 193.65 G
LYMPHOCYTES # BLD AUTO: 1.1 K/UL (ref 1–4.8)
LYMPHOCYTES NFR BLD: 12.3 % (ref 18–48)
MAGNESIUM SERPL-MCNC: 2 MG/DL (ref 1.6–2.6)
MCH RBC QN AUTO: 25.6 PG (ref 27–31)
MCHC RBC AUTO-ENTMCNC: 29.7 G/DL (ref 32–36)
MCV RBC AUTO: 86 FL (ref 82–98)
MONOCYTES # BLD AUTO: 0.7 K/UL (ref 0.3–1)
MONOCYTES NFR BLD: 8.2 % (ref 4–15)
NEUTROPHILS # BLD AUTO: 6.6 K/UL (ref 1.8–7.7)
NEUTROPHILS NFR BLD: 75.1 % (ref 38–73)
NRBC BLD-RTO: 0 /100 WBC
PHOSPHATE SERPL-MCNC: 4.2 MG/DL (ref 2.7–4.5)
PISA TR MAX VEL: 2.7 M/S
PLATELET # BLD AUTO: 186 K/UL (ref 150–450)
PMV BLD AUTO: 10.3 FL (ref 9.2–12.9)
POTASSIUM SERPL-SCNC: 4.4 MMOL/L (ref 3.5–5.1)
PROT SERPL-MCNC: 6.6 G/DL (ref 6–8.4)
RA PRESSURE ESTIMATED: 15 MMHG
RBC # BLD AUTO: 4.77 M/UL (ref 4–5.4)
RV TB RVSP: 18 MMHG
SODIUM SERPL-SCNC: 139 MMOL/L (ref 136–145)
TR MAX PG: 29 MMHG
TRICUSPID ANNULAR PLANE SYSTOLIC EXCURSION: 0.96 CM
TROPONIN I SERPL DL<=0.01 NG/ML-MCNC: <0.006 NG/ML (ref 0–0.03)
TV REST PULMONARY ARTERY PRESSURE: 44 MMHG
WBC # BLD AUTO: 8.71 K/UL (ref 3.9–12.7)
Z-SCORE OF LEFT VENTRICULAR DIMENSION IN END DIASTOLE: -23.81
Z-SCORE OF LEFT VENTRICULAR DIMENSION IN END SYSTOLE: -16.73

## 2024-05-19 PROCEDURE — 99223 1ST HOSP IP/OBS HIGH 75: CPT | Mod: ,,, | Performed by: INTERNAL MEDICINE

## 2024-05-19 PROCEDURE — 63600175 PHARM REV CODE 636 W HCPCS: Performed by: NURSE PRACTITIONER

## 2024-05-19 PROCEDURE — 36415 COLL VENOUS BLD VENIPUNCTURE: CPT | Performed by: FAMILY MEDICINE

## 2024-05-19 PROCEDURE — 85025 COMPLETE CBC W/AUTO DIFF WBC: CPT | Performed by: NURSE PRACTITIONER

## 2024-05-19 PROCEDURE — 94761 N-INVAS EAR/PLS OXIMETRY MLT: CPT

## 2024-05-19 PROCEDURE — 25000003 PHARM REV CODE 250: Performed by: FAMILY MEDICINE

## 2024-05-19 PROCEDURE — 84484 ASSAY OF TROPONIN QUANT: CPT | Performed by: FAMILY MEDICINE

## 2024-05-19 PROCEDURE — 83735 ASSAY OF MAGNESIUM: CPT | Performed by: NURSE PRACTITIONER

## 2024-05-19 PROCEDURE — 80053 COMPREHEN METABOLIC PANEL: CPT | Performed by: NURSE PRACTITIONER

## 2024-05-19 PROCEDURE — 36415 COLL VENOUS BLD VENIPUNCTURE: CPT | Performed by: NURSE PRACTITIONER

## 2024-05-19 PROCEDURE — 99900035 HC TECH TIME PER 15 MIN (STAT)

## 2024-05-19 PROCEDURE — 21400001 HC TELEMETRY ROOM

## 2024-05-19 PROCEDURE — 27000221 HC OXYGEN, UP TO 24 HOURS

## 2024-05-19 PROCEDURE — 84100 ASSAY OF PHOSPHORUS: CPT | Performed by: NURSE PRACTITIONER

## 2024-05-19 RX ORDER — MICONAZOLE NITRATE 2 %
POWDER (GRAM) TOPICAL 2 TIMES DAILY
Status: DISCONTINUED | OUTPATIENT
Start: 2024-05-19 | End: 2024-05-19

## 2024-05-19 RX ORDER — MICONAZOLE NITRATE 2 %
POWDER (GRAM) TOPICAL 2 TIMES DAILY
Status: DISCONTINUED | OUTPATIENT
Start: 2024-05-19 | End: 2024-05-23 | Stop reason: HOSPADM

## 2024-05-19 RX ADMIN — FUROSEMIDE 80 MG: 10 INJECTION, SOLUTION INTRAMUSCULAR; INTRAVENOUS at 09:05

## 2024-05-19 RX ADMIN — MICONAZOLE NITRATE: 20 POWDER TOPICAL at 01:05

## 2024-05-19 RX ADMIN — FUROSEMIDE 80 MG: 10 INJECTION, SOLUTION INTRAMUSCULAR; INTRAVENOUS at 08:05

## 2024-05-19 RX ADMIN — MICONAZOLE NITRATE: 20 POWDER TOPICAL at 08:05

## 2024-05-19 NOTE — ASSESSMENT & PLAN NOTE
Patient with Hypoxic Respiratory failure which is Acute.  she is not on home oxygen. Supplemental oxygen was provided and noted- Oxygen Concentration (%):  [28] 28    .   Signs/symptoms of respiratory failure include- tachypnea, increased work of breathing, wheezing, and lethargy. Contributing diagnoses includes - CHF and Pneumonia Labs and images were reviewed. Patient Has recent ABG, which has been reviewed. Will treat underlying causes and adjust management of respiratory failure as follows-     --IV diuresis  --supplemental O2 to maintain SpO2> 90%- currently on 2 L  --CT chest in the morning to review right-sided abnormality noted on chest x-ray.  Possible atelectasis versus vascular congestion versus pneumonia

## 2024-05-19 NOTE — PROGRESS NOTES
[facility] ([facility])  [facility] Medicine  Progress Note    Patient Name: Niru Reyes  MRN: [id number]  Patient Class: IP- Inpatient   Admission Date: 5/18/2024  Length of Stay: 1 days  Attending Physician: Magdiel Johnson MD  Primary Care Provider: Jordana Shirley MD        Subjective:     Principal Problem:Acute diastolic CHF (congestive heart failure)        HPI:  79 y.o. female patient, PMHx of HTN, CHF, morbid obesity and paroxysmal atrial fibrillation. Presented to the Emergency Department for evaluation of SOB which onset this morning at around 3 AM on day of arrival. When pt woke up at 3 AM, she states that her oxygen was at 68%, patient stated she did not want to awaken her family and waited to call her daughter until around 7-8 a.m.. SpO2 was in the 80s when her daughter arrived, called nurse triage line who instructed them to come to the ED. Symptoms are constant and moderate in severity. SOB worsens when laying flat. Associated sxs include wheezing and generalized weakness. Patient denies any fever, cough, CP, and all other sxs at this time. Pt is on LASIX. On 4/3/2024, pt was discharged from the hospital to [facility] for skilled therapy. They tried to wean her off oxygen and on 5/1/2024, she was taken off of oxygen. Pt stopped using Neb and inhalers 2 weeks ago. Pt also has UTI and was placed on antibiotics.  On discharge SNF patient weighed 421 lb, in the ED patient weighed 456 lb, 35# weight gain in 16 days.  In the ED, vitals: 143/43, 97, 20, 98 F, 96% on 4 L NC.  Significant labs:  Bili: 1.2, BNP: 476, ABG: pH:  7.349, PO2: 60.  CXR:moderate amount of alveolar consolidation scattered throughout the inferior 2/3 of the right lung.  Right pleural effusion.  Treated with IV diuretic, IV antibiotics.  Patient is a full code.  Admitted to hospital medicine for management of acute on chronic CHF, acute respiratory failure.    Overview/Hospital Course:  No notes on file    Interval  History:  Follow up for congestive heart failure.  Patient has diuresed 7 L since admission.  Her creatinine is stable and patient will be continued on Lasix IV 80 mg q.12 hours.  Cardiology has been consulted and case was discussed with Dr. Bah.  Chest x-ray was reviewed and right side imaging appears abnormal and therefore a CT scan has been ordered for the morning.  Patient was currently on 2 L of nasal cannula oxygen satting approximately 93%, she was on room air at the time of admission although previously she has been on home O2.  We will attempt to wean oxygen to room air if possible but patient is aware that she may need to be back on her home oxygen if unable to be weaned to room air.    Review of Systems  Objective:     Vital Signs (Most Recent):  Temp: 97.1 °F (36.2 °C) (05/19/24 0706)  Pulse: 85 (05/19/24 1119)  Resp: 16 (05/19/24 0719)  BP: (!) 142/71 (05/19/24 1119)  SpO2: (!) 93 % (05/19/24 0719) Vital Signs (24h Range):  Temp:  [97.1 °F (36.2 °C)-98 °F (36.7 °C)] 97.1 °F (36.2 °C)  Pulse:  [] 85  Resp:  [16-22] 16  SpO2:  [91 %-98 %] 93 %  BP: (120-177)/(43-90) 142/71     Weight: (!) 200 kg (440 lb 14.7 oz)  Body mass index is 67.04 kg/m².    Intake/Output Summary (Last 24 hours) at 5/19/2024 1144  Last data filed at 5/19/2024 1011  Gross per 24 hour   Intake 100 ml   Output 9150 ml   Net -9050 ml         Physical Exam  Vitals and nursing note reviewed.   Constitutional:       Appearance: Normal appearance.   HENT:      Head: Normocephalic and atraumatic.      Nose: Nose normal.      Mouth/Throat:      Mouth: Mucous membranes are moist.   Eyes:      Extraocular Movements: Extraocular movements intact.      Conjunctiva/sclera: Conjunctivae normal.   Cardiovascular:      Rate and Rhythm: Normal rate and regular rhythm.      Pulses: Normal pulses.      Heart sounds: Normal heart sounds.   Pulmonary:      Effort: Respiratory distress present.      Breath sounds: Normal breath sounds.   Abdominal:       General: Abdomen is flat. Bowel sounds are normal.      Palpations: Abdomen is soft.   Musculoskeletal:         General: Normal range of motion.      Cervical back: Normal range of motion and neck supple.      Right lower leg: Edema present.      Left lower leg: Edema present.   Skin:     General: Skin is warm.      Capillary Refill: Capillary refill takes less than 2 seconds.   Neurological:      Mental Status: She is alert and oriented to person, place, and time. Mental status is at baseline.   Psychiatric:         Mood and Affect: Mood normal.         Behavior: Behavior normal.             Significant Labs: All pertinent labs within the past 24 hours have been reviewed.  Recent Lab Results  (Last 5 results in the past 24 hours)        05/19/24  0425   05/19/24  0031   05/18/24  1824   05/18/24  1633   05/18/24  1424        Albumin 3.3               ALP 80               ALT 6               Anion Gap 11               Appearance, UA         Clear       AST 15               Bacteria, UA         None       Baso # 0.05               Basophil % 0.6               Bilirubin (UA)         Negative       BILIRUBIN TOTAL 1.1  Comment: For infants and newborns, interpretation of results should be based  on gestational age, weight and in agreement with clinical  observations.    Premature Infant recommended reference ranges:  Up to 24 hours.............<8.0 mg/dL  Up to 48 hours............<12.0 mg/dL  3-5 days..................<15.0 mg/dL  6-29 days.................<15.0 mg/dL                 Blood Culture, Routine       No Growth to date  [P]                No Growth to date  [P]         BUN 12               Calcium 8.6               Chloride 101               CO2 27               Color, UA         Yellow       Creatinine 0.7               Differential Method Automated               eGFR >60               Eos # 0.2               Eos % 2.4               Glucose 102               Glucose, UA         Negative       Gran #  (ANC) 6.6               Gran % 75.1               Hematocrit 41.1               Hemoglobin 12.2               Immature Grans (Abs) 0.12  Comment: Mild elevation in immature granulocytes is non specific and   can be seen in a variety of conditions including stress response,   acute inflammation, trauma and pregnancy. Correlation with other   laboratory and clinical findings is essential.                 Immature Granulocytes 1.4               Ketones, UA         Negative       Leukocyte Esterase, UA         2+       Lymph # 1.1               Lymph % 12.3               Magnesium  2.0               MCH 25.6               MCHC 29.7               MCV 86               Microscopic Comment         SEE COMMENT  Comment: Other formed elements not mentioned in the report are not   present in the microscopic examination.          Mono # 0.7               Mono % 8.2               MPV 10.3               NITRITE UA         Negative       nRBC 0               Blood, UA         Negative       pH, UA         7.0       Phosphorus Level 4.2               Platelet Count 186               Potassium 4.4               PROTEIN TOTAL 6.6               Protein, UA         Trace  Comment: Recommend a 24 hour urine protein or a urine   protein/creatinine ratio if globulin induced proteinuria is  clinically suspected.         RBC 4.77               RDW 16.0               Sodium 139               Specific [place], UA         1.015       Specimen UA         Urine, Clean Catch       Squam Epithel, UA         1       Troponin I   <0.006  Comment: The reference interval for Troponin I represents the 99th percentile   cutoff   for our facility and is consistent with 3rd generation assay   performance.     <0.006  Comment: The reference interval for Troponin I represents the 99th percentile   cutoff   for our facility and is consistent with 3rd generation assay   performance.             UROBILINOGEN UA         Negative       WBC, UA         1        WBC 8.71                                       [P] - Preliminary Result               Significant Imaging:   X-Ray Chest AP Portable   Final Result      1. There has been interval development of a moderate amount of alveolar consolidation scattered throughout the inferior 2/3 of the right lung.  This is characteristic of pneumonia.   2. Small right pleural effusion   .         Electronically signed by: Benito Winchester MD   Date:    05/18/2024   Time:    13:32           Assessment/Plan:      * Acute diastolic CHF (congestive heart failure)  Patient is identified as having Diastolic (HFpEF) heart failure that is Acute on chronic. CHF is currently uncontrolled due to >3 pillow orthopnea, Rales/crackles on pulmonary exam, and Pulmonary edema/pleural effusion on CXR. Latest ECHO performed and demonstrates- Results for orders placed during the hospital encounter of 03/14/24    Echo    Interpretation Summary    Left Ventricle: The left ventricle is normal in size. Normal wall thickness. There is concentric remodeling. There is low normal systolic function with a visually estimated ejection fraction of 50 - 55%. There is normal diastolic function.    Right Ventricle: Normal right ventricular cavity size. Wall thickness is normal. Right ventricle wall motion  is normal. Systolic function is normal.    Left Atrium: Left atrium is moderately dilated.    Pulmonary Artery: The estimated pulmonary artery systolic pressure is 45 mmHg.    IVC/SVC: Normal venous pressure at 3 mmHg.  . Continue Beta Blocker and Furosemide and monitor clinical status closely. Monitor on telemetry. Patient is on CHF pathway.  Monitor strict Is&Os and daily weights.  Place on fluid restriction of 1.5 L. Cardiology has been consulted. Continue to stress to patient importance of self efficacy and  on diet for CHF. Last BNP reviewed- and noted below   Recent Labs   Lab 05/18/24  1313   *         Pneumonia  Patient has a diagnosis of  pneumonia. The cause of the pneumonia is suspected to be bacterial in etiology but organism is not known. The pneumonia is  new onset . The patient has the following signs/symptoms of pneumonia: persistent hypoxia , sputum production, and shortness of breath. The patient does have a current oxygen requirement and the patient does not have a home oxygen requirement. I have reviewed the pertinent imaging. The following cultures have been collected: Blood cultures The culture results are listed below.     Current antimicrobial regimen consists of the antibiotics listed below. Will monitor patient closely and continue current treatment plan unchanged.    Antibiotics (From admission, onward)      Start     Stop Route Frequency Ordered    05/18/24 1600  azithromycin (ZITHROMAX) 500 mg in dextrose 5 % (D5W) 250 mL IVPB (Vial-Mate)         05/19/24 0359 IV ED 1 Time 05/18/24 1517    05/18/24 1530  cefTRIAXone (Rocephin) 1 g in dextrose 5 % in water (D5W) 100 mL IVPB (MB+)         05/19/24 0329 IV ED 1 Time 05/18/24 1517            Microbiology Results (last 7 days)       Procedure Component Value Units Date/Time    Blood culture #1 **CANNOT BE ORDERED STAT** [[id number]] Collected: 05/18/24 1633    Order Status: Sent Specimen: Blood from Peripheral, Upper Arm, Left     Blood culture #2 **CANNOT BE ORDERED STAT** [[id number]] Collected: 05/18/24 1633    Order Status: Sent Specimen: Blood from Peripheral, Hand, Left             Difficulty with activities of daily living  Per patient and daughter, daughter goes to patient apartment at approximately 5:00 a.m. every day to assist patient getting out of bed to her chair, patient remains in her chair until the evening when daughter returns to assist her back to the bed.  Patient urinates and defecates in a diaper, reports typically unable to clean herself independently and will remain in the diaper until the daughter is able to return to assist her.  Patient is unable to  independently walk, obtain meals or complete any ADLs independently.    --consult social Work  --PT/OT eval        Pleural effusion  Patient found to have moderate pleural effusion on imaging. I have not personally reviewed and interpreted the following imaging: Xray. A thoracentesis was deferred. Most likely etiology includes Congestive Heart Failure. Management to include Diuresis      Debility  Patient with Acute on chronic debility due to bed confinement status and chronic unspecified fatigue. Latest AMPAC and GEMS scores have not been reviewed. Evaluation for etiology is underway. Plan includes progressive mobility protocol initated and PT/OT consulted.    Morbid obesity  Body mass index is 67.04 kg/m². Morbid obesity complicates all aspects of disease management from diagnostic modalities to treatment. Weight loss encouraged and health benefits explained to patient.         Acute hypoxemic respiratory failure  Patient with Hypoxic Respiratory failure which is Acute.  she is not on home oxygen. Supplemental oxygen was provided and noted- Oxygen Concentration (%):  [28] 28    .   Signs/symptoms of respiratory failure include- tachypnea, increased work of breathing, wheezing, and lethargy. Contributing diagnoses includes - CHF and Pneumonia Labs and images were reviewed. Patient Has recent ABG, which has been reviewed. Will treat underlying causes and adjust management of respiratory failure as follows-     --IV diuresis  --supplemental O2 to maintain SpO2> 90%- currently on 2 L  --CT chest in the morning to review right-sided abnormality noted on chest x-ray.  Possible atelectasis versus vascular congestion versus pneumonia      VTE Risk Mitigation (From admission, onward)           Ordered     IP VTE HIGH RISK PATIENT  Once         05/18/24 1636     Place sequential compression device  Until discontinued         05/18/24 1636     Reason for No Pharmacological VTE Prophylaxis  Once        Question:  Reasons:   Answer:  Already adequately anticoagulated on oral Anticoagulants    05/18/24 1636                    Discharge Planning   JERONIMO:      Code Status: Full Code   Is the patient medically ready for discharge?:     Reason for patient still in hospital (select all that apply): Patient trending condition, Laboratory test, Treatment, Consult recommendations, and PT / OT recommendations                     Magdiel Johnson MD  Department of Hospital Medicine   [facility] ([facility])

## 2024-05-19 NOTE — ASSESSMENT & PLAN NOTE
I reviewed the H&P, I examined the patient, and there are no changes in the patient's condition.  Consent for right total knee replacement.  Questions answered.   Patient is identified as having Diastolic (HFpEF) heart failure that is Acute on chronic. CHF is currently uncontrolled due to >3 pillow orthopnea, Rales/crackles on pulmonary exam, and Pulmonary edema/pleural effusion on CXR. Latest ECHO performed and demonstrates- Results for orders placed during the hospital encounter of 03/14/24    Echo    Interpretation Summary    Left Ventricle: The left ventricle is normal in size. Normal wall thickness. There is concentric remodeling. There is low normal systolic function with a visually estimated ejection fraction of 50 - 55%. There is normal diastolic function.    Right Ventricle: Normal right ventricular cavity size. Wall thickness is normal. Right ventricle wall motion  is normal. Systolic function is normal.    Left Atrium: Left atrium is moderately dilated.    Pulmonary Artery: The estimated pulmonary artery systolic pressure is 45 mmHg.    IVC/SVC: Normal venous pressure at 3 mmHg.  . Continue Beta Blocker and Furosemide and monitor clinical status closely. Monitor on telemetry. Patient is on CHF pathway.  Monitor strict Is&Os and daily weights.  Place on fluid restriction of 1.5 L. Cardiology has been consulted. Continue to stress to patient importance of self efficacy and  on diet for CHF. Last BNP reviewed- and noted below   Recent Labs   Lab 05/18/24  1313   *

## 2024-05-19 NOTE — HPI
79 y.o. female patient, PMHx of HTN, CHF, morbid obesity and paroxysmal atrial fibrillation. Presented to the Emergency Department for evaluation of SOB which onset this morning at around 3 AM on day of arrival. When pt woke up at 3 AM, she states that her oxygen was at 68%, patient stated she did not want to awaken her family and waited to call her daughter until around 7-8 a.m.. SpO2 was in the 80s when her daughter arrived, called nurse triage line who instructed them to come to the ED. Symptoms are constant and moderate in severity. SOB worsens when laying flat. Associated sxs include wheezing and generalized weakness. Patient denies any fever, cough, CP, and all other sxs at this time. Pt is on LASIX. On 4/3/2024, pt was discharged from the hospital to [facility] for skilled therapy. They tried to wean her off oxygen and on 5/1/2024, she was taken off of oxygen. Pt stopped using Neb and inhalers 2 weeks ago. Pt also has UTI and was placed on antibiotics.  On discharge SNF patient weighed 421 lb, in the ED patient weighed 456 lb, 35# weight gain in 16 days.  In the ED, vitals: 143/43, 97, 20, 98 F, 96% on 4 L NC.  Significant labs:  Bili: 1.2, BNP: 476, ABG: pH:  7.349, PO2: 60.  CXR:moderate amount of alveolar consolidation scattered throughout the inferior 2/3 of the right lung.  Right pleural effusion.  Treated with IV diuretic, IV antibiotics.  Patient is a full code.  Admitted to hospital medicine for management of acute on chronic CHF, acute respiratory failure.       Cardiology consult for afib and chf  PMH CHF HTN morbid obesity PAF recent h/o COVID 19 in 03.24  Wean off O2 recently at NH  Admitted for acute SOB and hypoxemia  EKG AFIB  poor R progression on precordial leads. No change compared to prior one in   Echo EF 55% PASP 45 mmHG in 03/24  CXR moderate amount of alveolar consolidation scattered throughout the inferior 2/3 of the right lung.  Right pleural effusion.   Troponin x2  negative Cr 0.7  VSS

## 2024-05-19 NOTE — SUBJECTIVE & OBJECTIVE
Interval History:  Follow up for congestive heart failure.  Patient has diuresed 7 L since admission.  Her creatinine is stable and patient will be continued on Lasix IV 80 mg q.12 hours.  Cardiology has been consulted and case was discussed with Dr. Bah.  Chest x-ray was reviewed and right side imaging appears abnormal and therefore a CT scan has been ordered for the morning.  Patient was currently on 2 L of nasal cannula oxygen satting approximately 93%, she was on room air at the time of admission although previously she has been on home O2.  We will attempt to wean oxygen to room air if possible but patient is aware that she may need to be back on her home oxygen if unable to be weaned to room air.    Review of Systems  Objective:     Vital Signs (Most Recent):  Temp: 97.1 °F (36.2 °C) (05/19/24 0706)  Pulse: 85 (05/19/24 1119)  Resp: 16 (05/19/24 0719)  BP: (!) 142/71 (05/19/24 1119)  SpO2: (!) 93 % (05/19/24 0719) Vital Signs (24h Range):  Temp:  [97.1 °F (36.2 °C)-98 °F (36.7 °C)] 97.1 °F (36.2 °C)  Pulse:  [] 85  Resp:  [16-22] 16  SpO2:  [91 %-98 %] 93 %  BP: (120-177)/(43-90) 142/71     Weight: (!) 200 kg (440 lb 14.7 oz)  Body mass index is 67.04 kg/m².    Intake/Output Summary (Last 24 hours) at 5/19/2024 1144  Last data filed at 5/19/2024 1011  Gross per 24 hour   Intake 100 ml   Output 9150 ml   Net -9050 ml         Physical Exam  Vitals and nursing note reviewed.   Constitutional:       Appearance: Normal appearance.   HENT:      Head: Normocephalic and atraumatic.      Nose: Nose normal.      Mouth/Throat:      Mouth: Mucous membranes are moist.   Eyes:      Extraocular Movements: Extraocular movements intact.      Conjunctiva/sclera: Conjunctivae normal.   Cardiovascular:      Rate and Rhythm: Normal rate and regular rhythm.      Pulses: Normal pulses.      Heart sounds: Normal heart sounds.   Pulmonary:      Effort: Respiratory distress present.      Breath sounds: Normal breath sounds.    Abdominal:      General: Abdomen is flat. Bowel sounds are normal.      Palpations: Abdomen is soft.   Musculoskeletal:         General: Normal range of motion.      Cervical back: Normal range of motion and neck supple.      Right lower leg: Edema present.      Left lower leg: Edema present.   Skin:     General: Skin is warm.      Capillary Refill: Capillary refill takes less than 2 seconds.   Neurological:      Mental Status: She is alert and oriented to person, place, and time. Mental status is at baseline.   Psychiatric:         Mood and Affect: Mood normal.         Behavior: Behavior normal.             Significant Labs: All pertinent labs within the past 24 hours have been reviewed.  Recent Lab Results  (Last 5 results in the past 24 hours)        05/19/24  0425   05/19/24  0031   05/18/24  1824   05/18/24  1633   05/18/24  1424        Albumin 3.3               ALP 80               ALT 6               Anion Gap 11               Appearance, UA         Clear       AST 15               Bacteria, UA         None       Baso # 0.05               Basophil % 0.6               Bilirubin (UA)         Negative       BILIRUBIN TOTAL 1.1  Comment: For infants and newborns, interpretation of results should be based  on gestational age, weight and in agreement with clinical  observations.    Premature Infant recommended reference ranges:  Up to 24 hours.............<8.0 mg/dL  Up to 48 hours............<12.0 mg/dL  3-5 days..................<15.0 mg/dL  6-29 days.................<15.0 mg/dL                 Blood Culture, Routine       No Growth to date  [P]                No Growth to date  [P]         BUN 12               Calcium 8.6               Chloride 101               CO2 27               Color, UA         Yellow       Creatinine 0.7               Differential Method Automated               eGFR >60               Eos # 0.2               Eos % 2.4               Glucose 102               Glucose, UA         Negative        Gran # (ANC) 6.6               Gran % 75.1               Hematocrit 41.1               Hemoglobin 12.2               Immature Grans (Abs) 0.12  Comment: Mild elevation in immature granulocytes is non specific and   can be seen in a variety of conditions including stress response,   acute inflammation, trauma and pregnancy. Correlation with other   laboratory and clinical findings is essential.                 Immature Granulocytes 1.4               Ketones, UA         Negative       Leukocyte Esterase, UA         2+       Lymph # 1.1               Lymph % 12.3               Magnesium  2.0               MCH 25.6               MCHC 29.7               MCV 86               Microscopic Comment         SEE COMMENT  Comment: Other formed elements not mentioned in the report are not   present in the microscopic examination.          Mono # 0.7               Mono % 8.2               MPV 10.3               NITRITE UA         Negative       nRBC 0               Blood, UA         Negative       pH, UA         7.0       Phosphorus Level 4.2               Platelet Count 186               Potassium 4.4               PROTEIN TOTAL 6.6               Protein, UA         Trace  Comment: Recommend a 24 hour urine protein or a urine   protein/creatinine ratio if globulin induced proteinuria is  clinically suspected.         RBC 4.77               RDW 16.0               Sodium 139               Specific [place], UA         1.015       Specimen UA         Urine, Clean Catch       Squam Epithel, UA         1       Troponin I   <0.006  Comment: The reference interval for Troponin I represents the 99th percentile   cutoff   for our facility and is consistent with 3rd generation assay   performance.     <0.006  Comment: The reference interval for Troponin I represents the 99th percentile   cutoff   for our facility and is consistent with 3rd generation assay   performance.             UROBILINOGEN UA         Negative       WBC, UA          1       WBC 8.71                                       [P] - Preliminary Result               Significant Imaging:   X-Ray Chest AP Portable   Final Result      1. There has been interval development of a moderate amount of alveolar consolidation scattered throughout the inferior 2/3 of the right lung.  This is characteristic of pneumonia.   2. Small right pleural effusion   .         Electronically signed by: Benito Winchester MD   Date:    05/18/2024   Time:    13:32

## 2024-05-19 NOTE — SUBJECTIVE & OBJECTIVE
Past Medical History:   Diagnosis Date    Hypertension     Paroxysmal atrial fibrillation        Past Surgical History:   Procedure Laterality Date     SECTION      COLONOSCOPY N/A 2019    Procedure: COLONOSCOPY;  Surgeon: Janeth Leroy MD;  Location: [facility];  Service: Endoscopy;  Laterality: N/A;    HYSTERECTOMY         Review of patient's allergies indicates:  No Known Allergies    No current facility-administered medications on file prior to encounter.     Current Outpatient Medications on File Prior to Encounter   Medication Sig    amLODIPine (NORVASC) 5 MG tablet Take 1 tablet (5 mg total) by mouth once daily.    apixaban (ELIQUIS) 5 mg Tab Take 1 tablet (5 mg total) by mouth 2 (two) times daily.    furosemide (LASIX) 40 MG tablet Take 1 tablet (40 mg total) by mouth once daily.    metoprolol succinate (TOPROL-XL) 50 MG 24 hr tablet Take 1 tablet (50 mg total) by mouth once daily.    albuterol (PROVENTIL/VENTOLIN HFA) 90 mcg/actuation inhaler Inhale 2 puffs into the lungs every 6 (six) hours. Rescue    celecoxib (CELEBREX) 100 MG capsule Take 1 capsule (100 mg total) by mouth 2 (two) times daily. (Patient not taking: Reported on 2024)    pulse oximeter (PULSE OXIMETER) device by Apply Externally route 2 (two) times a day. Use twice daily at 8 AM and 3 PM and record the value in MyChart as directed.    triamcinolone acetonide 0.1% (KENALOG) 0.1 % cream Apply topically 2 (two) times daily. Under occlusion for 14 days     Family History       Problem Relation (Age of Onset)    Birth defects Daughter    Heart disease Mother, Sister    Muscular dystrophy Father          Tobacco Use    Smoking status: Never    Smokeless tobacco: Not on file   Substance and Sexual Activity    Alcohol use: No    Drug use: No    Sexual activity: Never     Review of Systems   Constitutional: Negative for decreased appetite, diaphoresis, fever, malaise/fatigue and night sweats.   HENT:  Negative for nosebleeds.     Eyes:  Negative for blurred vision and double vision.   Cardiovascular:  Positive for dyspnea on exertion, leg swelling and orthopnea. Negative for chest pain, claudication, irregular heartbeat, near-syncope, palpitations, paroxysmal nocturnal dyspnea and syncope.   Respiratory:  Negative for cough, shortness of breath, sleep disturbances due to breathing, snoring, sputum production and wheezing.    Endocrine: Negative for cold intolerance and polyuria.   Hematologic/Lymphatic: Does not bruise/bleed easily.   Skin:  Negative for rash.   Musculoskeletal:  Negative for back pain, falls, joint pain, joint swelling and neck pain.   Gastrointestinal:  Negative for abdominal pain, heartburn, nausea and vomiting.   Genitourinary:  Negative for dysuria, frequency and hematuria.   Neurological:  Negative for difficulty with concentration, dizziness, focal weakness, headaches, light-headedness, numbness, seizures and weakness.   Psychiatric/Behavioral:  Negative for depression, memory loss and substance abuse. The patient does not have insomnia.    Allergic/Immunologic: Negative for HIV exposure and hives.     Objective:     Vital Signs (Most Recent):  Temp: 97.1 °F (36.2 °C) (05/19/24 0706)  Pulse: 85 (05/19/24 1119)  Resp: 16 (05/19/24 0719)  BP: (!) 142/71 (05/19/24 1119)  SpO2: (!) 93 % (05/19/24 0719) Vital Signs (24h Range):  Temp:  [97.1 °F (36.2 °C)-98 °F (36.7 °C)] 97.1 °F (36.2 °C)  Pulse:  [] 85  Resp:  [16-22] 16  SpO2:  [91 %-98 %] 93 %  BP: (120-177)/(56-90) 142/71     Weight: (!) 200 kg (440 lb 14.7 oz)  Body mass index is 67.04 kg/m².    SpO2: (!) 93 %         Intake/Output Summary (Last 24 hours) at 5/19/2024 1258  Last data filed at 5/19/2024 1239  Gross per 24 hour   Intake 100 ml   Output 47466 ml   Net -9950 ml       Lines/Drains/Airways       Peripheral Intravenous Line  Duration                  Peripheral IV - Single Lumen 05/18/24 1634 22 G Lateral;Right Breast <1 day                    "  Physical Exam  HENT:      Head: Normocephalic.   Eyes:      Pupils: Pupils are equal, round, and reactive to light.   Neck:      Thyroid: No thyromegaly.      Vascular: Normal carotid pulses. No carotid bruit or JVD.   Cardiovascular:      Rate and Rhythm: Normal rate and regular rhythm. No extrasystoles are present.     Chest Wall: PMI is not displaced.      Pulses: Normal pulses.      Heart sounds: Normal heart sounds. No murmur heard.     No gallop. No S3 sounds.   Pulmonary:      Effort: No respiratory distress.      Breath sounds: No stridor. Rales present.   Abdominal:      General: Bowel sounds are normal.      Palpations: Abdomen is soft.      Tenderness: There is no abdominal tenderness. There is no rebound.   Musculoskeletal:         General: Swelling present. Normal range of motion.   Skin:     Findings: No rash.   Neurological:      Mental Status: She is alert and oriented to person, place, and time.   Psychiatric:         Behavior: Behavior normal.          Significant Labs: ABG:   Recent Labs   Lab 05/18/24  1351   PH 7.349*   PCO2 53.8*   HCO3 29.6*   POCSATURATED 89   BE 4*   , Blood Culture:   Recent Labs   Lab 05/18/24  1633   LABBLOO No Growth to date  No Growth to date   , BMP:   Recent Labs   Lab 05/18/24  1313 05/19/24  0425    102    139   K 4.6 4.4    101   CO2 26 27   BUN 15 12   CREATININE 0.7 0.7   CALCIUM 8.8 8.6*   MG  --  2.0   , CMP   Recent Labs   Lab 05/18/24  1313 05/19/24  0425    139   K 4.6 4.4    101   CO2 26 27    102   BUN 15 12   CREATININE 0.7 0.7   CALCIUM 8.8 8.6*   PROT 6.5 6.6   ALBUMIN 3.3* 3.3*   BILITOT 1.2* 1.1*   ALKPHOS 76 80   AST 17 15   ALT 7* 6*   ANIONGAP 11 11   , CBC   Recent Labs   Lab 05/18/24  1313 05/19/24  0425   WBC 9.49 8.71   HGB 11.6* 12.2   HCT 38.4 41.1    186   , INR No results for input(s): "INR", "PROTIME" in the last 48 hours., Lipid Panel No results for input(s): "CHOL", "HDL", "LDLCALC", " ""TRIG", "CHOLHDL" in the last 48 hours., and Troponin   Recent Labs   Lab 05/18/24  1313 05/18/24  1824 05/19/24  0031   TROPONINI <0.006 <0.006 <0.006       Significant Imaging: EKG:    "

## 2024-05-19 NOTE — ASSESSMENT & PLAN NOTE
Patient is identified as having Diastolic (HFpEF) heart failure that is Acute on chronic. CHF is currently uncontrolled due to Continued edema of extremities and >3 pillow orthopnea. Latest ECHO performed and demonstrates- Results for orders placed during the hospital encounter of 05/18/24    Echo Saline Bubble? No; Ultrasound enhancing contrast? No    Interpretation Summary    Left Ventricle: The left ventricle is normal in size. Normal wall thickness. There is low normal systolic function with a visually estimated ejection fraction of 50 - 55%. There is normal diastolic function.    Right Ventricle: Normal right ventricular cavity size. Wall thickness is normal. Systolic function is severely reduced.    Mitral Valve: There is severe mitral annular calcification present.    Pulmonary Artery: There is mild pulmonary hypertension. The estimated pulmonary artery systolic pressure is 44 mmHg.    IVC/SVC: Elevated venous pressure at 15 mmHg.    Pericardium: Right pleural effusion.  . Continue Furosemide and monitor clinical status closely. Monitor on telemetry. Patient is on CHF pathway.  Monitor strict Is&Os and daily weights.  Place on fluid restriction of 1.5 L. Cardiology has been consulted. Continue to stress to patient importance of self efficacy and  on diet for CHF. Last BNP reviewed- and noted below   Recent Labs   Lab 05/18/24  1313   *       Good OUP  Continue lasix 60 mg ivp bid

## 2024-05-19 NOTE — CONSULTS
[facility] - Telemetry ([facility])  Cardiology  Consult Note    Patient Name: Niru Reyes  MRN: [id number]  Admission Date: 5/18/2024  Hospital Length of Stay: 1 days  Code Status: Full Code   Attending Provider: Magdiel Johnson MD   Consulting Provider: Cristiano Bah MD  Primary Care Physician: Jordana Shirley MD  Principal Problem:Acute diastolic CHF (congestive heart failure)    Patient information was obtained from patient and ER records.     Inpatient consult to Cardiology  Consult performed by: Cristiano Bah MD  Consult ordered by: Antoinette Ahumada NP        Subjective:       HPI:   79 y.o. female patient, PMHx of HTN, CHF, morbid obesity and paroxysmal atrial fibrillation. Presented to the Emergency Department for evaluation of SOB which onset this morning at around 3 AM on day of arrival. When pt woke up at 3 AM, she states that her oxygen was at 68%, patient stated she did not want to awaken her family and waited to call her daughter until around 7-8 a.m.. SpO2 was in the 80s when her daughter arrived, called nurse triage line who instructed them to come to the ED. Symptoms are constant and moderate in severity. SOB worsens when laying flat. Associated sxs include wheezing and generalized weakness. Patient denies any fever, cough, CP, and all other sxs at this time. Pt is on LASIX. On 4/3/2024, pt was discharged from the hospital to [facility] for skilled therapy. They tried to wean her off oxygen and on 5/1/2024, she was taken off of oxygen. Pt stopped using Neb and inhalers 2 weeks ago. Pt also has UTI and was placed on antibiotics.  On discharge SNF patient weighed 421 lb, in the ED patient weighed 456 lb, 35# weight gain in 16 days.  In the ED, vitals: 143/43, 97, 20, 98 F, 96% on 4 L NC.  Significant labs:  Bili: 1.2, BNP: 476, ABG: pH:  7.349, PO2: 60.  CXR:moderate amount of alveolar consolidation scattered throughout the inferior 2/3 of the right lung.  Right pleural effusion.  Treated with IV  diuretic, IV antibiotics.  Patient is a full code.  Admitted to hospital medicine for management of acute on chronic CHF, acute respiratory failure.       Cardiology consult for afib and chf  PMH CHF HTN morbid obesity PAF recent h/o COVID 19 in   Wean off O2 recently at NH  Admitted for acute SOB and hypoxemia  EKG AFIB  poor R progression on precordial leads. No change compared to prior one in   Echo EF 55% PASP 45 mmHG in   CXR moderate amount of alveolar consolidation scattered throughout the inferior 2/3 of the right lung.  Right pleural effusion.   Troponin x2 negative Cr 0.7  VSS         Past Medical History:   Diagnosis Date    Hypertension     Paroxysmal atrial fibrillation        Past Surgical History:   Procedure Laterality Date     SECTION      COLONOSCOPY N/A 2019    Procedure: COLONOSCOPY;  Surgeon: Janeth Leroy MD;  Location: [facility];  Service: Endoscopy;  Laterality: N/A;    HYSTERECTOMY         Review of patient's allergies indicates:  No Known Allergies    No current facility-administered medications on file prior to encounter.     Current Outpatient Medications on File Prior to Encounter   Medication Sig    amLODIPine (NORVASC) 5 MG tablet Take 1 tablet (5 mg total) by mouth once daily.    apixaban (ELIQUIS) 5 mg Tab Take 1 tablet (5 mg total) by mouth 2 (two) times daily.    furosemide (LASIX) 40 MG tablet Take 1 tablet (40 mg total) by mouth once daily.    metoprolol succinate (TOPROL-XL) 50 MG 24 hr tablet Take 1 tablet (50 mg total) by mouth once daily.    albuterol (PROVENTIL/VENTOLIN HFA) 90 mcg/actuation inhaler Inhale 2 puffs into the lungs every 6 (six) hours. Rescue    celecoxib (CELEBREX) 100 MG capsule Take 1 capsule (100 mg total) by mouth 2 (two) times daily. (Patient not taking: Reported on 2024)    pulse oximeter (PULSE OXIMETER) device by Apply Externally route 2 (two) times a day. Use twice daily at 8 AM and 3 PM and record the  value in MyChart as directed.    triamcinolone acetonide 0.1% (KENALOG) 0.1 % cream Apply topically 2 (two) times daily. Under occlusion for 14 days     Family History       Problem Relation (Age of Onset)    Birth defects Daughter    Heart disease Mother, Sister    Muscular dystrophy Father          Tobacco Use    Smoking status: Never    Smokeless tobacco: Not on file   Substance and Sexual Activity    Alcohol use: No    Drug use: No    Sexual activity: Never     Review of Systems   Constitutional: Negative for decreased appetite, diaphoresis, fever, malaise/fatigue and night sweats.   HENT:  Negative for nosebleeds.    Eyes:  Negative for blurred vision and double vision.   Cardiovascular:  Positive for dyspnea on exertion, leg swelling and orthopnea. Negative for chest pain, claudication, irregular heartbeat, near-syncope, palpitations, paroxysmal nocturnal dyspnea and syncope.   Respiratory:  Negative for cough, shortness of breath, sleep disturbances due to breathing, snoring, sputum production and wheezing.    Endocrine: Negative for cold intolerance and polyuria.   Hematologic/Lymphatic: Does not bruise/bleed easily.   Skin:  Negative for rash.   Musculoskeletal:  Negative for back pain, falls, joint pain, joint swelling and neck pain.   Gastrointestinal:  Negative for abdominal pain, heartburn, nausea and vomiting.   Genitourinary:  Negative for dysuria, frequency and hematuria.   Neurological:  Negative for difficulty with concentration, dizziness, focal weakness, headaches, light-headedness, numbness, seizures and weakness.   Psychiatric/Behavioral:  Negative for depression, memory loss and substance abuse. The patient does not have insomnia.    Allergic/Immunologic: Negative for HIV exposure and hives.     Objective:     Vital Signs (Most Recent):  Temp: 97.1 °F (36.2 °C) (05/19/24 0706)  Pulse: 85 (05/19/24 1119)  Resp: 16 (05/19/24 0719)  BP: (!) 142/71 (05/19/24 1119)  SpO2: (!) 93 % (05/19/24 0719)  Vital Signs (24h Range):  Temp:  [97.1 °F (36.2 °C)-98 °F (36.7 °C)] 97.1 °F (36.2 °C)  Pulse:  [] 85  Resp:  [16-22] 16  SpO2:  [91 %-98 %] 93 %  BP: (120-177)/(56-90) 142/71     Weight: (!) 200 kg (440 lb 14.7 oz)  Body mass index is 67.04 kg/m².    SpO2: (!) 93 %         Intake/Output Summary (Last 24 hours) at 5/19/2024 1258  Last data filed at 5/19/2024 1239  Gross per 24 hour   Intake 100 ml   Output 22923 ml   Net -9950 ml       Lines/Drains/Airways       Peripheral Intravenous Line  Duration                  Peripheral IV - Single Lumen 05/18/24 1634 22 G Lateral;Right Breast <1 day                     Physical Exam  HENT:      Head: Normocephalic.   Eyes:      Pupils: Pupils are equal, round, and reactive to light.   Neck:      Thyroid: No thyromegaly.      Vascular: Normal carotid pulses. No carotid bruit or JVD.   Cardiovascular:      Rate and Rhythm: Normal rate and regular rhythm. No extrasystoles are present.     Chest Wall: PMI is not displaced.      Pulses: Normal pulses.      Heart sounds: Normal heart sounds. No murmur heard.     No gallop. No S3 sounds.   Pulmonary:      Effort: No respiratory distress.      Breath sounds: No stridor. Rales present.   Abdominal:      General: Bowel sounds are normal.      Palpations: Abdomen is soft.      Tenderness: There is no abdominal tenderness. There is no rebound.   Musculoskeletal:         General: Swelling present. Normal range of motion.   Skin:     Findings: No rash.   Neurological:      Mental Status: She is alert and oriented to person, place, and time.   Psychiatric:         Behavior: Behavior normal.          Significant Labs: ABG:   Recent Labs   Lab 05/18/24  1351   PH 7.349*   PCO2 53.8*   HCO3 29.6*   POCSATURATED 89   BE 4*   , Blood Culture:   Recent Labs   Lab 05/18/24  1633   LABBLOO No Growth to date  No Growth to date   , BMP:   Recent Labs   Lab 05/18/24  1313 05/19/24  0425    102    139   K 4.6 4.4    101   CO2  "26 27   BUN 15 12   CREATININE 0.7 0.7   CALCIUM 8.8 8.6*   MG  --  2.0   , CMP   Recent Labs   Lab 05/18/24  1313 05/19/24  0425    139   K 4.6 4.4    101   CO2 26 27    102   BUN 15 12   CREATININE 0.7 0.7   CALCIUM 8.8 8.6*   PROT 6.5 6.6   ALBUMIN 3.3* 3.3*   BILITOT 1.2* 1.1*   ALKPHOS 76 80   AST 17 15   ALT 7* 6*   ANIONGAP 11 11   , CBC   Recent Labs   Lab 05/18/24  1313 05/19/24  0425   WBC 9.49 8.71   HGB 11.6* 12.2   HCT 38.4 41.1    186   , INR No results for input(s): "INR", "PROTIME" in the last 48 hours., Lipid Panel No results for input(s): "CHOL", "HDL", "LDLCALC", "TRIG", "CHOLHDL" in the last 48 hours., and Troponin   Recent Labs   Lab 05/18/24  1313 05/18/24  1824 05/19/24  0031   TROPONINI <0.006 <0.006 <0.006       Significant Imaging: EKG:    Assessment and Plan:     * Acute diastolic CHF (congestive heart failure)  Patient is identified as having Diastolic (HFpEF) heart failure that is Acute on chronic. CHF is currently uncontrolled due to Continued edema of extremities and >3 pillow orthopnea. Latest ECHO performed and demonstrates- Results for orders placed during the hospital encounter of 05/18/24    Echo Saline Bubble? No; Ultrasound enhancing contrast? No    Interpretation Summary    Left Ventricle: The left ventricle is normal in size. Normal wall thickness. There is low normal systolic function with a visually estimated ejection fraction of 50 - 55%. There is normal diastolic function.    Right Ventricle: Normal right ventricular cavity size. Wall thickness is normal. Systolic function is severely reduced.    Mitral Valve: There is severe mitral annular calcification present.    Pulmonary Artery: There is mild pulmonary hypertension. The estimated pulmonary artery systolic pressure is 44 mmHg.    IVC/SVC: Elevated venous pressure at 15 mmHg.    Pericardium: Right pleural effusion.  . Continue Furosemide and monitor clinical status closely. Monitor on " telemetry. Patient is on CHF pathway.  Monitor strict Is&Os and daily weights.  Place on fluid restriction of 1.5 L. Cardiology has been consulted. Continue to stress to patient importance of self efficacy and  on diet for CHF. Last BNP reviewed- and noted below   Recent Labs   Lab 05/18/24  1313   *       Good OUP  Continue lasix 60 mg ivp bid      Pleural effusion  Right side pleural effusion on CXR    Due to CHF   ? Recent h/o COVID 19 ?    Continue diuresis    Morbid obesity  Rx per HM       Atrial fibrillation with RVR  AFIB VRC    Continue BB and eliquis          VTE Risk Mitigation (From admission, onward)           Ordered     IP VTE HIGH RISK PATIENT  Once         05/18/24 1636     Place sequential compression device  Until discontinued         05/18/24 1636     Reason for No Pharmacological VTE Prophylaxis  Once        Question:  Reasons:  Answer:  Already adequately anticoagulated on oral Anticoagulants    05/18/24 1636                    Thank you for your consult. I will follow-up with patient. Please contact us if you have any additional questions.    Cristiano Bah MD  Cardiology   [facility] - Telemetry ([facility])

## 2024-05-19 NOTE — PLAN OF CARE
A220/A220 ENOC Reyes is a 79 y.o.female admitted on 5/18/2024 for Acute diastolic CHF (congestive heart failure)   Code Status: Full Code MRN: [id number]   Review of patient's allergies indicates:  No Known Allergies  Past Medical History:   Diagnosis Date    Hypertension     Paroxysmal atrial fibrillation       PRN meds    acetaminophen, 650 mg, Q4H PRN  aluminum-magnesium hydroxide-simethicone, 30 mL, QID PRN  bisacodyL, 10 mg, Daily PRN  dextrose 10%, 12.5 g, PRN  dextrose 10%, 25 g, PRN  glucagon (human recombinant), 1 mg, PRN  glucose, 16 g, PRN  glucose, 24 g, PRN  melatonin, 6 mg, Nightly PRN  morphine, 2 mg, Q4H PRN  naloxone, 0.02 mg, PRN  ondansetron, 4 mg, Q8H PRN  sodium chloride 0.9%, 10 mL, Q12H PRN  sodium chloride 0.9%, 10 mL, PRN      Power ordered by Dr. Johnson for pts skin care. Bed alarm placed, pt turned q 2 and bathed. No falls were noted on shift and hourly rounding is complete. Cardiac and lab monitoring continues. Chart check completed. Will continue plan of care.      Orientation: oriented x 4  [place] Coma Scale Score: 15     Lead Monitored: Lead II Rhythm: atrial rhythm Frequency/Ectopy: PVCs  Cardiac/Telemetry Box Number: [id number]  VTE Required Core Measure: Pharmacological prophylaxis initiated/maintained Last Bowel Movement: 05/18/24  Diet Cardiac  Voiding Characteristics: external catheter  Doroteo Score: 14  Fall Risk Score: 15  Accucheck []   Freq?      Lines/Drains/Airways       Peripheral Intravenous Line  Duration                  Peripheral IV - Single Lumen 05/18/24 1634 22 G Lateral;Right Breast <1 day

## 2024-05-19 NOTE — ASSESSMENT & PLAN NOTE
Body mass index is 67.04 kg/m². Morbid obesity complicates all aspects of disease management from diagnostic modalities to treatment. Weight loss encouraged and health benefits explained to patient.

## 2024-05-20 LAB
ALBUMIN SERPL BCP-MCNC: 3.2 G/DL (ref 3.5–5.2)
ALP SERPL-CCNC: 76 U/L (ref 55–135)
ALT SERPL W/O P-5'-P-CCNC: 8 U/L (ref 10–44)
ANION GAP SERPL CALC-SCNC: 16 MMOL/L (ref 8–16)
AST SERPL-CCNC: 19 U/L (ref 10–40)
BASOPHILS # BLD AUTO: 0.05 K/UL (ref 0–0.2)
BASOPHILS NFR BLD: 0.4 % (ref 0–1.9)
BILIRUB SERPL-MCNC: 1 MG/DL (ref 0.1–1)
BUN SERPL-MCNC: 13 MG/DL (ref 8–23)
CALCIUM SERPL-MCNC: 8.4 MG/DL (ref 8.7–10.5)
CHLORIDE SERPL-SCNC: 97 MMOL/L (ref 95–110)
CO2 SERPL-SCNC: 28 MMOL/L (ref 23–29)
CREAT SERPL-MCNC: 0.8 MG/DL (ref 0.5–1.4)
DIFFERENTIAL METHOD BLD: ABNORMAL
EOSINOPHIL # BLD AUTO: 0.3 K/UL (ref 0–0.5)
EOSINOPHIL NFR BLD: 2.1 % (ref 0–8)
ERYTHROCYTE [DISTWIDTH] IN BLOOD BY AUTOMATED COUNT: 16.2 % (ref 11.5–14.5)
EST. GFR  (NO RACE VARIABLE): >60 ML/MIN/1.73 M^2
GLUCOSE SERPL-MCNC: 100 MG/DL (ref 70–110)
HCT VFR BLD AUTO: 42.8 % (ref 37–48.5)
HGB BLD-MCNC: 12.4 G/DL (ref 12–16)
IMM GRANULOCYTES # BLD AUTO: 0.04 K/UL (ref 0–0.04)
IMM GRANULOCYTES NFR BLD AUTO: 0.3 % (ref 0–0.5)
LYMPHOCYTES # BLD AUTO: 1.5 K/UL (ref 1–4.8)
LYMPHOCYTES NFR BLD: 12.1 % (ref 18–48)
MAGNESIUM SERPL-MCNC: 1.9 MG/DL (ref 1.6–2.6)
MCH RBC QN AUTO: 25.4 PG (ref 27–31)
MCHC RBC AUTO-ENTMCNC: 29 G/DL (ref 32–36)
MCV RBC AUTO: 88 FL (ref 82–98)
MONOCYTES # BLD AUTO: 1.2 K/UL (ref 0.3–1)
MONOCYTES NFR BLD: 9.6 % (ref 4–15)
NEUTROPHILS # BLD AUTO: 9.1 K/UL (ref 1.8–7.7)
NEUTROPHILS NFR BLD: 75.5 % (ref 38–73)
NRBC BLD-RTO: 0 /100 WBC
OHS QRS DURATION: 96 MS
OHS QTC CALCULATION: 417 MS
PHOSPHATE SERPL-MCNC: 3.6 MG/DL (ref 2.7–4.5)
PLATELET # BLD AUTO: 202 K/UL (ref 150–450)
PMV BLD AUTO: 10.2 FL (ref 9.2–12.9)
POTASSIUM SERPL-SCNC: 4.5 MMOL/L (ref 3.5–5.1)
PROT SERPL-MCNC: 6.2 G/DL (ref 6–8.4)
RBC # BLD AUTO: 4.88 M/UL (ref 4–5.4)
SODIUM SERPL-SCNC: 141 MMOL/L (ref 136–145)
WBC # BLD AUTO: 12.12 K/UL (ref 3.9–12.7)

## 2024-05-20 PROCEDURE — 97166 OT EVAL MOD COMPLEX 45 MIN: CPT

## 2024-05-20 PROCEDURE — 97530 THERAPEUTIC ACTIVITIES: CPT

## 2024-05-20 PROCEDURE — 85025 COMPLETE CBC W/AUTO DIFF WBC: CPT | Performed by: NURSE PRACTITIONER

## 2024-05-20 PROCEDURE — 63600175 PHARM REV CODE 636 W HCPCS: Performed by: NURSE PRACTITIONER

## 2024-05-20 PROCEDURE — 83735 ASSAY OF MAGNESIUM: CPT | Performed by: NURSE PRACTITIONER

## 2024-05-20 PROCEDURE — 36415 COLL VENOUS BLD VENIPUNCTURE: CPT | Performed by: NURSE PRACTITIONER

## 2024-05-20 PROCEDURE — 94761 N-INVAS EAR/PLS OXIMETRY MLT: CPT

## 2024-05-20 PROCEDURE — 21400001 HC TELEMETRY ROOM

## 2024-05-20 PROCEDURE — 27000221 HC OXYGEN, UP TO 24 HOURS

## 2024-05-20 PROCEDURE — 84100 ASSAY OF PHOSPHORUS: CPT | Performed by: NURSE PRACTITIONER

## 2024-05-20 PROCEDURE — 80053 COMPREHEN METABOLIC PANEL: CPT | Performed by: NURSE PRACTITIONER

## 2024-05-20 PROCEDURE — 97162 PT EVAL MOD COMPLEX 30 MIN: CPT

## 2024-05-20 PROCEDURE — 99900035 HC TECH TIME PER 15 MIN (STAT)

## 2024-05-20 RX ADMIN — FUROSEMIDE 80 MG: 10 INJECTION, SOLUTION INTRAMUSCULAR; INTRAVENOUS at 11:05

## 2024-05-20 RX ADMIN — FUROSEMIDE 80 MG: 10 INJECTION, SOLUTION INTRAMUSCULAR; INTRAVENOUS at 08:05

## 2024-05-20 RX ADMIN — MICONAZOLE NITRATE: 20 POWDER TOPICAL at 11:05

## 2024-05-20 RX ADMIN — MICONAZOLE NITRATE: 20 POWDER TOPICAL at 08:05

## 2024-05-20 NOTE — ASSESSMENT & PLAN NOTE
Per patient and daughter, daughter goes to patient apartment at approximately 5:00 a.m. every day to assist patient getting out of bed to her chair, patient remains in her chair until the evening when daughter returns to assist her back to the bed.  Patient urinates and defecates in a diaper, reports typically unable to clean herself independently and will remain in the diaper until the daughter is able to return to assist her.  Patient is unable to independently walk, obtain meals or complete any ADLs independently.    --consult social Work  --PT/OT jose  Patient was recently in skilled nursing at this point request to go home with home health

## 2024-05-20 NOTE — CONSULTS
[facility] - Telemetry ([facility])  Wound Care    Patient Name:  Niru Reyes   MRN:  [id number]  Date: 5/20/2024  Diagnosis: Acute diastolic CHF (congestive heart failure)    History:     Past Medical History:   Diagnosis Date    Hypertension     Paroxysmal atrial fibrillation        Social History     Socioeconomic History    Marital status:    Tobacco Use    Smoking status: Never   Substance and Sexual Activity    Alcohol use: No    Drug use: No    Sexual activity: Never     Social Determinants of Health     Financial Resource Strain: Patient Declined (5/14/2024)    Overall Financial Resource Strain (CARDIA)     Difficulty of Paying Living Expenses: Patient declined   Food Insecurity: Patient Declined (5/14/2024)    Hunger Vital Sign     Worried About Running Out of Food in the Last Year: Patient declined     Ran Out of Food in the Last Year: Patient declined   Transportation Needs: Unmet Transportation Needs (5/14/2024)    PRAPARE - Transportation     Lack of Transportation (Medical): Yes     Lack of Transportation (Non-Medical): Yes   Physical Activity: Insufficiently Active (5/14/2024)    Exercise Vital Sign     Days of Exercise per Week: 2 days     Minutes of Exercise per Session: 10 min   Stress: Stress Concern Present (5/14/2024)    [employer] [place] [employer] - Occupational Stress Questionnaire     Feeling of Stress : Rather much   Housing Stability: Unknown (5/14/2024)    Housing Stability Vital Sign     Unable to Pay for Housing in the Last Year: Patient declined       Precautions:     Allergies as of 05/18/2024    (No Known Allergies)       [facility] Assessment Details/Treatment     Consulted on Ms. Reyes due to present on admission wounds. She is awake and alert.   Chronic skin changes from venous insufficiency and lymphedema noted to BLE, skin intact, dry peeling skin, nodules, no open wounds. Bilateral heels intact.  Intertrigo to lower abdomen, bilateral groins, bilateral lower breast folds.  Antifungal powder in use.  Right posterior thigh ulceriaton again noted. Cleansed with saline and aquacel ag advantage and foam dressing applied       05/20/24 0840   WOCN Assessment   WOCN Total Time (mins) 45   Visit Date 05/20/24   Visit Time 0840   Consult Type New   WOCN Speciality Wound   Wound moisture;At risk for pressure Injury;other        Altered Skin Integrity 03/14/24 2200 Right posterior Thigh   Date First Assessed/Time First Assessed: 03/14/24 2200   Altered Skin Integrity Present on Admission - Did Patient arrive to the hospital with altered skin?: yes  Side: Right  Orientation: posterior  Location: Thigh   Wound Image    Dressing Appearance Intact   Drainage Amount Scant   Drainage Characteristics/Odor Serosanguineous   Appearance Red;Yellow;Moist   Tissue loss description Full thickness   Periwound Area Intact   Wound Edges Defined;Irregular   Wound Length (cm) 2.5 cm   Wound Width (cm) 2 cm   Wound Depth (cm) 0.1 cm   Wound Volume (cm^3) 0.5 cm^3   Wound Surface Area (cm^2) 5 cm^2   Care Cleansed with:;Sterile normal saline;Applied:;Skin Barrier   Dressing Silver;Hydrofiber;Foam   Dressing Change Due 05/23/24        Wound 05/18/24 1837 Moisture associated dermatitis anterior Pelvis #1   Date First Assessed/Time First Assessed: 05/18/24 1837   Present on Original Admission: Yes  Primary Wound Type: Moisture associated dermatitis  Orientation: anterior  Location: Pelvis  Wound Number: #1  Is this injury device related?: No   Wound Image     Dressing Appearance Open to air   Drainage Amount None   Appearance Red   Care Cleansed with:;Soap and water;Applied:;Skin Barrier       Recommendations made to primary team for wound care, moisture management, pressure einjury prevention interventions. . Orders placed.     05/20/2024

## 2024-05-20 NOTE — PT/OT/SLP EVAL
Physical Therapy Evaluation and Treatment    Patient Name: Niru Reyes   MRN: [id number]  Recent Surgery: * No surgery found *      Recommendations:     Discharge Recommendations:  (TBD pending further assessment of functional mobility)   Discharge Equipment Recommendations:  (TBD)   Barriers to discharge: Decreased caregiver support    Assessment:     Niru Reyes is a 79 y.o. female admitted with a medical diagnosis of Acute diastolic CHF (congestive heart failure). She presents with the following impairments/functional limitations: weakness, impaired endurance, impaired sensation, impaired functional mobility, gait instability, impaired balance, pain, decreased safety awareness, decreased lower extremity function, decreased ROM.    Rehab Prognosis: Good; patient would benefit from acute PT services to address these deficits and reach maximum level of function.    Plan:     During this hospitalization, patient to be seen 3 x/week to address the above listed problems via therapeutic activities, therapeutic exercises    Plan of Care Expires: 06/03/24    Subjective     Chief Complaint: Pt is motivated to participate  Patient Comments/Goals: none stated  Pain/Comfort:  Pain Rating 1: 0/10    Social History:  Living Environment: Patient lives alone in a mobile home with ramp. Has assistance from daughter. Receiving HHPT 2 x/wk.  Prior Level of Function: Prior to admission, patient requires assistance with ADLs including bathing/dressing, not driving and retired, and completed transfers via stand pivot with modified independence using rolling walker  Equipment Used at Home: wheelchair, walker, rolling  DME owned (not currently used): none (reports she will be getting a hospital bed next week)  Assistance Upon Discharge: family    Objective:     Communicated with nurse Lopez and epic chart review prior to session. Patient found HOB elevated with peripheral IV, oxygen, PureWick, telemetry upon PT entry to room.    General  "Precautions: Standard, fall   Orthopedic Precautions: N/A   Braces: N/A    Respiratory Status: Nasal cannula, flow 2 L/min    Exams:  Cognition: Patient is oriented to Person, Place, Time, Situation  RLE ROM: WFL  RLE Strength:  Grossly 4/5  LLE ROM: WFL  LLE Strength:  Grossly 3+/5  Sensation:    -       Intact  Skin Integrity/Edema:     -       Skin integrity: Visible skin intact    Functional Mobility:  Gait belt applied - Yes  Bed Mobility  Rolling Left: maximal assistance and of 2 persons  Rolling Right: moderate assistance and of 2 persons  Pt soiled, completed B rolling multiple trials to clean pt and don clean brief/sheets.  Supine Scooting: moderate assistance and of 2 persons  Supine to Sit: not progress due to pt presenting with SOB with B rolling, educated about pursed lip breathing technique and cued for use with mobility  Transfers  Will progress as appropriate    Therapeutic Activities and Exercises:   Pt educated on role of PT in acute care and POC. Educated on importance of OOB activities, activity pacing, and HEP (marching/hip flex, hip abd, heel slides/LAQ, quad sets, ankle pumps) in order to maintain/regain strength. Encouraged to sit up for all meals. Educated on "call don't fall" policy and increased risk of falling due to weakness, instructed to utilize call bell for assistance with all transfers. Pt agreeable to all requests.    AM-PAC 6 CLICK MOBILITY  Total Score:7    Patient left HOB elevated with all lines intact, call button in reach, RN notified, and bed alarm on.    GOALS:   Multidisciplinary Problems       Physical Therapy Goals          Problem: Physical Therapy    Goal Priority Disciplines Outcome Goal Variances Interventions   Physical Therapy Goal     PT, PT/OT      Description: Goals to be met by 6/3/24.  1. Pt will complete bed mobility MOD I.  2. Pt will complete sit to stand MOD I.  3. Pt will transfer bed to chair MOD I.  4. Pt will increase AMPAC score by 2 points to " progress functional mobility.                       History:     Past Medical History:   Diagnosis Date    Hypertension     Paroxysmal atrial fibrillation        Past Surgical History:   Procedure Laterality Date     SECTION      COLONOSCOPY N/A 2019    Procedure: COLONOSCOPY;  Surgeon: Janeth Leroy MD;  Location: [facility];  Service: Endoscopy;  Laterality: N/A;    HYSTERECTOMY         Time Tracking:     PT Received On: 24  PT Start Time: 1431  PT Stop Time: 1456  PT Total Time (min): 25 min     Billable Minutes: Evaluation 15min and Therapeutic Activity 10min    2024

## 2024-05-20 NOTE — PT/OT/SLP EVAL
Occupational Therapy   Evaluation    Name: Niru Reyes  MRN: [id number]  Admitting Diagnosis: Acute diastolic CHF (congestive heart failure)  Recent Surgery: * No surgery found *      Recommendations:     Discharge Recommendations:  (TBD with pt participation/progress)  Discharge Equipment Recommendations:  other (see comments) (TBD)  Barriers to discharge:  Decreased caregiver support    Assessment:     Niru Reyes is a 79 y.o. female with a medical diagnosis of Acute diastolic CHF (congestive heart failure).  She presents with the following performance deficits affecting function: weakness, impaired endurance, impaired self care skills, impaired functional mobility, gait instability, impaired balance, decreased lower extremity function, decreased safety awareness, decreased upper extremity function, impaired skin, edema, impaired cardiopulmonary response to activity.      Rehab Prognosis: Fair; patient would benefit from acute skilled OT services to address these deficits and reach maximum level of function.       Plan:     Patient to be seen 2 x/week to address the above listed problems via self-care/home management, therapeutic activities, therapeutic exercises  Plan of Care Expires: 06/03/24  Plan of Care Reviewed with: patient    Subjective     Chief Complaint: weakness, SOB  Patient/Family Comments/goals: improve strength and mobility    Occupational Profile:  Living Environment:  lives alone in a mobile home with ramp to enter. Pt reports family and neighbor checks in.   Previous level of function: Pt Mod (I) with ADLs, occasionally requires assist as needed. Pt uses manual w/c for functional mobility and performs stand pivot t/fs with RW. Pt reports taking sponge-baths in bed and using diapers/pads for toileting. Previously receiving HH therapy.  Roles and Routines: does not drive  Equipment Used at Home: wheelchair, walker, rolling, other (see comments) (pt reports receiving hospital bed soon)  Assistance  upon Discharge: family    Pain/Comfort:  Pain Rating 1: 0/10    Objective:     Communicated with: Nurse and epic chart review prior to session.  Patient found HOB elevated with peripheral IV, PureWick, telemetry, oxygen upon OT entry to room.    General Precautions: Standard, fall  Orthopedic Precautions: N/A  Braces: N/A  Respiratory Status: Nasal cannula, flow 2 L/min    Occupational Performance:    Bed Mobility:    Patient completed Rolling/Turning to Left with  maximal assistance and 2 persons  Patient completed Rolling/Turning to Right with moderate assistance and 2 persons  Supine scoot to HOB with Mod A x2 and bed in trend.     Activities of Daily Living:  Lower Body Dressing: total assistance doff/je brief in supine  Toileting: total assistance pt found soiled, required assist with cleaning and clothing management.    Cognitive/Visual Perceptual:  Cognitive/Psychosocial Skills:     -       Oriented to: Person, Place, and Time   -       Follows Commands/attention:Follows multistep  commands  -       Communication: clear/fluent  -       Memory: No Deficits noted  -       Safety awareness/insight to disability: impaired     Physical Exam:  Skin integrity: Wound buttock  Edema:  BLE  Sensation:    -       Intact  Dominant hand:    -       right  Upper Extremity Range of Motion:     -       Right Upper Extremity: WNL  -       Left Upper Extremity: WNL  Upper Extremity Strength:    -       Right Upper Extremity: 4+/5 grossly  -       Left Upper Extremity: 4+/5 grossly   Strength:    -       Right Upper Extremity: WFL  -       Left Upper Extremity: WFL    AMPAC 6 Click ADL:  AMPAC Total Score: 14    Treatment & Education:  Pt with increased SOB with exertion; educated pt on pursed lip breathing and importance of activity pacing. Patient educated on role of OT in acute setting and benefits of participation. Educated on importance of EOB/OOB activity and calling for A to transfer and meet needs. Encouraged  completion of B UE AROM therex throughout the day to tolerance to increase functional strength and activity tolerance. Educated pt on frequent turning/repositioning in bed for pressure relief to prevent skin breakdown/injury. Patient stated understanding and in agreement with POC.     Patient left HOB elevated with all lines intact, call button in reach, and bed alarm on    GOALS:   Multidisciplinary Problems       Occupational Therapy Goals          Problem: Occupational Therapy    Goal Priority Disciplines Outcome Interventions   Occupational Therapy Goal     OT, PT/OT     Description: Goals to be met by: 6/3/24     Patient will increase functional independence with ADLs by performing:    UE Dressing with Set-up Assistance.  Grooming while bedside chair with Set-up Assistance.  Toileting from bedside commode with Set-up Assistance for hygiene and clothing management.   Toilet transfer to bedside commode with Contact Guard Assistance with RW.  Upper extremity exercise program x15 reps per handout, with independence.                         History:     Past Medical History:   Diagnosis Date    Hypertension     Paroxysmal atrial fibrillation          Past Surgical History:   Procedure Laterality Date     SECTION      COLONOSCOPY N/A 2019    Procedure: COLONOSCOPY;  Surgeon: Janeth Leroy MD;  Location: [facility];  Service: Endoscopy;  Laterality: N/A;    HYSTERECTOMY         Time Tracking:     OT Date of Treatment: 24  OT Start Time: 1435  OT Stop Time: 1500  OT Total Time (min): 25 min    Billable Minutes:Evaluation 15  Therapeutic Activity 10    2024  Radha Angel OT

## 2024-05-20 NOTE — ASSESSMENT & PLAN NOTE
Patient with Hypoxic Respiratory failure which is Acute.  she is not on home oxygen. Supplemental oxygen was provided and noted- Oxygen Concentration (%):  [28] 28    .   Signs/symptoms of respiratory failure include- tachypnea, increased work of breathing, wheezing, and lethargy. Contributing diagnoses includes - CHF and Pneumonia Labs and images were reviewed. Patient Has recent ABG, which has been reviewed. Will treat underlying causes and adjust management of respiratory failure as follows-     --IV diuresis  --supplemental O2 to maintain SpO2> 90%- currently on 2 L  --CT chest in the morning to review right-sided abnormality noted on chest x-ray.  Possible atelectasis versus vascular congestion versus pneumonia  --consider IR guided  thoracentesis in a.m.

## 2024-05-20 NOTE — PLAN OF CARE
PT EVAL complete. Required MOD-MAX A for rolling, mobility not progressed due to increased SOB with rolling, will continue to progress as appropriate. Recommendation TBD pending further assessment of functional mobility.

## 2024-05-20 NOTE — ASSESSMENT & PLAN NOTE
Patient has a diagnosis of pneumonia. The cause of the pneumonia is suspected to be bacterial in etiology but organism is not known. The pneumonia is  new onset . The patient has the following signs/symptoms of pneumonia: persistent hypoxia , sputum production, and shortness of breath. The patient does have a current oxygen requirement and the patient does not have a home oxygen requirement. I have reviewed the pertinent imaging. The following cultures have been collected: Blood cultures The culture results are listed below.     Current antimicrobial regimen consists of the antibiotics listed below. Will monitor patient closely and continue current treatment plan unchanged.    Antibiotics (From admission, onward)    None          Microbiology Results (last 7 days)     Procedure Component Value Units Date/Time    Blood culture #1 **CANNOT BE ORDERED STAT** [[id number]] Collected: 05/18/24 1633    Order Status: Completed Specimen: Blood from Peripheral, Upper Arm, Left Updated: 05/20/24 0613     Blood Culture, Routine No Growth to date      No Growth to date    Blood culture #2 **CANNOT BE ORDERED STAT** [[id number]] Collected: 05/18/24 1633    Order Status: Completed Specimen: Blood from Peripheral, Hand, Left Updated: 05/20/24 0613     Blood Culture, Routine No Growth to date      No Growth to date

## 2024-05-20 NOTE — SUBJECTIVE & OBJECTIVE
Interval History:  Patient feeling better but still short of breath.    Review of Systems   Constitutional:  Positive for activity change and fatigue. Negative for fever.   Respiratory:  Positive for shortness of breath. Negative for cough.    Cardiovascular:  Negative for chest pain and leg swelling.   Gastrointestinal:  Negative for nausea and vomiting.   Neurological:  Positive for weakness.   All other systems reviewed and are negative.    Objective:     Vital Signs (Most Recent):  Temp: 98 °F (36.7 °C) (05/20/24 1627)  Pulse: 108 (05/20/24 1627)  Resp: 18 (05/20/24 1627)  BP: 120/64 (05/20/24 1627)  SpO2: (!) 93 % (05/20/24 1627) Vital Signs (24h Range):  Temp:  [97.8 °F (36.6 °C)-98.7 °F (37.1 °C)] 98 °F (36.7 °C)  Pulse:  [] 108  Resp:  [16-18] 18  SpO2:  [92 %-96 %] 93 %  BP: (120-135)/(58-81) 120/64     Weight: (!) 200 kg (440 lb 14.7 oz)  Body mass index is 67.04 kg/m².    Intake/Output Summary (Last 24 hours) at 5/20/2024 1827  Last data filed at 5/20/2024 0427  Gross per 24 hour   Intake --   Output 2100 ml   Net -2100 ml         Physical Exam  Vitals reviewed.   Constitutional:       Appearance: Normal appearance.   HENT:      Head: Normocephalic and atraumatic.      Mouth/Throat:      Mouth: Mucous membranes are moist.      Pharynx: Oropharynx is clear.   Eyes:      Extraocular Movements: Extraocular movements intact.      Conjunctiva/sclera: Conjunctivae normal.   Cardiovascular:      Rate and Rhythm: Regular rhythm. Tachycardia present.      Pulses: Normal pulses.      Heart sounds: Normal heart sounds.   Pulmonary:      Effort: Pulmonary effort is normal.      Breath sounds: Normal breath sounds.   Abdominal:      General: Bowel sounds are normal.      Palpations: Abdomen is soft.   Musculoskeletal:         General: Normal range of motion.      Cervical back: Normal range of motion and neck supple.      Right lower leg: Edema present.      Left lower leg: Edema present.   Skin:     General:  Skin is warm and dry.   Neurological:      General: No focal deficit present.      Mental Status: She is alert and oriented to person, place, and time. Mental status is at baseline.   Psychiatric:         Mood and Affect: Mood normal.         Behavior: Behavior normal.         Thought Content: Thought content normal.             Significant Labs: All pertinent labs within the past 24 hours have been reviewed.  CBC:   Recent Labs   Lab 05/19/24 0425 05/20/24  0501   WBC 8.71 12.12   HGB 12.2 12.4   HCT 41.1 42.8    202     CMP:   Recent Labs   Lab 05/19/24  0425 05/20/24  0501    141   K 4.4 4.5    97   CO2 27 28    100   BUN 12 13   CREATININE 0.7 0.8   CALCIUM 8.6* 8.4*   PROT 6.6 6.2   ALBUMIN 3.3* 3.2*   BILITOT 1.1* 1.0   ALKPHOS 80 76   AST 15 19   ALT 6* 8*   ANIONGAP 11 16       Significant Imaging: I have reviewed all pertinent imaging results/findings within the past 24 hours.

## 2024-05-20 NOTE — ASSESSMENT & PLAN NOTE
Patient with Persistent (7 days or more) atrial fibrillation which is controlled currently with Beta Blocker. Patient is currently in atrial fibrillation.[id number] Score: 4. HASBLED Score: . Anticoagulation indicated. Anticoagulation done with Eliquis .-Eliquis on hold due to possible procedure

## 2024-05-20 NOTE — CONSULTS
SW meet with patient at bedside to discuss discharge planning. SW inquired about if patient would have help at home or if she would be interested in SNF placement upon DC. Patient stated she was not interested in SNF placement. Patient stated she wished to go home and have home health. SW inquired about if Patient would have help upon DC. Patient stated she would have help from daughter, neighbors, and was looking into sitters upon returning home.   SW provided brochures for sitter services and Patient stated she would call for pricing and information.    SW will continue to follow and assist as needed.

## 2024-05-20 NOTE — PLAN OF CARE
OT jose completed. Recommendation TBD with pt participation/progress.  Mod A x2- Max A x2 for rolling/scooting.

## 2024-05-20 NOTE — CONSULTS
"                    Food & Nutrition Education    Diet Education: Heart Failure  Time Spent: 15 minutes.    Learners: Pt    Nutrition Education provided with handouts:  Healthy-Heart Nutrition Therapy, Fluid-Restricted Diet, Low-Sodium Nutrition Therapy ([email or web address])    Past Medical History:   Diagnosis Date    Hypertension     Paroxysmal atrial fibrillation      Patient Active Problem List   Diagnosis    HTN (hypertension)    Depression, major    OA (osteoarthritis) of knee    Anemia    Atherosclerosis of arteries of extremities    Nontraumatic hematoma of muscle    Pulmonary hypertension    Slow transit constipation    Acute hypoxemic respiratory failure    Acute diastolic CHF (congestive heart failure)    Atrial fibrillation with RVR    Morbid obesity    Debility    Chronic diastolic heart failure    Hematoma    Pleural effusion    Difficulty with activities of daily living    Pneumonia         Comments:  Dietitian educated patient on low sodium diet and fluid restriction related to hospital diagnosis. Discussed the importance of limiting sodium to 2,000 mg per day and reading food labels to avoid further complications of CHF. Discussed using salt free seasonings (Mrs. Meeks and [employer] "No Salt") and other herbs and spices in meals to enhance flavor without additional sodium. Discussed fluid restriction per excess fluid retention and dietary sources of fluid. Dietitian recommended using cup with measurements for fluids and to try to consume small sips spread throughout the day rather than a lot at one time.    The pt remained engaged throughout entire nutrition education.The pt states she has followed fluid restrictions prior to current admission however, not sure if she is currently suppose to be on restriction. Recommended she starts restriction until she can speak with MD/NP d/t the excessive amount of fluid pull from lungs. The pt states she understands the information presented and will " begin accounting for all sources of fluids throughout the day. Pt states she does not use salt or any other seasoning on food. Discussed being mindful of the amount of condiments she adds to foods, including ranch and BBQ sauce.     Nutrition Related Social Determinants of Health:   SDOH: Participates in community based programs to support access to food      NFPE not performed, pt appears well nourished.    All questions and concerns answered.    Provided handout with dietitian's contact information.    *Please re-consult as needed.    Thank You!   Julio César Hernandez MS, Registration Eligible, Provisional LDN

## 2024-05-20 NOTE — PLAN OF CARE
[facility] - Telemetry (Hospital)  Initial Discharge Assessment       Primary Care Provider: Jordana Shirley MD    Admission Diagnosis: Dyspnea [R06.00]  Hypoxia [R09.02]  Chest pain [R07.9]  Pneumonia due to infectious organism, unspecified laterality, unspecified part of lung [J18.9]  Acute on chronic congestive heart failure, unspecified heart failure type [I50.9]    Admission Date: 5/18/2024  Expected Discharge Date:     Transition of Care Barriers: None    Payor: MEDICARE / Plan: MEDICARE PART A & B / Product Type: Government /     Extended Emergency Contact Information  Primary Emergency Contact: Mary Medeiros   [facility]  Mobile Phone: [phone]  Relation: Daughter    Discharge Plan A: [id number] - [place] - [address] [place] [address] [facility] [address] [facility] [address] [place] [address] [address] [place] [address] [address]  Phone: [phone] Fax: [phone]      Initial Assessment (most recent)       Adult Discharge Assessment - 05/20/24 1217          Discharge Assessment    Assessment Type Discharge Planning Assessment     Confirmed/corrected address, phone number and insurance Yes     Confirmed Demographics Correct on Facesheet     Source of Information patient     When was your last doctors appointment? 05/15/24   Jordana Shirley MD - Family Medicine    Communicated JERONIMO with patient/caregiver Date not available/Unable to determine     Reason For Admission Acute diastolic CHF (congestive heart failure)     People in Home alone     Facility Arrived From: home     Do you expect to return to your current living situation? Yes     Do you have help at home or someone to help you manage your care at home? Yes     Who are your caregiver(s) and their phone number(s)? Mary Medeiros - daughter and family and friends assist during the week     Prior to hospitilization cognitive status: Alert/Oriented     Current cognitive status: Alert/Oriented      Walking or Climbing Stairs Difficulty yes     Walking or Climbing Stairs ambulation difficulty, requires equipment     Mobility Management wheelchair and rolling walker     Dressing/Bathing Difficulty no     Home Accessibility wheelchair accessible     Equipment Currently Used at Home wheelchair;walker, rolling     Readmission within 30 days? No     Patient currently being followed by outpatient case management? No     Do you currently have service(s) that help you manage your care at home? Yes     Name and Contact number of [facility]     Is the pt/caregiver preference to resume services with current agency No   Patient wishes to switch to [facility].    Do you take prescription medications? Yes     Do you have prescription coverage? Yes     Coverage Medicare A & B and BCBS     Do you have any problems affording any of your prescribed medications? No     Is the patient taking medications as prescribed? yes     Who is going to help you get home at discharge? Mary Medeiros - daughter and family and friends assist during the week     How do you get to doctors appointments? family or friend will provide     Are you on dialysis? No     Do you take coumadin? No     Discharge Plan A Home Health     DME Needed Upon Discharge  oxygen;hospital bed;bedside commode     Discharge Plan discussed with: Patient     Transition of Care Barriers None                     Anticipated DC dispo: home health   Prior Level of Function: limited, daughter assists with ADLs  People in home: alone    Comments:  SW met with patient at bedside to introduce role and discuss discharge planning. Patient states her daughter helps at home and she has additional friends and family who help at home during the week. Patient was interested in sitter services and switching home health agencies. Confirmed demographics, insurance, and emergency contacts. Patient stated she her PCP had placed orders for hospital bed, BSC,  and oxygen upon last appointment. JINA stated she would find out the status of the orders. JINA updated Patient's whiteboard with contact information and anticipated DC plan. CM discharge needs depends on hospital progress. JINA will continue following to assist with other needs.

## 2024-05-20 NOTE — PROGRESS NOTES
[facility] ([facility])  [facility] Medicine  Progress Note    Patient Name: Niru Reyes  MRN: [id number]  Patient Class: IP- Inpatient   Admission Date: 5/18/2024  Length of Stay: 2 days  Attending Physician: Felicita Ulloa MD  Primary Care Provider: Jordana Shirley MD        Subjective:     Principal Problem:Acute diastolic CHF (congestive heart failure)        HPI:  79 y.o. female patient, PMHx of HTN, CHF, morbid obesity and paroxysmal atrial fibrillation. Presented to the Emergency Department for evaluation of SOB which onset this morning at around 3 AM on day of arrival. When pt woke up at 3 AM, she states that her oxygen was at 68%, patient stated she did not want to awaken her family and waited to call her daughter until around 7-8 a.m.. SpO2 was in the 80s when her daughter arrived, called nurse triage line who instructed them to come to the ED. Symptoms are constant and moderate in severity. SOB worsens when laying flat. Associated sxs include wheezing and generalized weakness. Patient denies any fever, cough, CP, and all other sxs at this time. Pt is on LASIX. On 4/3/2024, pt was discharged from the hospital to [facility] ([facility] for skilled therapy. They tried to wean her off oxygen and on 5/1/2024, she was taken off of oxygen. Pt stopped using Neb and inhalers 2 weeks ago. Pt also has UTI and was placed on antibiotics.  On discharge SNF patient weighed 421 lb, in the ED patient weighed 456 lb, 35# weight gain in 16 days.  In the ED, vitals: 143/43, 97, 20, 98 F, 96% on 4 L NC.  Significant labs:  Bili: 1.2, BNP: 476, ABG: pH:  7.349, PO2: 60.  CXR:moderate amount of alveolar consolidation scattered throughout the inferior 2/3 of the right lung.  Right pleural effusion.  Treated with IV diuretic, IV antibiotics.  Patient is a full code.  Admitted to hospital medicine for management of acute on chronic CHF, acute respiratory failure.    Overview/Hospital Course:  No notes on  file    Interval History:  Patient feeling better but still short of breath.    Review of Systems   Constitutional:  Positive for activity change and fatigue. Negative for fever.   Respiratory:  Positive for shortness of breath. Negative for cough.    Cardiovascular:  Negative for chest pain and leg swelling.   Gastrointestinal:  Negative for nausea and vomiting.   Neurological:  Positive for weakness.   All other systems reviewed and are negative.    Objective:     Vital Signs (Most Recent):  Temp: 98 °F (36.7 °C) (05/20/24 1627)  Pulse: 108 (05/20/24 1627)  Resp: 18 (05/20/24 1627)  BP: 120/64 (05/20/24 1627)  SpO2: (!) 93 % (05/20/24 1627) Vital Signs (24h Range):  Temp:  [97.8 °F (36.6 °C)-98.7 °F (37.1 °C)] 98 °F (36.7 °C)  Pulse:  [] 108  Resp:  [16-18] 18  SpO2:  [92 %-96 %] 93 %  BP: (120-135)/(58-81) 120/64     Weight: (!) 200 kg (440 lb 14.7 oz)  Body mass index is 67.04 kg/m².    Intake/Output Summary (Last 24 hours) at 5/20/2024 1827  Last data filed at 5/20/2024 0427  Gross per 24 hour   Intake --   Output 2100 ml   Net -2100 ml         Physical Exam  Vitals reviewed.   Constitutional:       Appearance: Normal appearance.   HENT:      Head: Normocephalic and atraumatic.      Mouth/Throat:      Mouth: Mucous membranes are moist.      Pharynx: Oropharynx is clear.   Eyes:      Extraocular Movements: Extraocular movements intact.      Conjunctiva/sclera: Conjunctivae normal.   Cardiovascular:      Rate and Rhythm: Regular rhythm. Tachycardia present.      Pulses: Normal pulses.      Heart sounds: Normal heart sounds.   Pulmonary:      Effort: Pulmonary effort is normal.      Breath sounds: Normal breath sounds.   Abdominal:      General: Bowel sounds are normal.      Palpations: Abdomen is soft.   Musculoskeletal:         General: Normal range of motion.      Cervical back: Normal range of motion and neck supple.      Right lower leg: Edema present.      Left lower leg: Edema present.   Skin:      General: Skin is warm and dry.   Neurological:      General: No focal deficit present.      Mental Status: She is alert and oriented to person, place, and time. Mental status is at baseline.   Psychiatric:         Mood and Affect: Mood normal.         Behavior: Behavior normal.         Thought Content: Thought content normal.             Significant Labs: All pertinent labs within the past 24 hours have been reviewed.  CBC:   Recent Labs   Lab 05/19/24  0425 05/20/24  0501   WBC 8.71 12.12   HGB 12.2 12.4   HCT 41.1 42.8    202     CMP:   Recent Labs   Lab 05/19/24  0425 05/20/24  0501    141   K 4.4 4.5    97   CO2 27 28    100   BUN 12 13   CREATININE 0.7 0.8   CALCIUM 8.6* 8.4*   PROT 6.6 6.2   ALBUMIN 3.3* 3.2*   BILITOT 1.1* 1.0   ALKPHOS 80 76   AST 15 19   ALT 6* 8*   ANIONGAP 11 16       Significant Imaging: I have reviewed all pertinent imaging results/findings within the past 24 hours.    Assessment/Plan:      * Acute diastolic CHF (congestive heart failure)  Patient is identified as having Diastolic (HFpEF) heart failure that is Acute on chronic. CHF is currently uncontrolled due to >3 pillow orthopnea, Rales/crackles on pulmonary exam, and Pulmonary edema/pleural effusion on CXR. Latest ECHO performed and demonstrates- Results for orders placed during the hospital encounter of 03/14/24    Echo    Interpretation Summary    Left Ventricle: The left ventricle is normal in size. Normal wall thickness. There is concentric remodeling. There is low normal systolic function with a visually estimated ejection fraction of 50 - 55%. There is normal diastolic function.    Right Ventricle: Normal right ventricular cavity size. Wall thickness is normal. Right ventricle wall motion  is normal. Systolic function is normal.    Left Atrium: Left atrium is moderately dilated.    Pulmonary Artery: The estimated pulmonary artery systolic pressure is 45 mmHg.    IVC/SVC: Normal venous pressure at 3  mmHg.  . Continue Beta Blocker and Furosemide and monitor clinical status closely. Monitor on telemetry. Patient is on CHF pathway.  Monitor strict Is&Os and daily weights.  Place on fluid restriction of 1.5 L. Cardiology has been consulted. Continue to stress to patient importance of self efficacy and  on diet for CHF. Last BNP reviewed- and noted below   Recent Labs   Lab 05/18/24  1313   *         Pneumonia  Patient has a diagnosis of pneumonia. The cause of the pneumonia is suspected to be bacterial in etiology but organism is not known. The pneumonia is  new onset . The patient has the following signs/symptoms of pneumonia: persistent hypoxia , sputum production, and shortness of breath. The patient does have a current oxygen requirement and the patient does not have a home oxygen requirement. I have reviewed the pertinent imaging. The following cultures have been collected: Blood cultures The culture results are listed below.     Current antimicrobial regimen consists of the antibiotics listed below. Will monitor patient closely and continue current treatment plan unchanged.    Antibiotics (From admission, onward)      None            Microbiology Results (last 7 days)       Procedure Component Value Units Date/Time    Blood culture #1 **CANNOT BE ORDERED STAT** [[id number]] Collected: 05/18/24 1633    Order Status: Completed Specimen: Blood from Peripheral, Upper Arm, Left Updated: 05/20/24 0613     Blood Culture, Routine No Growth to date      No Growth to date    Blood culture #2 **CANNOT BE ORDERED STAT** [[id number]] Collected: 05/18/24 1633    Order Status: Completed Specimen: Blood from Peripheral, Hand, Left Updated: 05/20/24 0613     Blood Culture, Routine No Growth to date      No Growth to date            Difficulty with activities of daily living  Per patient and daughter, daughter goes to patient apartment at approximately 5:00 a.m. every day to assist patient getting out of bed to  her chair, patient remains in her chair until the evening when daughter returns to assist her back to the bed.  Patient urinates and defecates in a diaper, reports typically unable to clean herself independently and will remain in the diaper until the daughter is able to return to assist her.  Patient is unable to independently walk, obtain meals or complete any ADLs independently.    --consult social Work  --PT/OT jose  Patient was recently in skilled nursing at this point request to go home with home health        Pleural effusion  Patient found to have moderate pleural effusion on imaging. I have not personally reviewed and interpreted the following imaging: Xray. A thoracentesis was deferred. Most likely etiology includes Congestive Heart Failure. Management to include Diuresis      Debility  Patient with Acute on chronic debility due to bed confinement status and chronic unspecified fatigue. Latest AMPAC and GEMS scores have not been reviewed. Evaluation for etiology is underway. Plan includes progressive mobility protocol initated and PT/OT consulted.    Morbid obesity  Body mass index is 67.04 kg/m². Morbid obesity complicates all aspects of disease management from diagnostic modalities to treatment. Weight loss encouraged and health benefits explained to patient.         Atrial fibrillation with RVR  Patient with Persistent (7 days or more) atrial fibrillation which is controlled currently with Beta Blocker. Patient is currently in atrial fibrillation.[id number] Score: 4. HASBLED Score: . Anticoagulation indicated. Anticoagulation done with Eliquis .-Eliquis on hold due to possible procedure    Acute hypoxemic respiratory failure  Patient with Hypoxic Respiratory failure which is Acute.  she is not on home oxygen. Supplemental oxygen was provided and noted- Oxygen Concentration (%):  [28] 28    .   Signs/symptoms of respiratory failure include- tachypnea, increased work of breathing, wheezing, and lethargy.  Contributing diagnoses includes - CHF and Pneumonia Labs and images were reviewed. Patient Has recent ABG, which has been reviewed. Will treat underlying causes and adjust management of respiratory failure as follows-     --IV diuresis  --supplemental O2 to maintain SpO2> 90%- currently on 2 L  --CT chest in the morning to review right-sided abnormality noted on chest x-ray.  Possible atelectasis versus vascular congestion versus pneumonia  --consider IR guided  thoracentesis in a.m.    OA (osteoarthritis) of knee          VTE Risk Mitigation (From admission, onward)           Ordered     IP VTE HIGH RISK PATIENT  Once         05/18/24 1636     Place sequential compression device  Until discontinued         05/18/24 1636     Reason for No Pharmacological VTE Prophylaxis  Once        Question:  Reasons:  Answer:  Already adequately anticoagulated on oral Anticoagulants    05/18/24 1636                    Discharge Planning   JERONIMO:      Code Status: Full Code   Is the patient medically ready for discharge?:     Reason for patient still in hospital (select all that apply): Patient trending condition, Treatment, Consult recommendations, and Pending disposition  Discharge Plan A: Home Health                  Felicita Cristobal MD  Department of Hospital Medicine   [facility] - Telemetry ([facility])

## 2024-05-20 NOTE — PLAN OF CARE
SW reached out to [facility], with [facility], to check on status of hospital bed that was placed Outpatient. Per Nadya, Patient's hospital bed is scheduled to be delivered to Patient's home 5/22/24.    SW will continue to follow and assist as needed.

## 2024-05-20 NOTE — ASSESSMENT & PLAN NOTE
Patient is identified as having Diastolic (HFpEF) heart failure that is Acute on chronic. CHF is currently uncontrolled due to >3 pillow orthopnea, Rales/crackles on pulmonary exam, and Pulmonary edema/pleural effusion on CXR. Latest ECHO performed and demonstrates- Results for orders placed during the hospital encounter of 03/14/24    Echo    Interpretation Summary    Left Ventricle: The left ventricle is normal in size. Normal wall thickness. There is concentric remodeling. There is low normal systolic function with a visually estimated ejection fraction of 50 - 55%. There is normal diastolic function.    Right Ventricle: Normal right ventricular cavity size. Wall thickness is normal. Right ventricle wall motion  is normal. Systolic function is normal.    Left Atrium: Left atrium is moderately dilated.    Pulmonary Artery: The estimated pulmonary artery systolic pressure is 45 mmHg.    IVC/SVC: Normal venous pressure at 3 mmHg.  . Continue Beta Blocker and Furosemide and monitor clinical status closely. Monitor on telemetry. Patient is on CHF pathway.  Monitor strict Is&Os and daily weights.  Place on fluid restriction of 1.5 L. Cardiology has been consulted. Continue to stress to patient importance of self efficacy and  on diet for CHF. Last BNP reviewed- and noted below   Recent Labs   Lab 05/18/24  1313   *

## 2024-05-21 ENCOUNTER — DOCUMENTATION ONLY (OUTPATIENT)
Dept: CARDIOLOGY | Facility: CLINIC | Age: 79
End: 2024-05-21
Payer: MEDICARE

## 2024-05-21 LAB
ALBUMIN SERPL BCP-MCNC: 3.3 G/DL (ref 3.5–5.2)
ALP SERPL-CCNC: 85 U/L (ref 55–135)
ALT SERPL W/O P-5'-P-CCNC: 6 U/L (ref 10–44)
ANION GAP SERPL CALC-SCNC: 10 MMOL/L (ref 8–16)
APPEARANCE FLD: NORMAL
AST SERPL-CCNC: 11 U/L (ref 10–40)
BASOPHILS # BLD AUTO: 0.05 K/UL (ref 0–0.2)
BASOPHILS NFR BLD: 0.5 % (ref 0–1.9)
BILIRUB SERPL-MCNC: 1.1 MG/DL (ref 0.1–1)
BODY FLD TYPE: NORMAL
BUN SERPL-MCNC: 14 MG/DL (ref 8–23)
CALCIUM SERPL-MCNC: 8.8 MG/DL (ref 8.7–10.5)
CHLORIDE SERPL-SCNC: 91 MMOL/L (ref 95–110)
CO2 SERPL-SCNC: 40 MMOL/L (ref 23–29)
COLOR FLD: NORMAL
CREAT SERPL-MCNC: 0.8 MG/DL (ref 0.5–1.4)
DIFFERENTIAL METHOD BLD: ABNORMAL
EOSINOPHIL # BLD AUTO: 0.2 K/UL (ref 0–0.5)
EOSINOPHIL NFR BLD: 2.4 % (ref 0–8)
ERYTHROCYTE [DISTWIDTH] IN BLOOD BY AUTOMATED COUNT: 15.9 % (ref 11.5–14.5)
EST. GFR  (NO RACE VARIABLE): >60 ML/MIN/1.73 M^2
GLUCOSE SERPL-MCNC: 112 MG/DL (ref 70–110)
GLUCOSE SERPL-MCNC: 114 MG/DL (ref 70–110)
GRAM STN SPEC: NORMAL
GRAM STN SPEC: NORMAL
HCT VFR BLD AUTO: 41.4 % (ref 37–48.5)
HGB BLD-MCNC: 12 G/DL (ref 12–16)
IMM GRANULOCYTES # BLD AUTO: 0.03 K/UL (ref 0–0.04)
IMM GRANULOCYTES NFR BLD AUTO: 0.3 % (ref 0–0.5)
LDH SERPL L TO P-CCNC: 180 U/L (ref 110–260)
LYMPHOCYTES # BLD AUTO: 1.2 K/UL (ref 1–4.8)
LYMPHOCYTES NFR BLD: 11.9 % (ref 18–48)
LYMPHOCYTES NFR FLD MANUAL: 53 %
MAGNESIUM SERPL-MCNC: 1.9 MG/DL (ref 1.6–2.6)
MCH RBC QN AUTO: 24.9 PG (ref 27–31)
MCHC RBC AUTO-ENTMCNC: 29 G/DL (ref 32–36)
MCV RBC AUTO: 86 FL (ref 82–98)
MESOTHL CELL NFR FLD MANUAL: 6 %
MONOCYTES # BLD AUTO: 0.7 K/UL (ref 0.3–1)
MONOCYTES NFR BLD: 7.1 % (ref 4–15)
MONOS+MACROS NFR FLD MANUAL: 40 %
NEUTROPHILS # BLD AUTO: 7.5 K/UL (ref 1.8–7.7)
NEUTROPHILS NFR BLD: 77.8 % (ref 38–73)
NEUTROPHILS NFR FLD MANUAL: 1 %
NRBC BLD-RTO: 0 /100 WBC
PHOSPHATE SERPL-MCNC: 3.4 MG/DL (ref 2.7–4.5)
PLATELET # BLD AUTO: 205 K/UL (ref 150–450)
PMV BLD AUTO: 10.2 FL (ref 9.2–12.9)
POTASSIUM SERPL-SCNC: 3.8 MMOL/L (ref 3.5–5.1)
PROT SERPL-MCNC: 6.1 G/DL (ref 6–8.4)
PROT SERPL-MCNC: 6.8 G/DL (ref 6–8.4)
RBC # BLD AUTO: 4.82 M/UL (ref 4–5.4)
SODIUM SERPL-SCNC: 141 MMOL/L (ref 136–145)
WBC # BLD AUTO: 9.69 K/UL (ref 3.9–12.7)
WBC # FLD: 1903 /CU MM

## 2024-05-21 PROCEDURE — 82150 ASSAY OF AMYLASE: CPT | Performed by: INTERNAL MEDICINE

## 2024-05-21 PROCEDURE — 36415 COLL VENOUS BLD VENIPUNCTURE: CPT | Performed by: INTERNAL MEDICINE

## 2024-05-21 PROCEDURE — 97530 THERAPEUTIC ACTIVITIES: CPT

## 2024-05-21 PROCEDURE — 25000003 PHARM REV CODE 250: Performed by: FAMILY MEDICINE

## 2024-05-21 PROCEDURE — 83615 LACTATE (LD) (LDH) ENZYME: CPT | Mod: 91 | Performed by: INTERNAL MEDICINE

## 2024-05-21 PROCEDURE — 27000221 HC OXYGEN, UP TO 24 HOURS

## 2024-05-21 PROCEDURE — 21400001 HC TELEMETRY ROOM

## 2024-05-21 PROCEDURE — 87205 SMEAR GRAM STAIN: CPT | Performed by: INTERNAL MEDICINE

## 2024-05-21 PROCEDURE — 87070 CULTURE OTHR SPECIMN AEROBIC: CPT | Performed by: INTERNAL MEDICINE

## 2024-05-21 PROCEDURE — 83615 LACTATE (LD) (LDH) ENZYME: CPT | Performed by: INTERNAL MEDICINE

## 2024-05-21 PROCEDURE — 83735 ASSAY OF MAGNESIUM: CPT | Performed by: NURSE PRACTITIONER

## 2024-05-21 PROCEDURE — 85025 COMPLETE CBC W/AUTO DIFF WBC: CPT | Performed by: NURSE PRACTITIONER

## 2024-05-21 PROCEDURE — 82042 OTHER SOURCE ALBUMIN QUAN EA: CPT | Performed by: INTERNAL MEDICINE

## 2024-05-21 PROCEDURE — 89051 BODY FLUID CELL COUNT: CPT | Performed by: INTERNAL MEDICINE

## 2024-05-21 PROCEDURE — 87206 SMEAR FLUORESCENT/ACID STAI: CPT | Performed by: INTERNAL MEDICINE

## 2024-05-21 PROCEDURE — 25000003 PHARM REV CODE 250: Performed by: INTERNAL MEDICINE

## 2024-05-21 PROCEDURE — 87210 SMEAR WET MOUNT SALINE/INK: CPT | Performed by: INTERNAL MEDICINE

## 2024-05-21 PROCEDURE — 84100 ASSAY OF PHOSPHORUS: CPT | Performed by: NURSE PRACTITIONER

## 2024-05-21 PROCEDURE — 87116 MYCOBACTERIA CULTURE: CPT | Performed by: INTERNAL MEDICINE

## 2024-05-21 PROCEDURE — 87206 SMEAR FLUORESCENT/ACID STAI: CPT | Mod: 91 | Performed by: INTERNAL MEDICINE

## 2024-05-21 PROCEDURE — 82947 ASSAY GLUCOSE BLOOD QUANT: CPT | Performed by: INTERNAL MEDICINE

## 2024-05-21 PROCEDURE — 94761 N-INVAS EAR/PLS OXIMETRY MLT: CPT

## 2024-05-21 PROCEDURE — 80053 COMPREHEN METABOLIC PANEL: CPT | Performed by: NURSE PRACTITIONER

## 2024-05-21 PROCEDURE — 0W993ZZ DRAINAGE OF RIGHT PLEURAL CAVITY, PERCUTANEOUS APPROACH: ICD-10-PCS | Performed by: RADIOLOGY

## 2024-05-21 PROCEDURE — 87102 FUNGUS ISOLATION CULTURE: CPT | Performed by: INTERNAL MEDICINE

## 2024-05-21 PROCEDURE — 84155 ASSAY OF PROTEIN SERUM: CPT | Performed by: INTERNAL MEDICINE

## 2024-05-21 PROCEDURE — 36415 COLL VENOUS BLD VENIPUNCTURE: CPT | Performed by: NURSE PRACTITIONER

## 2024-05-21 RX ORDER — SODIUM CHLORIDE 0.9 % (FLUSH) 0.9 %
10 SYRINGE (ML) INJECTION
Status: DISCONTINUED | OUTPATIENT
Start: 2024-05-21 | End: 2024-05-23 | Stop reason: HOSPADM

## 2024-05-21 RX ORDER — FUROSEMIDE 80 MG/1
80 TABLET ORAL 2 TIMES DAILY
Status: DISCONTINUED | OUTPATIENT
Start: 2024-05-21 | End: 2024-05-23 | Stop reason: HOSPADM

## 2024-05-21 RX ORDER — METOPROLOL TARTRATE 50 MG/1
50 TABLET ORAL 2 TIMES DAILY
Status: DISCONTINUED | OUTPATIENT
Start: 2024-05-21 | End: 2024-05-23 | Stop reason: HOSPADM

## 2024-05-21 RX ORDER — ACETAZOLAMIDE 250 MG/1
500 TABLET ORAL DAILY
Status: COMPLETED | OUTPATIENT
Start: 2024-05-21 | End: 2024-05-22

## 2024-05-21 RX ADMIN — METOPROLOL TARTRATE 50 MG: 50 TABLET, FILM COATED ORAL at 08:05

## 2024-05-21 RX ADMIN — METOPROLOL TARTRATE 50 MG: 50 TABLET, FILM COATED ORAL at 11:05

## 2024-05-21 RX ADMIN — MICONAZOLE NITRATE: 20 POWDER TOPICAL at 09:05

## 2024-05-21 RX ADMIN — MICONAZOLE NITRATE: 20 POWDER TOPICAL at 08:05

## 2024-05-21 RX ADMIN — ACETAZOLAMIDE 500 MG: 250 TABLET ORAL at 09:05

## 2024-05-21 RX ADMIN — FUROSEMIDE 80 MG: 80 TABLET ORAL at 09:05

## 2024-05-21 RX ADMIN — FUROSEMIDE 80 MG: 80 TABLET ORAL at 05:05

## 2024-05-21 NOTE — OP NOTE
Pre Op Diagnosis: right pleural effusion     Post Op Diagnosis: same     Procedure:  right thoracentesis     Procedure performed by: Ayaz DANG, [place] RIOS     Written Informed Consent Obtained: Yes     Specimen Removed:  yes     Estimated Blood Loss:  minimal     Findings: Local anesthesia     Sedation:  no     The patient tolerated the procedure well and there were no complications.      Disposition:  F/U in clinic or with ordering physician    Discharge instructions:  Light activity for 24 hours.  Remove band aid in 24 hours.  No baths (showers are appropriate).      Sterile technique was performed in the posterior right thorax, lidocaine was used as a local anesthetic.  600 ccs of dark césar fluid removed and sent to lab.  Chest x ray ordered.  Pt tolerated the procedure well without immediate complications.  Please see radiologist report for details. F/u with PCP and/or ordering physician.

## 2024-05-21 NOTE — CHAPLAIN
Initial visit with patient.  Visited with patient to assess for spiritual and emotional needs. Patient was doing well but did have some spiritual things she wanted to talk to me about.  She was struggling with some things that are going on inside of her [employer] and is hoping for things to get better.  Patient wanted me to pray for her and I took time to do this before leaving.  Spiritual care remains available as needed.    Chaplain Catrachito Poole. M.Div., [facility]

## 2024-05-21 NOTE — PLAN OF CARE
05/21/24 1135   Rounds   Attendance Provider;;Charge nurse;Physical therapist   Discharge Plan A Home Health   Why the patient remains in the hospital Requires continued medical care   Transition of Care Barriers None       Patient is recommended SNF, per therapy. Patient wishes for home with Home Health.   Potential DC tomorrow or Thursday.

## 2024-05-21 NOTE — PLAN OF CARE
Pt tolerated interventions well. Required MOD A for bed mobility, MAX A of 2 for STS. Recommending moderate intensity therapy upon d/c.

## 2024-05-21 NOTE — NURSING
Procedure complete. Pt tolerated well. ~600 ml of deep césar/blood tinged fluid removed from right lung. HR elevated throughout procedure - baseline per pt and charting. VSS and NADN. Bandaid to right upper back CDI. Pt on 3LNC. Pt had no complaints of SOB. CXR to be performed and pt to be taken back to room via transport.

## 2024-05-21 NOTE — PLAN OF CARE
Supine> sit mod A. Stood with RW max A x2 with inability to stand upright fully. Returned to supine max A x2.   Rec moderate intensity therapy at MN

## 2024-05-21 NOTE — ASSESSMENT & PLAN NOTE
Patient with Persistent (7 days or more) atrial fibrillation which is controlled currently with Beta Blocker. Patient is currently in atrial fibrillation.[id number] Score: 4. HASBLED Score: . Anticoagulation indicated. Anticoagulation done with Eliquis .-Eliquis on hold due to possible procedure

## 2024-05-21 NOTE — ASSESSMENT & PLAN NOTE
Body mass index is 61.24 kg/m². Morbid obesity complicates all aspects of disease management from diagnostic modalities to treatment. Weight loss encouraged and health benefits explained to patient.

## 2024-05-21 NOTE — ASSESSMENT & PLAN NOTE
Per patient and daughter, daughter goes to patient apartment at approximately 5:00 a.m. every day to assist patient getting out of bed to her chair, patient remains in her chair until the evening when daughter returns to assist her back to the bed.  Patient urinates and defecates in a diaper, reports typically unable to clean herself independently and will remain in the diaper until the daughter is able to return to assist her.  Patient is unable to independently walk, obtain meals or complete any ADLs independently.    --consult social Work  --PT/OT jose  Patient was recently in skilled nursing at this point request to go home with home health  After evaluation with [facility] hospice patient and daughter determined that her best course of action would be to home with hospice.

## 2024-05-21 NOTE — PLAN OF CARE
A220/A220 ENOC Reyes is a 79 y.o.female admitted on 5/18/2024 for Acute diastolic CHF (congestive heart failure)   Code Status: DNR MRN: [id number]   Review of patient's allergies indicates:  No Known Allergies  Past Medical History:   Diagnosis Date    Hypertension     Paroxysmal atrial fibrillation       PRN meds    acetaminophen, 650 mg, Q4H PRN  aluminum-magnesium hydroxide-simethicone, 30 mL, QID PRN  bisacodyL, 10 mg, Daily PRN  dextrose 10%, 12.5 g, PRN  dextrose 10%, 25 g, PRN  glucagon (human recombinant), 1 mg, PRN  glucose, 16 g, PRN  glucose, 24 g, PRN  melatonin, 6 mg, Nightly PRN  morphine, 2 mg, Q4H PRN  naloxone, 0.02 mg, PRN  ondansetron, 4 mg, Q8H PRN  sodium chloride 0.9%, 10 mL, Q12H PRN  sodium chloride 0.9%, 10 mL, PRN  sodium chloride 0.9%, 10 mL, PRN      Pt turned q 2. No falls were noted on shift and hourly rounding is complete. Cardiac and lab monitoring continues. Chart check completed. Will continue plan of care.      Orientation: oriented x 4  [place] Coma Scale Score: 15     Lead Monitored: Lead II Rhythm: atrial rhythm Frequency/Ectopy: PVCs  Cardiac/Telemetry Box Number: [id number]  VTE Required Core Measure: Pharmacological prophylaxis initiated/maintained Last Bowel Movement: 05/18/24  Diet Cardiac  Voiding Characteristics: external catheter  Doroteo Score: 15  Fall Risk Score: 16  Accucheck []   Freq?      Lines/Drains/Airways       Peripheral Intravenous Line  Duration                  Peripheral IV - Single Lumen 05/18/24 1634 22 G Lateral;Right Breast 3 days

## 2024-05-21 NOTE — PT/OT/SLP PROGRESS
Occupational Therapy   Treatment    Name: Niru Reyes  MRN: [id number]  Admitting Diagnosis:  Acute diastolic CHF (congestive heart failure)       Recommendations:     Discharge Recommendations: Moderate Intensity Therapy  Discharge Equipment Recommendations:  to be determined by next level of care  Barriers to discharge:  Decreased caregiver support    Assessment:     Niru Reyes is a 79 y.o. female with a medical diagnosis of Acute diastolic CHF (congestive heart failure).  Performance deficits affecting function are weakness, gait instability, decreased upper extremity function, impaired cardiopulmonary response to activity, decreased lower extremity function, impaired balance, impaired endurance, decreased safety awareness, impaired self care skills, impaired functional mobility, edema.     Rehab Prognosis:  Good; patient would benefit from acute skilled OT services to address these deficits and reach maximum level of function.       Plan:     Patient to be seen 2 x/week to address the above listed problems via self-care/home management, therapeutic activities, therapeutic exercises  Plan of Care Expires: 06/03/24  Plan of Care Reviewed with: patient    Subjective     Chief Complaint: Weakness  Patient/Family Comments/goals: increase independence  Pain/Comfort:  Pain Rating 1: 0/10  Pain Rating Post-Intervention 1: 0/10    Objective:     Communicated with: Nurse prior to session.  Patient found HOB elevated with telemetry, peripheral IV, PureWick, oxygen upon OT entry to room.    General Precautions: Standard, fall    Orthopedic Precautions:N/A  Braces: N/A  Respiratory Status: Nasal cannula, flow 2 L/min     Occupational Performance:     Bed Mobility:    Patient completed Rolling/Turning to Left with  minimum assistance  Patient completed Scooting/Bridging with maximal assistance and 2 persons  Patient completed Supine to Sit with moderate assistance  Patient completed Sit to Supine with maximal assistance and 2  persons     Functional Mobility/Transfers:  Patient completed Sit <> Stand Transfer with maximal assistance and of 2 persons  with  rolling walker   Functional Mobility: Pt stood x2 attempts. Partially successful on second attempt, as patient had difficulty with achieving fully erect posture with max assist and verbal cues. Pt reported increase fear of falling    Activities of Daily Living:  Lower Body Dressing: dependence donning socks      [facility] 6 Click ADL: 14    Treatment & Education:  Pt motivated to participate, however reports some fear of falling due to developed weakness since admit. Pt returned to supine max A x2. Requires verbal cues for deep breathing throughout any physical activity. Pt encouraged to increase BUE/LE AROM via educated exercises to increase functional strength and endurance needed for daily activities. Pt verbalized understanding. Pt encouraged to utilize call button for nursing assistance.     Patient left HOB elevated with all lines intact, call button in reach, and bed alarm on    GOALS:   Multidisciplinary Problems       Occupational Therapy Goals          Problem: Occupational Therapy    Goal Priority Disciplines Outcome Interventions   Occupational Therapy Goal     OT, PT/OT Progressing    Description: Goals to be met by: 6/3/24     Patient will increase functional independence with ADLs by performing:    UE Dressing with Set-up Assistance.  Grooming while bedside chair with Set-up Assistance.  Toileting from bedside commode with Set-up Assistance for hygiene and clothing management.   Toilet transfer to bedside commode with Contact Guard Assistance with RW.  Upper extremity exercise program x15 reps per handout, with independence.                         Time Tracking:     OT Date of Treatment: 05/21/24  OT Start Time: 0925  OT Stop Time: 0950  OT Total Time (min): 25 min    Billable Minutes:Therapeutic Activity 25    ZAC Cross  OT/JEFFY: OT          5/21/2024

## 2024-05-21 NOTE — PLAN OF CARE
A220/A220 ENOC Reyes is a 79 y.o.female admitted on 5/18/2024 for Acute diastolic CHF (congestive heart failure)   Code Status: Full Code MRN: [id number]   Review of patient's allergies indicates:  No Known Allergies  Past Medical History:   Diagnosis Date    Hypertension     Paroxysmal atrial fibrillation       PRN meds    acetaminophen, 650 mg, Q4H PRN  aluminum-magnesium hydroxide-simethicone, 30 mL, QID PRN  bisacodyL, 10 mg, Daily PRN  dextrose 10%, 12.5 g, PRN  dextrose 10%, 25 g, PRN  glucagon (human recombinant), 1 mg, PRN  glucose, 16 g, PRN  glucose, 24 g, PRN  melatonin, 6 mg, Nightly PRN  morphine, 2 mg, Q4H PRN  naloxone, 0.02 mg, PRN  ondansetron, 4 mg, Q8H PRN  sodium chloride 0.9%, 10 mL, Q12H PRN  sodium chloride 0.9%, 10 mL, PRN      Chart check completed. Will continue plan of care.      Orientation: oriented x 4  [place] Coma Scale Score: 15     Lead Monitored: Lead II Rhythm: atrial rhythm Frequency/Ectopy: PVCs  Cardiac/Telemetry Box Number: [id number]  VTE Required Core Measure: Pharmacological prophylaxis initiated/maintained Last Bowel Movement: 05/18/24  Diet Cardiac  Voiding Characteristics: external catheter  Doroteo Score: 16  Fall Risk Score: 15  Accucheck []   Freq?      Lines/Drains/Airways       Peripheral Intravenous Line  Duration                  Peripheral IV - Single Lumen 05/18/24 1634 22 G Lateral;Right Breast 2 days

## 2024-05-21 NOTE — PROGRESS NOTES
"[facility]) nurse navigator notified of HFT[facility] candidate in need of education and introduction to 4-6 week program.      PT aao x 3 while lying in bed Introduced self to pt as HFTCC nurse navigator.     Patient given "Home Care Guide for Heart Failure Patients" , "[facility]" flyer and "Daily weight and symptom tracker".  Encouraged pto review information.      Reviewed the following key points of HFTCC program with pt and family:   1.) Take your medications as directed.    2.) Weight yourself daily   3.) Follow low salt and limited fluid diet.    4.) Stop smoking and start exercising   5.) Go to your appointments and call your team.      Pt reminded to follow Symptom tracker and to call at the onset of symptoms according to tracker.     Reviewed plan for follow up once discharged to include phone calls, in person and virtual visits to assist pt optimizing their heart failure medication regimen and encouraging healthy lifestyle modifications.  Reminded pt that program will assist them over the next 4-6 weeks and then patient will be transferred to long term care provider .  Reminded pt how to contact HFTCC navigator via phone and or via [facility].     Pt given appointment or instructed appointment will be printed on hospital discharge paperwork.     Pt also reminded HF nurse will call 48-72 hours after discharge to check on them.     PT verbalize read back of information given.  Encouraged pt and family to read over information often and contact team with any questions or concerns.      "

## 2024-05-21 NOTE — SUBJECTIVE & OBJECTIVE
Interval History:  Patient seen and examined at bedside.  She is feeling better.  With less shortness of breath.    Review of Systems   Constitutional:  Positive for activity change and fatigue. Negative for fever.   Respiratory:  Positive for shortness of breath. Negative for cough.    Cardiovascular:  Negative for chest pain and leg swelling.   Gastrointestinal:  Negative for nausea and vomiting.   Neurological:  Positive for weakness.   All other systems reviewed and are negative.    Objective:     Vital Signs (Most Recent):  Temp: 98 °F (36.7 °C) (05/21/24 1629)  Pulse: 92 (05/21/24 1629)  Resp: 19 (05/21/24 1629)  BP: (!) 125/57 (05/21/24 1629)  SpO2: 96 % (05/21/24 1629) Vital Signs (24h Range):  Temp:  [97.1 °F (36.2 °C)-98.9 °F (37.2 °C)] 98 °F (36.7 °C)  Pulse:  [] 92  Resp:  [14-19] 19  SpO2:  [93 %-96 %] 96 %  BP: (125-158)/(49-88) 125/57     Weight: (!) 182.7 kg (402 lb 12.5 oz)  Body mass index is 61.24 kg/m².    Intake/Output Summary (Last 24 hours) at 5/21/2024 1747  Last data filed at 5/21/2024 1424  Gross per 24 hour   Intake --   Output 4300 ml   Net -4300 ml         Physical Exam  Vitals reviewed.   Constitutional:       Appearance: Normal appearance.   HENT:      Head: Normocephalic and atraumatic.      Mouth/Throat:      Mouth: Mucous membranes are moist.      Pharynx: Oropharynx is clear.   Eyes:      Extraocular Movements: Extraocular movements intact.      Conjunctiva/sclera: Conjunctivae normal.   Cardiovascular:      Rate and Rhythm: Regular rhythm. Tachycardia present.      Pulses: Normal pulses.      Heart sounds: Normal heart sounds.   Pulmonary:      Effort: Pulmonary effort is normal.      Breath sounds: Normal breath sounds.   Abdominal:      General: Bowel sounds are normal.      Palpations: Abdomen is soft.   Musculoskeletal:         General: Normal range of motion.      Cervical back: Normal range of motion and neck supple.      Right lower leg: Edema present.      Left lower  Pt states she had gallbladder surgery on the 15th and was in the hospital for 10 days in [place]. Pt was discharged Monday and has not felt right since. Pt complains of rapid heart rate, shortness of breath, vomiting and abdominal pain. leg: Edema present.   Skin:     General: Skin is warm and dry.   Neurological:      General: No focal deficit present.      Mental Status: She is alert and oriented to person, place, and time. Mental status is at baseline.   Psychiatric:         Mood and Affect: Mood normal.         Behavior: Behavior normal.         Thought Content: Thought content normal.             Significant Labs: All pertinent labs within the past 24 hours have been reviewed.  CBC:   Recent Labs   Lab 05/20/24  0501 05/21/24  0459   WBC 12.12 9.69   HGB 12.4 12.0   HCT 42.8 41.4    205     CMP:   Recent Labs   Lab 05/20/24  0501 05/21/24  0459 05/21/24  0835    141  --    K 4.5 3.8  --    CL 97 91*  --    CO2 28 40*  --     114* 112*   BUN 13 14  --    CREATININE 0.8 0.8  --    CALCIUM 8.4* 8.8  --    PROT 6.2 6.8 6.1   ALBUMIN 3.2* 3.3*  --    BILITOT 1.0 1.1*  --    ALKPHOS 76 85  --    AST 19 11  --    ALT 8* 6*  --    ANIONGAP 16 10  --        Significant Imaging: I have reviewed all pertinent imaging results/findings within the past 24 hours.

## 2024-05-21 NOTE — TELEPHONE ENCOUNTER
Spoke with patient daughter/Mary regarding patient's 02 sat.  Patient has been admitted to [facility] ER since Saturday.

## 2024-05-21 NOTE — PLAN OF CARE
SW received a call from Patient's daughter, Mary, requesting an update on Patient. JINA stated that we have a meeting at 10:30 with providers and I can call back with an update this afternoon. Patient's daughter expressed concerns with Patient DC home due to not being able to get out of wheelchair during the day. Patient's daughter stated that she goes to Patient's home around 6 am every morning and helps Patient out of bed and into her wheelchair. Patient's daughter stated her mother is unable to get out of the wheelchair alone and she goes back after work at 6 pm in the evening, and help Patient back to bed. Patient's daughter stated she understanding Patient wishes to go home, however she wishes for what's best for Patient.  SW verbalized understanding and stated she provided sitter resources to bedside and would let Patient's Attending know during our morning meeting. SW also stated she would let provider know daughter was requesting a call.   Patient's daughter verbalized understanding.    SW will continue to follow and assist as needed.

## 2024-05-21 NOTE — PLAN OF CARE
Per Dylan, with [facility], Patient and daughter elected for [facility] services upon discharge. Per Dylan, they will order equipment and it will be delivered to patient's home tomorrow.     SW will continue to follow and assist as needed.

## 2024-05-21 NOTE — ASSESSMENT & PLAN NOTE
Patient with Hypoxic Respiratory failure which is Acute.  she is not on home oxygen. Supplemental oxygen was provided and noted- Oxygen Concentration (%):  [28] 28    .   Signs/symptoms of respiratory failure include- tachypnea, increased work of breathing, wheezing, and lethargy. Contributing diagnoses includes - CHF and Pneumonia Labs and images were reviewed. Patient Has recent ABG, which has been reviewed. Will treat underlying causes and adjust management of respiratory failure as follows-     --IV diuresis  --supplemental O2 to maintain SpO2> 90%- currently on 2 L  --CT chest i revealed large right-sided pleural effusion and patient underwent thoracentesis on 05/21

## 2024-05-21 NOTE — PLAN OF CARE
Problem: Adult Inpatient Plan of Care  Goal: Plan of Care Review  Outcome: Progressing  Goal: Absence of Hospital-Acquired Illness or Injury  Outcome: Progressing  Goal: Optimal Comfort and Wellbeing  Outcome: Progressing  Goal: Readiness for Transition of Care  Outcome: Progressing     Problem: Skin Injury Risk Increased  Goal: Skin Health and Integrity  Outcome: Progressing

## 2024-05-21 NOTE — HOSPITAL COURSE
Patient is a 79-year-old female with a history of morbid obesity, PAF, hypertension, CHF who presented to the emergency room after recently being discharged from skilled nursing at [facility].  She noted that her O2 sats were in the 60s and 70s.  She complained of worsening shortness of breath, wheezing, generalized weakness.  Patient was weaned off of oxygen at skilled nursing as well as her nebs and inhalers.  We will while at skilled nursing her weight was 1 ex he was before 21 in emergency department her weight was 456.  She was noted to have a weight gain of 35 lb in 16 days.  Patient was admitted for acute on chronic hypoxic respiratory failure acute on chronic CHF.  She was treated with IV diuretics.  She was noted to have significant effusion in the right lung and underwent thoracentesis on 05/21 by Interventional Radiology.  Due to her multiple admissions and readmissions as well as her decreased functional ability informational visit from [facility] hospice was ordered.  After meeting with hospice patient daughter agreed to DNR and post was filled out with goals of comfort and it was determined that she would receive hospice care.    Patient was seen and examined on day of discharge and stable to discharge home with hospice care.  Discussed with daughter and questions answered.  Patient will continue with her diuretics.

## 2024-05-21 NOTE — ASSESSMENT & PLAN NOTE
Patient has a diagnosis of pneumonia. The cause of the pneumonia is suspected to be bacterial in etiology but organism is not known. The pneumonia is  new onset . The patient has the following signs/symptoms of pneumonia: persistent hypoxia , sputum production, and shortness of breath. The patient does have a current oxygen requirement and the patient does not have a home oxygen requirement. I have reviewed the pertinent imaging. The following cultures have been collected: Blood cultures The culture results are listed below.     Current antimicrobial regimen consists of the antibiotics listed below. Will monitor patient closely and continue current treatment plan unchanged.    Antibiotics (From admission, onward)    None          Microbiology Results (last 7 days)     Procedure Component Value Units Date/Time    DANII prep [[id number]] Collected: 05/21/24 1040    Order Status: Sent Specimen: Body Fluid from Pleural Fluid Updated: 05/21/24 1103    AFB stain [[id number]] Collected: 05/21/24 1040    Order Status: Sent Specimen: Body Fluid from Pleural Fluid Updated: 05/21/24 1103    AFB culture [[id number]] Collected: 05/21/24 1040    Order Status: Sent Specimen: Body Fluid from Pleural Fluid Updated: 05/21/24 1102    Fungus culture [[id number]] Collected: 05/21/24 1041    Order Status: Sent Specimen: Respiratory from Pleural Fluid Updated: 05/21/24 1102    Culture, body fluid - Bactec [[id number]] Collected: 05/21/24 1040    Order Status: Sent Specimen: Body Fluid from Thoracentesis Fluid Updated: 05/21/24 1101    Gram stain [[id number]] Collected: 05/21/24 1040    Order Status: Sent Specimen: Body Fluid from Pleural Fluid Updated: 05/21/24 1101    Blood culture #2 **CANNOT BE ORDERED STAT** [[id number]] Collected: 05/18/24 1633    Order Status: Completed Specimen: Blood from Peripheral, Hand, Left Updated: 05/21/24 0613     Blood Culture, Routine No Growth to date      No Growth to date      No Growth to date    Blood  culture #1 **CANNOT BE ORDERED STAT** [[id number]] Collected: 05/18/24 1633    Order Status: Completed Specimen: Blood from Peripheral, Upper Arm, Left Updated: 05/21/24 0613     Blood Culture, Routine No Growth to date      No Growth to date      No Growth to date

## 2024-05-21 NOTE — PT/OT/SLP PROGRESS
Physical Therapy Treatment    Patient Name:  Niru Reyes   MRN:  [id number]    Recommendations:     Discharge Recommendations: Moderate Intensity Therapy  Discharge Equipment Recommendations: to be determined by next level of care  Barriers to discharge: Decreased caregiver support    Assessment:     Niru Reyes is a 79 y.o. female admitted with a medical diagnosis of Acute diastolic CHF (congestive heart failure).  She presents with the following impairments/functional limitations: weakness, impaired endurance, gait instability, impaired balance, pain, decreased safety awareness, impaired functional mobility, decreased lower extremity function, edema, impaired cardiopulmonary response to activity, decreased ROM.    Rehab Prognosis: Good; patient would benefit from acute skilled PT services to address these deficits and reach maximum level of function.    Recent Surgery: * No surgery found *      Plan:     During this hospitalization, patient to be seen 3 x/week to address the identified rehab impairments via gait training, therapeutic activities, therapeutic exercises and progress toward the following goals:    Plan of Care Expires:  06/03/24    Subjective     Chief Complaint: Pt is motivated to participate  Patient/Family Comments/goals: none stated  Pain/Comfort:  Pain Rating 1: 0/10      Objective:     Communicated with nurse Dan and [facility] chart review prior to session.  Patient found HOB elevated with peripheral IV, telemetry, PureWick, oxygen, bed alarm upon PT entry to room.     General Precautions: Standard, fall  Orthopedic Precautions: N/A  Braces: N/A  Respiratory Status: Nasal cannula, flow 2 L/min     Functional Mobility:  Gait belt applied - Yes  Bed Mobility  Rolling Left: moderate assistance and of 2 persons  Scooting: maximal assistance and of 2 persons  Supine to Sit: moderate assistance and of 2 persons for LE management and trunk management  Sit to Supine: maximal assistance and of 2 persons for  "LE management and trunk management  Transfers  Sit to Stand: maximal assistance and of 2 persons with rolling walker, x2 attempts, unable to stand fully upright despite cuing  Gait  Unable to progress at this time, will progress as appropriate  Balance  Sitting: contact guard assistance  Standing: maximal assistance and of 2 persons  Sat EOB x10min. SpO2 90-95%, -130bpm. No c/o dizziness. SOB with exertion, educated about pursed lip breathing technique and cued for use with mobility.    AM-PAC 6 CLICK MOBILITY  Turning over in bed (including adjusting bedclothes, sheets and blankets)?: 2  Sitting down on and standing up from a chair with arms (e.g., wheelchair, bedside commode, etc.): 2  Moving from lying on back to sitting on the side of the bed?: 2  Moving to and from a bed to a chair (including a wheelchair)?: 1  Need to walk in hospital room?: 1  Climbing 3-5 steps with a railing?: 1  Basic Mobility Total Score: 9       Treatment & Education:  Reviewed role of PT in acute care and POC. Pt tolerated interventions well. Reviewed importance of OOB activities, activity pacing, and HEP (marching/hip flex, hip abd, heel slides/LAQ, quad sets, ankle pumps) in order to maintain/regain strength. Encouraged to sit up in chair for all meals. Reviewed proper use of RW for safety and to reduce risk of falling. Reviewed "call don't fall" policy and increased risk of falling due to weakness, instructed to utilize call bell for assistance with all transfers. Pt agreeable to all requests.      Patient left HOB elevated with all lines intact, call button in reach, and bed alarm on..    GOALS:   Multidisciplinary Problems       Physical Therapy Goals          Problem: Physical Therapy    Goal Priority Disciplines Outcome Goal Variances Interventions   Physical Therapy Goal     PT, PT/OT Progressing     Description: Goals to be met by 6/3/24.  1. Pt will complete bed mobility MOD I.  2. Pt will complete sit to stand MOD " I.  3. Pt will transfer bed to chair MOD I.  4. Pt will increase AMPAC score by 2 points to progress functional mobility.                       Time Tracking:     PT Received On: 05/21/24  PT Start Time: 0926     PT Stop Time: 0951  PT Total Time (min): 25 min     Billable Minutes: Therapeutic Activity 25min    Treatment Type: Treatment  PT/PTA: PT     Number of PTA visits since last PT visit: 0     05/21/2024

## 2024-05-21 NOTE — PROGRESS NOTES
[facility] ([facility] Medicine  Progress Note    Patient Name: Niru Reyes  MRN: [id number]  Patient Class: IP- Inpatient   Admission Date: 5/18/2024  Length of Stay: 3 days  Attending Physician: Felicita Ulloa MD  Primary Care Provider: Jordana Shirley MD        Subjective:     Principal Problem:Acute diastolic CHF (congestive heart failure)        HPI:  79 y.o. female patient, PMHx of HTN, CHF, morbid obesity and paroxysmal atrial fibrillation. Presented to the Emergency Department for evaluation of SOB which onset this morning at around 3 AM on day of arrival. When pt woke up at 3 AM, she states that her oxygen was at 68%, patient stated she did not want to awaken her family and waited to call her daughter until around 7-8 a.m.. SpO2 was in the 80s when her daughter arrived, called nurse triage line who instructed them to come to the ED. Symptoms are constant and moderate in severity. SOB worsens when laying flat. Associated sxs include wheezing and generalized weakness. Patient denies any fever, cough, CP, and all other sxs at this time. Pt is on LASIX. On 4/3/2024, pt was discharged from the hospital to [facility] for skilled therapy. They tried to wean her off oxygen and on 5/1/2024, she was taken off of oxygen. Pt stopped using Neb and inhalers 2 weeks ago. Pt also has UTI and was placed on antibiotics.  On discharge SNF patient weighed 421 lb, in the ED patient weighed 456 lb, 35# weight gain in 16 days.  In the ED, vitals: 143/43, 97, 20, 98 F, 96% on 4 L NC.  Significant labs:  Bili: 1.2, BNP: 476, ABG: pH:  7.349, PO2: 60.  CXR:moderate amount of alveolar consolidation scattered throughout the inferior 2/3 of the right lung.  Right pleural effusion.  Treated with IV diuretic, IV antibiotics.  Patient is a full code.  Admitted to hospital medicine for management of acute on chronic CHF, acute respiratory failure.    Overview/Hospital Course:  Patient is a 79-year-old  female with a history of morbid obesity, PAF, hypertension, CHF who presented to the emergency room after recently being discharged from skilled nursing at [facility].  She noted that her O2 sats were in the 60s and 70s.  She complained of worsening shortness of breath, wheezing, generalized weakness.  Patient was weaned off of oxygen at skilled nursing as well as her nebs and inhalers.  We will while at skilled nursing her weight was 1 ex he was before 21 in emergency department her weight was 456.  She was noted to have a weight gain of 35 lb in 16 days.  Patient was admitted for acute on chronic hypoxic respiratory failure acute on chronic CHF.  She was treated with IV diuretics.  She was noted to have significant effusion in the right lung and underwent thoracentesis on 05/21 by Interventional Radiology.  Due to her multiple admissions and readmissions as well as her decreased functional ability informational visit from [facility] hospice was ordered.  After meeting with hospice patient daughter agreed to DNR and post was filled out with goals of comfort and it was determined that she would receive hospice care.    Interval History:  Patient seen and examined at bedside.  She is feeling better.  With less shortness of breath.    Review of Systems   Constitutional:  Positive for activity change and fatigue. Negative for fever.   Respiratory:  Positive for shortness of breath. Negative for cough.    Cardiovascular:  Negative for chest pain and leg swelling.   Gastrointestinal:  Negative for nausea and vomiting.   Neurological:  Positive for weakness.   All other systems reviewed and are negative.    Objective:     Vital Signs (Most Recent):  Temp: 98 °F (36.7 °C) (05/21/24 1629)  Pulse: 92 (05/21/24 1629)  Resp: 19 (05/21/24 1629)  BP: (!) 125/57 (05/21/24 1629)  SpO2: 96 % (05/21/24 1629) Vital Signs (24h Range):  Temp:  [97.1 °F (36.2 °C)-98.9 °F (37.2 °C)] 98 °F (36.7 °C)  Pulse:  [] 92  Resp:  [14-19]  19  SpO2:  [93 %-96 %] 96 %  BP: (125-158)/(49-88) 125/57     Weight: (!) 182.7 kg (402 lb 12.5 oz)  Body mass index is 61.24 kg/m².    Intake/Output Summary (Last 24 hours) at 5/21/2024 1747  Last data filed at 5/21/2024 1424  Gross per 24 hour   Intake --   Output 4300 ml   Net -4300 ml         Physical Exam  Vitals reviewed.   Constitutional:       Appearance: Normal appearance.   HENT:      Head: Normocephalic and atraumatic.      Mouth/Throat:      Mouth: Mucous membranes are moist.      Pharynx: Oropharynx is clear.   Eyes:      Extraocular Movements: Extraocular movements intact.      Conjunctiva/sclera: Conjunctivae normal.   Cardiovascular:      Rate and Rhythm: Regular rhythm. Tachycardia present.      Pulses: Normal pulses.      Heart sounds: Normal heart sounds.   Pulmonary:      Effort: Pulmonary effort is normal.      Breath sounds: Normal breath sounds.   Abdominal:      General: Bowel sounds are normal.      Palpations: Abdomen is soft.   Musculoskeletal:         General: Normal range of motion.      Cervical back: Normal range of motion and neck supple.      Right lower leg: Edema present.      Left lower leg: Edema present.   Skin:     General: Skin is warm and dry.   Neurological:      General: No focal deficit present.      Mental Status: She is alert and oriented to person, place, and time. Mental status is at baseline.   Psychiatric:         Mood and Affect: Mood normal.         Behavior: Behavior normal.         Thought Content: Thought content normal.             Significant Labs: All pertinent labs within the past 24 hours have been reviewed.  CBC:   Recent Labs   Lab 05/20/24  0501 05/21/24  0459   WBC 12.12 9.69   HGB 12.4 12.0   HCT 42.8 41.4    205     CMP:   Recent Labs   Lab 05/20/24  0501 05/21/24  0459 05/21/24  0835    141  --    K 4.5 3.8  --    CL 97 91*  --    CO2 28 40*  --     114* 112*   BUN 13 14  --    CREATININE 0.8 0.8  --    CALCIUM 8.4* 8.8  --     PROT 6.2 6.8 6.1   ALBUMIN 3.2* 3.3*  --    BILITOT 1.0 1.1*  --    ALKPHOS 76 85  --    AST 19 11  --    ALT 8* 6*  --    ANIONGAP 16 10  --        Significant Imaging: I have reviewed all pertinent imaging results/findings within the past 24 hours.    Assessment/Plan:      * Acute diastolic CHF (congestive heart failure)  Patient is identified as having Diastolic (HFpEF) heart failure that is Acute on chronic. CHF is currently uncontrolled due to >3 pillow orthopnea, Rales/crackles on pulmonary exam, and Pulmonary edema/pleural effusion on CXR. Latest ECHO performed and demonstrates- Results for orders placed during the hospital encounter of 03/14/24    Echo    Interpretation Summary    Left Ventricle: The left ventricle is normal in size. Normal wall thickness. There is concentric remodeling. There is low normal systolic function with a visually estimated ejection fraction of 50 - 55%. There is normal diastolic function.    Right Ventricle: Normal right ventricular cavity size. Wall thickness is normal. Right ventricle wall motion  is normal. Systolic function is normal.    Left Atrium: Left atrium is moderately dilated.    Pulmonary Artery: The estimated pulmonary artery systolic pressure is 45 mmHg.    IVC/SVC: Normal venous pressure at 3 mmHg.  . Continue Beta Blocker and Furosemide and monitor clinical status closely. Monitor on telemetry. Patient is on CHF pathway.  Monitor strict Is&Os and daily weights.  Place on fluid restriction of 1.5 L. Cardiology has been consulted. Continue to stress to patient importance of self efficacy and  on diet for CHF. Last BNP reviewed- and noted below   Recent Labs   Lab 05/18/24  1313   *         Pneumonia  Patient has a diagnosis of pneumonia. The cause of the pneumonia is suspected to be bacterial in etiology but organism is not known. The pneumonia is  new onset . The patient has the following signs/symptoms of pneumonia: persistent hypoxia , sputum  production, and shortness of breath. The patient does have a current oxygen requirement and the patient does not have a home oxygen requirement. I have reviewed the pertinent imaging. The following cultures have been collected: Blood cultures The culture results are listed below.     Current antimicrobial regimen consists of the antibiotics listed below. Will monitor patient closely and continue current treatment plan unchanged.    Antibiotics (From admission, onward)      None            Microbiology Results (last 7 days)       Procedure Component Value Units Date/Time    DANII prep [[id number]] Collected: 05/21/24 1040    Order Status: Sent Specimen: Body Fluid from Pleural Fluid Updated: 05/21/24 1103    AFB stain [[id number]] Collected: 05/21/24 1040    Order Status: Sent Specimen: Body Fluid from Pleural Fluid Updated: 05/21/24 1103    AFB culture [[id number]] Collected: 05/21/24 1040    Order Status: Sent Specimen: Body Fluid from Pleural Fluid Updated: 05/21/24 1102    Fungus culture [[id number]] Collected: 05/21/24 1041    Order Status: Sent Specimen: Respiratory from Pleural Fluid Updated: 05/21/24 1102    Culture, body fluid - Bactec [[id number]] Collected: 05/21/24 1040    Order Status: Sent Specimen: Body Fluid from Thoracentesis Fluid Updated: 05/21/24 1101    Gram stain [[id number]] Collected: 05/21/24 1040    Order Status: Sent Specimen: Body Fluid from Pleural Fluid Updated: 05/21/24 1101    Blood culture #2 **CANNOT BE ORDERED STAT** [[id number]] Collected: 05/18/24 1633    Order Status: Completed Specimen: Blood from Peripheral, Hand, Left Updated: 05/21/24 0613     Blood Culture, Routine No Growth to date      No Growth to date      No Growth to date    Blood culture #1 **CANNOT BE ORDERED STAT** [[id number]] Collected: 05/18/24 1633    Order Status: Completed Specimen: Blood from Peripheral, Upper Arm, Left Updated: 05/21/24 0613     Blood Culture, Routine No Growth to date      No Growth to date       No Growth to date            Difficulty with activities of daily living  Per patient and daughter, daughter goes to patient apartment at approximately 5:00 a.m. every day to assist patient getting out of bed to her chair, patient remains in her chair until the evening when daughter returns to assist her back to the bed.  Patient urinates and defecates in a diaper, reports typically unable to clean herself independently and will remain in the diaper until the daughter is able to return to assist her.  Patient is unable to independently walk, obtain meals or complete any ADLs independently.    --consult social Work  --PT/OT jose  Patient was recently in skilled nursing at this point request to go home with home health  After evaluation with [facility] hospice patient and daughter determined that her best course of action would be to home with hospice.        Pleural effusion  Patient found to have moderate pleural effusion on imaging. I have not personally reviewed and interpreted the following imaging: Xray. A thoracentesis was deferred. Most likely etiology includes Congestive Heart Failure. Management to include Diuresis      Debility  Patient with Acute on chronic debility due to bed confinement status and chronic unspecified fatigue. Latest AMPAC and GEMS scores have not been reviewed. Evaluation for etiology is underway. Plan includes progressive mobility protocol initated and PT/OT consulted.    Morbid obesity  Body mass index is 61.24 kg/m². Morbid obesity complicates all aspects of disease management from diagnostic modalities to treatment. Weight loss encouraged and health benefits explained to patient.         Atrial fibrillation with RVR  Patient with Persistent (7 days or more) atrial fibrillation which is controlled currently with Beta Blocker. Patient is currently in atrial fibrillation.[id number] Score: 4. HASBLED Score: . Anticoagulation indicated. Anticoagulation done with Eliquis .-Eliquis on hold  due to possible procedure    Acute hypoxemic respiratory failure  Patient with Hypoxic Respiratory failure which is Acute.  she is not on home oxygen. Supplemental oxygen was provided and noted- Oxygen Concentration (%):  [28] 28    .   Signs/symptoms of respiratory failure include- tachypnea, increased work of breathing, wheezing, and lethargy. Contributing diagnoses includes - CHF and Pneumonia Labs and images were reviewed. Patient Has recent ABG, which has been reviewed. Will treat underlying causes and adjust management of respiratory failure as follows-     --IV diuresis  --supplemental O2 to maintain SpO2> 90%- currently on 2 L  --CT chest i revealed large right-sided pleural effusion and patient underwent thoracentesis on 05/21    OA (osteoarthritis) of knee          VTE Risk Mitigation (From admission, onward)           Ordered     IP VTE HIGH RISK PATIENT  Once         05/18/24 1636     Place sequential compression device  Until discontinued         05/18/24 1636     Reason for No Pharmacological VTE Prophylaxis  Once        Question:  Reasons:  Answer:  Already adequately anticoagulated on oral Anticoagulants    05/18/24 1636                    Discharge Planning   JERONIMO:      Code Status: DNR   Is the patient medically ready for discharge?:     Reason for patient still in hospital (select all that apply): Patient trending condition, Laboratory test, Treatment, and Pending disposition  Discharge Plan A: Home Health                  Felicita Cristobal MD  Department of Hospital Medicine   [facility] - Telemetry ([facility])

## 2024-05-22 LAB
ACID FAST MOD KINY STN SPEC: NORMAL
ALBUMIN FLD-MCNC: 1.3 G/DL
AMYLASE, BODY FLUID: 16 U/L
BODY FLUID SOURCE AMYLASE: NORMAL
BODY FLUID SOURCE, LDH: NORMAL
KOH PREP SPEC: NORMAL
LDH FLD L TO P-CCNC: 142 U/L
SPECIMEN SOURCE: NORMAL

## 2024-05-22 PROCEDURE — 94761 N-INVAS EAR/PLS OXIMETRY MLT: CPT

## 2024-05-22 PROCEDURE — 97530 THERAPEUTIC ACTIVITIES: CPT | Mod: CQ

## 2024-05-22 PROCEDURE — 21400001 HC TELEMETRY ROOM

## 2024-05-22 PROCEDURE — 63600175 PHARM REV CODE 636 W HCPCS: Performed by: NURSE PRACTITIONER

## 2024-05-22 PROCEDURE — 27000221 HC OXYGEN, UP TO 24 HOURS

## 2024-05-22 PROCEDURE — 99900035 HC TECH TIME PER 15 MIN (STAT)

## 2024-05-22 PROCEDURE — 25000003 PHARM REV CODE 250: Performed by: INTERNAL MEDICINE

## 2024-05-22 PROCEDURE — 97530 THERAPEUTIC ACTIVITIES: CPT

## 2024-05-22 RX ORDER — HYDROCODONE BITARTRATE AND ACETAMINOPHEN 7.5; 325 MG/1; MG/1
1 TABLET ORAL EVERY 6 HOURS PRN
Status: DISCONTINUED | OUTPATIENT
Start: 2024-05-22 | End: 2024-05-23 | Stop reason: HOSPADM

## 2024-05-22 RX ADMIN — FUROSEMIDE 80 MG: 80 TABLET ORAL at 10:05

## 2024-05-22 RX ADMIN — HYDROCODONE BITARTRATE AND ACETAMINOPHEN 1 TABLET: 7.5; 325 TABLET ORAL at 05:05

## 2024-05-22 RX ADMIN — FUROSEMIDE 80 MG: 80 TABLET ORAL at 05:05

## 2024-05-22 RX ADMIN — MORPHINE SULFATE 2 MG: 4 INJECTION INTRAVENOUS at 01:05

## 2024-05-22 RX ADMIN — MICONAZOLE NITRATE: 20 POWDER TOPICAL at 08:05

## 2024-05-22 RX ADMIN — ACETAZOLAMIDE 500 MG: 250 TABLET ORAL at 10:05

## 2024-05-22 RX ADMIN — MICONAZOLE NITRATE: 20 POWDER TOPICAL at 10:05

## 2024-05-22 RX ADMIN — METOPROLOL TARTRATE 50 MG: 50 TABLET, FILM COATED ORAL at 10:05

## 2024-05-22 RX ADMIN — MORPHINE SULFATE 2 MG: 4 INJECTION INTRAVENOUS at 09:05

## 2024-05-22 NOTE — PT/OT/SLP PROGRESS
Physical Therapy  Treatment    Niru Reyes   MRN: [id number]   Admitting Diagnosis: Acute diastolic CHF (congestive heart failure)    PT Received On: 05/22/24  PT Start Time: 0950     PT Stop Time: 1015    PT Total Time (min): 25 min       Billable Minutes:  Therapeutic Activity 25    Treatment Type: Treatment  PT/PTA: PTA     Number of PTA visits since last PT visit: 1       General Precautions: Standard, fall  Orthopedic Precautions: N/A  Braces: N/A  Respiratory Status: Nasal cannula, flow 4 L/min    Spiritual, Cultural Beliefs, Alevism Practices, Values that Affect Care: no    Subjective:  Communicated with patient's nurse, Dony, and completed Epic chart review prior to session.  Patient agreed to PT session. Reports generalized soreness but majority in chest area.     Pain/Comfort  Pain Rating 1: 5/10  Location - Orientation 1: generalized  Location 1: chest  Pain Addressed 1: Other (see comments) (ACTIVITY PACING)  Pain Rating Post-Intervention 1: 5/10    Objective:   Patient found with: telemetry, oxygen, bed alarm, PureWick    Supine > sit EOB: SBA (increased time to complete)    Forward scoot towards EOB: SBA (increased time to complete)    STS from EOB > jose ramon RW x2 trials: Max A of 2  Short interval standing trials due to patient being unable to sustain standing position    Reviewed AROM TE to BLE including: hip flex/ext, knee flex/ext, ankle PF/DF  To be completed a minimum of 10 reps for each LE in order to promote return of function, strength and ROM.     Educated patient on importance of increased tolerance to upright position and direct impact on CV endurance and strength. Patient encouraged to utilize elevated HOB/ supported sitting EOB to simulate chair position until able to safely complete chair T/F. Patient given a minimum goal of majority of the day to be spent in upright, especially with all meals. Encouraged patient to perform AROM TE to BLE throughout the day within all available planes of  motion. Re enforced importance of utilizing call light to meet needs in room and not attempt to get up without staff assistance. Patient verbalized understanding and agreed to comply.    AM-PAC 6 CLICK MOBILITY  How much help from another person does this patient currently need?   1 = Unable, Total/Dependent Assistance  2 = A lot, Maximum/Moderate Assistance  3 = A little, Minimum/Contact Guard/Supervision  4 = None, Modified [place]/Independent    Turning over in bed (including adjusting bedclothes, sheets and blankets)?: 3  Sitting down on and standing up from a chair with arms (e.g., wheelchair, bedside commode, etc.): 2  Moving from lying on back to sitting on the side of the bed?: 3  Moving to and from a bed to a chair (including a wheelchair)?: 1 (NT)  Need to walk in hospital room?: 1 (NT)  Climbing 3-5 steps with a railing?: 1 (NT)  Basic Mobility Total Score: 11    AM-PAC Raw Score CMS G-Code Modifier Level of Impairment Assistance   6 % Total / Unable   7 - 9 CM 80 - 100% Maximal Assist   10 - 14 CL 60 - 80% Moderate Assist   15 - 19 CK 40 - 60% Moderate Assist   20 - 22 CJ 20 - 40% Minimal Assist   23 CI 1-20% SBA / CGA   24 CH 0% Independent/ Mod I     Patient left sitting edge of bed (supported with cushions & pillows with call button in reach.    Assessment:  Niru Reyes is a 79 y.o. female with a medical diagnosis of Acute diastolic CHF (congestive heart failure) and presents with overall decline in functional mobility. Patient would continue to benefit from skilled PT to address functional limitations listed below in order to return to PLOF/decrease caregiver burden.     Rehab identified problem list/impairments: weakness, impaired endurance, impaired self care skills, impaired functional mobility, gait instability, impaired balance, decreased coordination, decreased upper extremity function, decreased lower extremity function, decreased safety awareness, pain, decreased ROM, impaired  cardiopulmonary response to activity    Rehab potential is fair.    Activity tolerance: Fair    Discharge recommendations: Moderate Intensity Therapy      Barriers to discharge:      Equipment recommendations: to be determined by next level of care     GOALS:   Multidisciplinary Problems       Physical Therapy Goals          Problem: Physical Therapy    Goal Priority Disciplines Outcome Goal Variances Interventions   Physical Therapy Goal     PT, PT/OT Progressing     Description: Goals to be met by 6/3/24.  1. Pt will complete bed mobility MOD I.  2. Pt will complete sit to stand MOD I.  3. Pt will transfer bed to chair MOD I.  4. Pt will increase AMPAC score by 2 points to progress functional mobility.                       PLAN:    Patient to be seen 3 x/week to address the above listed problems via gait training, therapeutic activities, therapeutic exercises  Plan of Care expires: 06/03/24  Plan of Care reviewed with: patient         05/22/2024

## 2024-05-22 NOTE — PT/OT/SLP PROGRESS
Occupational Therapy   Treatment    Name: Niru Reyes  MRN: [id number]  Admitting Diagnosis:  Acute diastolic CHF (congestive heart failure)       Recommendations:     Discharge Recommendations: Moderate Intensity Therapy  Discharge Equipment Recommendations:  to be determined by next level of care  Barriers to discharge:  Decreased caregiver support    Assessment:     Niru Reyes is a 79 y.o. female with a medical diagnosis of Acute diastolic CHF (congestive heart failure).  Performance deficits affecting function are weakness, gait instability, decreased upper extremity function, impaired balance, impaired endurance, impaired self care skills, impaired functional mobility, decreased safety awareness, pain, edema, decreased ROM, decreased lower extremity function, impaired cardiopulmonary response to activity.     Rehab Prognosis:  Good; patient would benefit from acute skilled OT services to address these deficits and reach maximum level of function.       Plan:     Patient to be seen 2 x/week to address the above listed problems via self-care/home management, therapeutic activities, therapeutic exercises  Plan of Care Expires: 06/03/24  Plan of Care Reviewed with: patient    Subjective     Chief Complaint: Soreness to chest  Patient/Family Comments/goals: Increase independence  Pain/Comfort:  Pain Rating 1: 5/10  Location - Orientation 1: generalized  Location 1: chest  Pain Addressed 1:  (activity pacing)  Pain Rating Post-Intervention 1: 5/10    Objective:     Communicated with: Nurse prior to session.  Patient found supine with telemetry, peripheral IV, oxygen, bed alarm, PureWick upon OT entry to room.    General Precautions: Standard, fall    Orthopedic Precautions:N/A  Braces: N/A  Respiratory Status: Nasal cannula, flow 4 L/min     Occupational Performance:     Bed Mobility:    Patient completed Rolling/Turning to Left with  stand by assistance  Patient completed Scooting/Bridging with stand by  assistance  Patient completed Supine to Sit with stand by assistance     Functional Mobility/Transfers:  Patient completed Sit <> Stand Transfer with maximal assistance and of 2 persons  with  bariatric RW   Functional Mobility: Unable     Activities of Daily Living:  Lower Body Dressing: dependence donning socks      [facility] 6 Click ADL: 14    Treatment & Education:  Pt stood x2 trials with bariatric RW, unable to sustain upright position due to LE instability/weakness. Pt returned to seated EOB position, with pillows/cushions for back support. Pt encouraged to increase upright position to increase functional endurance/strength needed for daily activities. Pt verbalized understanding. Pt encouraged to utilize call button for nursing assistance when ready to return to bed.     Patient left sitting edge of bed with all lines intact, call button in reach, and bed alarm on    GOALS:   Multidisciplinary Problems       Occupational Therapy Goals          Problem: Occupational Therapy    Goal Priority Disciplines Outcome Interventions   Occupational Therapy Goal     OT, PT/OT Progressing    Description: Goals to be met by: 6/3/24     Patient will increase functional independence with ADLs by performing:    UE Dressing with Set-up Assistance.  Grooming while bedside chair with Set-up Assistance.  Toileting from bedside commode with Set-up Assistance for hygiene and clothing management.   Toilet transfer to bedside commode with Contact Guard Assistance with RW.  Upper extremity exercise program x15 reps per handout, with independence.                         Time Tracking:     OT Date of Treatment: 05/22/24  OT Start Time: 0950  OT Stop Time: 1015  OT Total Time (min): 25 min    Billable Minutes:Therapeutic Activity 25    ZAC Cross  OT/JEFFY: OT          5/22/2024

## 2024-05-22 NOTE — ASSESSMENT & PLAN NOTE
Per patient and daughter, daughter goes to patient apartment at approximately 5:00 a.m. every day to assist patient getting out of bed to her chair, patient remains in her chair until the evening when daughter returns to assist her back to the bed.  Patient urinates and defecates in a diaper, reports typically unable to clean herself independently and will remain in the diaper until the daughter is able to return to assist her.  Patient is unable to independently walk, obtain meals or complete any ADLs independently.    --consult social Work  --PT/OT jose  Patient was recently in skilled nursing at this point request to go home with home health  After evaluation with [facility] hospice patient and daughter determined that her best course of action would be to home with hospice.

## 2024-05-22 NOTE — PROGRESS NOTES
[facility] ([facility] Medicine  Progress Note    Patient Name: Niru Reyes  MRN: [id number]  Patient Class: IP- Inpatient   Admission Date: 5/18/2024  Length of Stay: 4 days  Attending Physician: Felicita Ulloa MD  Primary Care Provider: Jordana Shirley MD        Subjective:     Principal Problem:Acute diastolic CHF (congestive heart failure)        HPI:  79 y.o. female patient, PMHx of HTN, CHF, morbid obesity and paroxysmal atrial fibrillation. Presented to the Emergency Department for evaluation of SOB which onset this morning at around 3 AM on day of arrival. When pt woke up at 3 AM, she states that her oxygen was at 68%, patient stated she did not want to awaken her family and waited to call her daughter until around 7-8 a.m.. SpO2 was in the 80s when her daughter arrived, called nurse triage line who instructed them to come to the ED. Symptoms are constant and moderate in severity. SOB worsens when laying flat. Associated sxs include wheezing and generalized weakness. Patient denies any fever, cough, CP, and all other sxs at this time. Pt is on LASIX. On 4/3/2024, pt was discharged from the hospital to [facility] ([facility] for skilled therapy. They tried to wean her off oxygen and on 5/1/2024, she was taken off of oxygen. Pt stopped using Neb and inhalers 2 weeks ago. Pt also has UTI and was placed on antibiotics.  On discharge SNF patient weighed 421 lb, in the ED patient weighed 456 lb, 35# weight gain in 16 days.  In the ED, vitals: 143/43, 97, 20, 98 F, 96% on 4 L NC.  Significant labs:  Bili: 1.2, BNP: 476, ABG: pH:  7.349, PO2: 60.  CXR:moderate amount of alveolar consolidation scattered throughout the inferior 2/3 of the right lung.  Right pleural effusion.  Treated with IV diuretic, IV antibiotics.  Patient is a full code.  Admitted to hospital medicine for management of acute on chronic CHF, acute respiratory failure.    Overview/Hospital Course:  Patient is a 79-year-old  female with a history of morbid obesity, PAF, hypertension, CHF who presented to the emergency room after recently being discharged from skilled nursing at [facility].  She noted that her O2 sats were in the 60s and 70s.  She complained of worsening shortness of breath, wheezing, generalized weakness.  Patient was weaned off of oxygen at skilled nursing as well as her nebs and inhalers.  We will while at skilled nursing her weight was 1 ex he was before 21 in emergency department her weight was 456.  She was noted to have a weight gain of 35 lb in 16 days.  Patient was admitted for acute on chronic hypoxic respiratory failure acute on chronic CHF.  She was treated with IV diuretics.  She was noted to have significant effusion in the right lung and underwent thoracentesis on 05/21 by Interventional Radiology.  Due to her multiple admissions and readmissions as well as her decreased functional ability informational visit from [facility] hospice was ordered.  After meeting with hospice patient daughter agreed to DNR and post was filled out with goals of comfort and it was determined that she would receive hospice care.    Interval History:  Patient seen and examined this morning with plans for DC home with hospice.  Discussed with daughter he was at the house receiving equipment.  Went to see patient again this evening prior to urgent she daughter feels she is too weak to be discharged home.  They would like stay in discharge tomorrow.  She has no specific complaints.    Review of Systems   Constitutional:  Positive for activity change and fatigue. Negative for fever.   Respiratory:  Negative for cough and shortness of breath.    Cardiovascular:  Negative for chest pain and leg swelling.   Gastrointestinal:  Negative for nausea and vomiting.   Neurological:  Positive for weakness.   All other systems reviewed and are negative.    Objective:     Vital Signs (Most Recent):  Temp: 97.9 °F (36.6 °C) (05/22/24 1730)  Pulse:  84 (05/22/24 1730)  Resp: 15 (05/22/24 1750)  BP: (!) 167/78 (05/22/24 1730)  SpO2: 98 % (05/22/24 1730) Vital Signs (24h Range):  Temp:  [97.5 °F (36.4 °C)-98.5 °F (36.9 °C)] 97.9 °F (36.6 °C)  Pulse:  [69-92] 84  Resp:  [15-18] 15  SpO2:  [93 %-98 %] 98 %  BP: (117-167)/(61-79) 167/78     Weight: (!) 182.7 kg (402 lb 12.5 oz)  Body mass index is 61.24 kg/m².    Intake/Output Summary (Last 24 hours) at 5/22/2024 1831  Last data filed at 5/22/2024 1750  Gross per 24 hour   Intake 480 ml   Output 2600 ml   Net -2120 ml         Physical Exam  Vitals reviewed.   Constitutional:       Appearance: Normal appearance.   HENT:      Head: Normocephalic and atraumatic.      Mouth/Throat:      Mouth: Mucous membranes are moist.      Pharynx: Oropharynx is clear.   Eyes:      Extraocular Movements: Extraocular movements intact.      Conjunctiva/sclera: Conjunctivae normal.   Cardiovascular:      Rate and Rhythm: Regular rhythm. Tachycardia present.      Pulses: Normal pulses.      Heart sounds: Normal heart sounds.   Pulmonary:      Effort: Pulmonary effort is normal.      Breath sounds: Normal breath sounds.   Abdominal:      General: Bowel sounds are normal.      Palpations: Abdomen is soft.   Musculoskeletal:         General: Normal range of motion.      Cervical back: Normal range of motion and neck supple.      Right lower leg: Edema present.      Left lower leg: Edema present.   Skin:     General: Skin is warm and dry.   Neurological:      General: No focal deficit present.      Mental Status: She is alert and oriented to person, place, and time. Mental status is at baseline.   Psychiatric:         Mood and Affect: Mood normal.         Behavior: Behavior normal.         Thought Content: Thought content normal.             Significant Labs: All pertinent labs within the past 24 hours have been reviewed.  CBC:   Recent Labs   Lab 05/21/24  0459   WBC 9.69   HGB 12.0   HCT 41.4        CMP:   Recent Labs   Lab  05/21/24  0459 05/21/24  0835     --    K 3.8  --    CL 91*  --    CO2 40*  --    * 112*   BUN 14  --    CREATININE 0.8  --    CALCIUM 8.8  --    PROT 6.8 6.1   ALBUMIN 3.3*  --    BILITOT 1.1*  --    ALKPHOS 85  --    AST 11  --    ALT 6*  --    ANIONGAP 10  --        Significant Imaging: I have reviewed all pertinent imaging results/findings within the past 24 hours.    Assessment/Plan:      * Acute diastolic CHF (congestive heart failure)  Patient is identified as having Diastolic (HFpEF) heart failure that is Acute on chronic. CHF is currently uncontrolled due to >3 pillow orthopnea, Rales/crackles on pulmonary exam, and Pulmonary edema/pleural effusion on CXR. Latest ECHO performed and demonstrates- Results for orders placed during the hospital encounter of 03/14/24    Echo    Interpretation Summary    Left Ventricle: The left ventricle is normal in size. Normal wall thickness. There is concentric remodeling. There is low normal systolic function with a visually estimated ejection fraction of 50 - 55%. There is normal diastolic function.    Right Ventricle: Normal right ventricular cavity size. Wall thickness is normal. Right ventricle wall motion  is normal. Systolic function is normal.    Left Atrium: Left atrium is moderately dilated.    Pulmonary Artery: The estimated pulmonary artery systolic pressure is 45 mmHg.    IVC/SVC: Normal venous pressure at 3 mmHg.  . Continue Beta Blocker and Furosemide and monitor clinical status closely. Monitor on telemetry. Patient is on CHF pathway.  Monitor strict Is&Os and daily weights.  Place on fluid restriction of 1.5 L. Cardiology has been consulted. Continue to stress to patient importance of self efficacy and  on diet for CHF. Last BNP reviewed- and noted below   Recent Labs   Lab 05/18/24  1313   *         Pneumonia  Patient has a diagnosis of pneumonia. The cause of the pneumonia is suspected to be bacterial in etiology but organism is  not known. The pneumonia is  new onset . The patient has the following signs/symptoms of pneumonia: persistent hypoxia , sputum production, and shortness of breath. The patient does have a current oxygen requirement and the patient does not have a home oxygen requirement. I have reviewed the pertinent imaging. The following cultures have been collected: Blood cultures The culture results are listed below.     Current antimicrobial regimen consists of the antibiotics listed below. Will monitor patient closely and continue current treatment plan unchanged.    Antibiotics (From admission, onward)      None            Microbiology Results (last 7 days)       Procedure Component Value Units Date/Time    AFB culture [[id number]] Collected: 05/21/24 1040    Order Status: Completed Specimen: Body Fluid from Pleural Fluid Updated: 05/22/24 1409     AFB CULTURE STAIN No acid fast bacilli seen.    Fungus culture [[id number]] Collected: 05/21/24 1041    Order Status: Completed Specimen: Respiratory from Pleural Fluid Updated: 05/22/24 1255     Fungus (Mycology) Culture Culture in progress    Blood culture #1 **CANNOT BE ORDERED STAT** [[id number]] Collected: 05/18/24 1633    Order Status: Completed Specimen: Blood from Peripheral, Upper Arm, Left Updated: 05/22/24 0612     Blood Culture, Routine No Growth to date      No Growth to date      No Growth to date      No Growth to date    Blood culture #2 **CANNOT BE ORDERED STAT** [[id number]] Collected: 05/18/24 1633    Order Status: Completed Specimen: Blood from Peripheral, Hand, Left Updated: 05/22/24 0612     Blood Culture, Routine No Growth to date      No Growth to date      No Growth to date      No Growth to date    Culture, body fluid - Bactec [[id number]] Collected: 05/21/24 1040    Order Status: Completed Specimen: Body Fluid from Thoracentesis Fluid Updated: 05/22/24 0515     Body Fluid Culture, Sterile No Growth to date    AFB stain [[id number]] Collected: 05/21/24  1040    Order Status: Completed Specimen: Body Fluid from Pleural Fluid Updated: 05/22/24 0407     Direct Acid Fast No acid fast bacilli seen.    KOH prep [[id number]] Collected: 05/21/24 1040    Order Status: Completed Specimen: Body Fluid from Pleural Fluid Updated: 05/22/24 0223     KOH Prep No yeast or fungal elements seen    Gram stain [[id number]] Collected: 05/21/24 1040    Order Status: Completed Specimen: Body Fluid from Pleural Fluid Updated: 05/21/24 2354     Gram Stain Result Rare WBC's      No organisms seen            Difficulty with activities of daily living  Per patient and daughter, daughter goes to patient apartment at approximately 5:00 a.m. every day to assist patient getting out of bed to her chair, patient remains in her chair until the evening when daughter returns to assist her back to the bed.  Patient urinates and defecates in a diaper, reports typically unable to clean herself independently and will remain in the diaper until the daughter is able to return to assist her.  Patient is unable to independently walk, obtain meals or complete any ADLs independently.    --consult social Work  --PT/OT eval  Patient was recently in skilled nursing at this point request to go home with home health  After evaluation with [facility] hospice patient and daughter determined that her best course of action would be to home with hospice.        Pleural effusion  Patient found to have moderate pleural effusion on imaging. I have not personally reviewed and interpreted the following imaging: Xray. A thoracentesis was deferred. Most likely etiology includes Congestive Heart Failure. Management to include Diuresis      Debility  Patient with Acute on chronic debility due to bed confinement status and chronic unspecified fatigue. Latest AMPAC and GEMS scores have not been reviewed. Evaluation for etiology is underway. Plan includes progressive mobility protocol initated and PT/OT consulted.    Morbid  obesity  Body mass index is 61.24 kg/m². Morbid obesity complicates all aspects of disease management from diagnostic modalities to treatment. Weight loss encouraged and health benefits explained to patient.         Atrial fibrillation with RVR  Patient with Persistent (7 days or more) atrial fibrillation which is controlled currently with Beta Blocker. Patient is currently in atrial fibrillation.[id number] Score: 4. HASBLED Score: . Anticoagulation indicated. Anticoagulation done with Eliquis .-Eliquis on hold due to possible procedure    Acute hypoxemic respiratory failure  Patient with Hypoxic Respiratory failure which is Acute.  she is not on home oxygen. Supplemental oxygen was provided and noted-      .   Signs/symptoms of respiratory failure include- tachypnea, increased work of breathing, wheezing, and lethargy. Contributing diagnoses includes - CHF and Pneumonia Labs and images were reviewed. Patient Has recent ABG, which has been reviewed. Will treat underlying causes and adjust management of respiratory failure as follows-     --IV diuresis  --supplemental O2 to maintain SpO2> 90%- currently on 2 L  --CT chest i revealed large right-sided pleural effusion and patient underwent thoracentesis on 05/21    OA (osteoarthritis) of knee          VTE Risk Mitigation (From admission, onward)           Ordered     IP VTE HIGH RISK PATIENT  Once         05/18/24 1636     Place sequential compression device  Until discontinued         05/18/24 1636     Reason for No Pharmacological VTE Prophylaxis  Once        Question:  Reasons:  Answer:  Already adequately anticoagulated on oral Anticoagulants    05/18/24 1636                    Discharge Planning   JERONIMO: 5/22/2024     Code Status: DNR   Is the patient medically ready for discharge?:     Reason for patient still in hospital (select all that apply): Patient trending condition  Discharge Plan A: Home Health   Discharge Delays: None known at this  time              Felicita Cristobal MD  Department of Hospital Medicine   '[place] - Telemetry ([facility])

## 2024-05-22 NOTE — ASSESSMENT & PLAN NOTE
Patient with Persistent (7 days or more) atrial fibrillation which is controlled currently with Beta Blocker. Patient is currently in atrial fibrillation.[id number] Score: 4. HASBLED Score: . Anticoagulation indicated. Anticoagulation done with Eliquis .-Eliquis on hold due to possible procedure

## 2024-05-22 NOTE — ASSESSMENT & PLAN NOTE
Patient with Hypoxic Respiratory failure which is Acute.  she is not on home oxygen. Supplemental oxygen was provided and noted-      .   Signs/symptoms of respiratory failure include- tachypnea, increased work of breathing, wheezing, and lethargy. Contributing diagnoses includes - CHF and Pneumonia Labs and images were reviewed. Patient Has recent ABG, which has been reviewed. Will treat underlying causes and adjust management of respiratory failure as follows-     --IV diuresis  --supplemental O2 to maintain SpO2> 90%- currently on 2 L  --CT chest i revealed large right-sided pleural effusion and patient underwent thoracentesis on 05/21

## 2024-05-22 NOTE — SUBJECTIVE & OBJECTIVE
Interval History:  Patient seen and examined this morning with plans for DC home with hospice.  Discussed with daughter he was at the house receiving equipment.  Went to see patient again this evening prior to urgent she daughter feels she is too weak to be discharged home.  They would like stay in discharge tomorrow.  She has no specific complaints.    Review of Systems   Constitutional:  Positive for activity change and fatigue. Negative for fever.   Respiratory:  Negative for cough and shortness of breath.    Cardiovascular:  Negative for chest pain and leg swelling.   Gastrointestinal:  Negative for nausea and vomiting.   Neurological:  Positive for weakness.   All other systems reviewed and are negative.    Objective:     Vital Signs (Most Recent):  Temp: 97.9 °F (36.6 °C) (05/22/24 1730)  Pulse: 84 (05/22/24 1730)  Resp: 15 (05/22/24 1750)  BP: (!) 167/78 (05/22/24 1730)  SpO2: 98 % (05/22/24 1730) Vital Signs (24h Range):  Temp:  [97.5 °F (36.4 °C)-98.5 °F (36.9 °C)] 97.9 °F (36.6 °C)  Pulse:  [69-92] 84  Resp:  [15-18] 15  SpO2:  [93 %-98 %] 98 %  BP: (117-167)/(61-79) 167/78     Weight: (!) 182.7 kg (402 lb 12.5 oz)  Body mass index is 61.24 kg/m².    Intake/Output Summary (Last 24 hours) at 5/22/2024 1831  Last data filed at 5/22/2024 1750  Gross per 24 hour   Intake 480 ml   Output 2600 ml   Net -2120 ml         Physical Exam  Vitals reviewed.   Constitutional:       Appearance: Normal appearance.   HENT:      Head: Normocephalic and atraumatic.      Mouth/Throat:      Mouth: Mucous membranes are moist.      Pharynx: Oropharynx is clear.   Eyes:      Extraocular Movements: Extraocular movements intact.      Conjunctiva/sclera: Conjunctivae normal.   Cardiovascular:      Rate and Rhythm: Regular rhythm. Tachycardia present.      Pulses: Normal pulses.      Heart sounds: Normal heart sounds.   Pulmonary:      Effort: Pulmonary effort is normal.      Breath sounds: Normal breath sounds.   Abdominal:       General: Bowel sounds are normal.      Palpations: Abdomen is soft.   Musculoskeletal:         General: Normal range of motion.      Cervical back: Normal range of motion and neck supple.      Right lower leg: Edema present.      Left lower leg: Edema present.   Skin:     General: Skin is warm and dry.   Neurological:      General: No focal deficit present.      Mental Status: She is alert and oriented to person, place, and time. Mental status is at baseline.   Psychiatric:         Mood and Affect: Mood normal.         Behavior: Behavior normal.         Thought Content: Thought content normal.             Significant Labs: All pertinent labs within the past 24 hours have been reviewed.  CBC:   Recent Labs   Lab 05/21/24 0459   WBC 9.69   HGB 12.0   HCT 41.4        CMP:   Recent Labs   Lab 05/21/24  0459 05/21/24  0835     --    K 3.8  --    CL 91*  --    CO2 40*  --    * 112*   BUN 14  --    CREATININE 0.8  --    CALCIUM 8.8  --    PROT 6.8 6.1   ALBUMIN 3.3*  --    BILITOT 1.1*  --    ALKPHOS 85  --    AST 11  --    ALT 6*  --    ANIONGAP 10  --        Significant Imaging: I have reviewed all pertinent imaging results/findings within the past 24 hours.

## 2024-05-22 NOTE — PLAN OF CARE
JINA received a call from Patient's daughter, Mary, inquiring about time line and expected discharge for Patient. JINA stated that per Attending, Patient was expected to DC home today, once the equipment was delivered from [facility]. JINA stated that she was told equipment would be delivered for 5:30 pm. Patient's daughter stated she was going to be able to leave work early today and be able to have equipment delivered to the Patient's home for 3:30 pm. JINA stated she would reach out to [facility], with [facility], to see if the equipment could be moved up.    JINA reached out to [facility], with [facility], who stated he would let DME company know equipment could be delivered for 3:30 and delivery would be within the 3:30 - 4:30 window.     JINA reached back out to Patient's daughter regarding DME delivery. JINA stated equipment would be delivered between 3:30 - 4:30 pm. Patient's daughter verbalized understanding. Patient's daughter inquired about if patient would have oxygen to discharge home with. JINA stated she would reach out to [facility] to make sure a portable oxygen tank was delivered to bedside. Patient's daughter verbalized understanding. JINA requested Patient's daughter give SW a call upon equipment being delivered to Patient's home and we can begin the discharge of the Patient and we can get them home quicker. Patient's daughter verbalized understanding.    JINA reached out to [facility], to request a portable oxygen tank be delivered to Patient's bedside. [facility] stated he would make sure a portable oxygen tank was delivered.     14 49 Patient's daughter called JINA letting her know she is on the way home to received DME at home. Per Dylan, with [facility], DME will be arriving to Patient's home around 4:30 pm. JINA requested patient's daughter call SW back once equipment was delivered so we can complete Patient's DC. Patient's daughter verbalized understanding.     15 18 JINA contacted Patient's  daughter, letting her know equipment might be delivered closer to 4:30 pm. Patient's daughter stated she was at home and called to inquire about the equipment. SW stated the window for delivery was 3:30 - 4:30 pm. Patient's daughter verbalized understanding and inquired about discharge today. SW stated discharge would be today home with [facility] services.     SW will continue to follow and assist as needed.

## 2024-05-22 NOTE — PLAN OF CARE
Bed mobility SBA. Sit to stand with bariatric RW x2 trials max A x2. Pt left seated EOB with back support.  Rec moderate intensity therapy at PA

## 2024-05-22 NOTE — PLAN OF CARE
[facility] - Telemetry (Hospital)  Discharge Final Note    Primary Care Provider: Jordana Shirley MD    Expected Discharge Date: 5/22/2024    Final Discharge Note (most recent)       Final Note - 05/22/24 1520          Final Note    Assessment Type Final Discharge Note     Anticipated Discharge Disposition Hospice/Home        Post-Acute Status    Post-Acute Authorization Hospice     Hospice Status Set-up Complete/Auth obtained     Coverage Medicare A & B     Discharge Delays None known at this time                     Important Message from Medicare             DC Disposition: Home Hospice, with [facility]  Family Notified: patient's daughter over the phone  Transportation: personal transportation    Patient and daughter were agreeable to setting up [facility] services upon discharge. Per Dylan, with [facility], equipment is scheduled to be delivered between 3:30 - 4:30 pm. Patient's daughter will come  Patient, once equipment is delivered and set up.

## 2024-05-22 NOTE — PLAN OF CARE
ongoing (interventions implemented as appropriate)  pt axo4 at times VSS  pt able to make needs known  pt remained afebrile throughout this shift  pt remained free of falls this shift  pt denies pain this shift  plan of care reviewed pt verbalizes understanding  Pt moving/turning independent. Frequent weight shifting encouraged  Pt afib on monitor  Bed low, side rails up x 2, wheels locked,call light in reach  Hourly rounding completed  Will continue to observe

## 2024-05-22 NOTE — ASSESSMENT & PLAN NOTE
Patient has a diagnosis of pneumonia. The cause of the pneumonia is suspected to be bacterial in etiology but organism is not known. The pneumonia is  new onset . The patient has the following signs/symptoms of pneumonia: persistent hypoxia , sputum production, and shortness of breath. The patient does have a current oxygen requirement and the patient does not have a home oxygen requirement. I have reviewed the pertinent imaging. The following cultures have been collected: Blood cultures The culture results are listed below.     Current antimicrobial regimen consists of the antibiotics listed below. Will monitor patient closely and continue current treatment plan unchanged.    Antibiotics (From admission, onward)    None          Microbiology Results (last 7 days)     Procedure Component Value Units Date/Time    AFB culture [[id number]] Collected: 05/21/24 1040    Order Status: Completed Specimen: Body Fluid from Pleural Fluid Updated: 05/22/24 1409     AFB CULTURE STAIN No acid fast bacilli seen.    Fungus culture [[id number]] Collected: 05/21/24 1041    Order Status: Completed Specimen: Respiratory from Pleural Fluid Updated: 05/22/24 1255     Fungus (Mycology) Culture Culture in progress    Blood culture #1 **CANNOT BE ORDERED STAT** [[id number]] Collected: 05/18/24 1633    Order Status: Completed Specimen: Blood from Peripheral, Upper Arm, Left Updated: 05/22/24 0612     Blood Culture, Routine No Growth to date      No Growth to date      No Growth to date      No Growth to date    Blood culture #2 **CANNOT BE ORDERED STAT** [[id number]] Collected: 05/18/24 1633    Order Status: Completed Specimen: Blood from Peripheral, Hand, Left Updated: 05/22/24 0612     Blood Culture, Routine No Growth to date      No Growth to date      No Growth to date      No Growth to date    Culture, body fluid - Bactec [[id number]] Collected: 05/21/24 1040    Order Status: Completed Specimen: Body Fluid from Thoracentesis Fluid  Updated: 05/22/24 0515     Body Fluid Culture, Sterile No Growth to date    AFB stain [[id number]] Collected: 05/21/24 1040    Order Status: Completed Specimen: Body Fluid from Pleural Fluid Updated: 05/22/24 0407     Direct Acid Fast No acid fast bacilli seen.    KOH prep [[id number]] Collected: 05/21/24 1040    Order Status: Completed Specimen: Body Fluid from Pleural Fluid Updated: 05/22/24 0223     KOH Prep No yeast or fungal elements seen    Gram stain [[id number]] Collected: 05/21/24 1040    Order Status: Completed Specimen: Body Fluid from Pleural Fluid Updated: 05/21/24 2354     Gram Stain Result Rare WBC's      No organisms seen

## 2024-05-23 VITALS
HEIGHT: 68 IN | DIASTOLIC BLOOD PRESSURE: 54 MMHG | BODY MASS INDEX: 44.41 KG/M2 | OXYGEN SATURATION: 97 % | TEMPERATURE: 98 F | HEART RATE: 74 BPM | RESPIRATION RATE: 18 BRPM | WEIGHT: 293 LBS | SYSTOLIC BLOOD PRESSURE: 108 MMHG

## 2024-05-23 LAB
ACID FAST MOD KINY STN SPEC: NORMAL
ALBUMIN SERPL BCP-MCNC: 3 G/DL (ref 3.5–5.2)
ANION GAP SERPL CALC-SCNC: 9 MMOL/L (ref 8–16)
BASOPHILS # BLD AUTO: 0.05 K/UL (ref 0–0.2)
BASOPHILS NFR BLD: 0.7 % (ref 0–1.9)
BUN SERPL-MCNC: 17 MG/DL (ref 8–23)
CALCIUM SERPL-MCNC: 8.3 MG/DL (ref 8.7–10.5)
CHLORIDE SERPL-SCNC: 99 MMOL/L (ref 95–110)
CO2 SERPL-SCNC: 30 MMOL/L (ref 23–29)
CREAT SERPL-MCNC: 0.8 MG/DL (ref 0.5–1.4)
DIFFERENTIAL METHOD BLD: ABNORMAL
EOSINOPHIL # BLD AUTO: 0.3 K/UL (ref 0–0.5)
EOSINOPHIL NFR BLD: 3.5 % (ref 0–8)
ERYTHROCYTE [DISTWIDTH] IN BLOOD BY AUTOMATED COUNT: 15.9 % (ref 11.5–14.5)
EST. GFR  (NO RACE VARIABLE): >60 ML/MIN/1.73 M^2
GLUCOSE SERPL-MCNC: 103 MG/DL (ref 70–110)
HCT VFR BLD AUTO: 41 % (ref 37–48.5)
HGB BLD-MCNC: 11.9 G/DL (ref 12–16)
IMM GRANULOCYTES # BLD AUTO: 0.04 K/UL (ref 0–0.04)
IMM GRANULOCYTES NFR BLD AUTO: 0.5 % (ref 0–0.5)
LYMPHOCYTES # BLD AUTO: 0.9 K/UL (ref 1–4.8)
LYMPHOCYTES NFR BLD: 12.6 % (ref 18–48)
MCH RBC QN AUTO: 25.6 PG (ref 27–31)
MCHC RBC AUTO-ENTMCNC: 29 G/DL (ref 32–36)
MCV RBC AUTO: 88 FL (ref 82–98)
MONOCYTES # BLD AUTO: 0.6 K/UL (ref 0.3–1)
MONOCYTES NFR BLD: 8.2 % (ref 4–15)
MYCOBACTERIUM SPEC QL CULT: NORMAL
NEUTROPHILS # BLD AUTO: 5.6 K/UL (ref 1.8–7.7)
NEUTROPHILS NFR BLD: 74.5 % (ref 38–73)
NRBC BLD-RTO: 0 /100 WBC
PHOSPHATE SERPL-MCNC: 3.8 MG/DL (ref 2.7–4.5)
PLATELET # BLD AUTO: 177 K/UL (ref 150–450)
PMV BLD AUTO: 11.4 FL (ref 9.2–12.9)
POTASSIUM SERPL-SCNC: 4.5 MMOL/L (ref 3.5–5.1)
RBC # BLD AUTO: 4.65 M/UL (ref 4–5.4)
SODIUM SERPL-SCNC: 138 MMOL/L (ref 136–145)
WBC # BLD AUTO: 7.47 K/UL (ref 3.9–12.7)

## 2024-05-23 PROCEDURE — 85025 COMPLETE CBC W/AUTO DIFF WBC: CPT | Performed by: INTERNAL MEDICINE

## 2024-05-23 PROCEDURE — 80069 RENAL FUNCTION PANEL: CPT | Performed by: INTERNAL MEDICINE

## 2024-05-23 PROCEDURE — 25000003 PHARM REV CODE 250: Performed by: INTERNAL MEDICINE

## 2024-05-23 PROCEDURE — 27000221 HC OXYGEN, UP TO 24 HOURS

## 2024-05-23 PROCEDURE — 36415 COLL VENOUS BLD VENIPUNCTURE: CPT | Performed by: INTERNAL MEDICINE

## 2024-05-23 PROCEDURE — 94761 N-INVAS EAR/PLS OXIMETRY MLT: CPT

## 2024-05-23 RX ORDER — POLYETHYLENE GLYCOL 3350 17 G/17G
17 POWDER, FOR SOLUTION ORAL DAILY PRN
Status: DISCONTINUED | OUTPATIENT
Start: 2024-05-23 | End: 2024-05-23 | Stop reason: HOSPADM

## 2024-05-23 RX ORDER — AMOXICILLIN 250 MG
1 CAPSULE ORAL DAILY PRN
Status: DISCONTINUED | OUTPATIENT
Start: 2024-05-23 | End: 2024-05-23 | Stop reason: HOSPADM

## 2024-05-23 RX ORDER — FUROSEMIDE 80 MG/1
80 TABLET ORAL 2 TIMES DAILY
Qty: 60 TABLET | Refills: 0 | Status: SHIPPED | OUTPATIENT
Start: 2024-05-23 | End: 2024-06-22

## 2024-05-23 RX ADMIN — METOPROLOL TARTRATE 50 MG: 50 TABLET, FILM COATED ORAL at 08:05

## 2024-05-23 RX ADMIN — FUROSEMIDE 80 MG: 80 TABLET ORAL at 08:05

## 2024-05-23 RX ADMIN — MICONAZOLE NITRATE: 20 POWDER TOPICAL at 08:05

## 2024-05-23 NOTE — PLAN OF CARE
ongoing (interventions implemented as appropriate)  pt axo4 VSS  pt able to make needs known  pt remained afebrile throughout this shift  pt remained free of falls this shift  pt denies pain this shift  plan of care reviewed pt verbalizes understanding  Pt moving/turning independent. Frequent weight shifting encouraged  Pt afib on monitor  Bed low, side rails up x 2, wheels locked,call light in reach  Hourly rounding completed  Will continue to observe

## 2024-05-23 NOTE — PLAN OF CARE
SW meet with Patient at beside, upon request. Patient stated she did not request SW, she wished for bedside nurse. SW stated DC was planned for today and inquired about if daughter was going to come get Patient upon DC. Patient stated her daughter was on her way from [place] to  Patient and she was ready for discharge today.     SW will continue to follow and assist as needed.

## 2024-05-24 ENCOUNTER — TELEPHONE (OUTPATIENT)
Dept: FAMILY MEDICINE | Facility: CLINIC | Age: 79
End: 2024-05-24
Payer: MEDICARE

## 2024-05-24 ENCOUNTER — PATIENT MESSAGE (OUTPATIENT)
Dept: PULMONOLOGY | Facility: CLINIC | Age: 79
End: 2024-05-24
Payer: MEDICARE

## 2024-05-24 LAB
BACTERIA BLD CULT: NORMAL
BACTERIA BLD CULT: NORMAL

## 2024-05-24 NOTE — TELEPHONE ENCOUNTER
Spoke to pt's daughter, Mary, she states she was returning a call but informed her that we did not call today. She also wanted to cancel her appt for next week since she is getting into Hospice. Appointment was canceled.

## 2024-05-24 NOTE — TELEPHONE ENCOUNTER
----- Message from Doris Montoya sent at 5/24/2024 12:40 PM CDT -----  Contact: self  Type:  Patient Returning Call    Who Called: Niru Reyes  Who Left Message for Patient:unsure  Does the patient know what this is regarding?:unsure  Would the patient rather a call back or a response via [facility]? Call back  Best Call Back Number:[phone]  Additional Information: n/a

## 2024-05-26 LAB — BACTERIA FLD CULT: NORMAL

## 2024-05-26 NOTE — ASSESSMENT & PLAN NOTE
Patient is identified as having Diastolic (HFpEF) heart failure that is Acute on chronic. CHF is currently uncontrolled due to >3 pillow orthopnea, Rales/crackles on pulmonary exam, and Pulmonary edema/pleural effusion on CXR. Latest ECHO performed and demonstrates- Results for orders placed during the hospital encounter of 03/14/24    Echo    Interpretation Summary    Left Ventricle: The left ventricle is normal in size. Normal wall thickness. There is concentric remodeling. There is low normal systolic function with a visually estimated ejection fraction of 50 - 55%. There is normal diastolic function.    Right Ventricle: Normal right ventricular cavity size. Wall thickness is normal. Right ventricle wall motion  is normal. Systolic function is normal.    Left Atrium: Left atrium is moderately dilated.    Pulmonary Artery: The estimated pulmonary artery systolic pressure is 45 mmHg.    IVC/SVC: Normal venous pressure at 3 mmHg.  . Continue Beta Blocker and Furosemide and monitor clinical status closely. Monitor on telemetry. Patient is on CHF pathway.  Monitor strict Is&Os and daily weights.  Place on fluid restriction of 1.5 L. Cardiology has been consulted. Continue to stress to patient importance of self efficacy and  on diet for CHF. Last BNP reviewed- and noted below

## 2024-05-26 NOTE — ASSESSMENT & PLAN NOTE
Per patient and daughter, daughter goes to patient apartment at approximately 5:00 a.m. every day to assist patient getting out of bed to her chair, patient remains in her chair until the evening when daughter returns to assist her back to the bed.  Patient urinates and defecates in a diaper, reports typically unable to clean herself independently and will remain in the diaper until the daughter is able to return to assist her.  Patient is unable to independently walk, obtain meals or complete any ADLs independently.    --consult social Work  --PT/OT jose  Patient was recently in skilled nursing at this point request to go home with home health  After evaluation with [facility] hospice patient and daughter determined that her best course of action would be to home with hospice.

## 2024-05-26 NOTE — DISCHARGE SUMMARY
[facility] - Telemetry ([facility]  Discharge Summary      Patient Name: Niru Reyes  MRN: [id number]  FRANCIE: [id number]  Patient Class: IP- Inpatient  Admission Date: 5/18/2024  Hospital Length of Stay: 5 days  Discharge Date and Time: 5/23/2024  1:43 PM  Attending Physician: No att. providers found   Discharging Provider: Felicita Cristobal MD  Primary Care Provider: Jordana Shirley MD    Primary Care Team: [facility]    HPI:   79 y.o. female patient, PMHx of HTN, CHF, morbid obesity and paroxysmal atrial fibrillation. Presented to the Emergency Department for evaluation of SOB which onset this morning at around 3 AM on day of arrival. When pt woke up at 3 AM, she states that her oxygen was at 68%, patient stated she did not want to awaken her family and waited to call her daughter until around 7-8 a.m.. SpO2 was in the 80s when her daughter arrived, called nurse triage line who instructed them to come to the ED. Symptoms are constant and moderate in severity. SOB worsens when laying flat. Associated sxs include wheezing and generalized weakness. Patient denies any fever, cough, CP, and all other sxs at this time. Pt is on LASIX. On 4/3/2024, pt was discharged from the hospital to [facility] for skilled therapy. They tried to wean her off oxygen and on 5/1/2024, she was taken off of oxygen. Pt stopped using Neb and inhalers 2 weeks ago. Pt also has UTI and was placed on antibiotics.  On discharge SNF patient weighed 421 lb, in the ED patient weighed 456 lb, 35# weight gain in 16 days.  In the ED, vitals: 143/43, 97, 20, 98 F, 96% on 4 L NC.  Significant labs:  Bili: 1.2, BNP: 476, ABG: pH:  7.349, PO2: 60.  CXR:moderate amount of alveolar consolidation scattered throughout the inferior 2/3 of the right lung.  Right pleural effusion.  Treated with IV diuretic, IV antibiotics.  Patient is a full code.  Admitted to hospital medicine for management of acute on chronic CHF, acute  respiratory failure.    * No surgery found *      Hospital Course:   Patient is a 79-year-old female with a history of morbid obesity, PAF, hypertension, CHF who presented to the emergency room after recently being discharged from skilled nursing at [facility].  She noted that her O2 sats were in the 60s and 70s.  She complained of worsening shortness of breath, wheezing, generalized weakness.  Patient was weaned off of oxygen at skilled nursing as well as her nebs and inhalers.  We will while at skilled nursing her weight was 1 ex he was before 21 in emergency department her weight was 456.  She was noted to have a weight gain of 35 lb in 16 days.  Patient was admitted for acute on chronic hypoxic respiratory failure acute on chronic CHF.  She was treated with IV diuretics.  She was noted to have significant effusion in the right lung and underwent thoracentesis on 05/21 by Interventional Radiology.  Due to her multiple admissions and readmissions as well as her decreased functional ability informational visit from [facility] hospice was ordered.  After meeting with hospice patient daughter agreed to DNR and post was filled out with goals of comfort and it was determined that she would receive hospice care.    Patient was seen and examined on day of discharge and stable to discharge home with hospice care.  Discussed with daughter and questions answered.  Patient will continue with her diuretics.     Goals of Care Treatment Preferences:  Code Status: DNR      Consults:   Consults (From admission, onward)          Status Ordering Provider     Inpatient consult to Social Work  Once        Provider:  (Not yet assigned)    Completed ANNE LLOYD.     Inpatient consult to Social Work/Case Management  Once        Provider:  (Not yet assigned)    Completed ANNE LLOYD.     Inpatient consult to Registered Dietitian/Nutritionist  Once        Provider:  (Not yet assigned)    Completed ANNE LLOYD.     Inpatient  consult to Cardiology  Once        Provider:  Cristiano Bah MD    Completed PREMA APRIL J.            Pulmonary  Pneumonia  Patient has a diagnosis of pneumonia. The cause of the pneumonia is suspected to be bacterial in etiology but organism is not known. The pneumonia is  new onset . The patient has the following signs/symptoms of pneumonia: persistent hypoxia , sputum production, and shortness of breath. The patient does have a current oxygen requirement and the patient does not have a home oxygen requirement. I have reviewed the pertinent imaging. The following cultures have been collected: Blood cultures The culture results are listed below.     Current antimicrobial regimen consists of the antibiotics listed below. Will monitor patient closely and continue current treatment plan unchanged.    Antibiotics (From admission, onward)      None            Microbiology Results (last 7 days)       Procedure Component Value Units Date/Time    AFB culture [[id number]] Collected: 05/21/24 1040    Order Status: Completed Specimen: Body Fluid from Pleural Fluid Updated: 05/23/24 0927     AFB Culture & Smear Culture in progress     AFB CULTURE STAIN No acid fast bacilli seen.    Fungus culture [[id number]] Collected: 05/21/24 1041    Order Status: Completed Specimen: Respiratory from Pleural Fluid Updated: 05/22/24 1255     Fungus (Mycology) Culture Culture in progress    AFB stain [[id number]] Collected: 05/21/24 1040    Order Status: Completed Specimen: Body Fluid from Pleural Fluid Updated: 05/22/24 0407     Direct Acid Fast No acid fast bacilli seen.    KOH prep [[id number]] Collected: 05/21/24 1040    Order Status: Completed Specimen: Body Fluid from Pleural Fluid Updated: 05/22/24 0223     KOH Prep No yeast or fungal elements seen    Gram stain [[id number]] Collected: 05/21/24 1040    Order Status: Completed Specimen: Body Fluid from Pleural Fluid Updated: 05/21/24 2354     Gram Stain Result Rare WBC's      No  organisms seen            Pleural effusion  Patient found to have moderate pleural effusion on imaging. I have not personally reviewed and interpreted the following imaging: Xray. A thoracentesis was deferred. Most likely etiology includes Congestive Heart Failure. Management to include Diuresis      Acute hypoxemic respiratory failure  Patient with Hypoxic Respiratory failure which is Acute.  she is not on home oxygen. Supplemental oxygen was provided and noted-      .   Signs/symptoms of respiratory failure include- tachypnea, increased work of breathing, wheezing, and lethargy. Contributing diagnoses includes - CHF and Pneumonia Labs and images were reviewed. Patient Has recent ABG, which has been reviewed. Will treat underlying causes and adjust management of respiratory failure as follows-     --IV diuresis  --supplemental O2 to maintain SpO2> 90%- currently on 2 L  --CT chest i revealed large right-sided pleural effusion and patient underwent thoracentesis on 05/21    Cardiac/Vascular  * Acute diastolic CHF (congestive heart failure)  Patient is identified as having Diastolic (HFpEF) heart failure that is Acute on chronic. CHF is currently uncontrolled due to >3 pillow orthopnea, Rales/crackles on pulmonary exam, and Pulmonary edema/pleural effusion on CXR. Latest ECHO performed and demonstrates- Results for orders placed during the hospital encounter of 03/14/24    Echo    Interpretation Summary    Left Ventricle: The left ventricle is normal in size. Normal wall thickness. There is concentric remodeling. There is low normal systolic function with a visually estimated ejection fraction of 50 - 55%. There is normal diastolic function.    Right Ventricle: Normal right ventricular cavity size. Wall thickness is normal. Right ventricle wall motion  is normal. Systolic function is normal.    Left Atrium: Left atrium is moderately dilated.    Pulmonary Artery: The estimated pulmonary artery systolic pressure is 45  mmHg.    IVC/SVC: Normal venous pressure at 3 mmHg.  . Continue Beta Blocker and Furosemide and monitor clinical status closely. Monitor on telemetry. Patient is on CHF pathway.  Monitor strict Is&Os and daily weights.  Place on fluid restriction of 1.5 L. Cardiology has been consulted. Continue to stress to patient importance of self efficacy and  on diet for CHF. Last BNP reviewed- and noted below         Atrial fibrillation with RVR  Patient with Persistent (7 days or more) atrial fibrillation which is controlled currently with Beta Blocker. Patient is currently in atrial fibrillation.[id number] Score: 4. HASBLED Score: . Anticoagulation indicated. Anticoagulation done with Eliquis .-Eliquis on hold due to possible procedure    Endocrine  Morbid obesity  Body mass index is 61.24 kg/m². Morbid obesity complicates all aspects of disease management from diagnostic modalities to treatment. Weight loss encouraged and health benefits explained to patient.         Orthopedic  OA (osteoarthritis) of knee        Other  Difficulty with activities of daily living  Per patient and daughter, daughter goes to patient apartment at approximately 5:00 a.m. every day to assist patient getting out of bed to her chair, patient remains in her chair until the evening when daughter returns to assist her back to the bed.  Patient urinates and defecates in a diaper, reports typically unable to clean herself independently and will remain in the diaper until the daughter is able to return to assist her.  Patient is unable to independently walk, obtain meals or complete any ADLs independently.    --consult social Work  --PT/OT jose  Patient was recently in skilled nursing at this point request to go home with home health  After evaluation with [facility] hospice patient and daughter determined that her best course of action would be to home with hospice.        Debility  Patient with Acute on chronic debility due to bed confinement  status and chronic unspecified fatigue. Latest AMPAC and GEMS scores have not been reviewed. Evaluation for etiology is underway. Plan includes progressive mobility protocol initated and PT/OT consulted.      Final Active Diagnoses:    Diagnosis Date Noted POA    PRINCIPAL PROBLEM:  Acute diastolic CHF (congestive heart failure) [I50.31] 03/14/2024 Yes    Pleural effusion [J90] 05/18/2024 Yes    Difficulty with activities of daily living [Z78.9] 05/18/2024 Yes    Pneumonia [J18.9] 05/18/2024 Yes    Debility [R53.81] 03/31/2024 Yes    Morbid obesity [E66.01] 03/14/2024 Yes    Acute hypoxemic respiratory failure [J96.01] 03/14/2024 Yes    Atrial fibrillation with RVR [I48.91] 03/14/2024 Yes    OA (osteoarthritis) of knee [M17.9] 12/04/2014 Yes      Problems Resolved During this Admission:       Discharged Condition: fair    Disposition: Hospice/Home    Follow Up:    Patient Instructions:      Diet Cardiac     Call MD for:  temperature >100.4     Call MD for:  persistent nausea and vomiting or diarrhea     Call MD for:  severe uncontrolled pain     Call MD for:  difficulty breathing or increased cough     Call MD for:  severe persistent headache     Call MD for:  worsening rash     Call MD for:  persistent dizziness, light-headedness, or visual disturbances     Call MD for:  increased confusion or weakness     Activity as tolerated       36  Pending Diagnostic Studies:       None           Medications:  Reconciled Home Medications:      Medication List        CHANGE how you take these medications      furosemide 80 MG tablet  Commonly known as: LASIX  Take 1 tablet (80 mg total) by mouth 2 (two) times daily.  What changed:   medication strength  how much to take  when to take this            CONTINUE taking these medications      albuterol 90 mcg/actuation inhaler  Commonly known as: PROVENTIL/VENTOLIN HFA  Inhale 2 puffs into the lungs every 6 (six) hours. Rescue     amLODIPine 5 MG tablet  Commonly known as:  NORVASC  Take 1 tablet (5 mg total) by mouth once daily.     apixaban 5 mg Tab  Commonly known as: ELIQUIS  Take 1 tablet (5 mg total) by mouth 2 (two) times daily.     metoprolol succinate 50 MG 24 hr tablet  Commonly known as: TOPROL-XL  Take 1 tablet (50 mg total) by mouth once daily.     pulse oximeter device  Commonly known as: pulse oximeter  by Apply Externally route 2 (two) times a day. Use twice daily at 8 AM and 3 PM and [facility] as directed.     triamcinolone acetonide 0.1% 0.1 % cream  Commonly known as: KENALOG  Apply topically 2 (two) times daily. Under occlusion for 14 days            ASK your doctor about these medications      celecoxib 100 MG capsule  Commonly known as: CeleBREX  Take 1 capsule (100 mg total) by mouth 2 (two) times daily.              Indwelling Lines/Drains at time of discharge:   Lines/Drains/Airways       None                   Time spent on the discharge of patient: 36 minutes         Felicita Cristobal MD  Department of Hospital Medicine  '[facility] - Telemetry ([facility])

## 2024-05-26 NOTE — ASSESSMENT & PLAN NOTE
Patient has a diagnosis of pneumonia. The cause of the pneumonia is suspected to be bacterial in etiology but organism is not known. The pneumonia is  new onset . The patient has the following signs/symptoms of pneumonia: persistent hypoxia , sputum production, and shortness of breath. The patient does have a current oxygen requirement and the patient does not have a home oxygen requirement. I have reviewed the pertinent imaging. The following cultures have been collected: Blood cultures The culture results are listed below.     Current antimicrobial regimen consists of the antibiotics listed below. Will monitor patient closely and continue current treatment plan unchanged.    Antibiotics (From admission, onward)    None          Microbiology Results (last 7 days)     Procedure Component Value Units Date/Time    AFB culture [[id number]] Collected: 05/21/24 1040    Order Status: Completed Specimen: Body Fluid from Pleural Fluid Updated: 05/23/24 0927     AFB Culture & Smear Culture in progress     AFB CULTURE STAIN No acid fast bacilli seen.    Fungus culture [[id number]] Collected: 05/21/24 1041    Order Status: Completed Specimen: Respiratory from Pleural Fluid Updated: 05/22/24 1255     Fungus (Mycology) Culture Culture in progress    AFB stain [[id number]] Collected: 05/21/24 1040    Order Status: Completed Specimen: Body Fluid from Pleural Fluid Updated: 05/22/24 0407     Direct Acid Fast No acid fast bacilli seen.    KOH prep [[id number]] Collected: 05/21/24 1040    Order Status: Completed Specimen: Body Fluid from Pleural Fluid Updated: 05/22/24 0223     KOH Prep No yeast or fungal elements seen    Gram stain [[id number]] Collected: 05/21/24 1040    Order Status: Completed Specimen: Body Fluid from Pleural Fluid Updated: 05/21/24 2354     Gram Stain Result Rare WBC's      No organisms seen

## 2024-05-26 NOTE — ASSESSMENT & PLAN NOTE
Patient with Persistent (7 days or more) atrial fibrillation which is controlled currently with Beta Blocker. Patient is currently in atrial fibrillation.[id number] Score: 4. HASBLED Score: . Anticoagulation indicated. Anticoagulation done with Eliquis .-Eliquis on hold due to possible procedure

## 2024-05-26 NOTE — ASSESSMENT & PLAN NOTE
Patient is identified as having Diastolic (HFpEF) heart failure that is Acute on chronic. CHF is currently uncontrolled due to >3 pillow orthopnea, Rales/crackles on pulmonary exam, and Pulmonary edema/pleural effusion on CXR. Latest ECHO performed and demonstrates- Results for orders placed during the hospital encounter of 03/14/24    Echo    Interpretation Summary    Left Ventricle: The left ventricle is normal in size. Normal wall thickness. There is concentric remodeling. There is low normal systolic function with a visually estimated ejection fraction of 50 - 55%. There is normal diastolic function.    Right Ventricle: Normal right ventricular cavity size. Wall thickness is normal. Right ventricle wall motion  is normal. Systolic function is normal.    Left Atrium: Left atrium is moderately dilated.    Pulmonary Artery: The estimated pulmonary artery systolic pressure is 45 mmHg.    IVC/SVC: Normal venous pressure at 3 mmHg.  . Continue Beta Blocker and Furosemide and monitor clinical status closely. Monitor on telemetry. Patient is on CHF pathway.  Monitor strict Is&Os and daily weights.  Place on fluid restriction of 1.5 L. Cardiology has been consulted. Continue to stress to patient importance of self efficacy and  on diet for CHF. Last BNP reviewed- and noted below

## 2024-05-28 ENCOUNTER — TELEPHONE (OUTPATIENT)
Dept: FAMILY MEDICINE | Facility: CLINIC | Age: 79
End: 2024-05-28
Payer: MEDICARE

## 2024-05-28 NOTE — TELEPHONE ENCOUNTER
----- Message from Johana Green sent at 5/28/2024  1:51 PM CDT -----  Contact: daughter  [phone]  Patients daughter called in this afternoon stating the patient is now in Hospice and she would like to speak with the doctor concerning the appointments she has scheduled. Please call back  [phone] . Please call back  [phone].

## 2024-05-28 NOTE — TELEPHONE ENCOUNTER
Return call back to Mary regarding  patient is now in Hospice and she would like to speak with the doctor concerning the appointments she has scheduled. Daughter wants to know do she keeps the up and coming appointments that scheduled or should she continue with hospice care. Please advise

## 2024-05-28 NOTE — TELEPHONE ENCOUNTER
Spoke with Mary patient daughter Dr. Myers would like her to  keep upcoming trinity in order to  get the specialist input to make sure all comfort care measures are being provided. Pt. Daughter verbalized understanding.

## 2024-05-31 ENCOUNTER — EXTERNAL HOME HEALTH (OUTPATIENT)
Dept: HOME HEALTH SERVICES | Facility: HOSPITAL | Age: 79
End: 2024-05-31
Payer: MEDICARE

## 2024-06-04 LAB
FUNGUS SPEC CULT: NORMAL
FUNGUS SPEC CULT: NORMAL

## 2024-06-11 ENCOUNTER — DOCUMENT SCAN (OUTPATIENT)
Dept: HOME HEALTH SERVICES | Facility: HOSPITAL | Age: 79
End: 2024-06-11
Payer: MEDICARE

## 2024-06-12 ENCOUNTER — DOCUMENT SCAN (OUTPATIENT)
Dept: HOME HEALTH SERVICES | Facility: HOSPITAL | Age: 79
End: 2024-06-12
Payer: MEDICARE

## 2024-06-19 ENCOUNTER — DOCUMENT SCAN (OUTPATIENT)
Dept: HOME HEALTH SERVICES | Facility: HOSPITAL | Age: 79
End: 2024-06-19
Payer: MEDICARE

## 2024-07-10 RX ORDER — METOPROLOL SUCCINATE 50 MG/1
TABLET, EXTENDED RELEASE ORAL
Qty: 30 TABLET | Refills: 0 | Status: SHIPPED | OUTPATIENT
Start: 2024-07-10

## 2024-07-10 NOTE — TELEPHONE ENCOUNTER
No care due was identified.  [facility] Embedded Care Due Messages. Reference number: [id number].   7/10/2024 8:26:02 AM CDT

## 2024-08-19 PROBLEM — J18.9 PNEUMONIA: Status: RESOLVED | Noted: 2024-05-18 | Resolved: 2024-08-19

## 2024-08-19 PROBLEM — J96.01 ACUTE HYPOXEMIC RESPIRATORY FAILURE: Status: RESOLVED | Noted: 2024-03-14 | Resolved: 2024-08-19

## 2025-01-14 DIAGNOSIS — Z00.00 ENCOUNTER FOR MEDICARE ANNUAL WELLNESS EXAM: ICD-10-CM

## 2025-02-05 DIAGNOSIS — Z78.0 MENOPAUSE: ICD-10-CM

## 2025-09-05 ENCOUNTER — PATIENT OUTREACH (OUTPATIENT)
Dept: ADMINISTRATIVE | Facility: HOSPITAL | Age: 80
End: 2025-09-05
Payer: MEDICARE